# Patient Record
Sex: MALE
[De-identification: names, ages, dates, MRNs, and addresses within clinical notes are randomized per-mention and may not be internally consistent; named-entity substitution may affect disease eponyms.]

---

## 2020-02-21 ENCOUNTER — HOSPITAL ENCOUNTER (INPATIENT)
Dept: HOSPITAL 88 - FSED | Age: 40
LOS: 6 days | Discharge: HOME HEALTH | DRG: 871 | End: 2020-02-27
Attending: INTERNAL MEDICINE | Admitting: INTERNAL MEDICINE
Payer: COMMERCIAL

## 2020-02-21 VITALS — WEIGHT: 237 LBS | HEIGHT: 75 IN | BODY MASS INDEX: 29.47 KG/M2

## 2020-02-21 VITALS — SYSTOLIC BLOOD PRESSURE: 120 MMHG | DIASTOLIC BLOOD PRESSURE: 59 MMHG

## 2020-02-21 DIAGNOSIS — B95.62: ICD-10-CM

## 2020-02-21 DIAGNOSIS — G82.20: ICD-10-CM

## 2020-02-21 DIAGNOSIS — Z79.4: ICD-10-CM

## 2020-02-21 DIAGNOSIS — L89.324: ICD-10-CM

## 2020-02-21 DIAGNOSIS — N31.9: ICD-10-CM

## 2020-02-21 DIAGNOSIS — L89.224: ICD-10-CM

## 2020-02-21 DIAGNOSIS — R53.81: ICD-10-CM

## 2020-02-21 DIAGNOSIS — D50.9: ICD-10-CM

## 2020-02-21 DIAGNOSIS — M86.69: ICD-10-CM

## 2020-02-21 DIAGNOSIS — E11.40: ICD-10-CM

## 2020-02-21 DIAGNOSIS — E11.69: ICD-10-CM

## 2020-02-21 DIAGNOSIS — Z74.01: ICD-10-CM

## 2020-02-21 DIAGNOSIS — L89.214: ICD-10-CM

## 2020-02-21 DIAGNOSIS — Z93.3: ICD-10-CM

## 2020-02-21 DIAGNOSIS — A41.9: Primary | ICD-10-CM

## 2020-02-21 DIAGNOSIS — E11.65: ICD-10-CM

## 2020-02-21 DIAGNOSIS — L89.154: ICD-10-CM

## 2020-02-21 DIAGNOSIS — L03.317: ICD-10-CM

## 2020-02-21 DIAGNOSIS — M86.19: ICD-10-CM

## 2020-02-21 DIAGNOSIS — E87.2: ICD-10-CM

## 2020-02-21 DIAGNOSIS — L89.314: ICD-10-CM

## 2020-02-21 DIAGNOSIS — L89.523: ICD-10-CM

## 2020-02-21 DIAGNOSIS — E43: ICD-10-CM

## 2020-02-21 LAB
ANISOCYTOSIS BLD QL SMEAR: SLIGHT
BASOPHILS # BLD AUTO: 0 10*3/UL (ref 0–0.1)
BASOPHILS NFR BLD AUTO: 0.3 % (ref 0–1)
DEPRECATED NEUTROPHILS # BLD AUTO: 8.4 10*3/UL (ref 2.1–6.9)
EOSINOPHIL # BLD AUTO: 0.2 10*3/UL (ref 0–0.4)
EOSINOPHIL NFR BLD AUTO: 1.8 % (ref 0–6)
ERYTHROCYTE [DISTWIDTH] IN CORD BLOOD: 18.5 % (ref 11.7–14.4)
HCT VFR BLD AUTO: 23.6 % (ref 38.2–49.6)
HGB BLD-MCNC: 6.5 G/DL (ref 14–18)
HYPOCHROMIA BLD QL SMEAR: (no result)
LYMPHOCYTES # BLD: 3.2 10*3/UL (ref 1–3.2)
LYMPHOCYTES NFR BLD AUTO: 24.8 % (ref 18–39.1)
MCH RBC QN AUTO: 21 PG (ref 28–32)
MCHC RBC AUTO-ENTMCNC: 27.5 G/DL (ref 31–35)
MCV RBC AUTO: 76.4 FL (ref 81–99)
MICROCYTES BLD QL SMEAR: SLIGHT
MONOCYTES # BLD AUTO: 1 10*3/UL (ref 0.2–0.8)
MONOCYTES NFR BLD AUTO: 7.5 % (ref 4.4–11.3)
NEUTS SEG NFR BLD AUTO: 64.3 % (ref 38.7–80)
PLAT MORPH BLD: (no result)
PLATELET # BLD AUTO: 1236 X10E3/UL (ref 140–360)
PLATELET # BLD EST: (no result) 10*3/UL
POLYCHROMASIA BLD QL SMEAR: (no result)
RBC # BLD AUTO: 3.09 X10E6/UL (ref 4.3–5.7)

## 2020-02-21 PROCEDURE — 84443 ASSAY THYROID STIM HORMONE: CPT

## 2020-02-21 PROCEDURE — 86140 C-REACTIVE PROTEIN: CPT

## 2020-02-21 PROCEDURE — 84100 ASSAY OF PHOSPHORUS: CPT

## 2020-02-21 PROCEDURE — 81003 URINALYSIS AUTO W/O SCOPE: CPT

## 2020-02-21 PROCEDURE — 96372 THER/PROPH/DIAG INJ SC/IM: CPT

## 2020-02-21 PROCEDURE — 82728 ASSAY OF FERRITIN: CPT

## 2020-02-21 PROCEDURE — 86900 BLOOD TYPING SEROLOGIC ABO: CPT

## 2020-02-21 PROCEDURE — 87205 SMEAR GRAM STAIN: CPT

## 2020-02-21 PROCEDURE — 83735 ASSAY OF MAGNESIUM: CPT

## 2020-02-21 PROCEDURE — 82948 REAGENT STRIP/BLOOD GLUCOSE: CPT

## 2020-02-21 PROCEDURE — 36415 COLL VENOUS BLD VENIPUNCTURE: CPT

## 2020-02-21 PROCEDURE — 87040 BLOOD CULTURE FOR BACTERIA: CPT

## 2020-02-21 PROCEDURE — 80202 ASSAY OF VANCOMYCIN: CPT

## 2020-02-21 PROCEDURE — 85025 COMPLETE CBC W/AUTO DIFF WBC: CPT

## 2020-02-21 PROCEDURE — 83605 ASSAY OF LACTIC ACID: CPT

## 2020-02-21 PROCEDURE — 82607 VITAMIN B-12: CPT

## 2020-02-21 PROCEDURE — 84134 ASSAY OF PREALBUMIN: CPT

## 2020-02-21 PROCEDURE — 86920 COMPATIBILITY TEST SPIN: CPT

## 2020-02-21 PROCEDURE — 87186 SC STD MICRODIL/AGAR DIL: CPT

## 2020-02-21 PROCEDURE — 72170 X-RAY EXAM OF PELVIS: CPT

## 2020-02-21 PROCEDURE — 85651 RBC SED RATE NONAUTOMATED: CPT

## 2020-02-21 PROCEDURE — 80053 COMPREHEN METABOLIC PANEL: CPT

## 2020-02-21 PROCEDURE — 82746 ASSAY OF FOLIC ACID SERUM: CPT

## 2020-02-21 PROCEDURE — 84466 ASSAY OF TRANSFERRIN: CPT

## 2020-02-21 PROCEDURE — 87086 URINE CULTURE/COLONY COUNT: CPT

## 2020-02-21 PROCEDURE — 73521 X-RAY EXAM HIPS BI 2 VIEWS: CPT

## 2020-02-21 PROCEDURE — 87071 CULTURE AEROBIC QUANT OTHER: CPT

## 2020-02-21 PROCEDURE — 83540 ASSAY OF IRON: CPT

## 2020-02-21 PROCEDURE — 80048 BASIC METABOLIC PNL TOTAL CA: CPT

## 2020-02-21 PROCEDURE — 93925 LOWER EXTREMITY STUDY: CPT

## 2020-02-21 PROCEDURE — 36569 INSJ PICC 5 YR+ W/O IMAGING: CPT

## 2020-02-21 PROCEDURE — 83036 HEMOGLOBIN GLYCOSYLATED A1C: CPT

## 2020-02-21 PROCEDURE — 86850 RBC ANTIBODY SCREEN: CPT

## 2020-02-21 RX ADMIN — SODIUM CHLORIDE PRN MG: 900 INJECTION INTRAVENOUS at 20:38

## 2020-02-21 RX ADMIN — HYDROCODONE BITARTRATE AND ACETAMINOPHEN PRN EA: 5; 325 TABLET ORAL at 23:50

## 2020-02-21 RX ADMIN — INSULIN LISPRO SCH UNIT: 100 INJECTION, SOLUTION INTRAVENOUS; SUBCUTANEOUS at 23:30

## 2020-02-21 NOTE — XMS REPORT
Patient Summary Document

                             Created on: 2020



BALJEET JESSICA

External Reference #: [id number]

: 1980

Sex: Male



Demographics







                          Address                   [address] [place]  [address]

 

                          Home Phone                [phone]

 

                          Preferred Language        Unknown

 

                          Marital Status            Unknown

 

                          Hindu Affiliation     Unknown

 

                          Race                      Unknown

 

                          Ethnic Group              Unknown





Author







                          Author                    [facility]

 

                          Address                   Unknown

 

                          Phone                     Unavailable







Care Team Providers







                    Care Team Member Name    Role                Phone

 

                    UNKNOWN,  REFFERING    PP                  Unavailable

 

                    HANNAH SANDHU    Unavailable         Unavailable

 

                    FRANCESCA ETIENNE        Unavailable         Unavailable

 

                    JUSTEN DESAI    Unavailable         Unavailable

 

                    CHRIS BOSCH        Unavailable         Unavailable

 

                    CHEYANNE TURNER    Unavailable         Unavailable







Problems

This patient has no known problems.



Allergies, Adverse Reactions, Alerts

This patient has no known allergies or adverse reactions.



Medications

This patient has no known medications.



Encounters







             Start Date/Time    End Date/Time    Encounter Type    Admission Type    Attending [facility]

                    Care Facility       Care Department     Encounter ID

 

        2019 00:00:00    2019 00:00:00    Outpatient                    [facility]     [id number]

 

        2019 03:38:39    2019 03:38:39    Emergency                    [facility]     [id number]

 

        2019 23:43:52    2019 23:43:52    Outpatient                    [facility]     MED     [id number]

 

        2017-09-15 12:36:00    2017-09-15 12:36:00    Emergency    E               [facility]    MED     [id number]









Results







           Test Description    Test Time    Test Comments    Text Results    Atomic Results    Result

 Comments

 

                POC Glucose     2019 11:24:41                      

 

   

 

                Glucose POC (test code=Glucose POC)    183 mg/dL                 If you consider your patient

 critically ill, the Roche-Accu Check Infrom II meter should not be used for 
Glucose determination. Draw a venous Glucose and send to the main Lab for 
analysis.





POC [id number] 07:12:47* 





                Test Item       Value           Reference Range    Comments

 

                Glucose POC (test code=Glucose POC)    259 mg/dL                 If you consider your patient

 critically ill, the Roche-Accu Check Infrom II meter should not be used for 
Glucose determination. Draw a venous Glucose and send to the main Lab for 
analysis.





POC [id number] 20:28:10* 





                Test Item       Value           Reference Range    Comments

 

                Glucose POC (test code=Glucose POC)    227 mg/dL                 Notify RN or MDIf you consider

 your patient critically ill, the Roche-Accu Check Infrom II meter should not be
used for Glucose determination. Draw a venous Glucose and send to the main Lab 
for analysis.





POC [id number] 16:21:11* 





                Test Item       Value           Reference Range    Comments

 

                Glucose POC (test code=Glucose POC)    202 mg/dL                 If you consider your patient

 critically ill, the Roche-Accu Check Infrom II meter should not be used for 
Glucose determination. Draw a venous Glucose and send to the main Lab for 
analysis.





Vancomycin Level [id number] 13:25:28* 





                Test Item       Value           Reference Range    Comments

 

                Vanco Tr (test code=Vanco Tr)    13.5 mcg/mL     10.0-20.0        





POC [id number] 12:13:43* 





                Test Item       Value           Reference Range    Comments

 

                Glucose POC (test code=Glucose POC)    217 mg/dL                 If you consider your patient

 critically ill, the Roche-Accu Check Infrom II meter should not be used for 
Glucose determination. Draw a venous Glucose and send to the main Lab for 
analysis.





POC [id number] 07:42:42* 





                Test Item       Value           Reference Range    Comments

 

                Glucose POC (test code=Glucose POC)    183 mg/dL                 If you consider your patient

 critically ill, the Roche-Accu Check Infrom II meter should not be used for 
Glucose determination. Draw a venous Glucose and send to the main Lab for 
analysis.





POC [id number] 20:55:39* 





                Test Item       Value           Reference Range    Comments

 

                Glucose POC (test code=Glucose POC)    158 mg/dL                 If you consider your patient

 critically ill, the Roche-Accu Check Infrom II meter should not be used for 
Glucose determination. Draw a venous Glucose and send to the main Lab for 
analysis.





POC [id number] 16:22:41* 





                Test Item       Value           Reference Range    Comments

 

                Glucose POC (test code=Glucose POC)    134 mg/dL                 Notify RN or MDIf you consider

 your patient critically ill, the Roche-Accu Check Infrom II meter should not be
used for Glucose determination. Draw a venous Glucose and send to the main Lab 
for analysis.





POC [id number] 11:01:35* 





                Test Item       Value           Reference Range    Comments

 

                Glucose POC (test code=Glucose POC)    297 mg/dL                 Notify RN or MDIf you consider

 your patient critically ill, the Roche-Accu Check Infrom II meter should not be
used for Glucose determination. Draw a venous Glucose and send to the main Lab 
for analysis.





ABORh [id number] 08:17:25* 





                Test Item       Value           Reference Range    Comments

 

                Methodology (test code=Methodology)    Test-Tube(TT)                     

 

                Anti-A (test code=Anti-A)    0                                

 

                Anti-B (test code=Anti-B)    0                                

 

                Anti-AB (test code=Anti-AB)    NT                               

 

                Anti-D (test code=Anti-D)    4+                               

 

                ABORh Retype (test code=ABORh Retype)    O POS                            





POC [id number] 07:20:40* 





                Test Item       Value           Reference Range    Comments

 

                Glucose POC (test code=Glucose POC)    185 mg/dL                 Notify RN or MDIf you consider

 your patient critically ill, the Roche-Accu Check Infrom II meter should not be
used for Glucose determination. Draw a venous Glucose and send to the main Lab 
for analysis.





3C ABSC [id number] 06:21:17* 





                Test Item       Value           Reference Range    Comments

 

                SC1 IS (test code=SC1 IS)    NT                               

 

                SC2 IS (test code=SC2 IS)    NT                               

 

                SC3 IS (test code=SC3 IS)    NT                               

 

                SC1 37 (test code=SC1 37)    0                                

 

                SC2 37 (test code=SC2 37)    0                                

 

                SC3 37 (test code=SC3 37)    0                                

 

                SC1 AHG (test code=SC1 AHG)    0                                

 

                SC2 AHG (test code=SC2 AHG)    0                                

 

                SC3 AHG (test code=SC3 AHG)    0                                

 

                SC1 CC (test code=SC1 CC)    2+                               

 

                SC2 CC (test code=SC2 CC)    2+                               

 

                SC3 CC (test code=SC3 CC)    2+                               

 

                Antibody Screen (3C) (test code=Antibody Screen (3C))    Negative ABSC                     





[id number] 05:54:23* 





                Test Item       Value           Reference Range    Comments

 

                Previous History (test code=Previous History)    No Prev History                     

 

                BBID (test code=BBID)    [id number]                         

 

                Methodology (test code=Methodology)    Test-Tube(TT)                     

 

                Anti-A (test code=Anti-A)    0                                

 

                Anti-B (test code=Anti-B)    0                                

 

                Anti-D (test code=Anti-D)    4+                               

 

                DCon (test code=DCon)    NT                               

 

                A1 (test code=A1)    4+                               

 

                B cells (test code=B cells)    4+                               

 

                ABORh (test code=ABORh)    O POS                            





Automated [id number] 05:41:48* 





                Test Item       Value           Reference Range    Comments

 

                Neutro Auto (test code=Neutro Auto)    50.8 %          36.0-70.0        

 

                Lymph Auto (test code=Lymph Auto)    35.3 %          12.0-44.0        

 

                Mono Auto (test code=Mono Auto)    9.3 %           0.0-11.0         

 

                Eos, Auto (test code=Eos, Auto)    3.4 %           0.0-7.0          

 

                Basophil Auto (test code=Basophil Auto)    0.3 %           0.0-2.0          

 

                Neutro Absolute (test code=Neutro Absolute)    4.9 x10         1.6-7.4          

 

                Lymph Absolute (test code=Lymph Absolute)    3.41 x10        .50-4.60         

 

                Mono Absolute (test code=Mono Absolute)    .90 x10         .00-1.20         

 

                Eos Absolute (test code=Eos Absolute)    0.33 x10        0.00-0.74        

 

                Baso Absolute (test code=Baso Absolute)    0.03 x10        0.00-0.21        





IG [id number] 05:41:48* 





                Test Item       Value           Reference Range    Comments

 

                IG (test code=IG)    0.9 %           0.0-5.0          

 

                IG Abs (test code=IG Abs)    0 x10                            





Complete Blood Count with [id number] 05:41:47* 





                Test Item       Value           Reference Range    Comments

 

                WBC (test code=WBC)    9.6 x10         4.4-10.5         

 

                RBC (test code=RBC)    3.62 x10        4.10-5.70        

 

                Hgb (test code=Hgb)    8.5 g/dL        13.4-17.4        

 

                Hct (test code=Hct)    28.9 %          38.7-52.0        

 

                MCV (test code=MCV)    79.80 fL        80..00     

 

                MCHC (test code=MCHC)    29.40 g/dL      32.00-37.50      

 

                RDW CV (test code=RDW CV)    16.0 %          11.5-14.5        

 

                MCH (test code=MCH)    23.5 pg         27.0-32.5        

 

                Platelets (test code=Platelets)    634.0 x10       140.0-440.0      

 

                MPV (test code=MPV)    8.3 fL                           

 

                Slide Review (test code=Slide Review)    Auto            Auto            Result created by GL_SJM_SLIDE_REV_AUTO

 [email or web address]

 

                nRBC (test code=nRBC)    0                                

 

                NRBC Abs (test code=NRBC Abs)    0.00 x10                         

 

                Pos Morph XN (test code=Pos Morph XN)    A                                

 

                IPF (test code=IPF)    0 %                              





Basic Metabolic [id number] 05:25:44* 





                Test Item       Value           Reference Range    Comments

 

                Sodium Level (test code=Sodium Level)    136.0 mmol/L    135.0-145.0      

 

                Potassium Level (test code=Potassium Level)    4.4 mmol/L      3.5-5.1          

 

                Chloride Level (test code=Chloride Level)    95 mmol/L                  

 

                CO2 (test code=CO2)    30 mmol/L       22-29            

 

                Anion Gap (test code=Anion Gap)    11 mmol/L       7-16             

 

                BUN (test code=BUN)    14.50 mg/dL     6.00-20.00       

 

                Creatinine Level (test code=Creatinine Level)    0.60 mg/dL      0.70-1.20        

 

                BUN/Creat Ratio (test code=BUN/Creat Ratio)    24                               

 

                Glucose Level (test code=Glucose Level)    179 mg/dL                  

 

                Calcium Level (test code=Calcium Level)    8.9 mg/dL       8.3-10.5         





Basic Metabolic [id number] 05:25:44* 





                Test Item       Value           Reference Range    Comments

 

                Sodium Level (test code=Sodium Level)    136.0 mmol/L    135.0-145.0      

 

                Potassium Level (test code=Potassium Level)    4.4 mmol/L      3.5-5.1          

 

                Chloride Level (test code=Chloride Level)    95 mmol/L                  

 

                CO2 (test code=CO2)    30 mmol/L       22-29            

 

                Anion Gap (test code=Anion Gap)    11 mmol/L       7-16             

 

                BUN (test code=BUN)    14.50 mg/dL     6.00-20.00       

 

                Creatinine Level (test code=Creatinine Level)    0.60 mg/dL      0.70-1.20        

 

                BUN/Creat Ratio (test code=BUN/Creat Ratio)    24                               

 

                Glucose Level (test code=Glucose Level)    179 mg/dL                  

 

                Calcium Level (test code=Calcium Level)    8.9 mg/dL       8.3-10.5         

 

                eGFR AA (test code=eGFR AA)    >60 mL/min/1.73 m2                    eGFR (estimated Glomerular Filtration

 Rate) is an estimated value, calculated from the patient's serum creatinine 
using the MDRD equation. It is NOT the patient's actual GFR. The eGFR provides a
more clinically useful measure of kidney disease than serum creatinine 
alone.***This calculation takes sex and race into account, if the information is
provided. If the race is not provided, and the patient is -American, 
multiply by 1.212. If sex is not provided, and the patient is female, multiply 
by 0.742. Results for patients <18 years of age have not been validated by the 
MDRD study and should be interpreted with caution. eGFR Result 
Interpretation:eGFR > or=60 is in the Normal RangeeGFR < 60 may mean kidney 
diseaseeGFR < 15 may mean kidney failure*** Ranges recommended by the National 
Kidney Foundation, http://nkdep.nih.gov





Basic Metabolic [id number] 05:25:44* 





                Test Item       Value           Reference Range    Comments

 

                Sodium Level (test code=Sodium Level)    136.0 mmol/L    135.0-145.0      

 

                Potassium Level (test code=Potassium Level)    4.4 mmol/L      3.5-5.1          

 

                Chloride Level (test code=Chloride Level)    95 mmol/L                  

 

                CO2 (test code=CO2)    30 mmol/L       22-29            

 

                Anion Gap (test code=Anion Gap)    11 mmol/L       7-16             

 

                BUN (test code=BUN)    14.50 mg/dL     6.00-20.00       

 

                Creatinine Level (test code=Creatinine Level)    0.60 mg/dL      0.70-1.20        

 

                BUN/Creat Ratio (test code=BUN/Creat Ratio)    24                               

 

                Glucose Level (test code=Glucose Level)    179 mg/dL                  

 

                Calcium Level (test code=Calcium Level)    8.9 mg/dL       8.3-10.5         

 

                eGFR AA (test code=eGFR AA)    >60 mL/min/1.73 m2                    eGFR (estimated Glomerular Filtration

 Rate) is an estimated value, calculated from the patient's serum creatinine 
using the MDRD equation. It is NOT the patient's actual GFR. The eGFR provides a
more clinically useful measure of kidney disease than serum creatinine 
alone.***This calculation takes sex and race into account, if the information is
provided. If the race is not provided, and the patient is -American, 
multiply by 1.212. If sex is not provided, and the patient is female, multiply 
by 0.742. Results for patients <18 years of age have not been validated by the 
MDRD study and should be interpreted with caution. eGFR Result 
Interpretation:eGFR > or=60 is in the Normal RangeeGFR < 60 may mean kidney 
diseaseeGFR < 15 may mean kidney failure*** Ranges recommended by the National 
Kidney Foundation, http://nkdep.nih.gov

 

                eGFR Non-AA (test code=eGFR Non-AA)    >60.00 mL/min/1.73 m2                    eGFR (estimated Glomerular

 Filtration Rate) is an estimated value, calculated from the patient's serum 
creatinine using the MDRD equation. It is NOT the patient's actual GFR. The eGFR
provides a more clinically useful measure of kidney disease than serum 
creatinine alone.***This calculation takes sex and race into account, if the 
information is provided. If the race is not provided, and the patient is 
-American, multiply by 1.212. If sex is not provided, and the patient is 
female, multiply by 0.742. Results for patients <18 years of age have not been 
validated by the MDRD study and should be interpreted with caution. eGFR Result 
Interpretation:eGFR > or=60 is in the Normal RangeeGFR < 60 may mean kidney 
diseaseeGFR < 15 may mean kidney failure*** Ranges recommended by the National 
Kidney Foundation, http://nkdep.nih.gov





POC [id number] 20:32:07* 





                Test Item       Value           Reference Range    Comments

 

                Glucose POC (test code=Glucose POC)    155 mg/dL                 If you consider your patient

 critically ill, the Roche-Accu Check Infrom II meter should not be used for 
Glucose determination. Draw a venous Glucose and send to the main Lab for 
analysis.





POC [id number] 16:56:40* 





                Test Item       Value           Reference Range    Comments

 

                Glucose POC (test code=Glucose POC)    161 mg/dL                 If you consider your patient

 critically ill, the Roche-Accu Check Infrom II meter should not be used for 
Glucose determination. Draw a venous Glucose and send to the main Lab for 
analysis.





POC [id number] 11:55:05* 





                Test Item       Value           Reference Range    Comments

 

                Glucose POC (test code=Glucose POC)    291 mg/dL                 If you consider your patient

 critically ill, the Roche-Accu Check Infrom II meter should not be used for 
Glucose determination. Draw a venous Glucose and send to the main Lab for 
analysis.





POC [id number] 07:58:10* 





                Test Item       Value           Reference Range    Comments

 

                Glucose POC (test code=Glucose POC)    227 mg/dL                 If you consider your patient

 critically ill, the Roche-Accu Check Infrom II meter should not be used for 
Glucose determination. Draw a venous Glucose and send to the main Lab for 
analysis.





Vancomycin Level [id number] 21:57:56* 





                Test Item       Value           Reference Range    Comments

 

                Vanco Tr (test code=Vanco Tr)    11.6 mcg/mL     10.0-20.0        





POC [id number] 20:04:38* 





                Test Item       Value           Reference Range    Comments

 

                Glucose POC (test code=Glucose POC)    123 mg/dL                 If you consider your patient

 critically ill, the Roche-Accu Check Infrom II meter should not be used for 
Glucose determination. Draw a venous Glucose and send to the main Lab for 
analysis.





POC [id number] 16:27:10* 





                Test Item       Value           Reference Range    Comments

 

                Glucose POC (test code=Glucose POC)    164 mg/dL                 If you consider your patient

 critically ill, the Roche-Accu Check Infrom II meter should not be used for 
Glucose determination. Draw a venous Glucose and send to the main Lab for 
analysis.





POC [id number] 11:56:10* 





                Test Item       Value           Reference Range    Comments

 

                Glucose POC (test code=Glucose POC)    161 mg/dL                 If you consider your patient

 critically ill, the Roche-Accu Check Infrom II meter should not be used for 
Glucose determination. Draw a venous Glucose and send to the main Lab for 
analysis.





Blood [id number] 09:01:35No growth at 5 days.POC [id number] 
07:39:33* 





                Test Item       Value           Reference Range    Comments

 

                Glucose POC (test code=Glucose POC)    195 mg/dL                 If you consider your patient

 critically ill, the Roche-Accu Check Infrom II meter should not be used for 
Glucose determination. Draw a venous Glucose and send to the main Lab for 
analysis.





Basic Metabolic [id number] 06:17:36* 





                Test Item       Value           Reference Range    Comments

 

                Sodium Level (test code=Sodium Level)    138.0 mmol/L    135.0-145.0      

 

                Potassium Level (test code=Potassium Level)    4.0 mmol/L      3.5-5.1          

 

                Chloride Level (test code=Chloride Level)    100 mmol/L                 

 

                CO2 (test code=CO2)    27 mmol/L       22-29            

 

                Anion Gap (test code=Anion Gap)    11 mmol/L       7-16             

 

                BUN (test code=BUN)    9.00 mg/dL      6.00-20.00       

 

                Creatinine Level (test code=Creatinine Level)    0.60 mg/dL      0.70-1.20        

 

                BUN/Creat Ratio (test code=BUN/Creat Ratio)    15                               

 

                Glucose Level (test code=Glucose Level)    189 mg/dL                  

 

                Calcium Level (test code=Calcium Level)    8.5 mg/dL       8.3-10.5         





Basic Metabolic [id number] 06:17:36* 





                Test Item       Value           Reference Range    Comments

 

                Sodium Level (test code=Sodium Level)    138.0 mmol/L    135.0-145.0      

 

                Potassium Level (test code=Potassium Level)    4.0 mmol/L      3.5-5.1          

 

                Chloride Level (test code=Chloride Level)    100 mmol/L                 

 

                CO2 (test code=CO2)    27 mmol/L       22-29            

 

                Anion Gap (test code=Anion Gap)    11 mmol/L       7-16             

 

                BUN (test code=BUN)    9.00 mg/dL      6.00-20.00       

 

                Creatinine Level (test code=Creatinine Level)    0.60 mg/dL      0.70-1.20        

 

                BUN/Creat Ratio (test code=BUN/Creat Ratio)    15                               

 

                Glucose Level (test code=Glucose Level)    189 mg/dL                  

 

                Calcium Level (test code=Calcium Level)    8.5 mg/dL       8.3-10.5         

 

                eGFR AA (test code=eGFR AA)    >60 mL/min/1.73 m2                    eGFR (estimated Glomerular Filtration

 Rate) is an estimated value, calculated from the patient's serum creatinine 
using the MDRD equation. It is NOT the patient's actual GFR. The eGFR provides a
more clinically useful measure of kidney disease than serum creatinine 
alone.***This calculation takes sex and race into account, if the information is
provided. If the race is not provided, and the patient is -American, 
multiply by 1.212. If sex is not provided, and the patient is female, multiply 
by 0.742. Results for patients <18 years of age have not been validated by the 
MDRD study and should be interpreted with caution. eGFR Result 
Interpretation:eGFR > or=60 is in the Normal RangeeGFR < 60 may mean kidney 
diseaseeGFR < 15 may mean kidney failure*** Ranges recommended by the National 
Kidney Foundation, http://nkdep.nih.gov





Basic Metabolic [id number] 06:17:36* 





                Test Item       Value           Reference Range    Comments

 

                Sodium Level (test code=Sodium Level)    138.0 mmol/L    135.0-145.0      

 

                Potassium Level (test code=Potassium Level)    4.0 mmol/L      3.5-5.1          

 

                Chloride Level (test code=Chloride Level)    100 mmol/L                 

 

                CO2 (test code=CO2)    27 mmol/L       22-29            

 

                Anion Gap (test code=Anion Gap)    11 mmol/L       7-16             

 

                BUN (test code=BUN)    9.00 mg/dL      6.00-20.00       

 

                Creatinine Level (test code=Creatinine Level)    0.60 mg/dL      0.70-1.20        

 

                BUN/Creat Ratio (test code=BUN/Creat Ratio)    15                               

 

                Glucose Level (test code=Glucose Level)    189 mg/dL                  

 

                Calcium Level (test code=Calcium Level)    8.5 mg/dL       8.3-10.5         

 

                eGFR AA (test code=eGFR AA)    >60 mL/min/1.73 m2                    eGFR (estimated Glomerular Filtration

 Rate) is an estimated value, calculated from the patient's serum creatinine 
using the MDRD equation. It is NOT the patient's actual GFR. The eGFR provides a
more clinically useful measure of kidney disease than serum creatinine 
alone.***This calculation takes sex and race into account, if the information is
provided. If the race is not provided, and the patient is -American, 
multiply by 1.212. If sex is not provided, and the patient is female, multiply 
by 0.742. Results for patients <18 years of age have not been validated by the 
MDRD study and should be interpreted with caution. eGFR Result 
Interpretation:eGFR > or=60 is in the Normal RangeeGFR < 60 may mean kidney 
diseaseeGFR < 15 may mean kidney failure*** Ranges recommended by the National 
Kidney Foundation, http://nkdep.nih.gov

 

                eGFR Non-AA (test code=eGFR Non-AA)    >60.00 mL/min/1.73 m2                    eGFR (estimated Glomerular

 Filtration Rate) is an estimated value, calculated from the patient's serum 
creatinine using the MDRD equation. It is NOT the patient's actual GFR. The eGFR
provides a more clinically useful measure of kidney disease than serum 
creatinine alone.***This calculation takes sex and race into account, if the 
information is provided. If the race is not provided, and the patient is 
-American, multiply by 1.212. If sex is not provided, and the patient is 
female, multiply by 0.742. Results for patients <18 years of age have not been 
validated by the MDRD study and should be interpreted with caution. eGFR Result 
Interpretation:eGFR > or=60 is in the Normal RangeeGFR < 60 may mean kidney 
diseaseeGFR < 15 may mean kidney failure*** Ranges recommended by the National 
Kidney Foundation, http://nkdep.nih.gov





Complete Blood Count with [id number] 05:15:48* 





                Test Item       Value           Reference Range    Comments

 

                WBC (test code=WBC)    10.2 x10        4.4-10.5         

 

                RBC (test code=RBC)    3.15 x10        4.10-5.70        

 

                Hgb (test code=Hgb)    7.5 g/dL        13.4-17.4        

 

                Hct (test code=Hct)    25.8 %          38.7-52.0        

 

                MCV (test code=MCV)    81.90 fL        80..00     

 

                MCHC (test code=MCHC)    29.10 g/dL      32.00-37.50      

 

                RDW CV (test code=RDW CV)    16.4 %          11.5-14.5        

 

                MCH (test code=MCH)    23.8 pg         27.0-32.5        

 

                Platelets (test code=Platelets)    595.0 x10       140.0-440.0      

 

                MPV (test code=MPV)    8.7 fL                           

 

                Slide Review (test code=Slide Review)    Auto            Auto            Result created by [email or web address]_SLIDE_REV_[email or web address]

 [email or web address]

 

                nRBC (test code=nRBC)    0                                

 

                NRBC Abs (test code=NRBC Abs)    0.00 x10                         

 

                Pos Morph XN (test code=Pos Morph XN)    A                                

 

                IPF (test code=IPF)    0 %                              





Automated [id number] 05:15:48* 





                Test Item       Value           Reference Range    Comments

 

                Neutro Auto (test code=Neutro Auto)    46.3 %          36.0-70.0        

 

                Lymph Auto (test code=Lymph Auto)    41.3 %          12.0-44.0        

 

                Mono Auto (test code=Mono Auto)    8.3 %           0.0-11.0         

 

                Eos, Auto (test code=Eos, Auto)    3.2 %           0.0-7.0          

 

                Basophil Auto (test code=Basophil Auto)    0.3 %           0.0-2.0          

 

                Neutro Absolute (test code=Neutro Absolute)    4.7 x10         1.6-7.4          

 

                Lymph Absolute (test code=Lymph Absolute)    4.21 x10        .50-4.60         

 

                Mono Absolute (test code=Mono Absolute)    .85 x10         .00-1.20         

 

                Eos Absolute (test code=Eos Absolute)    0.33 x10        0.00-0.74        

 

                Baso Absolute (test code=Baso Absolute)    0.03 x10        0.00-0.21        





IG [id number] 05:15:48* 





                Test Item       Value           Reference Range    Comments

 

                IG (test code=IG)    0.6 %           0.0-5.0          

 

                IG Abs (test code=IG Abs)    0 x10                            





Vancomycin Level [id number] 23:32:37* 





                Test Item       Value           Reference Range    Comments

 

                Vanco Tr (test code=Vanco Tr)    2.8 mcg/mL      10.0-20.0        





POC [id number] 20:57:44* 





                Test Item       Value           Reference Range    Comments

 

                Glucose POC (test code=Glucose POC)    309 mg/dL                 If you consider your patient

 critically ill, the Roche-Accu Check Infrom II meter should not be used for 
Glucose determination. Draw a venous Glucose and send to the main Lab for 
analysis.





POC [id number] 16:21:03* 





                Test Item       Value           Reference Range    Comments

 

                Glucose POC (test code=Glucose POC)    199 mg/dL                 Notify RN or MDIf you consider

 your patient critically ill, the Roche-Accu Check Infrom II meter should not be
used for Glucose determination. Draw a venous Glucose and send to the main Lab 
for analysis.





POC [id number] 11:51:37* 





                Test Item       Value           Reference Range    Comments

 

                Glucose POC (test code=Glucose POC)    234 mg/dL                 Notify RN or MDIf you consider

 your patient critically ill, the Roche-Accu Check Infrom II meter should not be
used for Glucose determination. Draw a venous Glucose and send to the main Lab 
for analysis.





Urine [id number] 10:38:47 C Urine Added by GL_SJM_UA_CUL_IND>=100,000 
cfu/ml YeastPOC [id number] 07:40:34* 





                Test Item       Value           Reference Range    Comments

 

                Glucose POC (test code=Glucose POC)    228 mg/dL                 Notify RN or MDIf you consider

 your patient critically ill, the Roche-Accu Check Infrom II meter should not be
used for Glucose determination. Draw a venous Glucose and send to the main Lab 
for analysis.





POC [id number] 19:55:40* 





                Test Item       Value           Reference Range    Comments

 

                Glucose POC (test code=Glucose POC)    217 mg/dL                 If you consider your patient

 critically ill, the Roche-Accu Check Infrom II meter should not be used for 
Glucose determination. Draw a venous Glucose and send to the main Lab for 
analysis.





POC [id number] 16:06:29* 





                Test Item       Value           Reference Range    Comments

 

                Glucose POC (test code=Glucose POC)    228 mg/dL                 Notify RN or MDIf you consider

 your patient critically ill, the Roche-Accu Check Infrom II meter should not be
used for Glucose determination. Draw a venous Glucose and send to the main Lab 
for analysis.





POC [id number] 11:15:06* 





                Test Item       Value           Reference Range    Comments

 

                Glucose POC (test code=Glucose POC)    251 mg/dL                 Notify RN or MDIf you consider

 your patient critically ill, the Roche-Accu Check Infrom II meter should not be
used for Glucose determination. Draw a venous Glucose and send to the main Lab 
for analysis.





Urinalysis [id number] 06:51:37* 





                Test Item       Value           Reference Range    Comments

 

                UA WBC (test code=UA WBC)    20-29           0-5              

 

                UA RBC (test code=UA RBC)    6-10            0-5              

 

                UA Bacteria (test code=UA Bacteria)    Few                              

 

                UA Squam Epithelial (test code=UA Squam Epithelial)    11-19                            

 

                UA Yeast (test code=UA Yeast)    Many                             

 

                UA Ca Ox Crystal (test code=UA Ca Ox Crystal)    Few                              





Basic Metabolic [id number] 06:42:47* 





                Test Item       Value           Reference Range    Comments

 

                Sodium Level (test code=Sodium Level)    144.0 mmol/L    135.0-145.0      

 

                Potassium Level (test code=Potassium Level)    3.8 mmol/L      3.5-5.1          

 

                Chloride Level (test code=Chloride Level)    106 mmol/L                 

 

                CO2 (test code=CO2)    27 mmol/L       22-29            

 

                Anion Gap (test code=Anion Gap)    11 mmol/L       7-16             

 

                BUN (test code=BUN)    8.10 mg/dL      6.00-20.00       

 

                Creatinine Level (test code=Creatinine Level)    0.80 mg/dL      0.70-1.20        

 

                BUN/Creat Ratio (test code=BUN/Creat Ratio)    10                               

 

                Glucose Level (test code=Glucose Level)    315 mg/dL                  

 

                Calcium Level (test code=Calcium Level)    8.6 mg/dL       8.3-10.5         





Basic Metabolic [id number] 06:42:47* 





                Test Item       Value           Reference Range    Comments

 

                Sodium Level (test code=Sodium Level)    144.0 mmol/L    135.0-145.0      

 

                Potassium Level (test code=Potassium Level)    3.8 mmol/L      3.5-5.1          

 

                Chloride Level (test code=Chloride Level)    106 mmol/L                 

 

                CO2 (test code=CO2)    27 mmol/L       22-29            

 

                Anion Gap (test code=Anion Gap)    11 mmol/L       7-16             

 

                BUN (test code=BUN)    8.10 mg/dL      6.00-20.00       

 

                Creatinine Level (test code=Creatinine Level)    0.80 mg/dL      0.70-1.20        

 

                BUN/Creat Ratio (test code=BUN/Creat Ratio)    10                               

 

                Glucose Level (test code=Glucose Level)    315 mg/dL                  

 

                Calcium Level (test code=Calcium Level)    8.6 mg/dL       8.3-10.5         

 

                eGFR AA (test code=eGFR AA)    >60 mL/min/1.73 m2                    eGFR (estimated Glomerular Filtration

 Rate) is an estimated value, calculated from the patient's serum creatinine 
using the MDRD equation. It is NOT the patient's actual GFR. The eGFR provides a
more clinically useful measure of kidney disease than serum creatinine 
alone.***This calculation takes sex and race into account, if the information is
provided. If the race is not provided, and the patient is -American, 
multiply by 1.212. If sex is not provided, and the patient is female, multiply 
by 0.742. Results for patients <18 years of age have not been validated by the 
MDRD study and should be interpreted with caution. eGFR Result 
Interpretation:eGFR > or=60 is in the Normal RangeeGFR < 60 may mean kidney 
diseaseeGFR < 15 may mean kidney failure*** Ranges recommended by the National 
Kidney Foundation, http://nkdep.nih.gov





Basic Metabolic [id number] 06:42:47* 





                Test Item       Value           Reference Range    Comments

 

                Sodium Level (test code=Sodium Level)    144.0 mmol/L    135.0-145.0      

 

                Potassium Level (test code=Potassium Level)    3.8 mmol/L      3.5-5.1          

 

                Chloride Level (test code=Chloride Level)    106 mmol/L                 

 

                CO2 (test code=CO2)    27 mmol/L       22-29            

 

                Anion Gap (test code=Anion Gap)    11 mmol/L       7-16             

 

                BUN (test code=BUN)    8.10 mg/dL      6.00-20.00       

 

                Creatinine Level (test code=Creatinine Level)    0.80 mg/dL      0.70-1.20        

 

                BUN/Creat Ratio (test code=BUN/Creat Ratio)    10                               

 

                Glucose Level (test code=Glucose Level)    315 mg/dL                  

 

                Calcium Level (test code=Calcium Level)    8.6 mg/dL       8.3-10.5         

 

                eGFR AA (test code=eGFR AA)    >60 mL/min/1.73 m2                    eGFR (estimated Glomerular Filtration

 Rate) is an estimated value, calculated from the patient's serum creatinine 
using the MDRD equation. It is NOT the patient's actual GFR. The eGFR provides a
more clinically useful measure of kidney disease than serum creatinine 
alone.***This calculation takes sex and race into account, if the information is
provided. If the race is not provided, and the patient is -American, 
multiply by 1.212. If sex is not provided, and the patient is female, multiply 
by 0.742. Results for patients <18 years of age have not been validated by the 
MDRD study and should be interpreted with caution. eGFR Result 
Interpretation:eGFR > or=60 is in the Normal RangeeGFR < 60 may mean kidney 
diseaseeGFR < 15 may mean kidney failure*** Ranges recommended by the National 
Kidney Foundation, http://nkdep.nih.gov

 

                eGFR Non-AA (test code=eGFR Non-AA)    >60.00 mL/min/1.73 m2                    eGFR (estimated Glomerular

 Filtration Rate) is an estimated value, calculated from the patient's serum 
creatinine using the MDRD equation. It is NOT the patient's actual GFR. The eGFR
provides a more clinically useful measure of kidney disease than serum 
creatinine alone.***This calculation takes sex and race into account, if the 
information is provided. If the race is not provided, and the patient is 
-American, multiply by 1.212. If sex is not provided, and the patient is 
female, multiply by 0.742. Results for patients <18 years of age have not been 
validated by the MDRD study and should be interpreted with caution. eGFR Result 
Interpretation:eGFR > or=60 is in the Normal RangeeGFR < 60 may mean kidney 
diseaseeGFR < 15 may mean kidney failure*** Ranges recommended by the National 
Kidney Foundation, http://nkdep.nih.gov





Urinalysis with Culture, if [id number] 06:35:06* 





                Test Item       Value           Reference Range    Comments

 

                UA Color (test code=UA Color)    YELLO           Yellow           

 

                UA Appear (test code=UA Appear)    CLDY            Clear            

 

                UA pH (test code=UA pH)    6                                

 

                UA Spec Grav (test code=UA Spec Grav)    1.023           1.001-1.035      

 

                UA Glucose (test code=UA Glucose)    300 mg/dL       Negative         

 

                UA Bili (test code=UA Bili)    NEG             Negative         

 

                UA Ketones (test code=UA Ketones)    5 mg/dL         Negative         

 

                UA Blood (test code=UA Blood)    50 cells/mcL    Negative         

 

                UA Protein (test code=UA Protein)    75 mg/dL        Negative         

 

                UA Urobilinogen (test code=UA Urobilinogen)    4 mg/dL         >0.2             

 

                UA Nitrite (test code=UA Nitrite)    NEG             Negative         

 

                UA Leuk Est (test code=UA Leuk Est)    500 cells/mcL    Negative         

 

                UA Micro Ind? (test code=UA Micro Ind?)    Indicated       Not Indicated    Result created

 by rule GL_SJM_UA_MICRO_IND





Complete Blood Count without [id number] 06:23:13* 





                Test Item       Value           Reference Range    Comments

 

                WBC (test code=WBC)    11.5 x10        4.4-10.5         

 

                RBC (test code=RBC)    3.46 x10        4.10-5.70        

 

                Hgb (test code=Hgb)    8.3 g/dL        13.4-17.4        

 

                Hct (test code=Hct)    28.7 %          38.7-52.0        

 

                MCV (test code=MCV)    82.90 fL        80..00     

 

                MCH (test code=MCH)    24.0 pg         27.0-32.5        

 

                MCHC (test code=MCHC)    28.90 g/dL      32.00-37.50      

 

                RDW CV (test code=RDW CV)    16.9 %          11.5-14.5        

 

                Platelets (test code=Platelets)    712.0 x10       140.0-440.0      

 

                MPV (test code=MPV)    8.3 fL                           

 

                nRBC (test code=nRBC)    0                                

 

                NRBC Abs (test code=NRBC Abs)    0.00 x10                         

 

                IPF (test code=IPF)    0 %                              





POC [id number] 20:26:35* 





                Test Item       Value           Reference Range    Comments

 

                Glucose POC (test code=Glucose POC)    217 mg/dL                 If you consider your patient

 critically ill, the Roche-Accu Check Infrom II meter should not be used for 
Glucose determination. Draw a venous Glucose and send to the main Lab for 
analysis.





XR Chest 1 View CVAD Insertion [id number] 19:34:01Patient:   BALJEET JESSICA                                 MRN:    [id number] Date/Time2019
19:29 CDTReason for ExampiccReportChest one view AP 2019 7:33 PMCLINICAL 
INDICATION: PICC placementCOMPARISON: 2019 at 1849LOCATION: 
[id number]:Tip of a left PICC now projects over the cavoatrial junction. 
There is a trace right pleural effusion. The lungs are otherwise well aerated. 
Cardiomediastinal contours are within normal limits. The central pulmonary vascu
lature is not engorged.***** Final *****Dictated by:    Seipel, MD, Timothy JDic
tated DT/TM: 2019 7:33 pmSigned by:  Seipel, MD, Timothy JSigned (Electron
ic Signature):  2019 7:34 pmXR Chest 1 View [id number] 19:22:55
Patient:   BALJEET JESSICA                                 MRN:    [id number]
am Date/Time2019 19:02 CDTReason for ExamLine placementReportChest one view
AP 2019 7:22 PMCLINICAL INDICATION: Line placementCOMPARISON: None availab
leLOCATION: [id number]:The tip of a left PICC projects over the right atrium 
and could be retracted 4 to 5 cm. The lungs are well aerated. Cardiomediastinal 
contours are within normal limits. The central pulmonary vasculature is not engo
rged.***** Final *****Dictated by:    Seipel, MD, Timothy JDictated DT/TM:  7:22 pmSigned by:  Seipel, MD, Timothy JSigned (Electronic Signature):   7:22 pmBlood [id number] 18:03:00* 





                Test Item       Value           Reference Range    Comments

 

                ORGANISM (test code=ORGANISM)    Methicillin-Resistant Staphylococcus aureus                     



 

                Chloramphenicol (test code=Chlor)                                     

 

                Ciprofloxacin (test code=Cipro)                                     

 

                Clindamycin (test code=Clinda)                                     

 

                Erythromycin (test code=Eryth)                                     

 

                Gentamicin (test code=Gent)                                     

 

                Levofloxacin (test code=Levo)                                     

 

                Linezolid (test code=Linez)                                     

 

                Oxacillin (test code=Ox)                                     

 

                Rifampin (test code=Rif)                                     

 

                Tetracycline (test code=Tetra)                                     

 

                Trimethoprim/Sulfa (test code=SXT)                                     

 

                Vancomycin (test code=Vanc)                                     

 

                          Final Report (test code=Final Report)    Methicillin-Resistant Staphylococcus aureus

 Contact isolation recommended Results called to and read back by: Nicanor Hendricks RN 19 10:23:39/PXS                               

 

                          Anaerobic Gram Stain Report (test code=Anaerobic Gram Stain Report)    Evidence of

 growth Gram Positive Cocci in Clusters Results called to and read back by: LORETO Ellis  19 10:31:45 YA                               





Blood [id number] 18:03:00No growth at 5 days.POC [id number] 15:54:30* 





                Test Item       Value           Reference Range    Comments

 

                pH Art (test code=pH Art)    7.45            7.35-7.45        

 

                pH Temp Andrei Art (test code=pH Temp Andrei Art)    7.45                             

 

                pCO2 Art (test code=pCO2 Art)    40 mmHg         35-45            

 

                pCO2 Temp Andrei Art (test code=pCO2 Temp Andrei Art)    40 mmHg                          

 

                pO2 Art (test code=pO2 Art)    78 mmHg                    

 

                pO2 Temp Andrei Art (test code=pO2 Temp Andrei Art)    78 mmHg                          

 

                ctHb Art (test code=ctHb Art)    8.7 g/dL        12.2-17.4        

 

                O2 Sat Art (test code=O2 Sat Art)    96.5 %          80.0-100.0       

 

                FO2Hb Art (test code=FO2Hb Art)    94.0 %          0.0-100.0        

 

                FCOHb Art (test code=FCOHb Art)    2.5 %           0.0-20.0         

 

                FMetHb Art (test code=FMetHb Art)    0.1 %           0.0-20.0         

 

                HCO3 Art (test code=HCO3 Art)    27.6 mmol/L     22.0-26.0       L

 

                Hct Art (test code=Hct Art)    27 %            34-52            

 

                Na Art (test code=Na Art)    143 mmol/L      135-145          

 

                K Art (test code=K Art)    3.4 mmol/L      3.5-4.5          

 

                iCa Art (test code=iCa Art)    1.18 mmol/L     1.00-1.50        

 

                Cl Art (test code=Cl Art)    106 mmol/L                 

 

                Glu Art (test code=Glu Art)    294 mg/dL                  

 

                Base Excess Arterial (test code=Base Excess Arterial)    3.4                              

 

                FiO2 Art (test code=FiO2 Art)    21 %                             

 

                Lactate Art (test code=Lactate Art)    0.8 mmol/L      0.5-2.2          

 

                Draw Site (test code=Draw Site)    Radial. L                        





POC [id number] 15:41:58* 





                Test Item       Value           Reference Range    Comments

 

                Glucose POC (test code=Glucose POC)    287 mg/dL                 If you consider your patient

 critically ill, the Roche-Accu Check Infrom II meter should not be used for 
Glucose determination. Draw a venous Glucose and send to the main Lab for 
analysis.





Lactic Acid, Plasma (Venous)2019 15:37:44* 





                Test Item       Value           Reference Range    Comments

 

                          Lactic Acid, Plasma (Venous) (test code=Lactic Acid, Plasma (Venous))    1.0 mmol/L

                          0.5-1.9                    





Urine Drug [id number] 22:56:06* 





                Test Item       Value           Reference Range    Comments

 

                Amphetamine Screen Ur (test code=Amphetamine Screen Ur)    Negative        Negative         

 

                Barbiturate Screen Ur (test code=Barbiturate Screen Ur)    Negative        Negative         

 

                Benzodiazepines Ur (test code=Benzodiazepines Ur)    Negative        Negative         

 

                Cocaine Screen Ur (test code=Cocaine Screen Ur)    POSITIVE        Negative         

 

                U Methadone Scr (test code=U Methadone Scr)    Negative        Negative         

 

                Opiate Screen Ur (test code=Opiate Screen Ur)    Negative        Negative         

 

                U PCP Scrn (test code=U PCP Scrn)    Negative        Negative         

 

                Cannabinoid Screen Ur (test code=Cannabinoid Screen Ur)    POSITIVE        Negative         

 

                U TCA (test code=U TCA)    POSITIVE        Negative        The results of all drug screen tests

 are only preliminary. Clinical consideration and professional judgment should 
be applied to any drug of abuse test result, particularly when preliminary 
positive results are obtained. Please order a separate confirmatory test if 
desired.





Comprehensive Metabolic [id number] 21:46:03* 





                Test Item       Value           Reference Range    Comments

 

                Sodium Level (test code=Sodium Level)    135.0 mmol/L    135.0-145.0      

 

                Potassium Level (test code=Potassium Level)    3.7 mmol/L      3.5-5.1          

 

                Chloride Level (test code=Chloride Level)    96 mmol/L                  

 

                CO2 (test code=CO2)    20 mmol/L       22-29            

 

                Anion Gap (test code=Anion Gap)    19 mmol/L       7-16             

 

                BUN (test code=BUN)    13.00 mg/dL     6.00-20.00       

 

                Creatinine Level (test code=Creatinine Level)    0.80 mg/dL      0.70-1.20        

 

                BUN/Creat Ratio (test code=BUN/Creat Ratio)    16                               

 

                Glucose Level (test code=Glucose Level)    252 mg/dL                  

 

                Calcium Level (test code=Calcium Level)    9.4 mg/dL       8.3-10.5         

 

                Alk Phos (test code=Alk Phos)    215 U/L                    

 

                Bilirubin Total (test code=Bilirubin Total)    0.4 mg/dL       0.1-0.9          

 

                Albumin Level (test code=Albumin Level)    3.5 g/dL        3.5-5.2          

 

                Protein Total (test code=Protein Total)    8.2 g/dL        6.4-8.3          

 

                ALT (test code=ALT)    15 U/L          1-41             

 

                AST (test code=AST)    21 U/L          1-40             

 

                Globulin (test code=Globulin)    4.7 g/dL        2.9-3.1          

 

                A/G Ratio (test code=A/G Ratio)    0.7 ratio                        





Comprehensive Metabolic [id number] 21:46:03* 





                Test Item       Value           Reference Range    Comments

 

                Sodium Level (test code=Sodium Level)    135.0 mmol/L    135.0-145.0      

 

                Potassium Level (test code=Potassium Level)    3.7 mmol/L      3.5-5.1          

 

                Chloride Level (test code=Chloride Level)    96 mmol/L                  

 

                CO2 (test code=CO2)    20 mmol/L       22-29            

 

                Anion Gap (test code=Anion Gap)    19 mmol/L       7-16             

 

                BUN (test code=BUN)    13.00 mg/dL     6.00-20.00       

 

                Creatinine Level (test code=Creatinine Level)    0.80 mg/dL      0.70-1.20        

 

                BUN/Creat Ratio (test code=BUN/Creat Ratio)    16                               

 

                Glucose Level (test code=Glucose Level)    252 mg/dL                  

 

                Calcium Level (test code=Calcium Level)    9.4 mg/dL       8.3-10.5         

 

                Alk Phos (test code=Alk Phos)    215 U/L                    

 

                Bilirubin Total (test code=Bilirubin Total)    0.4 mg/dL       0.1-0.9          

 

                Albumin Level (test code=Albumin Level)    3.5 g/dL        3.5-5.2          

 

                Protein Total (test code=Protein Total)    8.2 g/dL        6.4-8.3          

 

                ALT (test code=ALT)    15 U/L          1-41             

 

                AST (test code=AST)    21 U/L          1-40             

 

                Globulin (test code=Globulin)    4.7 g/dL        2.9-3.1          

 

                A/G Ratio (test code=A/G Ratio)    0.7 ratio                        

 

                eGFR AA (test code=eGFR AA)    >60 mL/min/1.73 m2                    eGFR (estimated Glomerular Filtration

 Rate) is an estimated value, calculated from the patient's serum creatinine 
using the MDRD equation. It is NOT the patient's actual GFR. The eGFR provides a
more clinically useful measure of kidney disease than serum creatinine 
alone.***This calculation takes sex and race into account, if the information is
provided. If the race is not provided, and the patient is -American, 
multiply by 1.212. If sex is not provided, and the patient is female, multiply 
by 0.742. Results for patients <18 years of age have not been validated by the 
MDRD study and should be interpreted with caution. eGFR Result 
Interpretation:eGFR > or=60 is in the Normal RangeeGFR < 60 may mean kidney 
diseaseeGFR < 15 may mean kidney failure*** Ranges recommended by the National 
Kidney Foundation, http://nkdep.nih.gov





Alcohol [id number] 21:46:03* 





                Test Item       Value           Reference Range    Comments

 

                Ethanol Level (test code=Ethanol Level)    <0.00 g/dL      0.00-0.01       Intoxicated 0.080

 g/dL or more

 

                Ethanol Inst (test code=Ethanol Inst)    <0                               





Comprehensive Metabolic [id number] 21:46:03* 





                Test Item       Value           Reference Range    Comments

 

                Sodium Level (test code=Sodium Level)    135.0 mmol/L    135.0-145.0      

 

                Potassium Level (test code=Potassium Level)    3.7 mmol/L      3.5-5.1          

 

                Chloride Level (test code=Chloride Level)    96 mmol/L                  

 

                CO2 (test code=CO2)    20 mmol/L       22-29            

 

                Anion Gap (test code=Anion Gap)    19 mmol/L       7-16             

 

                BUN (test code=BUN)    13.00 mg/dL     6.00-20.00       

 

                Creatinine Level (test code=Creatinine Level)    0.80 mg/dL      0.70-1.20        

 

                BUN/Creat Ratio (test code=BUN/Creat Ratio)    16                               

 

                Glucose Level (test code=Glucose Level)    252 mg/dL                  

 

                Calcium Level (test code=Calcium Level)    9.4 mg/dL       8.3-10.5         

 

                Alk Phos (test code=Alk Phos)    215 U/L                    

 

                Bilirubin Total (test code=Bilirubin Total)    0.4 mg/dL       0.1-0.9          

 

                Albumin Level (test code=Albumin Level)    3.5 g/dL        3.5-5.2          

 

                Protein Total (test code=Protein Total)    8.2 g/dL        6.4-8.3          

 

                ALT (test code=ALT)    15 U/L          1-41             

 

                AST (test code=AST)    21 U/L          1-40             

 

                Globulin (test code=Globulin)    4.7 g/dL        2.9-3.1          

 

                A/G Ratio (test code=A/G Ratio)    0.7 ratio                        

 

                eGFR AA (test code=eGFR AA)    >60 mL/min/1.73 m2                    eGFR (estimated Glomerular Filtration

 Rate) is an estimated value, calculated from the patient's serum creatinine 
using the MDRD equation. It is NOT the patient's actual GFR. The eGFR provides a
more clinically useful measure of kidney disease than serum creatinine 
alone.***This calculation takes sex and race into account, if the information is
provided. If the race is not provided, and the patient is -American, 
multiply by 1.212. If sex is not provided, and the patient is female, multiply 
by 0.742. Results for patients <18 years of age have not been validated by the 
MDRD study and should be interpreted with caution. eGFR Result 
Interpretation:eGFR > or=60 is in the Normal RangeeGFR < 60 may mean kidney 
diseaseeGFR < 15 may mean kidney failure*** Ranges recommended by the National 
Kidney Foundation, http://nkdep.nih.gov

 

                eGFR Non-AA (test code=eGFR Non-AA)    >60.00 mL/min/1.73 m2                    eGFR (estimated Glomerular

 Filtration Rate) is an estimated value, calculated from the patient's serum 
creatinine using the MDRD equation. It is NOT the patient's actual GFR. The eGFR
provides a more clinically useful measure of kidney disease than serum 
creatinine alone.***This calculation takes sex and race into account, if the 
information is provided. If the race is not provided, and the patient is 
-American, multiply by 1.212. If sex is not provided, and the patient is 
female, multiply by 0.742. Results for patients <18 years of age have not been 
validated by the MDRD study and should be interpreted with caution. eGFR Result 
Interpretation:eGFR > or=60 is in the Normal RangeeGFR < 60 may mean kidney 
diseaseeGFR < 15 may mean kidney failure*** Ranges recommended by the National 
Kidney Foundation, http://nkdep.nih.gov





IG [id number] 21:19:52* 





                Test Item       Value           Reference Range    Comments

 

                IG (test code=IG)    0.6 %           0.0-5.0          

 

                IG Abs (test code=IG Abs)    0 x10                            





Complete Blood Count with [id number] 21:19:51* 





                Test Item       Value           Reference Range    Comments

 

                WBC (test code=WBC)    16.0 x10        4.4-10.5         

 

                RBC (test code=RBC)    3.86 x10        4.10-5.70        

 

                Hgb (test code=Hgb)    9.4 g/dL        13.4-17.4        

 

                Hct (test code=Hct)    31.2 %          38.7-52.0        

 

                MCV (test code=MCV)    80.80 fL        80..00     

 

                MCHC (test code=MCHC)    30.10 g/dL      32.00-37.50      

 

                RDW CV (test code=RDW CV)    16.7 %          11.5-14.5        

 

                MCH (test code=MCH)    24.4 pg         27.0-32.5        

 

                Platelets (test code=Platelets)    772.0 x10       140.0-440.0      

 

                MPV (test code=MPV)    8.3 fL                           

 

                Slide Review (test code=Slide Review)    Auto            Auto            Result created by [email or web address]



 

                nRBC (test code=nRBC)    0                                

 

                NRBC Abs (test code=NRBC Abs)    0.00 x10                         

 

                IPF (test code=IPF)    0 %                              





Automated [id number] 21:19:51* 





                Test Item       Value           Reference Range    Comments

 

                Neutro Auto (test code=Neutro Auto)    82.6 %          36.0-70.0        

 

                Lymph Auto (test code=Lymph Auto)    8.7 %           12.0-44.0        

 

                Mono Auto (test code=Mono Auto)    7.7 %           0.0-11.0         

 

                Eos, Auto (test code=Eos, Auto)    0.1 %           0.0-7.0          

 

                Basophil Auto (test code=Basophil Auto)    0.3 %           0.0-2.0          

 

                Neutro Absolute (test code=Neutro Absolute)    13.2 x10        1.6-7.4          

 

                Lymph Absolute (test code=Lymph Absolute)    1.39 x10        .50-4.60         

 

                Mono Absolute (test code=Mono Absolute)    1.23 x10        .00-1.20         

 

                Eos Absolute (test code=Eos Absolute)    0.02 x10        0.00-0.74        

 

                Baso Absolute (test code=Baso Absolute)    0.05 x10        0.00-0.21        





POC [id number] 11:31:08* 





                Test Item       Value           Reference Range    Comments

 

                Glucose POC (test code=Glucose POC)    167 mg/dL                 If you consider your patient

 critically ill, the Roche-Accu Check Infrom II meter should not be used for 
Glucose determination. Draw a venous Glucose and send to the main Lab for 
analysis.





POC [id number] 07:36:11* 





                Test Item       Value           Reference Range    Comments

 

                Glucose POC (test code=Glucose POC)    221 mg/dL                 Notify RN or MDIf you consider

 your patient critically ill, the Roche-Accu Check Infrom II meter should not be
used for Glucose determination. Draw a venous Glucose and send to the main Lab 
for analysis.





POC [id number] 20:26:07* 





                Test Item       Value           Reference Range    Comments

 

                Glucose POC (test code=Glucose POC)    176 mg/dL                 Notify RN or MDIf you consider

 your patient critically ill, the Roche-Accu Check Infrom II meter should not be
used for Glucose determination. Draw a venous Glucose and send to the main Lab 
for analysis.





POC [id number] 16:51:07* 





                Test Item       Value           Reference Range    Comments

 

                Glucose POC (test code=Glucose POC)    167 mg/dL                 If you consider your patient

 critically ill, the Roche-Accu Check Infrom II meter should not be used for 
Glucose determination. Draw a venous Glucose and send to the main Lab for 
analysis.





POC [id number] 11:13:15* 





                Test Item       Value           Reference Range    Comments

 

                Glucose POC (test code=Glucose POC)    224 mg/dL                 Notify RN or MDIf you consider

 your patient critically ill, the Roche-Accu Check Infrom II meter should not be
used for Glucose determination. Draw a venous Glucose and send to the main Lab 
for analysis.





POC [id number] 07:31:38* 





                Test Item       Value           Reference Range    Comments

 

                Glucose POC (test code=Glucose POC)    263 mg/dL                 Notify RN or MDIf you consider

 your patient critically ill, the Roche-Accu Check Infrom II meter should not be
used for Glucose determination. Draw a venous Glucose and send to the main Lab 
for analysis.





Basic Metabolic [id number] 06:51:27* 





                Test Item       Value           Reference Range    Comments

 

                Sodium Level (test code=Sodium Level)    138.0 mmol/L    135.0-145.0      

 

                Potassium Level (test code=Potassium Level)    4.7 mmol/L      3.5-5.1          

 

                Chloride Level (test code=Chloride Level)    100 mmol/L                 

 

                CO2 (test code=CO2)    29 mmol/L       22-29            

 

                Anion Gap (test code=Anion Gap)    9 mmol/L        7-16             

 

                BUN (test code=BUN)    16.20 mg/dL     6.00-20.00       

 

                Creatinine Level (test code=Creatinine Level)    0.70 mg/dL      0.70-1.20        

 

                BUN/Creat Ratio (test code=BUN/Creat Ratio)    23                               

 

                Glucose Level (test code=Glucose Level)    263 mg/dL                  

 

                Calcium Level (test code=Calcium Level)    8.8 mg/dL       8.3-10.5         





Basic Metabolic [id number] 06:51:27* 





                Test Item       Value           Reference Range    Comments

 

                Sodium Level (test code=Sodium Level)    138.0 mmol/L    135.0-145.0      

 

                Potassium Level (test code=Potassium Level)    4.7 mmol/L      3.5-5.1          

 

                Chloride Level (test code=Chloride Level)    100 mmol/L                 

 

                CO2 (test code=CO2)    29 mmol/L       22-29            

 

                Anion Gap (test code=Anion Gap)    9 mmol/L        7-16             

 

                BUN (test code=BUN)    16.20 mg/dL     6.00-20.00       

 

                Creatinine Level (test code=Creatinine Level)    0.70 mg/dL      0.70-1.20        

 

                BUN/Creat Ratio (test code=BUN/Creat Ratio)    23                               

 

                Glucose Level (test code=Glucose Level)    263 mg/dL                  

 

                Calcium Level (test code=Calcium Level)    8.8 mg/dL       8.3-10.5         

 

                eGFR AA (test code=eGFR AA)    >60 mL/min/1.73 m2                    eGFR (estimated Glomerular Filtration

 Rate) is an estimated value, calculated from the patient's serum creatinine 
using the MDRD equation. It is NOT the patient's actual GFR. The eGFR provides a
more clinically useful measure of kidney disease than serum creatinine 
alone.***This calculation takes sex and race into account, if the information is
provided. If the race is not provided, and the patient is -American, 
multiply by 1.212. If sex is not provided, and the patient is female, multiply 
by 0.742. Results for patients <18 years of age have not been validated by the 
MDRD study and should be interpreted with caution. eGFR Result 
Interpretation:eGFR > or=60 is in the Normal RangeeGFR < 60 may mean kidney 
diseaseeGFR < 15 may mean kidney failure*** Ranges recommended by the National 
Kidney Foundation, http://nkdep.nih.gov





Basic Metabolic [id number] 06:51:27* 





                Test Item       Value           Reference Range    Comments

 

                Sodium Level (test code=Sodium Level)    138.0 mmol/L    135.0-145.0      

 

                Potassium Level (test code=Potassium Level)    4.7 mmol/L      3.5-5.1          

 

                Chloride Level (test code=Chloride Level)    100 mmol/L                 

 

                CO2 (test code=CO2)    29 mmol/L       22-29            

 

                Anion Gap (test code=Anion Gap)    9 mmol/L        7-16             

 

                BUN (test code=BUN)    16.20 mg/dL     6.00-20.00       

 

                Creatinine Level (test code=Creatinine Level)    0.70 mg/dL      0.70-1.20        

 

                BUN/Creat Ratio (test code=BUN/Creat Ratio)    23                               

 

                Glucose Level (test code=Glucose Level)    263 mg/dL                  

 

                Calcium Level (test code=Calcium Level)    8.8 mg/dL       8.3-10.5         

 

                eGFR AA (test code=eGFR AA)    >60 mL/min/1.73 m2                    eGFR (estimated Glomerular Filtration

 Rate) is an estimated value, calculated from the patient's serum creatinine 
using the MDRD equation. It is NOT the patient's actual GFR. The eGFR provides a
more clinically useful measure of kidney disease than serum creatinine 
alone.***This calculation takes sex and race into account, if the information is
provided. If the race is not provided, and the patient is -American, 
multiply by 1.212. If sex is not provided, and the patient is female, multiply 
by 0.742. Results for patients <18 years of age have not been validated by the 
MDRD study and should be interpreted with caution. eGFR Result 
Interpretation:eGFR > or=60 is in the Normal RangeeGFR < 60 may mean kidney 
diseaseeGFR < 15 may mean kidney failure*** Ranges recommended by the National 
Kidney Foundation, http://nkdep.nih.gov

 

                eGFR Non-AA (test code=eGFR Non-AA)    >60.00 mL/min/1.73 m2                    eGFR (estimated Glomerular

 Filtration Rate) is an estimated value, calculated from the patient's serum 
creatinine using the MDRD equation. It is NOT the patient's actual GFR. The eGFR
provides a more clinically useful measure of kidney disease than serum 
creatinine alone.***This calculation takes sex and race into account, if the 
information is provided. If the race is not provided, and the patient is 
-American, multiply by 1.212. If sex is not provided, and the patient is 
female, multiply by 0.742. Results for patients <18 years of age have not been 
validated by the MDRD study and should be interpreted with caution. eGFR Result 
Interpretation:eGFR > or=60 is in the Normal RangeeGFR < 60 may mean kidney 
diseaseeGFR < 15 may mean kidney failure*** Ranges recommended by the National 
Kidney Foundation, http://nkdep.nih.gov





Automated [id number] 06:46:59* 





                Test Item       Value           Reference Range    Comments

 

                Neutro Auto (test code=Neutro Auto)    53.5 %          36.0-70.0        

 

                Lymph Auto (test code=Lymph Auto)    34.2 %          12.0-44.0        

 

                Mono Auto (test code=Mono Auto)    7.9 %           0.0-11.0         

 

                Eos, Auto (test code=Eos, Auto)    3.1 %           0.0-7.0          

 

                Basophil Auto (test code=Basophil Auto)    0.4 %           0.0-2.0          

 

                Neutro Absolute (test code=Neutro Absolute)    6.1 x10         1.6-7.4          

 

                Lymph Absolute (test code=Lymph Absolute)    3.92 x10        .50-4.60         

 

                Mono Absolute (test code=Mono Absolute)    .91 x10         .00-1.20         

 

                Eos Absolute (test code=Eos Absolute)    0.35 x10        0.00-0.74        

 

                Baso Absolute (test code=Baso Absolute)    0.05 x10        0.00-0.21        





IG [id number] 06:46:59* 





                Test Item       Value           Reference Range    Comments

 

                IG (test code=IG)    0.9 %           0.0-5.0          

 

                IG Abs (test code=IG Abs)    0 x10                            





Complete Blood Count with [id number] 06:46:58* 





                Test Item       Value           Reference Range    Comments

 

                WBC (test code=WBC)    11.5 x10        4.4-10.5         

 

                RBC (test code=RBC)    3.58 x10        4.10-5.70        

 

                Hgb (test code=Hgb)    8.9 g/dL        13.4-17.4        

 

                Hct (test code=Hct)    29.5 %          38.7-52.0        

 

                MCV (test code=MCV)    82.40 fL        80..00     

 

                MCHC (test code=MCHC)    30.20 g/dL      32.00-37.50      

 

                RDW CV (test code=RDW CV)    16.0 %          11.5-14.5        

 

                MCH (test code=MCH)    24.9 pg         27.0-32.5        

 

                Platelets (test code=Platelets)    777.0 x10       140.0-440.0      

 

                MPV (test code=MPV)    8.3 fL                           

 

                Slide Review (test code=Slide Review)    Auto            Auto            Result created by [email or web address]



 

                nRBC (test code=nRBC)    0                                

 

                NRBC Abs (test code=NRBC Abs)    0.00 x10                         

 

                IPF (test code=IPF)    0 %                              





Prothrombin Time and [id number] 06:46:58* 





                Test Item       Value           Reference Range    Comments

 

                Prothrombin Time (test code=Prothrombin Time)    12.5 seconds    9.8-13.4         

 

                INR (test code=INR)    1.1 ratio       0.6-1.2          





POC [id number] 20:30:45* 





                Test Item       Value           Reference Range    Comments

 

                Glucose POC (test code=Glucose POC)    170 mg/dL                 If you consider your patient

 critically ill, the Roche-Accu Check Infrom II meter should not be used for 
Glucose determination. Draw a venous Glucose and send to the main Lab for 
analysis.





POC [id number] 16:43:12* 





                Test Item       Value           Reference Range    Comments

 

                Glucose POC (test code=Glucose POC)    165 mg/dL                 If you consider your patient

 critically ill, the Roche-Accu Check Infrom II meter should not be used for 
Glucose determination. Draw a venous Glucose and send to the main Lab for 
analysis.





Blood [id number] 12:02:02No growth at 5 days.POC [id number] 
11:15:34* 





                Test Item       Value           Reference Range    Comments

 

                Glucose POC (test code=Glucose POC)    302 mg/dL                 Notify RN or MDIf you consider

 your patient critically ill, the Roche-Accu Check Infrom II meter should not be
used for Glucose determination. Draw a venous Glucose and send to the main Lab 
for analysis.





POC [id number] 07:49:14* 





                Test Item       Value           Reference Range    Comments

 

                Glucose POC (test code=Glucose POC)    272 mg/dL                 If you consider your patient

 critically ill, the Roche-Accu Check Infrom II meter should not be used for 
Glucose determination. Draw a venous Glucose and send to the main Lab for 
analysis.





POC [id number] 20:59:08* 





                Test Item       Value           Reference Range    Comments

 

                Glucose POC (test code=Glucose POC)    306 mg/dL                 Notify RN or MDIf you consider

 your patient critically ill, the Roche-Accu Check Infrom II meter should not be
used for Glucose determination. Draw a venous Glucose and send to the main Lab 
for analysis.





POC [id number] 16:28:39* 





                Test Item       Value           Reference Range    Comments

 

                Glucose POC (test code=Glucose POC)    167 mg/dL                 Notify RN or MDIf you consider

 your patient critically ill, the Roche-Accu Check Infrom II meter should not be
used for Glucose determination. Draw a venous Glucose and send to the main Lab 
for analysis.





POC [id number] 11:51:31* 





                Test Item       Value           Reference Range    Comments

 

                Glucose POC (test code=Glucose POC)    275 mg/dL                 Notify RN or MDIf you consider

 your patient critically ill, the Roche-Accu Check Infrom II meter should not be
used for Glucose determination. Draw a venous Glucose and send to the main Lab 
for analysis.





POC [id number] 07:48:38* 





                Test Item       Value           Reference Range    Comments

 

                Glucose POC (test code=Glucose POC)    187 mg/dL                 Notify RN or MDIf you consider

 your patient critically ill, the Roche-Accu Check Infrom II meter should not be
used for Glucose determination. Draw a venous Glucose and send to the main Lab 
for analysis.





Basic Metabolic [id number] 05:25:11* 





                Test Item       Value           Reference Range    Comments

 

                Sodium Level (test code=Sodium Level)    138.0 mmol/L    135.0-145.0      

 

                Potassium Level (test code=Potassium Level)    4.2 mmol/L      3.5-5.1          

 

                Chloride Level (test code=Chloride Level)    102 mmol/L                 

 

                CO2 (test code=CO2)    27 mmol/L       22-29            

 

                Anion Gap (test code=Anion Gap)    9 mmol/L        7-16             

 

                BUN (test code=BUN)    7.80 mg/dL      6.00-20.00       

 

                Creatinine Level (test code=Creatinine Level)    0.50 mg/dL      0.70-1.20        

 

                BUN/Creat Ratio (test code=BUN/Creat Ratio)    16                               

 

                Glucose Level (test code=Glucose Level)    172 mg/dL                  

 

                Calcium Level (test code=Calcium Level)    8.5 mg/dL       8.3-10.5         





Basic Metabolic [id number] 05:25:11* 





                Test Item       Value           Reference Range    Comments

 

                Sodium Level (test code=Sodium Level)    138.0 mmol/L    135.0-145.0      

 

                Potassium Level (test code=Potassium Level)    4.2 mmol/L      3.5-5.1          

 

                Chloride Level (test code=Chloride Level)    102 mmol/L                 

 

                CO2 (test code=CO2)    27 mmol/L       22-29            

 

                Anion Gap (test code=Anion Gap)    9 mmol/L        7-16             

 

                BUN (test code=BUN)    7.80 mg/dL      6.00-20.00       

 

                Creatinine Level (test code=Creatinine Level)    0.50 mg/dL      0.70-1.20        

 

                BUN/Creat Ratio (test code=BUN/Creat Ratio)    16                               

 

                Glucose Level (test code=Glucose Level)    172 mg/dL                  

 

                Calcium Level (test code=Calcium Level)    8.5 mg/dL       8.3-10.5         

 

                eGFR AA (test code=eGFR AA)    >60 mL/min/1.73 m2                    eGFR (estimated Glomerular Filtration

 Rate) is an estimated value, calculated from the patient's serum creatinine 
using the MDRD equation. It is NOT the patient's actual GFR. The eGFR provides a
more clinically useful measure of kidney disease than serum creatinine 
alone.***This calculation takes sex and race into account, if the information is
provided. If the race is not provided, and the patient is -American, 
multiply by 1.212. If sex is not provided, and the patient is female, multiply 
by 0.742. Results for patients <18 years of age have not been validated by the 
MDRD study and should be interpreted with caution. eGFR Result 
Interpretation:eGFR > or=60 is in the Normal RangeeGFR < 60 may mean kidney 
diseaseeGFR < 15 may mean kidney failure*** Ranges recommended by the National 
Kidney Foundation, http://nkdep.nih.gov





Basic Metabolic [id number] 05:25:11* 





                Test Item       Value           Reference Range    Comments

 

                Sodium Level (test code=Sodium Level)    138.0 mmol/L    135.0-145.0      

 

                Potassium Level (test code=Potassium Level)    4.2 mmol/L      3.5-5.1          

 

                Chloride Level (test code=Chloride Level)    102 mmol/L                 

 

                CO2 (test code=CO2)    27 mmol/L       22-29            

 

                Anion Gap (test code=Anion Gap)    9 mmol/L        7-16             

 

                BUN (test code=BUN)    7.80 mg/dL      6.00-20.00       

 

                Creatinine Level (test code=Creatinine Level)    0.50 mg/dL      0.70-1.20        

 

                BUN/Creat Ratio (test code=BUN/Creat Ratio)    16                               

 

                Glucose Level (test code=Glucose Level)    172 mg/dL                  

 

                Calcium Level (test code=Calcium Level)    8.5 mg/dL       8.3-10.5         

 

                eGFR AA (test code=eGFR AA)    >60 mL/min/1.73 m2                    eGFR (estimated Glomerular Filtration

 Rate) is an estimated value, calculated from the patient's serum creatinine 
using the MDRD equation. It is NOT the patient's actual GFR. The eGFR provides a
more clinically useful measure of kidney disease than serum creatinine 
alone.***This calculation takes sex and race into account, if the information is
provided. If the race is not provided, and the patient is -American, 
multiply by 1.212. If sex is not provided, and the patient is female, multiply 
by 0.742. Results for patients <18 years of age have not been validated by the 
MDRD study and should be interpreted with caution. eGFR Result 
Interpretation:eGFR > or=60 is in the Normal RangeeGFR < 60 may mean kidney 
diseaseeGFR < 15 may mean kidney failure*** Ranges recommended by the National 
Kidney Foundation, http://nkdep.nih.gov

 

                eGFR Non-AA (test code=eGFR Non-AA)    >60.00 mL/min/1.73 m2                    eGFR (estimated Glomerular

 Filtration Rate) is an estimated value, calculated from the patient's serum 
creatinine using the MDRD equation. It is NOT the patient's actual GFR. The eGFR
provides a more clinically useful measure of kidney disease than serum 
creatinine alone.***This calculation takes sex and race into account, if the 
information is provided. If the race is not provided, and the patient is 
-American, multiply by 1.212. If sex is not provided, and the patient is 
female, multiply by 0.742. Results for patients <18 years of age have not been 
validated by the MDRD study and should be interpreted with caution. eGFR Result 
Interpretation:eGFR > or=60 is in the Normal RangeeGFR < 60 may mean kidney 
diseaseeGFR < 15 may mean kidney failure*** Ranges recommended by the National 
Kidney Foundation, http://nkdep.nih.gov





Urinalysis with Culture, if [id number] 05:12:29* 





                Test Item       Value           Reference Range    Comments

 

                UA Color (test code=UA Color)    YELLO           Yellow           

 

                UA Appear (test code=UA Appear)    CLEAR           Clear            

 

                UA pH (test code=UA pH)    5                                

 

                UA Spec Grav (test code=UA Spec Grav)    1.009           1.001-1.035      

 

                UA Glucose (test code=UA Glucose)    NEG             Negative         

 

                UA Bili (test code=UA Bili)    NEG             Negative         

 

                UA Ketones (test code=UA Ketones)    NEG             Negative         

 

                UA Blood (test code=UA Blood)    NEG             Negative         

 

                UA Protein (test code=UA Protein)    NEG             Negative         

 

                UA Urobilinogen (test code=UA Urobilinogen)    0.2 mg/dL                        

 

                UA Nitrite (test code=UA Nitrite)    NEG             Negative         

 

                UA Leuk Est (test code=UA Leuk Est)    NEG             Negative         

 

                UA Micro Ind? (test code=UA Micro Ind?)    Not Indicated    Not Indicated    Result created

 by rule GL_SJM_UA_MICRO_IND





Complete Blood Count with [id number] 05:06:41* 





                Test Item       Value           Reference Range    Comments

 

                WBC (test code=WBC)    11.4 x10        4.4-10.5         

 

                RBC (test code=RBC)    3.25 x10        4.10-5.70        

 

                Hgb (test code=Hgb)    8.1 g/dL        13.4-17.4        

 

                MCV (test code=MCV)    82.50 fL        80..00     

 

                Hct (test code=Hct)    26.8 %          38.7-52.0        

 

                MCHC (test code=MCHC)    30.20 g/dL      32.00-37.50      

 

                RDW CV (test code=RDW CV)    15.8 %          11.5-14.5        

 

                MCH (test code=MCH)    24.9 pg         27.0-32.5        

 

                Platelets (test code=Platelets)    709.0 x10       140.0-440.0      

 

                MPV (test code=MPV)    8.2 fL                           

 

                Slide Review (test code=Slide Review)    Auto            Auto            Result created by GL_SJM_SLIDE_REV_AUTO



 

                nRBC (test code=nRBC)    0                                

 

                NRBC Abs (test code=NRBC Abs)    0.00 x10                         

 

                IPF (test code=IPF)    0 %                              





Automated [id number] 05:06:41* 





                Test Item       Value           Reference Range    Comments

 

                Neutro Auto (test code=Neutro Auto)    52.8 %          36.0-70.0        

 

                Lymph Auto (test code=Lymph Auto)    34.4 %          12.0-44.0        

 

                Mono Auto (test code=Mono Auto)    9.2 %           0.0-11.0         

 

                Eos, Auto (test code=Eos, Auto)    2.5 %           0.0-7.0          

 

                Basophil Auto (test code=Basophil Auto)    0.4 %           0.0-2.0          

 

                Neutro Absolute (test code=Neutro Absolute)    6.0 x10         1.6-7.4          

 

                Lymph Absolute (test code=Lymph Absolute)    3.92 x10        .50-4.60         

 

                Mono Absolute (test code=Mono Absolute)    1.05 x10        .00-1.20         

 

                Eos Absolute (test code=Eos Absolute)    0.28 x10        0.00-0.74        

 

                Baso Absolute (test code=Baso Absolute)    0.04 x10        0.00-0.21        





IG [id number] 05:06:41* 





                Test Item       Value           Reference Range    Comments

 

                IG (test code=IG)    0.7 %           0.0-5.0          

 

                IG Abs (test code=IG Abs)    0 x10                            





POC [id number] 21:04:42* 





                Test Item       Value           Reference Range    Comments

 

                Glucose POC (test code=Glucose POC)    211 mg/dL                 If you consider your patient

 critically ill, the Roche-Accu Check Infrom II meter should not be used for 
Glucose determination. Draw a venous Glucose and send to the main Lab for 
analysis.





POC [id number] 16:59:08* 





                Test Item       Value           Reference Range    Comments

 

                Glucose POC (test code=Glucose POC)    231 mg/dL                 Notify RN or MDIf you consider

 your patient critically ill, the Roche-Accu Check Infrom II meter should not be
used for Glucose determination. Draw a venous Glucose and send to the main Lab 
for analysis.





POC [id number] 11:36:36* 





                Test Item       Value           Reference Range    Comments

 

                Glucose POC (test code=Glucose POC)    360 mg/dL                 Notify RN or MDIf you consider

 your patient critically ill, the Roche-Accu Check Infrom II meter should not be
used for Glucose determination. Draw a venous Glucose and send to the main Lab 
for analysis.





POC [id number] 07:40:35* 





                Test Item       Value           Reference Range    Comments

 

                Glucose POC (test code=Glucose POC)    241 mg/dL                 Notify RN or MDIf you consider

 your patient critically ill, the Roche-Accu Check Infrom II meter should not be
used for Glucose determination. Draw a venous Glucose and send to the main Lab 
for analysis.





POC [id number] 20:25:31* 





                Test Item       Value           Reference Range    Comments

 

                Glucose POC (test code=Glucose POC)    306 mg/dL                 If you consider your patient

 critically ill, the Roche-Accu Check Infrom II meter should not be used for 
Glucose determination. Draw a venous Glucose and send to the main Lab for 
analysis.





POC [id number] 17:11:02* 





                Test Item       Value           Reference Range    Comments

 

                Glucose POC (test code=Glucose POC)    191 mg/dL                 Notify RN or MDIf you consider

 your patient critically ill, the Roche-Accu Check Infrom II meter should not be
used for Glucose determination. Draw a venous Glucose and send to the main Lab 
for analysis.





XR Chest 1 View [id number] 16:29:35Patient:   BALJEET JESSICA II        
                     MRN:    [id number] Date/Time2019 16:22 CDTReason 
for ExamLine placementReportCHEST 1 VIEWCLINICAL INFORMATION:  Line 
placementCOMPARISON: 2019FINDINGS:The tip of the left arm PICC is 
located at the superior cavoatrial junction.  The lungs are well-expanded.  
There is stable blunting of the right lateral costophrenic angle.  The heart 
size is normal.  The bones are unchanged.IMPRESSION:The tip of the left arm PICC
projects over the superior cavoatrial junction.LOCATION: R16***** Final 
*****Dictated by:    MD Gant Adam FDictated DT/TM: 2019 4:28 pmSigned
by:  MD Gant Adam FSigned (Electronic Signature):  2019 4:29 pmPOC 
[id number] 14:07:01* 





                Test Item       Value           Reference Range    Comments

 

                Glucose POC (test code=Glucose POC)    276 mg/dL                 If you consider your patient

 critically ill, the Roche-Accu Check Infrom II meter should not be used for 
Glucose determination. Draw a venous Glucose and send to the main Lab for 
analysis.





POC [id number] 11:12:33* 





                Test Item       Value           Reference Range    Comments

 

                Glucose POC (test code=Glucose POC)    292 mg/dL                 If you consider your patient

 critically ill, the Roche-Accu Check Infrom II meter should not be used for 
Glucose determination. Draw a venous Glucose and send to the main Lab for 
analysis.





Urine [id number] 09:34:39* 





                Test Item       Value           Reference Range    Comments

 

                ORGANISM (test code=ORGANISM)    Escherichia coli                     

 

                Amikacin (test code=Amik)                                     

 

                Ampicillin (test code=Amp)                                     

 

                Ampicillin/Sulbactam (test code=Amp/Sul)                                     

 

                Cefazolin (test code=Cefaz)                                     

 

                Cefepime (test code=Cefep)                                     

 

                Cefotaxime (test code=Cefo)                                     

 

                Ceftazidime (test code=Ceftaz)                                     

 

                Ceftriaxone (test code=Ceftri)                                     

 

                Cefuroxime (test code=Cefur)                                     

 

                Ciprofloxacin (test code=Cipro)                                     

 

                Gentamicin (test code=Gent)                                     

 

                Imipenem (test code=Imi)                                     

 

                Levofloxacin (test code=Levo)                                     

 

                Meropenem (test code=Navi)                                     

 

                Nitrofurantoin (test code=Nitro)                                     

 

                Piperacillin/Tazobactam (test code=Pip/Derek)                                     

 

                Tobramycin (test code=Tobra)                                     

 

                Trimethoprim/Sulfa (test code=SXT)                                     

 

                ORGANISM (test code=ORGANISM2)    Escherichia coli                     

 

                Amikacin (test code=Amik)                                     

 

                Ampicillin (test code=Amp)                                     

 

                Ampicillin/Sulbactam (test code=Amp/Sul)                                     

 

                Cefazolin (test code=Cefaz)                                     

 

                Cefepime (test code=Cefep)                                     

 

                Cefotaxime (test code=Cefo)                                     

 

                Ceftazidime (test code=Ceftaz)                                     

 

                Ceftriaxone (test code=Ceftri)                                     

 

                Cefuroxime (test code=Cefur)                                     

 

                Ciprofloxacin (test code=Cipro)                                     

 

                Gentamicin (test code=Gent)                                     

 

                Imipenem (test code=Imi)                                     

 

                Levofloxacin (test code=Levo)                                     

 

                Meropenem (test code=Navi)                                     

 

                Nitrofurantoin (test code=Nitro)                                     

 

                Piperacillin/Tazobactam (test code=Pip/Derek)                                     

 

                Tobramycin (test code=Tobra)                                     

 

                Trimethoprim/Sulfa (test code=SXT)                                     

 

                          Final Report (test code=Final Report)    >=100,000 cfu/ml Escherichia coli >=100,000

 cfu/ml Escherichia coli #2                               





 C Urine Added by GL_[email or web address]_UA_CUL_INDHemoglobin [id number] 07:40:43* 





                Test Item       Value           Reference Range    Comments

 

                Hemoglobin A1c (test code=Hemoglobin A1c)    10.5 %          4.8-5.9         Non Diabetic 4.8-5.9%Diabetic

 <7.0%





POC [id number] 07:23:37* 





                Test Item       Value           Reference Range    Comments

 

                Glucose POC (test code=Glucose POC)    308 mg/dL                 If you consider your patient

 critically ill, the Roche-Accu Check Infrom II meter should not be used for 
Glucose determination. Draw a venous Glucose and send to the main Lab for 
analysis.





3C ABSC [id number] 05:58:53* 





                Test Item       Value           Reference Range    Comments

 

                SC1 IS (test code=SC1 IS)    NT                               

 

                SC2 IS (test code=SC2 IS)    NT                               

 

                SC3 IS (test code=SC3 IS)    NT                               

 

                SC1 37 (test code=SC1 37)    0                                

 

                SC2 37 (test code=SC2 37)    0                                

 

                SC3 37 (test code=SC3 37)    0                                

 

                SC1 AHG (test code=SC1 AHG)    0                                

 

                SC2 AHG (test code=SC2 AHG)    0                                

 

                SC3 AHG (test code=SC3 AHG)    0                                

 

                SC1 CC (test code=SC1 CC)    2+                               

 

                SC2 CC (test code=SC2 CC)    2+                               

 

                SC3 CC (test code=SC3 CC)    2+                               

 

                Antibody Screen (3C) (test code=Antibody Screen (3C))    Negative ABSC                     





[id number] 05:58:52* 





                Test Item       Value           Reference Range    Comments

 

                Previous History (test code=Previous History)    Yes Prev History                     

 

                BBID (test code=BBID)    [id number]                         

 

                Methodology (test code=Methodology)    Test-Tube(TT)                     

 

                Anti-A (test code=Anti-A)    0                                

 

                Anti-B (test code=Anti-B)    0                                

 

                Anti-D (test code=Anti-D)    4+                               

 

                DCon (test code=DCon)    NT                               

 

                A1 (test code=A1)    4+                               

 

                B cells (test code=B cells)    4+                               

 

                ABORh (test code=ABORh)    O POS                            





Protein [id number] 05:39:06* 





                Test Item       Value           Reference Range    Comments

 

                Protein Total (test code=Protein Total)    7.0 g/dL        6.4-8.3          





Albumin [id number] 05:39:06* 





                Test Item       Value           Reference Range    Comments

 

                Albumin Level (test code=Albumin Level)    3.0 g/dL        3.5-5.2          





[id number] 05:39:06* 





                Test Item       Value           Reference Range    Comments

 

                Prealbumin (test code=Prealbumin)    10 mg/dL        20-40            





Basic Metabolic [id number] 05:39:05* 





                Test Item       Value           Reference Range    Comments

 

                Sodium Level (test code=Sodium Level)    135.0 mmol/L    135.0-145.0      

 

                Potassium Level (test code=Potassium Level)    4.5 mmol/L      3.5-5.1          

 

                Chloride Level (test code=Chloride Level)    98 mmol/L                  

 

                CO2 (test code=CO2)    25 mmol/L       22-29            

 

                Anion Gap (test code=Anion Gap)    12 mmol/L       7-16             

 

                BUN (test code=BUN)    9.20 mg/dL      6.00-20.00       

 

                Creatinine Level (test code=Creatinine Level)    0.60 mg/dL      0.70-1.20        

 

                BUN/Creat Ratio (test code=BUN/Creat Ratio)    15                               

 

                Glucose Level (test code=Glucose Level)    299 mg/dL                  

 

                Calcium Level (test code=Calcium Level)    8.5 mg/dL       8.3-10.5         





Basic Metabolic [id number] 05:39:05* 





                Test Item       Value           Reference Range    Comments

 

                Sodium Level (test code=Sodium Level)    135.0 mmol/L    135.0-145.0      

 

                Potassium Level (test code=Potassium Level)    4.5 mmol/L      3.5-5.1          

 

                Chloride Level (test code=Chloride Level)    98 mmol/L                  

 

                CO2 (test code=CO2)    25 mmol/L       22-29            

 

                Anion Gap (test code=Anion Gap)    12 mmol/L       7-16             

 

                BUN (test code=BUN)    9.20 mg/dL      6.00-20.00       

 

                Creatinine Level (test code=Creatinine Level)    0.60 mg/dL      0.70-1.20        

 

                BUN/Creat Ratio (test code=BUN/Creat Ratio)    15                               

 

                Glucose Level (test code=Glucose Level)    299 mg/dL                  

 

                Calcium Level (test code=Calcium Level)    8.5 mg/dL       8.3-10.5         

 

                eGFR AA (test code=eGFR AA)    >60 mL/min/1.73 m2                    eGFR (estimated Glomerular Filtration

 Rate) is an estimated value, calculated from the patient's serum creatinine 
using the MDRD equation. It is NOT the patient's actual GFR. The eGFR provides a
more clinically useful measure of kidney disease than serum creatinine 
alone.***This calculation takes sex and race into account, if the information is
provided. If the race is not provided, and the patient is -American, 
multiply by 1.212. If sex is not provided, and the patient is female, multiply 
by 0.742. Results for patients <18 years of age have not been validated by the 
MDRD study and should be interpreted with caution. eGFR Result 
Interpretation:eGFR > or=60 is in the Normal RangeeGFR < 60 may mean kidney 
diseaseeGFR < 15 may mean kidney failure*** Ranges recommended by the National 
Kidney Foundation, http://nkdep.nih.gov





Basic Metabolic [id number] 05:39:05* 





                Test Item       Value           Reference Range    Comments

 

                Sodium Level (test code=Sodium Level)    135.0 mmol/L    135.0-145.0      

 

                Potassium Level (test code=Potassium Level)    4.5 mmol/L      3.5-5.1          

 

                Chloride Level (test code=Chloride Level)    98 mmol/L                  

 

                CO2 (test code=CO2)    25 mmol/L       22-29            

 

                Anion Gap (test code=Anion Gap)    12 mmol/L       7-16             

 

                BUN (test code=BUN)    9.20 mg/dL      6.00-20.00       

 

                Creatinine Level (test code=Creatinine Level)    0.60 mg/dL      0.70-1.20        

 

                BUN/Creat Ratio (test code=BUN/Creat Ratio)    15                               

 

                Glucose Level (test code=Glucose Level)    299 mg/dL                  

 

                Calcium Level (test code=Calcium Level)    8.5 mg/dL       8.3-10.5         

 

                eGFR AA (test code=eGFR AA)    >60 mL/min/1.73 m2                    eGFR (estimated Glomerular Filtration

 Rate) is an estimated value, calculated from the patient's serum creatinine 
using the MDRD equation. It is NOT the patient's actual GFR. The eGFR provides a
more clinically useful measure of kidney disease than serum creatinine 
alone.***This calculation takes sex and race into account, if the information is
provided. If the race is not provided, and the patient is -American, 
multiply by 1.212. If sex is not provided, and the patient is female, multiply 
by 0.742. Results for patients <18 years of age have not been validated by the 
MDRD study and should be interpreted with caution. eGFR Result 
Interpretation:eGFR > or=60 is in the Normal RangeeGFR < 60 may mean kidney 
diseaseeGFR < 15 may mean kidney failure*** Ranges recommended by the National 
Kidney Foundation, http://nkdep.nih.gov

 

                eGFR Non-AA (test code=eGFR Non-AA)    >60.00 mL/min/1.73 m2                    eGFR (estimated Glomerular

 Filtration Rate) is an estimated value, calculated from the patient's serum 
creatinine using the MDRD equation. It is NOT the patient's actual GFR. The eGFR
provides a more clinically useful measure of kidney disease than serum 
creatinine alone.***This calculation takes sex and race into account, if the 
information is provided. If the race is not provided, and the patient is 
-American, multiply by 1.212. If sex is not provided, and the patient is 
female, multiply by 0.742. Results for patients <18 years of age have not been 
validated by the MDRD study and should be interpreted with caution. eGFR Result 
Interpretation:eGFR > or=60 is in the Normal RangeeGFR < 60 may mean kidney 
diseaseeGFR < 15 may mean kidney failure*** Ranges recommended by the National 
Kidney Foundation, http://nkdep.nih.gov





Complete Blood Count without [id number] 05:07:31* 





                Test Item       Value           Reference Range    Comments

 

                WBC (test code=WBC)    15.1 x10        4.4-10.5         

 

                RBC (test code=RBC)    3.63 x10        4.10-5.70        

 

                Hgb (test code=Hgb)    9.1 g/dL        13.4-17.4        

 

                Hct (test code=Hct)    30.0 %          38.7-52.0        

 

                MCV (test code=MCV)    82.60 fL        80..00     

 

                MCH (test code=MCH)    25.1 pg         27.0-32.5        

 

                MCHC (test code=MCHC)    30.30 g/dL      32.00-37.50      

 

                RDW CV (test code=RDW CV)    16.2 %          11.5-14.5        

 

                Platelets (test code=Platelets)    685.0 x10       140.0-440.0      

 

                MPV (test code=MPV)    9.2 fL                           

 

                nRBC (test code=nRBC)    0                                

 

                NRBC Abs (test code=NRBC Abs)    0.00 x10                         

 

                IPF (test code=IPF)    0 %                              





Drugs of Abuse Urine [id number] 01:09:36* 





                Test Item       Value           Reference Range    Comments

 

                Amphetamine Screen Ur (test code=Amphetamine Screen Ur)    Negative        Negative        For

 diagnostic purposes only. Positive results should always be assessed in 
conjunction with a patient's medical history.

 

                Barbiturate Screen Ur (test code=Barbiturate Screen Ur)    Negative        Negative         

 

                Benzodiazepines Ur (test code=Benzodiazepines Ur)    Negative        Negative         

 

                Cocaine Screen Ur (test code=Cocaine Screen Ur)    Negative        Negative         

 

                U Methadone (test code=U Methadone)    Negative        Negative         

 

                Opiate Screen Ur (test code=Opiate Screen Ur)    POSITIVE        Negative         

 

                U PCP Scrn (test code=U PCP Scrn)    Negative        Negative         

 

                U Propoxyphene (test code=U Propoxyphene)    Negative        Negative         

 

                Cannabinoid Screen Ur (test code=Cannabinoid Screen Ur)    POSITIVE        Negative         





POC [id number] 21:19:06* 





                Test Item       Value           Reference Range    Comments

 

                Glucose POC (test code=Glucose POC)    376 mg/dL                 If you consider your patient

 critically ill, the Roche-Accu Check Infrom II meter should not be used for 
Glucose determination. Draw a venous Glucose and send to the main Lab for 
analysis.





Red Blood Cells [id number] 19:22:25* 





                Test Item       Value           Reference Range    Comments

 

                # of Units (test code=# of Units)    2                                

 

                RBC Trn Reason (test code=RBC Trn Reason)    Surgery                          

 

                RBC Product Ready (test code=RBC Product Ready)    RBC Ready                        





3C ABSC [id number] 19:14:26* 





                Test Item       Value           Reference Range    Comments

 

                SC1 IS (test code=SC1 IS)    NT                               

 

                SC2 IS (test code=SC2 IS)    NT                               

 

                SC3 IS (test code=SC3 IS)    NT                               

 

                SC1 37 (test code=SC1 37)    0                                

 

                SC2 37 (test code=SC2 37)    0                                

 

                SC3 37 (test code=SC3 37)    0                                

 

                SC1 AHG (test code=SC1 AHG)    0                                

 

                SC2 AHG (test code=SC2 AHG)    0                                

 

                SC3 AHG (test code=SC3 AHG)    0                                

 

                SC1 CC (test code=SC1 CC)    2+                               

 

                SC2 CC (test code=SC2 CC)    2+                               

 

                SC3 CC (test code=SC3 CC)    2+                               

 

                Antibody Screen (3C) (test code=Antibody Screen (3C))    Negative ABSC                     





Hx ABO/[id number] 18:37:56O [id number] 18:36:35* 





                Test Item       Value           Reference Range    Comments

 

                Previous History (test code=Previous History)    Yes Prev History                     

 

                BBID (test code=BBID)    [id number]                         

 

                Methodology (test code=Methodology)    Test-Tube(TT)                     

 

                Anti-A (test code=Anti-A)    0                                

 

                Anti-B (test code=Anti-B)    0                                

 

                Anti-D (test code=Anti-D)    4+                               

 

                DCon (test code=DCon)    NT                               

 

                A1 (test code=A1)    4+                               

 

                B cells (test code=B cells)    4+                               

 

                ABORh (test code=ABORh)    O POS                            





POC [id number] 16:22:41* 





                Test Item       Value           Reference Range    Comments

 

                Glucose POC (test code=Glucose POC)    327 mg/dL                 Notify RN or MDIf you consider

 your patient critically ill, the Roche-Accu Check Infrom II meter should not be
used for Glucose determination. Draw a venous Glucose and send to the main Lab 
for analysis.





POC [id number] 11:16:36* 





                Test Item       Value           Reference Range    Comments

 

                Glucose POC (test code=Glucose POC)    351 mg/dL                 Notify RN or MDIf you consider

 your patient critically ill, the Roche-Accu Check Infrom II meter should not be
used for Glucose determination. Draw a venous Glucose and send to the main Lab 
for analysis.





Erythrocyte Sedimentation Rate [id number] 08:15:41* 





                Test Item       Value           Reference Range    Comments

 

                ESR STAT (test code=ESR STAT)    129 mm/hr       0-9              





POC [id number] 07:43:40* 





                Test Item       Value           Reference Range    Comments

 

                Glucose POC (test code=Glucose POC)    338 mg/dL                 Notify RN or MDIf you consider

 your patient critically ill, the Roche-Accu Check Infrom II meter should not be
used for Glucose determination. Draw a venous Glucose and send to the main Lab 
for analysis.





Complete Blood Count without [id number] 07:10:35* 





                Test Item       Value           Reference Range    Comments

 

                WBC (test code=WBC)    12.8 x10        4.4-10.5         

 

                RBC (test code=RBC)    3.55 x10        4.10-5.70        

 

                Hgb (test code=Hgb)    8.9 g/dL        13.4-17.4        

 

                Hct (test code=Hct)    29.1 %          38.7-52.0        

 

                MCV (test code=MCV)    82.00 fL        80..00     

 

                MCH (test code=MCH)    25.1 pg         27.0-32.5        

 

                MCHC (test code=MCHC)    30.60 g/dL      32.00-37.50      

 

                RDW CV (test code=RDW CV)    16.1 %          11.5-14.5        

 

                Platelets (test code=Platelets)    690.0 x10       140.0-440.0      

 

                MPV (test code=MPV)    9.0 fL                           

 

                nRBC (test code=nRBC)    0                                

 

                NRBC Abs (test code=NRBC Abs)    0.00 x10                         

 

                IPF (test code=IPF)    0 %                              





Basic Metabolic [id number] 07:07:06* 





                Test Item       Value           Reference Range    Comments

 

                Sodium Level (test code=Sodium Level)    134.0 mmol/L    135.0-145.0      

 

                Potassium Level (test code=Potassium Level)    4.8 mmol/L      3.5-5.1          

 

                Chloride Level (test code=Chloride Level)    99 mmol/L                  

 

                CO2 (test code=CO2)    22 mmol/L       22-29            

 

                Anion Gap (test code=Anion Gap)    13 mmol/L       7-16             

 

                BUN (test code=BUN)    12.30 mg/dL     6.00-20.00       

 

                Creatinine Level (test code=Creatinine Level)    0.70 mg/dL      0.70-1.20        

 

                BUN/Creat Ratio (test code=BUN/Creat Ratio)    18                               

 

                Glucose Level (test code=Glucose Level)    329 mg/dL                  

 

                Calcium Level (test code=Calcium Level)    8.7 mg/dL       8.3-10.5         





C Reactive [id number] 07:07:06* 





                Test Item       Value           Reference Range    Comments

 

                CRP (test code=CRP)    187.3 mg/L      0.0-5.0          





Basic Metabolic [id number] 07:07:06* 





                Test Item       Value           Reference Range    Comments

 

                Sodium Level (test code=Sodium Level)    134.0 mmol/L    135.0-145.0      

 

                Potassium Level (test code=Potassium Level)    4.8 mmol/L      3.5-5.1          

 

                Chloride Level (test code=Chloride Level)    99 mmol/L                  

 

                CO2 (test code=CO2)    22 mmol/L       22-29            

 

                Anion Gap (test code=Anion Gap)    13 mmol/L       7-16             

 

                BUN (test code=BUN)    12.30 mg/dL     6.00-20.00       

 

                Creatinine Level (test code=Creatinine Level)    0.70 mg/dL      0.70-1.20        

 

                BUN/Creat Ratio (test code=BUN/Creat Ratio)    18                               

 

                Glucose Level (test code=Glucose Level)    329 mg/dL                  

 

                Calcium Level (test code=Calcium Level)    8.7 mg/dL       8.3-10.5         

 

                eGFR AA (test code=eGFR AA)    >60 mL/min/1.73 m2                    eGFR (estimated Glomerular Filtration

 Rate) is an estimated value, calculated from the patient's serum creatinine 
using the MDRD equation. It is NOT the patient's actual GFR. The eGFR provides a
more clinically useful measure of kidney disease than serum creatinine 
alone.***This calculation takes sex and race into account, if the information is
provided. If the race is not provided, and the patient is -American, 
multiply by 1.212. If sex is not provided, and the patient is female, multiply 
by 0.742. Results for patients <18 years of age have not been validated by the 
MDRD study and should be interpreted with caution. eGFR Result 
Interpretation:eGFR > or=60 is in the Normal RangeeGFR < 60 may mean kidney 
diseaseeGFR < 15 may mean kidney failure*** Ranges recommended by the National 
Kidney Foundation, http://nkdep.nih.gov





Basic Metabolic [id number] 07:07:06* 





                Test Item       Value           Reference Range    Comments

 

                Sodium Level (test code=Sodium Level)    134.0 mmol/L    135.0-145.0      

 

                Potassium Level (test code=Potassium Level)    4.8 mmol/L      3.5-5.1          

 

                Chloride Level (test code=Chloride Level)    99 mmol/L                  

 

                CO2 (test code=CO2)    22 mmol/L       22-29            

 

                Anion Gap (test code=Anion Gap)    13 mmol/L       7-16             

 

                BUN (test code=BUN)    12.30 mg/dL     6.00-20.00       

 

                Creatinine Level (test code=Creatinine Level)    0.70 mg/dL      0.70-1.20        

 

                BUN/Creat Ratio (test code=BUN/Creat Ratio)    18                               

 

                Glucose Level (test code=Glucose Level)    329 mg/dL                  

 

                Calcium Level (test code=Calcium Level)    8.7 mg/dL       8.3-10.5         

 

                eGFR AA (test code=eGFR AA)    >60 mL/min/1.73 m2                    eGFR (estimated Glomerular Filtration

 Rate) is an estimated value, calculated from the patient's serum creatinine 
using the MDRD equation. It is NOT the patient's actual GFR. The eGFR provides a
more clinically useful measure of kidney disease than serum creatinine 
alone.***This calculation takes sex and race into account, if the information is
provided. If the race is not provided, and the patient is -American, 
multiply by 1.212. If sex is not provided, and the patient is female, multiply 
by 0.742. Results for patients <18 years of age have not been validated by the 
MDRD study and should be interpreted with caution. eGFR Result 
Interpretation:eGFR > or=60 is in the Normal RangeeGFR < 60 may mean kidney 
diseaseeGFR < 15 may mean kidney failure*** Ranges recommended by the National 
Kidney Foundation, http://nkdep.nih.gov

 

                eGFR Non-AA (test code=eGFR Non-AA)    >60.00 mL/min/1.73 m2                    eGFR (estimated Glomerular

 Filtration Rate) is an estimated value, calculated from the patient's serum 
creatinine using the MDRD equation. It is NOT the patient's actual GFR. The eGFR
provides a more clinically useful measure of kidney disease than serum 
creatinine alone.***This calculation takes sex and race into account, if the 
information is provided. If the race is not provided, and the patient is 
-American, multiply by 1.212. If sex is not provided, and the patient is 
female, multiply by 0.742. Results for patients <18 years of age have not been 
validated by the MDRD study and should be interpreted with caution. eGFR Result 
Interpretation:eGFR > or=60 is in the Normal RangeeGFR < 60 may mean kidney 
diseaseeGFR < 15 may mean kidney failure*** Ranges recommended by the National 
Kidney Foundation, http://nkdep.nih.gov





POC [id number] 21:54:36* 





                Test Item       Value           Reference Range    Comments

 

                Glucose POC (test code=Glucose POC)    281 mg/dL                 Notify RN or MDIf you consider

 your patient critically ill, the Roche-Accu Check Infrom II meter should not be
used for Glucose determination. Draw a venous Glucose and send to the main Lab 
for analysis.





POC [id number] 16:45:59* 





                Test Item       Value           Reference Range    Comments

 

                Glucose POC (test code=Glucose POC)    246 mg/dL                 Notify RN or MDIf you consider

 your patient critically ill, the Roche-Accu Check Infrom II meter should not be
used for Glucose determination. Draw a venous Glucose and send to the main Lab 
for analysis.





Blood [id number] 15:01:47No growth at 5 days.Urinalysis Microscopic
2019 12:52:27* 





                Test Item       Value           Reference Range    Comments

 

                UA WBC (test code=UA WBC)    20-29           0-5              

 

                UA RBC (test code=UA RBC)    0-5             0-5              

 

                UA Bacteria (test code=UA Bacteria)    Profuse                          

 

                UA Squam Epithelial (test code=UA Squam Epithelial)    0-5                              





Urinalysis with Culture, if [id number] 12:37:15* 





                Test Item       Value           Reference Range    Comments

 

                UA Color (test code=UA Color)    YELLO           Yellow           

 

                UA Appear (test code=UA Appear)    CLEAR           Clear            

 

                UA pH (test code=UA pH)    6.5                              

 

                UA Spec Grav (test code=UA Spec Grav)    1.032           1.001-1.035      

 

                UA Glucose (test code=UA Glucose)    1000 mg/dL      Negative         

 

                UA Bili (test code=UA Bili)    NEG             Negative         

 

                UA Ketones (test code=UA Ketones)    5 mg/dL         Negative         

 

                UA Blood (test code=UA Blood)    25 cells/mcL    Negative         

 

                UA Protein (test code=UA Protein)    NEG             Negative         

 

                UA Urobilinogen (test code=UA Urobilinogen)    1 mg/dL         >0.2             

 

                UA Nitrite (test code=UA Nitrite)    POS             Negative         

 

                UA Leuk Est (test code=UA Leuk Est)    NEG             Negative         

 

                UA Micro Ind? (test code=UA Micro Ind?)    Indicated       Not Indicated    Result created

 by rule [id number]_IND





Comprehensive Metabolic [id number] 11:57:22* 





                Test Item       Value           Reference Range    Comments

 

                Sodium Level (test code=Sodium Level)    135.0 mmol/L    135.0-145.0      

 

                Potassium Level (test code=Potassium Level)    4.5 mmol/L      3.5-5.1          

 

                Chloride Level (test code=Chloride Level)    96 mmol/L                  

 

                CO2 (test code=CO2)    28 mmol/L       22-29            

 

                Anion Gap (test code=Anion Gap)    11 mmol/L       7-16             

 

                BUN (test code=BUN)    8.70 mg/dL      6.00-20.00       

 

                Creatinine Level (test code=Creatinine Level)    0.60 mg/dL      0.70-1.20        

 

                BUN/Creat Ratio (test code=BUN/Creat Ratio)    14                               

 

                Glucose Level (test code=Glucose Level)    478 mg/dL                 Critical results called

 to JOSE HAINES RN at 2019 11:55:00 CDT by .  Read back and verified? 
YES

 

                Calcium Level (test code=Calcium Level)    8.7 mg/dL       8.3-10.5         

 

                Alk Phos (test code=Alk Phos)    331 U/L                    

 

                Bilirubin Total (test code=Bilirubin Total)    0.3 mg/dL       0.1-0.9          

 

                Albumin Level (test code=Albumin Level)    3.1 g/dL        3.5-5.2          

 

                Protein Total (test code=Protein Total)    7.6 g/dL        6.4-8.3          

 

                ALT (test code=ALT)    20 U/L          1-41             

 

                AST (test code=AST)    12 U/L          1-40             

 

                Globulin (test code=Globulin)    4.5 g/dL        2.9-3.1          

 

                A/G Ratio (test code=A/G Ratio)    0.7 ratio                        

 

                eGFR AA (test code=eGFR AA)    >60 mL/min/1.73 m2                    eGFR (estimated Glomerular Filtration

 Rate) is an estimated value, calculated from the patient's serum creatinine 
using the MDRD equation. It is NOT the patient's actual GFR. The eGFR provides a
more clinically useful measure of kidney disease than serum creatinine 
alone.***This calculation takes sex and race into account, if the information is
provided. If the race is not provided, and the patient is -American, 
multiply by 1.212. If sex is not provided, and the patient is female, multiply 
by 0.742. Results for patients <18 years of age have not been validated by the 
MDRD study and should be interpreted with caution. eGFR Result 
Interpretation:eGFR > or=60 is in the Normal RangeeGFR < 60 may mean kidney 
diseaseeGFR < 15 may mean kidney failure*** Ranges recommended by the National 
Kidney Foundation, http://nkdep.nih.gov





Comprehensive Metabolic [id number] 11:57:22* 





                Test Item       Value           Reference Range    Comments

 

                Sodium Level (test code=Sodium Level)    135.0 mmol/L    135.0-145.0      

 

                Potassium Level (test code=Potassium Level)    4.5 mmol/L      3.5-5.1          

 

                Chloride Level (test code=Chloride Level)    96 mmol/L                  

 

                CO2 (test code=CO2)    28 mmol/L       22-29            

 

                Anion Gap (test code=Anion Gap)    11 mmol/L       7-16             

 

                BUN (test code=BUN)    8.70 mg/dL      6.00-20.00       

 

                Creatinine Level (test code=Creatinine Level)    0.60 mg/dL      0.70-1.20        

 

                BUN/Creat Ratio (test code=BUN/Creat Ratio)    14                               

 

                Glucose Level (test code=Glucose Level)    478 mg/dL                 Critical results called

 to JOSE HAINES RN at 2019 11:55:00 CDT by .  Read back and verified? 
YES

 

                Calcium Level (test code=Calcium Level)    8.7 mg/dL       8.3-10.5         

 

                Alk Phos (test code=Alk Phos)    331 U/L                    

 

                Bilirubin Total (test code=Bilirubin Total)    0.3 mg/dL       0.1-0.9          

 

                Albumin Level (test code=Albumin Level)    3.1 g/dL        3.5-5.2          

 

                Protein Total (test code=Protein Total)    7.6 g/dL        6.4-8.3          

 

                ALT (test code=ALT)    20 U/L          1-41             

 

                AST (test code=AST)    12 U/L          1-40             

 

                Globulin (test code=Globulin)    4.5 g/dL        2.9-3.1          

 

                A/G Ratio (test code=A/G Ratio)    0.7 ratio                        

 

                eGFR AA (test code=eGFR AA)    >60 mL/min/1.73 m2                    eGFR (estimated Glomerular Filtration

 Rate) is an estimated value, calculated from the patient's serum creatinine 
using the MDRD equation. It is NOT the patient's actual GFR. The eGFR provides a
more clinically useful measure of kidney disease than serum creatinine 
alone.***This calculation takes sex and race into account, if the information is
provided. If the race is not provided, and the patient is -American, 
multiply by 1.212. If sex is not provided, and the patient is female, multiply 
by 0.742. Results for patients <18 years of age have not been validated by the 
MDRD study and should be interpreted with caution. eGFR Result 
Interpretation:eGFR > or=60 is in the Normal RangeeGFR < 60 may mean kidney 
diseaseeGFR < 15 may mean kidney failure*** Ranges recommended by the National 
Kidney Foundation, http://nkdep.nih.gov





Comprehensive Metabolic [id number] 11:57:22* 





                Test Item       Value           Reference Range    Comments

 

                Sodium Level (test code=Sodium Level)    135.0 mmol/L    135.0-145.0      

 

                Potassium Level (test code=Potassium Level)    4.5 mmol/L      3.5-5.1          

 

                Chloride Level (test code=Chloride Level)    96 mmol/L                  

 

                CO2 (test code=CO2)    28 mmol/L       22-29            

 

                Anion Gap (test code=Anion Gap)    11 mmol/L       7-16             

 

                BUN (test code=BUN)    8.70 mg/dL      6.00-20.00       

 

                Creatinine Level (test code=Creatinine Level)    0.60 mg/dL      0.70-1.20        

 

                BUN/Creat Ratio (test code=BUN/Creat Ratio)    14                               

 

                Glucose Level (test code=Glucose Level)    478 mg/dL                 Critical results called

 to JOSE HAINES RN at 2019 11:55:00 CDT by .  Read back and verified? 
YES

 

                Calcium Level (test code=Calcium Level)    8.7 mg/dL       8.3-10.5         

 

                Alk Phos (test code=Alk Phos)    331 U/L                    

 

                Bilirubin Total (test code=Bilirubin Total)    0.3 mg/dL       0.1-0.9          

 

                Albumin Level (test code=Albumin Level)    3.1 g/dL        3.5-5.2          

 

                Protein Total (test code=Protein Total)    7.6 g/dL        6.4-8.3          

 

                ALT (test code=ALT)    20 U/L          1-41             

 

                AST (test code=AST)    12 U/L          1-40             

 

                Globulin (test code=Globulin)    4.5 g/dL        2.9-3.1          

 

                A/G Ratio (test code=A/G Ratio)    0.7 ratio                        

 

                eGFR AA (test code=eGFR AA)    >60 mL/min/1.73 m2                    eGFR (estimated Glomerular Filtration

 Rate) is an estimated value, calculated from the patient's serum creatinine 
using the MDRD equation. It is NOT the patient's actual GFR. The eGFR provides a
more clinically useful measure of kidney disease than serum creatinine 
alone.***This calculation takes sex and race into account, if the information is
provided. If the race is not provided, and the patient is -American, 
multiply by 1.212. If sex is not provided, and the patient is female, multiply 
by 0.742. Results for patients <18 years of age have not been validated by the 
MDRD study and should be interpreted with caution. eGFR Result 
Interpretation:eGFR > or=60 is in the Normal RangeeGFR < 60 may mean kidney 
diseaseeGFR < 15 may mean kidney failure*** Ranges recommended by the National 
Kidney Foundation, http://nkdep.nih.gov

 

                eGFR Non-AA (test code=eGFR Non-AA)    >60.00 mL/min/1.73 m2                    eGFR (estimated Glomerular

 Filtration Rate) is an estimated value, calculated from the patient's serum 
creatinine using the MDRD equation. It is NOT the patient's actual GFR. The eGFR
provides a more clinically useful measure of kidney disease than serum 
creatinine alone.***This calculation takes sex and race into account, if the 
information is provided. If the race is not provided, and the patient is 
-American, multiply by 1.212. If sex is not provided, and the patient is 
female, multiply by 0.742. Results for patients <18 years of age have not been 
validated by the MDRD study and should be interpreted with caution. eGFR Result 
Interpretation:eGFR > or=60 is in the Normal RangeeGFR < 60 may mean kidney 
diseaseeGFR < 15 may mean kidney failure*** Ranges recommended by the National 
Kidney Foundation, http://nkdep.nih.gov





Erythrocyte Sedimentation Rate [id number] 11:41:50* 





                Test Item       Value           Reference Range    Comments

 

                ESR STAT (test code=ESR STAT)    113 mm/hr       0-9              





Lactic Acid, Plasma (Venous)2019 11:32:31* 





                Test Item       Value           Reference Range    Comments

 

                          Lactic Acid, Plasma (Venous) (test code=Lactic Acid, Plasma (Venous))    1.6 mmol/L

                          0.5-1.9                    





Complete Blood Count with [id number] 11:22:31* 





                Test Item       Value           Reference Range    Comments

 

                WBC (test code=WBC)    14.1 x10        4.4-10.5         

 

                RBC (test code=RBC)    3.64 x10        4.10-5.70        

 

                Hgb (test code=Hgb)    9.1 g/dL        13.4-17.4        

 

                MCV (test code=MCV)    81.90 fL        80..00     

 

                Hct (test code=Hct)    29.8 %          38.7-52.0        

 

                MCHC (test code=MCHC)    30.50 g/dL      32.00-37.50      

 

                MCH (test code=MCH)    25.0 pg         27.0-32.5        

 

                RDW CV (test code=RDW CV)    15.9 %          11.5-14.5        

 

                Platelets (test code=Platelets)    575.0 x10       140.0-440.0      

 

                MPV (test code=MPV)    9.1 fL                           

 

                Slide Review (test code=Slide Review)    Auto            Auto            Result created by [email or web address]



 

                nRBC (test code=nRBC)    0                                

 

                NRBC Abs (test code=NRBC Abs)    0.00 x10                         

 

                IPF (test code=IPF)    0 %                              





Automated [id number] 11:22:31* 





                Test Item       Value           Reference Range    Comments

 

                Neutro Auto (test code=Neutro Auto)    70.9 %          36.0-70.0        

 

                Lymph Auto (test code=Lymph Auto)    20.0 %          12.0-44.0        

 

                Mono Auto (test code=Mono Auto)    7.1 %           0.0-11.0         

 

                Eos, Auto (test code=Eos, Auto)    1.1 %           0.0-7.0          

 

                Basophil Auto (test code=Basophil Auto)    0.3 %           0.0-2.0          

 

                Neutro Absolute (test code=Neutro Absolute)    10.0 x10        1.6-7.4          

 

                Lymph Absolute (test code=Lymph Absolute)    2.83 x10        .50-4.60         

 

                Mono Absolute (test code=Mono Absolute)    1.00 x10        .00-1.20         

 

                Eos Absolute (test code=Eos Absolute)    0.15 x10        0.00-0.74        

 

                Baso Absolute (test code=Baso Absolute)    0.04 x10        0.00-0.21        





IG [id number] 11:22:31* 





                Test Item       Value           Reference Range    Comments

 

                IG (test code=IG)    0.6 %           0.0-5.0          

 

                IG Abs (test code=IG Abs)    0 x10                            





Wound Culture w/ Gram Stain2019-06-10 10:17:52* 





                Test Item       Value           Reference Range    Comments

 

                ORGANISM (test code=ORGANISM)    Methicillin-Resistant Staphylococcus aureus                     



 

                Chloramphenicol (test code=Chlor)                                     

 

                Ciprofloxacin (test code=Cipro)                                     

 

                Clindamycin (test code=Clinda)                                     

 

                Erythromycin (test code=Eryth)                                     

 

                Gentamicin (test code=Gent)                                     

 

                Levofloxacin (test code=Levo)                                     

 

                Linezolid (test code=Linez)                                     

 

                Oxacillin (test code=Ox)                                     

 

                Rifampin (test code=Rif)                                     

 

                Tetracycline (test code=Tetra)                                     

 

                Trimethoprim/Sulfa (test code=SXT)                                     

 

                Vancomycin (test code=Vanc)                                     

 

                          Amended Final Report (test code=Amended Final Report)    Few Methicillin-Resistant

 Staphylococcus aureus Contact isolation recommended Moderate Diphtheroids No 
Anaerobes Isolated at 3 Days                               

 

                          Gram Stain Report (test code=Gram Stain Report)    Rare White Blood Cells No organisms

 seen.                                               





Wound Culture w/ Gram [id number] 09:27:59* 





                Test Item       Value           Reference Range    Comments

 

                ORGANISM (test code=ORGANISM)    Methicillin-Resistant Staphylococcus aureus                     



 

                Chloramphenicol (test code=Chlor)                                     

 

                Ciprofloxacin (test code=Cipro)                                     

 

                Clindamycin (test code=Clinda)                                     

 

                Erythromycin (test code=Eryth)                                     

 

                Gentamicin (test code=Gent)                                     

 

                Levofloxacin (test code=Levo)                                     

 

                Linezolid (test code=Linez)                                     

 

                Oxacillin (test code=Ox)                                     

 

                Rifampin (test code=Rif)                                     

 

                Tetracycline (test code=Tetra)                                     

 

                Trimethoprim/Sulfa (test code=SXT)                                     

 

                Vancomycin (test code=Vanc)                                     

 

                          Final Report (test code=Final Report)    Few Methicillin-Resistant Staphylococcus 

aureus Moderate Diphtheroids No Anaerobes Isolated at 3 Days                               

 

                          Gram Stain Report (test code=Gram Stain Report)    Rare White Blood Cells No organisms

 seen.                                               





Blood [id number] 00:02:07No growth at 5 days.Blood [id number] 
00:01:26No growth at 5 days.CT Spine Thoracic w/o [id number] 23:05:50
Patient:   BALJEET JANG                                  MRN:    [id number]
am Date/Time2019 22:53 CDTReason for ExamBack painReportCT THORACIC SPINE WI
THOUT CONTRASTLocation: B87Oepcprov history painTechnique:Helical axial CT scan 
was performed.  Coronal and sagittal reconstructions of the entire thoracic spin
e were obtained. Exam was performed on an up-to-date helical CT scanner.  Images
were viewed in both bone and soft tissue windows. Evaluation of the intracanali
cular soft tissues is limited due to the lack of intrathecal contrast.This exam 
was performed according to our departmental dose -optimization program, which in
cludes automatic exposure control, adjustment of MA and/or KV according to patie
nt size, and or use of iterative reconstruction technique.DLP:   2090    mGy*cmF
INDINGS:There is no fracture, dislocation, listhesis, focal lytic or blastic les
ion. No evidence of central spinal canal or foraminal stenosis.The paravertebral
soft tissues are normal without evidence of paravertebral hematoma or mass.There
is hypertrophic facet arthropathy at the mid spine more prominent on the right 
at T9-T10.IMPRESSION:No acute abnormality. Mild DJD.***** Final *****Dictated b
y:    MD Holly Maria VDictated DT/TM: 2019 11:04 pmSigned by:  MD Holly Maria VSigned (Electronic Signature):  2019 11:05 pmCT Spine Lumbar 
w/o [id number] 23:03:48Patient:   BALJEET JANG                       
          MRN:    [id number] Date/Time2019 22:53 CDTReason for ExamBack 
painReportCT LUMBAR SPINE WITHOUT CONTRASTLocation: N76Acwpvsuf history 
painTechnique:Helical axial CT scan was performed.  Coronal and sagittal 
reconstructions of the entire lumbar spine were obtained. Exam was performed on 
an up-to-date helical CT scanner.  Images were viewed in both bone and soft 
tissue windows. Evaluation of the intracanalicular soft tissues is limited due 
to the lack of intrathecal contrast.This exam was performed according to our 
departmental dose -optimization program, which includes automatic exposure 
control, adjustment of MA and/or KV according to patient size, and or use of 
iterative reconstruction technique.DLP:    2090   mGy*cmFINDINGS:There is no 
fracture, dislocation, listhesis, focal lytic or blastic lesion. No evidence of 
central spinal canal or foraminal stenosis.The paravertebral soft tissues are 
normal without evidence of paravertebral hematoma or mass.Mild hypertrophic 
facet arthropathy is present throughout the spine.IMPRESSION:1. No acute lumbar 
spine abnormality. Mild facet arthritis.2. DJD and heterotopic calcifications 
noted at the hips and pelvis more on the left. Clinical correlation krishna
mmended.***** Final *****Dictated by:    MD Holly Maria VDictated DT/TM: 2019 11:00 pmSigned by:  MD Holly Maria VSigned (Electronic Signature):   11:03 pmCT Spine Cervical w/o [id number] 22:44:01Patient:   
BALJEET JANG                                  MRN:    [id number] 
Date/Time2019 22:40 CDTReason for ExamBack painReportCT CERVICAL SPINELoc
ation: [id number] HISTORY: painTechnique:Helical axial CT scan of the cervical 
spine was performed.  Coronal and sagittal images were reconstructed. Exam was p
erformed on an up-to-date helical CT scanner.   Images were viewed in both soft 
tissue and bone windows.This exam was performed according to our departmental do
se -optimization program, which includes automatic exposure control, adjustment 
of mA and/or kV according to patient size, and/ or use of iterative reconstructi
on technique.DLP:     568 mGy*cmFINDINGS: There is no evidence of fracture, disl
ocation, subluxation.  Alignment is normal.  Soft tissues are normal.  There are
no paraspinal hematomas.There are no degenerative changes demonstrated.IMPRESSI
ON:Normal exam.***** Final *****Dictated by:    MD Holly Maria VDictated DT/T
M: 2019 10:42 pmSigned by:  MD Holly Maria VSigned (Electronic Signatur
e):  2019 10:44 pmCT Pelvis w/o [id number] 18:22:47Patient:   
BALJEET JESSICA II                              MRN:    [id number] 
Date/Time2019 17:46 CDTReason for ExamSepsisReportCT SCAN OF THE PELVIS 
WITHOUT CONTRASTLocation: [id number] HISTORY: Sepsis. Pressure ulcersTECHNIQUE:
 Helical CT of the pelvis without IV contrast without complication. Exam perfor
med without IV contrast has limited sensitivity for detection of mass or inflamm
ation. Exam was performed on an up-to-date helical CT scanner.   Coronal and sag
ittal images were reconstructed.This exam was performed according to our [facility]
ental dose- optimization program, which includes automatic exposure control, adj
ustment of MA and/or KV according to patient size, and or use of iterative recon
struction technique.DLP:   965    mGy*cmComparison study CT left hip and femur J
an  and CT pelvis 2018FINDINGS:On the left, deep large 4 cm pre
ssure ulcers again noted lateral to the left femur greater trochanter and caudal
to the left ischium. There is little interval change in the extensive heterotop
ic calcification lateral to the left greater and caudal to the left ischial tube
rosity. Soft tissue thickening of the lower aspect of the left greater gluteus a
nd upper hamstring muscles is unchanged. Several small bubbles of subcutaneous e
mphysema are seen within the ulcer best seen on axial images 98. On the right, e
xtensive heterotopic calcification measuring approximately 10 x 5 x 4.7 cm is ag
ain noted just caudal to the right ischial tuberosity. Scarring from prior press
ure ulcers in the soft tissues is present adjacent to the right initial tuberosi
ty and lateral to the right hip.This degree of periostitis and periosteal reacti
on from pressure ulcers has high correlation to osteomyelitis.Left lower quadran
t abdominal colostomy noted. Bladder, seminal vesicles, prostate bladder unremar
kable. Bilateral enlarged groin lymph nodes again noted.IMPRESSION:1. Overall li
ttle change in the deep pressure ulcers lateral to left femur greater trochanter
and adjacent to the left ischial tuberosity, with extensive periosteal reaction 
and heterotopic bone formation.2. The findings have high correlation to osteomy
elitis. Recommend correlation to clinical history and biopsy results.***** Final
*****Dictated by:    MD Holly Maria VDictated DT/TM: 2019 6:03 pmSigned
by:  MD Holly Maria VSigned (Electronic Signature):  2019 6:22 pmXR 
Chest 1 View CVAD Insertion [id number] 16:02:33Patient:   BALJEET JESSICA II                              MRN:    [id number] Date/Time2019 
15:30 CSTReason for ExamLine placementReportEXAM: XR Chest 1 View CVAD Insertion
Follow-upHISTORY: Line placementLocation code R 16COMPARISON: Chest radiograph 
from 2018FINDINGS:Right arm PICC tip is in the distal SVC.Pleural 
thickening/small effusion at right lung base persists. Left lung is clear. 
Cardiomediastinal silhouette is normal.Osseous structures are unremarkabl
e.IMPRESSION:Right arm PICC tip in distal SVC.***** Final *****Dictated by:    MANOLO Tate MD, Rama FDictated DT/TM: 2019 4:01 pmSigned by:  Jacquelin Connors MD, Rama FSigned (Electronic Signature):  2019 4:02 pmCT 
Femur w/o Contrast [id number] 23:52:32Patient:   BALJEET JESSICA II        
                     MRN:    [id number] Date/Time2019 23:38 CSTReason 
for ExamOsteomyelitisReportEXAM: CT LEFT LOWER EXTREMITY WITHOUT 
CONTRASTINDICATION: OsteomyelitisCOMPARISON: None availableTECHNIQUE: Routine 
axial CT images of the left femur without intravenous contrast. Coronal and 
sagittal reformatted images were submitted for review.IV contrast: None     DLP:
1897.3  mGy-cmFINDINGS:There is a soft tissue overlying the left greater 
trochanter measuring approximately 3 x 4.4 cm in diameter and 4.2 cm in depth. 
There is soft tissue thickening and edema involving the adjacent soft tissues. 
Significant heterotopic ossification is noted which is new compared to the prior
examination. There is also periosteal reaction involving the proximal left 
femur/greater trochanter.There is a second soft tissue ulceration overlying the 
left ischial tuberosity measuring approximately 7.9 x 1.5 cm for depth of 5.4 
cm. There is significant soft tissue thickening and intramuscular edema. There 
is heterotopic ossification and periosteal reaction surrounding the left ischium
with osseous erosion consistent with osteomyelitis.There are multiple enlarged 
lymph nodes within the left groin with the largest measuring 2.4 x 1.9 cm which 
are likely reactive. There is an colostomy noted in the left lower quadrant ot
herwise the visualized small and large bowel is unremarkable. The urinary bladde
r is unremarkable.IMPRESSION:1.   Osseous erosion involving the ACL tuberosity c
onsistent with osteomyelitis. Significant periosteal reaction involving the left
issue him as well as the left proximal femur/greater trochanter.2.   Large soft 
tissue ulcerations overlying the left greater trochanter and left issue and as 
detailed above.3.   Large left inguinal lymph node which is likely reactive.LOCA
TION: Y07Psal CT exam was performed according to our departmental dose optimizat
ion program, which includes automated exposure control, adjustment of the mA and
/or kV according to the patient size and/or use of iterative reconstructive tech
nique.***** Final *****Dictated by:    MD Catalan Melanie  CDictated DT/TM:  11:44 pmSigned by:  MD Catalan Melanie  CSigned (Electronic Signature):  
2019 11:52 pmBLOOD [id number] 19:01:00* 





                Test Item       Value           Reference Range    Comments

 

                CULTURE (BEAKER) (test code=1095)    No growth in 5 days                     





BLOOD [id number] 19:01:00* 





                Test Item       Value           Reference Range    Comments

 

                CULTURE (BEAKER) (test code=1095)    No growth in 5 days                     





BLOOD [id number] 07:01:00* 





                Test Item       Value           Reference Range    Comments

 

                CULTURE (BEAKER) (test code=1095)    No growth in 5 days                     





BLOOD [id number] 07:01:00* 





                Test Item       Value           Reference Range    Comments

 

                CULTURE (BEAKER) (test code=1095)    No growth in 5 days                     





POCT-GLUCOSE [id number] 11:48:00* 





                Test Item       Value           Reference Range    Comments

 

                POC-GLUCOSE METER (BEAKER) (test code=1538)    151 mg/dL                 TESTED AT [facility] 

[address] [facility] [address]





POCT-GLUCOSE [id number] 08:10:00* 





                Test Item       Value           Reference Range    Comments

 

                POC-GLUCOSE METER (BEAKER) (test code=1538)    178 mg/dL                 TESTED AT [facility] 

[address] [facility] [address]





[id number] 06:47:00* 





                Test Item       Value           Reference Range    Comments

 

                PHOSPHORUS (BEAKER) (test code=604)    4.6 mg/dL       2.3-4.7          





[id number] 06:47:00* 





                Test Item       Value           Reference Range    Comments

 

                MAGNESIUM (BEAKER) (test code=627)    2.1 mg/dL       1.6-2.6          





BASIC METABOLIC [id number] 06:47:00* 





                Test Item       Value           Reference Range    Comments

 

                SODIUM (BEAKER) (test code=381)    139 meq/L       136-145          

 

                POTASSIUM (BEAKER) (test code=379)    4.0 meq/L       3.5-5.1          

 

                CHLORIDE (BEAKER) (test code=382)    104 meq/L                  

 

                CO2 (BEAKER) (test code=355)    27 meq/L        22-29            

 

                BLOOD UREA NITROGEN (BEAKER) (test code=354)    15 mg/dL        7-21             

 

                CREATININE (BEAKER) (test code=358)    0.88 mg/dL      0.57-1.25        

 

                GLUCOSE RANDOM (BEAKER) (test code=652)    192 mg/dL                  

 

                CALCIUM (BEAKER) (test code=697)    9.4 mg/dL       8.4-10.2         

 

                EGFR (BEAKER) (test code=1092)    98 mL/min/1.73 sq m                    ESTIMATED GFR IS NOT AS 

ACCURATE AS CREATININE CLEARANCE IN PREDICTING GLOMERULAR FILTRATION RATE. 
ESTIMATED GFR IS NOT APPLICABLE FOR DIALYSIS PATIENTS.





HEPATIC FUNCTION [id number] 06:47:00* 





                Test Item       Value           Reference Range    Comments

 

                TOTAL PROTEIN (BEAKER) (test code=770)    8.0 gm/dL       6.0-8.3          

 

                ALBUMIN (BEAKER) (test code=1145)    3.5 g/dL        3.5-5.0          

 

                BILIRUBIN TOTAL (BEAKER) (test code=377)    0.3 mg/dL       0.2-1.2          

 

                BILIRUBIN DIRECT (BEAKER) (test code=706)    0.1 mg/dL       0.1-0.5          

 

                ALKALINE PHOSPHATASE (BEAKER) (test code=346)    176 U/L                    

 

                AST (SGOT) (BEAKER) (test code=353)    22 U/L          5-34             

 

                ALT (SGPT) (BEAKER) (test code=347)    39 U/L          6-55             





CBC W/PLT COUNT & AUTO [id number] 06:09:00* 





                Test Item       Value           Reference Range    Comments

 

                WHITE BLOOD CELL COUNT (BEAKER) (test code=775)    8.7 K/ L        3.5-10.5         

 

                RED BLOOD CELL COUNT (BEAKER) (test code=761)    4.06 M/ L       4.63-6.08        

 

                HEMOGLOBIN (BEAKER) (test code=410)    10.8 GM/DL      13.7-17.5        

 

                HEMATOCRIT (BEAKER) (test code=411)    34.5 %          40.1-51.0        

 

                MEAN CORPUSCULAR VOLUME (BEAKER) (test code=753)    85.0 fL         79.0-92.2        

 

                MEAN CORPUSCULAR HEMOGLOBIN (BEAKER) (test code=751)    26.6 pg         25.7-32.2        

 

                    MEAN CORPUSCULAR HEMOGLOBIN CONC (BEAKER) (test code=752)    31.3 GM/DL          32.3-36.5

                                         

 

                RED CELL DISTRIBUTION WIDTH (BEAKER) (test code=412)    15.1 %          11.6-14.4        

 

                PLATELET COUNT (BEAKER) (test code=756)    490 K/CU MM     150-450          

 

                MEAN PLATELET VOLUME (BEAKER) (test code=754)    8.9 fL          9.4-12.4         

 

                NUCLEATED RED BLOOD CELLS (BEAKER) (test code=413)    0 /100 WBC      0-0              

 

                NEUTROPHILS RELATIVE PERCENT (BEAKER) (test code=429)    35 %                             

 

                LYMPHOCYTES RELATIVE PERCENT (BEAKER) (test code=430)    52 %                             

 

                MONOCYTES RELATIVE PERCENT (BEAKER) (test code=431)    9 %                              

 

                EOSINOPHILS RELATIVE PERCENT (BEAKER) (test code=432)    4 %                              

 

                BASOPHILS RELATIVE PERCENT (BEAKER) (test code=437)    1 %                              

 

                NEUTROPHILS ABSOLUTE COUNT (BEAKER) (test code=670)    3.03 K/ L       1.78-5.38        

 

                LYMPHOCYTES ABSOLUTE COUNT (BEAKER) (test code=414)    4.49 K/ L       1.32-3.57        

 

                MONOCYTES ABSOLUTE COUNT (BEAKER) (test code=415)    0.75 K/ L       0.30-0.82        

 

                EOSINOPHILS ABSOLUTE COUNT (BEAKER) (test code=416)    0.30 K/ L       0.04-0.54        

 

                BASOPHILS ABSOLUTE COUNT (BEAKER) (test code=417)    0.07 K/ L       0.01-0.08        

 

                IMMATURE GRANULOCYTES-RELATIVE PERCENT (BEAKER) (test code=2801)    0 %             0-1              





POCT-GLUCOSE [id number] 21:21:00* 





                Test Item       Value           Reference Range    Comments

 

                POC-GLUCOSE METER (BEAKER) (test code=1538)    139 mg/dL                 TESTED AT [facility] 

[address] [facility] [address]





POCT-GLUCOSE [id number] 17:49:00* 





                Test Item       Value           Reference Range    Comments

 

                POC-GLUCOSE METER (BEAKER) (test code=1538)    167 mg/dL                 TESTED AT [address] [address]





POCT-GLUCOSE [id number] 11:34:00* 





                Test Item       Value           Reference Range    Comments

 

                POC-GLUCOSE METER (BEAKER) (test code=1538)    88 mg/dL                  TESTED AT [address] [address]





POCT-GLUCOSE [id number] 08:20:00* 





                Test Item       Value           Reference Range    Comments

 

                POC-GLUCOSE METER (BEAKER) (test code=1538)    149 mg/dL                 TESTED AT [place] [id number] [facility] [address]





WOUND CULTURE + GRAM [id number] 07:29:00* 





                Test Item       Value           Reference Range    Comments

 

                CULTURE (BEAKER) (test code=1095)                                    4+ Same organism has been isolated from

 cultures(s) of the same body site and collection date. Repeat identification 
and susceptibility testing performed only after consultation with the clinical 
microbiology laboratory.Refer to previous culture ofMethicillin resistant 
Staphylococcus aureus

 

                GRAM STAIN RESULT (BEAKER) (test code=1123)    4+ WBCs                          

 

                          GRAM STAIN RESULT (BEAKER) (test code=112311)    4+ gram positive cocci in clusters

                                                     





WOUND CULTURE + GRAM [id number] 07:26:00* 





                Test Item       Value           Reference Range    Comments

 

                    CULTURE (BEAKER) (test code=1095)    METHICILLIN RESISTANT STAPHYLOCOCCUS AUREUS    

                                        4+ Methicillin resistant Staphylococcus aureus

 

                Clindamycin (test code=10)                                     

 

                Erythromycin (test code=4)                                     

 

                Linezolid (test code=40)                                     

 

                Nitrofurantoin (test code=23)                                     

 

                Oxacillin (test code=14)                                     

 

                Rifampin (test code=43)                                     

 

                Tetracycline (test code=2)                                     

 

                Trimethoprim + Sulfamethoxazole (test code=47)                                     

 

                Vancomycin (test code=13)                                     

 

                GRAM STAIN RESULT (BEAKER) (test code=1123)    <1+ WBCs                         

 

                GRAM STAIN RESULT (BEAKER) (test code=112318)    No organisms seen                     





CBC W/PLT COUNT & AUTO [id number] 05:47:00* 





                Test Item       Value           Reference Range    Comments

 

                WHITE BLOOD CELL COUNT (BEAKER) (test code=775)    8.3 K/ L        3.5-10.5         

 

                RED BLOOD CELL COUNT (BEAKER) (test code=761)    4.10 M/ L       4.63-6.08        

 

                HEMOGLOBIN (BEAKER) (test code=410)    10.9 GM/DL      13.7-17.5        

 

                HEMATOCRIT (BEAKER) (test code=411)    35.2 %          40.1-51.0        

 

                MEAN CORPUSCULAR VOLUME (BEAKER) (test code=753)    85.9 fL         79.0-92.2        

 

                MEAN CORPUSCULAR HEMOGLOBIN (BEAKER) (test code=751)    26.6 pg         25.7-32.2        

 

                    MEAN CORPUSCULAR HEMOGLOBIN CONC (BEAKER) (test code=752)    31.0 GM/DL          32.3-36.5

                                         

 

                RED CELL DISTRIBUTION WIDTH (BEAKER) (test code=412)    15.0 %          11.6-14.4        

 

                PLATELET COUNT (BEAKER) (test code=756)    447 K/CU MM     150-450          

 

                MEAN PLATELET VOLUME (BEAKER) (test code=754)    9.0 fL          9.4-12.4         

 

                NUCLEATED RED BLOOD CELLS (BEAKER) (test code=413)    0 /100 WBC      0-0              

 

                NEUTROPHILS RELATIVE PERCENT (BEAKER) (test code=429)    39 %                             

 

                LYMPHOCYTES RELATIVE PERCENT (BEAKER) (test code=430)    47 %                             

 

                MONOCYTES RELATIVE PERCENT (BEAKER) (test code=431)    8 %                              

 

                EOSINOPHILS RELATIVE PERCENT (BEAKER) (test code=432)    6 %                              

 

                BASOPHILS RELATIVE PERCENT (BEAKER) (test code=437)    1 %                              

 

                NEUTROPHILS ABSOLUTE COUNT (BEAKER) (test code=670)    3.24 K/ L       1.78-5.38        

 

                LYMPHOCYTES ABSOLUTE COUNT (BEAKER) (test code=414)    3.84 K/ L       1.32-3.57        

 

                MONOCYTES ABSOLUTE COUNT (BEAKER) (test code=415)    0.63 K/ L       0.30-0.82        

 

                EOSINOPHILS ABSOLUTE COUNT (BEAKER) (test code=416)    0.48 K/ L       0.04-0.54        

 

                BASOPHILS ABSOLUTE COUNT (BEAKER) (test code=417)    0.04 K/ L       0.01-0.08        

 

                IMMATURE GRANULOCYTES-RELATIVE PERCENT (BEAKER) (test code=2801)    0 %             0-1              





[id number] 05:45:00* 





                Test Item       Value           Reference Range    Comments

 

                PHOSPHORUS (BEAKER) (test code=604)    4.9 mg/dL       2.3-4.7          





[id number] 05:45:00* 





                Test Item       Value           Reference Range    Comments

 

                MAGNESIUM (BEAKER) (test code=627)    2.1 mg/dL       1.6-2.6          





BASIC METABOLIC [id number] 05:45:00* 





                Test Item       Value           Reference Range    Comments

 

                SODIUM (BEAKER) (test code=381)    141 meq/L       136-145          

 

                POTASSIUM (BEAKER) (test code=379)    3.8 meq/L       3.5-5.1          

 

                CHLORIDE (BEAKER) (test code=382)    104 meq/L                  

 

                CO2 (BEAKER) (test code=355)    29 meq/L        22-29            

 

                BLOOD UREA NITROGEN (BEAKER) (test code=354)    11 mg/dL        7-21             

 

                CREATININE (BEAKER) (test code=358)    0.66 mg/dL      0.57-1.25        

 

                GLUCOSE RANDOM (BEAKER) (test code=652)    120 mg/dL                  

 

                CALCIUM (BEAKER) (test code=697)    9.6 mg/dL       8.4-10.2         

 

                EGFR (BEAKER) (test code=1092)    137 mL/min/1.73 sq m                    ESTIMATED GFR IS NOT AS

 ACCURATE AS CREATININE CLEARANCE IN PREDICTING GLOMERULAR FILTRATION RATE. 
ESTIMATED GFR IS NOT APPLICABLE FOR DIALYSIS PATIENTS.





HEPATIC FUNCTION [id number] 05:45:00* 





                Test Item       Value           Reference Range    Comments

 

                TOTAL PROTEIN (BEAKER) (test code=770)    8.2 gm/dL       6.0-8.3          

 

                ALBUMIN (BEAKER) (test code=1145)    3.6 g/dL        3.5-5.0          

 

                BILIRUBIN TOTAL (BEAKER) (test code=377)    0.4 mg/dL       0.2-1.2          

 

                BILIRUBIN DIRECT (BEAKER) (test code=706)    0.2 mg/dL       0.1-0.5          

 

                ALKALINE PHOSPHATASE (BEAKER) (test code=346)    187 U/L                    

 

                AST (SGOT) (BEAKER) (test code=353)    30 U/L          5-34             

 

                ALT (SGPT) (BEAKER) (test code=347)    49 U/L          6-55             





POCT-GLUCOSE [id number] 21:59:00* 





                Test Item       Value           Reference Range    Comments

 

                POC-GLUCOSE METER (BEAKER) (test code=1538)    120 mg/dL                 TESTED AT [facility] 

[address] [facility] [address]





POCT-GLUCOSE [id number] 17:59:00* 





                Test Item       Value           Reference Range    Comments

 

                POC-GLUCOSE METER (BEAKER) (test code=1538)    97 mg/dL                  TESTED AT [facility] [address]

 [facility] [address]





VANCOMYCIN LEVEL, [id number] 16:57:00* 





                Test Item       Value           Reference Range    Comments

 

                VANCOMYCIN TROUGH (BEAKER) (test code=522)    7.8 ug/mL       10.0-20.0        





POCT-GLUCOSE [id number] 13:33:00* 





                Test Item       Value           Reference Range    Comments

 

                POC-GLUCOSE METER (BEAKER) (test code=1538)    95 mg/dL                  TESTED AT [place] [id number]

 [facility] [address]





[id number] 13:19:00* 





                Test Item       Value           Reference Range    Comments

 

                PHOSPHORUS (BEAKER) (test code=604)    4.3 mg/dL       2.3-4.7          





[id number] 13:19:00* 





                Test Item       Value           Reference Range    Comments

 

                MAGNESIUM (BEAKER) (test code=627)    2.1 mg/dL       1.6-2.6          





BASIC METABOLIC [id number] 13:19:00* 





                Test Item       Value           Reference Range    Comments

 

                SODIUM (BEAKER) (test code=381)    138 meq/L       136-145          

 

                POTASSIUM (BEAKER) (test code=379)    4.0 meq/L       3.5-5.1          

 

                CHLORIDE (BEAKER) (test code=382)    103 meq/L                  

 

                CO2 (BEAKER) (test code=355)    27 meq/L        22-29            

 

                BLOOD UREA NITROGEN (BEAKER) (test code=354)    10 mg/dL        7-21             

 

                CREATININE (BEAKER) (test code=358)    0.63 mg/dL      0.57-1.25        

 

                GLUCOSE RANDOM (BEAKER) (test code=652)    93 mg/dL                   

 

                CALCIUM (BEAKER) (test code=697)    9.0 mg/dL       8.4-10.2         

 

                EGFR (BEAKER) (test code=1092)    144 mL/min/1.73 sq m                    ESTIMATED GFR IS NOT AS

 ACCURATE AS CREATININE CLEARANCE IN PREDICTING GLOMERULAR FILTRATION RATE. 
ESTIMATED GFR IS NOT APPLICABLE FOR DIALYSIS PATIENTS.





HEPATIC FUNCTION [id number] 13:19:00* 





                Test Item       Value           Reference Range    Comments

 

                TOTAL PROTEIN (BEAKER) (test code=770)    8.0 gm/dL       6.0-8.3          

 

                ALBUMIN (BEAKER) (test code=1145)    3.5 g/dL        3.5-5.0          

 

                BILIRUBIN TOTAL (BEAKER) (test code=377)    0.3 mg/dL       0.2-1.2          

 

                BILIRUBIN DIRECT (BEAKER) (test code=706)    0.2 mg/dL       0.1-0.5          

 

                ALKALINE PHOSPHATASE (BEAKER) (test code=346)    192 U/L                    

 

                AST (SGOT) (BEAKER) (test code=353)    36 U/L          5-34             

 

                ALT (SGPT) (BEAKER) (test code=347)    50 U/L          6-55             





CBC W/PLT COUNT & AUTO [id number] 13:07:00* 





                Test Item       Value           Reference Range    Comments

 

                WHITE BLOOD CELL COUNT (BEAKER) (test code=775)    7.8 K/ L        3.5-10.5         

 

                RED BLOOD CELL COUNT (BEAKER) (test code=761)    3.73 M/ L       4.63-6.08        

 

                HEMOGLOBIN (BEAKER) (test code=410)    10.1 GM/DL      13.7-17.5        

 

                HEMATOCRIT (BEAKER) (test code=411)    32.1 %          40.1-51.0        

 

                MEAN CORPUSCULAR VOLUME (BEAKER) (test code=753)    86.1 fL         79.0-92.2        

 

                MEAN CORPUSCULAR HEMOGLOBIN (BEAKER) (test code=751)    27.1 pg         25.7-32.2        

 

                    MEAN CORPUSCULAR HEMOGLOBIN CONC (BEAKER) (test code=752)    31.5 GM/DL          32.3-36.5

                                         

 

                RED CELL DISTRIBUTION WIDTH (BEAKER) (test code=412)    15.4 %          11.6-14.4        

 

                PLATELET COUNT (BEAKER) (test code=756)    412 K/CU MM     150-450          

 

                MEAN PLATELET VOLUME (BEAKER) (test code=754)    9.0 fL          9.4-12.4         

 

                NUCLEATED RED BLOOD CELLS (BEAKER) (test code=413)    0 /100 WBC      0-0              

 

                NEUTROPHILS RELATIVE PERCENT (BEAKER) (test code=429)    37 %                             

 

                LYMPHOCYTES RELATIVE PERCENT (BEAKER) (test code=430)    47 %                             

 

                MONOCYTES RELATIVE PERCENT (BEAKER) (test code=431)    9 %                              

 

                EOSINOPHILS RELATIVE PERCENT (BEAKER) (test code=432)    7 %                              

 

                BASOPHILS RELATIVE PERCENT (BEAKER) (test code=437)    1 %                              

 

                NEUTROPHILS ABSOLUTE COUNT (BEAKER) (test code=670)    2.86 K/ L       1.78-5.38        

 

                LYMPHOCYTES ABSOLUTE COUNT (BEAKER) (test code=414)    3.64 K/ L       1.32-3.57        

 

                MONOCYTES ABSOLUTE COUNT (BEAKER) (test code=415)    0.69 K/ L       0.30-0.82        

 

                EOSINOPHILS ABSOLUTE COUNT (BEAKER) (test code=416)    0.52 K/ L       0.04-0.54        

 

                BASOPHILS ABSOLUTE COUNT (BEAKER) (test code=417)    0.04 K/ L       0.01-0.08        

 

                IMMATURE GRANULOCYTES-RELATIVE PERCENT (BEAKER) (test code=2801)    0 %             0-1              





POCT-GLUCOSE [id number] 11:31:00* 





                Test Item       Value           Reference Range    Comments

 

                POC-GLUCOSE METER (BEAKER) (test code=1538)    162 mg/dL                 TESTED AT [place] [id number] [facility] [address]





POCT-GLUCOSE [id number] 08:18:00* 





                Test Item       Value           Reference Range    Comments

 

                POC-GLUCOSE METER (BEAKER) (test code=1538)    219 mg/dL                 TESTED AT [address] [address]





POCT-GLUCOSE [id number] 21:40:00* 





                Test Item       Value           Reference Range    Comments

 

                POC-GLUCOSE METER (BEAKER) (test code=1538)    130 mg/dL                 TESTED AT [address] [address]





POCT-GLUCOSE [id number] 20:11:00* 





                Test Item       Value           Reference Range    Comments

 

                POC-GLUCOSE METER (BEAKER) (test code=1538)    156 mg/dL                 TESTED AT [address] [address]





HEMOGLOBIN [id number] 19:32:00* 





                Test Item       Value           Reference Range    Comments

 

                HEMOGLOBIN A1C (BEAKER) (test code=368)    9.3 %           4.3-6.1          





VANCOMYCIN LEVEL, [id number] 17:43:00* 





                Test Item       Value           Reference Range    Comments

 

                VANCOMYCIN TROUGH (BEAKER) (test code=522)    9.2 ug/mL       10.0-20.0        





POCT-GLUCOSE [id number] 13:32:00* 





                Test Item       Value           Reference Range    Comments

 

                POC-GLUCOSE METER (BEAKER) (test code=1538)    213 mg/dL                 TESTED AT [place] [address]





POCT-GLUCOSE [id number] 11:53:00* 





                Test Item       Value           Reference Range    Comments

 

                POC-GLUCOSE METER (BEAKER) (test code=1538)    283 mg/dL                 TESTED AT [place] [id number]





T4, [id number] 08:37:00* 





                Test Item       Value           Reference Range    Comments

 

                FREE T4 (BEAKER) (test code=655)    0.92 ng/dL      0.70-1.48        





TSH/FREE T4 IF [id number] 07:35:00* 





                Test Item       Value           Reference Range    Comments

 

                THYROID STIMULATING HORMONE (BEAKER) (test code=772)    8.07 uIU/mL     0.35-4.94        





C-REACTIVE [id number] 07:11:00* 





                Test Item       Value           Reference Range    Comments

 

                C-REACTIVE PROTEIN (BEAKER) (test code=676)    17.40 mg/dL     0.00-0.50        





POCT-GLUCOSE [id number] 06:59:00* 





                Test Item       Value           Reference Range    Comments

 

                POC-GLUCOSE METER (BEAKER) (test code=1538)    236 mg/dL                 TESTED AT [address] [address]





POCT-LACTIC ACID, [id number] 06:37:00* 





                Test Item       Value           Reference Range    Comments

 

                POC-LACTIC ACID, VENOUS (BEAKER) (test code=2805)    0.7 mmol/L      0.9-1.7         TESTED AT

 [address] [address]





[id number] 05:12:00* 





                Test Item       Value           Reference Range    Comments

 

                PROCALCITONIN (BEAKER) (test code=3036)    0.07 ng/mL      <0.05            





SEPSIS RISK (ng/mL)Low:          0.05-0.50Intermediate: 0.51-2.00High:         >
=2.01POCT-LACTIC ACID, [id number] 04:16:00* 





                Test Item       Value           Reference Range    Comments

 

                POC-LACTIC ACID, VENOUS (BEAKER) (test code=2805)    0.8 mmol/L      0.9-1.7         TESTED AT

 [address] [address]





POCT-GLUCOSE [id number] 04:14:00* 





                Test Item       Value           Reference Range    Comments

 

                POC-GLUCOSE METER (BEAKER) (test code=1538)    280 mg/dL                 TESTED AT [address] [address]





BASIC METABOLIC [id number] 01:38:00* 





                Test Item       Value           Reference Range    Comments

 

                SODIUM (BEAKER) (test code=381)    135 meq/L       136-145          

 

                POTASSIUM (BEAKER) (test code=379)    3.7 meq/L       3.5-5.1          

 

                CHLORIDE (BEAKER) (test code=382)    103 meq/L                  

 

                CO2 (BEAKER) (test code=355)    22 meq/L        22-29            

 

                BLOOD UREA NITROGEN (BEAKER) (test code=354)    12 mg/dL        7-21             

 

                CREATININE (BEAKER) (test code=358)    0.82 mg/dL      0.57-1.25        

 

                GLUCOSE RANDOM (BEAKER) (test code=652)    341 mg/dL                  

 

                CALCIUM (BEAKER) (test code=697)    9.0 mg/dL       8.4-10.2         

 

                EGFR (BEAKER) (test code=1092)    106 mL/min/1.73 sq m                    ESTIMATED GFR IS NOT AS

 ACCURATE AS CREATININE CLEARANCE IN PREDICTING GLOMERULAR FILTRATION RATE. 
ESTIMATED GFR IS NOT APPLICABLE FOR DIALYSIS PATIENTS.





CBC W/PLT COUNT & AUTO [id number] 01:17:00* 





                Test Item       Value           Reference Range    Comments

 

                WHITE BLOOD CELL COUNT (BEAKER) (test code=775)    13.8 K/ L       3.5-10.5         

 

                RED BLOOD CELL COUNT (BEAKER) (test code=761)    3.85 M/ L       4.63-6.08        

 

                HEMOGLOBIN (BEAKER) (test code=410)    10.3 GM/DL      13.7-17.5        

 

                HEMATOCRIT (BEAKER) (test code=411)    33.0 %          40.1-51.0        

 

                MEAN CORPUSCULAR VOLUME (BEAKER) (test code=753)    85.7 fL         79.0-92.2        

 

                MEAN CORPUSCULAR HEMOGLOBIN (BEAKER) (test code=751)    26.8 pg         25.7-32.2        

 

                    MEAN CORPUSCULAR HEMOGLOBIN CONC (BEAKER) (test code=752)    31.2 GM/DL          32.3-36.5

                                         

 

                RED CELL DISTRIBUTION WIDTH (BEAKER) (test code=412)    15.8 %          11.6-14.4        

 

                PLATELET COUNT (BEAKER) (test code=756)    379 K/CU MM     150-450          

 

                MEAN PLATELET VOLUME (BEAKER) (test code=754)    9.3 fL          9.4-12.4         

 

                NUCLEATED RED BLOOD CELLS (BEAKER) (test code=413)    0 /100 WBC      0-0              

 

                NEUTROPHILS RELATIVE PERCENT (BEAKER) (test code=429)    56 %                             

 

                LYMPHOCYTES RELATIVE PERCENT (BEAKER) (test code=430)    30 %                             

 

                MONOCYTES RELATIVE PERCENT (BEAKER) (test code=431)    10 %                             

 

                EOSINOPHILS RELATIVE PERCENT (BEAKER) (test code=432)    4 %                              

 

                BASOPHILS RELATIVE PERCENT (BEAKER) (test code=437)    0 %                              

 

                NEUTROPHILS ABSOLUTE COUNT (BEAKER) (test code=670)    7.74 K/ L       1.78-5.38        

 

                LYMPHOCYTES ABSOLUTE COUNT (BEAKER) (test code=414)    4.13 K/ L       1.32-3.57        

 

                MONOCYTES ABSOLUTE COUNT (BEAKER) (test code=415)    1.31 K/ L       0.30-0.82        

 

                EOSINOPHILS ABSOLUTE COUNT (BEAKER) (test code=416)    0.49 K/ L       0.04-0.54        

 

                BASOPHILS ABSOLUTE COUNT (BEAKER) (test code=417)    0.04 K/ L       0.01-0.08        

 

                IMMATURE GRANULOCYTES-RELATIVE PERCENT (BEAKER) (test code=2801)    0 %             0-1              





RAD, CHEST, 1 VIEW, NON [id number] 16:35:00Reason for exam:->check picc 
placementShould this be performed at the bedside?->YesFINAL REPORT PATIENT ID:  
[id number] EXAM: Frontal chest radiograph, 2 images HISTORY PROVIDED: Check PICC 
placement COMPARISON: 2018 at 1214 IMPRESSION:Interval placement of a 
right upper extremity PICC line with the tip projecting over the proximal SVC. 
There is blunting of the right costophrenic angle with right basilar opacities 
compatible with a small pleural effusion and atelectasis or consolidation the 
right lung base without significant change since the previous examination. The 
left lung is clear. No discernible pneumothorax. Cardiomediastinal contours are 
stable. No acute osseous abnormality. Signed: Vishnu Cisneros 
Verified Date/Time:  2018 16:35:17 Reading Location: [facility] Reading 
Room      Electronically signed by: VISHNU CISNEROS on 2018 04:35 PM 
POCT-GLUCOSE [id number] 12:42:00* 





                Test Item       Value           Reference Range    Comments

 

                POC-GLUCOSE METER (BEAKER) (test code=1538)    101 mg/dL                 TESTED AT [facility] 

[address] [facility] [address]





RAD, CHEST, 1 VIEW, NON [id number] 12:30:00Reason for exam:->VASCULAR ACCESS
PROBLEMShould this be performed at the bedside?->YesFINAL REPORT PATIENT ID:   
[id number] INDICATION: VASCULAR ACCESS PROBLEM COMPARISON:2018 
TECHNIQUE: Chest radiograph, single view, portable technique. FINDINGS / 
IMPRESSION: Previously demonstrated right PICC line is removed. There is a right
subpulmonic pleural effusion, versus elevated right hemidiaphragm. No pulmonary 
edema, consolidation, or pneumothorax is demonstrated. Cardiac and mediastinal 
contours are normal. Osseous structures unremarkable. Signed: Paul Avilez Verified Date/Time:  2018 12:30:28 Reading Location: [facility] Radiology Reading Room Electronically signed by: PAUL AVILEZ M.D. on 2018 12:30 PM FUNGUS CULTURE +  [id number] 08:53:00* 





                Test Item       Value           Reference Range    Comments

 

                CULTURE (BEAKER) (test code=1095)    No fungus isolated in 28 days                     

 

                FUNGUS SMEAR (BEAKER) (test code=1406)    No fungi seen                     





POCT-GLUCOSE [id number] 12:39:00* 





                Test Item       Value           Reference Range    Comments

 

                POC-GLUCOSE METER (BEAKER) (test code=1538)    94 mg/dL                  TESTED AT [address] [address]





CBC (HEMOGRAM ONLY)2018 11:41:00* 





                Test Item       Value           Reference Range    Comments

 

                WHITE BLOOD CELL COUNT (BEAKER) (test code=775)    11.1 K/ L       3.5-10.5         

 

                RED BLOOD CELL COUNT (BEAKER) (test code=761)    3.24 M/ L       4.63-6.08        

 

                HEMOGLOBIN (BEAKER) (test code=410)    8.8 GM/DL       13.7-17.5        

 

                HEMATOCRIT (BEAKER) (test code=411)    28.9 %          40.1-51.0        

 

                MEAN CORPUSCULAR VOLUME (BEAKER) (test code=753)    89.2 fL         79.0-92.2        

 

                MEAN CORPUSCULAR HEMOGLOBIN (BEAKER) (test code=751)    27.2 pg         25.7-32.2        

 

                    MEAN CORPUSCULAR HEMOGLOBIN CONC (BEAKER) (test code=752)    30.4 GM/DL          32.3-36.5

                                         

 

                RED CELL DISTRIBUTION WIDTH (BEAKER) (test code=412)    15.6 %          11.6-14.4        

 

                PLATELET COUNT (BEAKER) (test code=756)    657 K/CU MM     150-450          

 

                MEAN PLATELET VOLUME (BEAKER) (test code=754)    8.1 fL          9.4-12.4         

 

                NUCLEATED RED BLOOD CELLS (BEAKER) (test code=413)    0 /100 WBC      0-0              





POCT-GLUCOSE [id number] 09:17:00* 





                Test Item       Value           Reference Range    Comments

 

                POC-GLUCOSE METER (BEAKER) (test code=1538)    139 mg/dL                 TESTED AT [address] [address]





POCT-GLUCOSE [id number] 21:23:00* 





                Test Item       Value           Reference Range    Comments

 

                POC-GLUCOSE METER (BEAKER) (test code=1538)    144 mg/dL                 TESTED AT [address] [address]





POCT-GLUCOSE [id number] 17:35:00* 





                Test Item       Value           Reference Range    Comments

 

                POC-GLUCOSE METER (BEAKER) (test code=1538)    254 mg/dL                 TESTED AT [address] [address]





POCT-GLUCOSE [id number] 11:41:00* 





                Test Item       Value           Reference Range    Comments

 

                POC-GLUCOSE METER (BEAKER) (test code=1538)    190 mg/dL                 TESTED AT [address] [address]





POCT-GLUCOSE [id number] 07:43:00* 





                Test Item       Value           Reference Range    Comments

 

                POC-GLUCOSE METER (BEAKER) (test code=1538)    228 mg/dL                 TESTED AT [address] [address]





POCT-GLUCOSE [id number] 21:02:00* 





                Test Item       Value           Reference Range    Comments

 

                POC-GLUCOSE METER (BEAKER) (test code=1538)    182 mg/dL                 TESTED AT [address] [address]





POCT-GLUCOSE [id number] 17:20:00* 





                Test Item       Value           Reference Range    Comments

 

                POC-GLUCOSE METER (BEAKER) (test code=1538)    174 mg/dL                 TESTED AT [address] [address]





POCT-GLUCOSE [id number] 12:04:00* 





                Test Item       Value           Reference Range    Comments

 

                POC-GLUCOSE METER (BEAKER) (test code=1538)    233 mg/dL                 TESTED AT [address] [address]





POCT-GLUCOSE [id number] 08:45:00* 





                Test Item       Value           Reference Range    Comments

 

                POC-GLUCOSE METER (BEAKER) (test code=1538)    194 mg/dL                 TESTED AT [address] [address]





POCT-GLUCOSE [id number] 21:00:00* 





                Test Item       Value           Reference Range    Comments

 

                POC-GLUCOSE METER (BEAKER) (test code=1538)    315 mg/dL                 TESTED AT [address] [address]





POCT-GLUCOSE [id number] 17:17:00* 





                Test Item       Value           Reference Range    Comments

 

                POC-GLUCOSE METER (BEAKER) (test code=1538)    191 mg/dL                 TESTED AT [place] [address] [facility] [address]





POCT-GLUCOSE [id number] 11:56:00* 





                Test Item       Value           Reference Range    Comments

 

                POC-GLUCOSE METER (BEAKER) (test code=1538)    138 mg/dL                 TESTED AT [facility] 

[address] [facility] [address]





POCT-GLUCOSE [id number] 08:34:00* 





                Test Item       Value           Reference Range    Comments

 

                POC-GLUCOSE METER (BEAKER) (test code=1538)    235 mg/dL                 TESTED AT [address] [address]





BASIC METABOLIC [id number] 05:10:00* 





                Test Item       Value           Reference Range    Comments

 

                SODIUM (BEAKER) (test code=381)    138 meq/L       136-145          

 

                POTASSIUM (BEAKER) (test code=379)    4.2 meq/L       3.5-5.1          

 

                CHLORIDE (BEAKER) (test code=382)    103 meq/L                  

 

                CO2 (BEAKER) (test code=355)    27 meq/L        22-29            

 

                BLOOD UREA NITROGEN (BEAKER) (test code=354)    17 mg/dL        7-21             

 

                CREATININE (BEAKER) (test code=358)    0.75 mg/dL      0.57-1.25        

 

                GLUCOSE RANDOM (BEAKER) (test code=652)    188 mg/dL                  

 

                CALCIUM (BEAKER) (test code=697)    9.1 mg/dL       8.4-10.2         

 

                EGFR (BEAKER) (test code=1092)    118 mL/min/1.73 sq m                    ESTIMATED GFR IS NOT AS

 ACCURATE AS CREATININE CLEARANCE IN PREDICTING GLOMERULAR FILTRATION RATE. 
ESTIMATED GFR IS NOT APPLICABLE FOR DIALYSIS PATIENTS.





CBC (HEMOGRAM ONLY)2018 04:53:00* 





                Test Item       Value           Reference Range    Comments

 

                WHITE BLOOD CELL COUNT (BEAKER) (test code=775)    14.1 K/ L       3.5-10.5         

 

                RED BLOOD CELL COUNT (BEAKER) (test code=761)    3.39 M/ L       4.63-6.08        

 

                HEMOGLOBIN (BEAKER) (test code=410)    8.8 GM/DL       13.7-17.5        

 

                HEMATOCRIT (BEAKER) (test code=411)    30.6 %          40.1-51.0        

 

                MEAN CORPUSCULAR VOLUME (BEAKER) (test code=753)    90.3 fL         79.0-92.2        

 

                MEAN CORPUSCULAR HEMOGLOBIN (BEAKER) (test code=751)    26.0 pg         25.7-32.2        

 

                    MEAN CORPUSCULAR HEMOGLOBIN CONC (BEAKER) (test code=752)    28.8 GM/DL          32.3-36.5

                                         

 

                RED CELL DISTRIBUTION WIDTH (BEAKER) (test code=412)    15.7 %          11.6-14.4        

 

                PLATELET COUNT (BEAKER) (test code=756)    848 K/CU MM     150-450          

 

                MEAN PLATELET VOLUME (BEAKER) (test code=754)    8.0 fL          9.4-12.4         

 

                NUCLEATED RED BLOOD CELLS (BEAKER) (test code=413)    0 /100 WBC      0-0              





POCT-GLUCOSE [id number] 20:53:00* 





                Test Item       Value           Reference Range    Comments

 

                POC-GLUCOSE METER (BEAKER) (test code=1538)    249 mg/dL                 TESTED AT [address] [address]





POCT-GLUCOSE [id number] 19:02:00* 





                Test Item       Value           Reference Range    Comments

 

                POC-GLUCOSE METER (BEAKER) (test code=1538)    295 mg/dL                 TESTED AT [address] [address]





POCT-GLUCOSE [id number] 14:25:00* 





                Test Item       Value           Reference Range    Comments

 

                POC-GLUCOSE METER (BEAKER) (test code=1538)    192 mg/dL                 TESTED AT [address] [address]





POCT-GLUCOSE [id number] 14:25:00* 





                Test Item       Value           Reference Range    Comments

 

                POC-GLUCOSE METER (BEAKER) (test code=1538)    214 mg/dL                 TESTED AT [address] [address]





POCT-GLUCOSE [id number] 21:50:00* 





                Test Item       Value           Reference Range    Comments

 

                POC-GLUCOSE METER (BEAKER) (test code=1538)    188 mg/dL                 TESTED AT [address] [address]





POCT-GLUCOSE [id number] 18:25:00* 





                Test Item       Value           Reference Range    Comments

 

                POC-GLUCOSE METER (BEAKER) (test code=1538)    205 mg/dL                 TESTED AT [address] [address]





POCT-GLUCOSE [id number] 12:28:00* 





                Test Item       Value           Reference Range    Comments

 

                POC-GLUCOSE METER (BEAKER) (test code=1538)    259 mg/dL                 TESTED AT [address] [address]





POCT-GLUCOSE [id number] 07:50:00* 





                Test Item       Value           Reference Range    Comments

 

                POC-GLUCOSE METER (BEAKER) (test code=1538)    220 mg/dL                 TESTED AT [address] [address]





POCT-GLUCOSE [id number] 22:03:00* 





                Test Item       Value           Reference Range    Comments

 

                POC-GLUCOSE METER (BEAKER) (test code=1538)    208 mg/dL                 TESTED AT [address] [address]





POCT-GLUCOSE [id number] 17:25:00* 





                Test Item       Value           Reference Range    Comments

 

                POC-GLUCOSE METER (BEAKER) (test code=1538)    124 mg/dL                 TESTED AT [address] [address]





POCT-GLUCOSE [id number] 11:52:00* 





                Test Item       Value           Reference Range    Comments

 

                POC-GLUCOSE METER (BEAKER) (test code=1538)    143 mg/dL                 TESTED AT [address] [address]





POCT-GLUCOSE [id number] 07:52:00* 





                Test Item       Value           Reference Range    Comments

 

                POC-GLUCOSE METER (BEAKER) (test code=1538)    209 mg/dL                 TESTED AT [address] [address]





POCT-GLUCOSE [id number] 20:58:00* 





                Test Item       Value           Reference Range    Comments

 

                POC-GLUCOSE METER (BEAKER) (test code=1538)    183 mg/dL                 TESTED AT [address] [address]





POCT-GLUCOSE [id number] 17:25:00* 





                Test Item       Value           Reference Range    Comments

 

                POC-GLUCOSE METER (BEAKER) (test code=1538)    173 mg/dL                 TESTED AT [address] [address]





POCT-GLUCOSE [id number] 12:29:00* 





                Test Item       Value           Reference Range    Comments

 

                POC-GLUCOSE METER (BEAKER) (test code=1538)    285 mg/dL                 TESTED AT [facility] 

[address] [facility] [address]





POCT-GLUCOSE [id number] 07:53:00* 





                Test Item       Value           Reference Range    Comments

 

                POC-GLUCOSE METER (BEAKER) (test code=1538)    224 mg/dL                 TESTED AT [facility] 

[address] [facility] [address]





CBC W/PLT COUNT & AUTO [id number] 07:11:00* 





                Test Item       Value           Reference Range    Comments

 

                WHITE BLOOD CELL COUNT (BEAKER) (test code=775)    12.2 K/ L       3.5-10.5         

 

                RED BLOOD CELL COUNT (BEAKER) (test code=761)    2.91 M/ L       4.63-6.08        

 

                HEMOGLOBIN (BEAKER) (test code=410)    7.8 GM/DL       13.7-17.5        

 

                HEMATOCRIT (BEAKER) (test code=411)    26.8 %          40.1-51.0        

 

                MEAN CORPUSCULAR VOLUME (BEAKER) (test code=753)    92.1 fL         79.0-92.2        

 

                MEAN CORPUSCULAR HEMOGLOBIN (BEAKER) (test code=751)    26.8 pg         25.7-32.2        

 

                    MEAN CORPUSCULAR HEMOGLOBIN CONC (BEAKER) (test code=752)    29.1 GM/DL          32.3-36.5

                                         

 

                RED CELL DISTRIBUTION WIDTH (BEAKER) (test code=412)    15.4 %          11.6-14.4        

 

                PLATELET COUNT (BEAKER) (test code=756)    700 K/CU MM     150-450          

 

                MEAN PLATELET VOLUME (BEAKER) (test code=754)    9.1 fL          9.4-12.4         

 

                NUCLEATED RED BLOOD CELLS (BEAKER) (test code=413)    0 /100 WBC      0-0              

 

                NEUTROPHILS RELATIVE PERCENT (BEAKER) (test code=429)    56 %                             

 

                LYMPHOCYTES RELATIVE PERCENT (BEAKER) (test code=430)    34 %                             

 

                MONOCYTES RELATIVE PERCENT (BEAKER) (test code=431)    7 %                              

 

                EOSINOPHILS RELATIVE PERCENT (BEAKER) (test code=432)    3 %                              

 

                BASOPHILS RELATIVE PERCENT (BEAKER) (test code=437)    0 %                              

 

                NEUTROPHILS ABSOLUTE COUNT (BEAKER) (test code=670)    6.78 K/ L       1.78-5.38        

 

                LYMPHOCYTES ABSOLUTE COUNT (BEAKER) (test code=414)    4.09 K/ L       1.32-3.57        

 

                MONOCYTES ABSOLUTE COUNT (BEAKER) (test code=415)    0.79 K/ L       0.30-0.82        

 

                EOSINOPHILS ABSOLUTE COUNT (BEAKER) (test code=416)    0.36 K/ L       0.04-0.54        

 

                BASOPHILS ABSOLUTE COUNT (BEAKER) (test code=417)    0.03 K/ L       0.01-0.08        

 

                IMMATURE GRANULOCYTES-RELATIVE PERCENT (BEAKER) (test code=2801)    1 %             0-1              





[id number] 07:10:00* 





                Test Item       Value           Reference Range    Comments

 

                PREALBUMIN (BEAKER) (test code=586)    13 mg/dL        14-45            





[id number] 06:58:00* 





                Test Item       Value           Reference Range    Comments

 

                MAGNESIUM (BEAKER) (test code=627)    2.0 mg/dL       1.6-2.6          





BASIC METABOLIC [id number] 06:58:00* 





                Test Item       Value           Reference Range    Comments

 

                SODIUM (BEAKER) (test code=381)    137 meq/L       136-145          

 

                POTASSIUM (BEAKER) (test code=379)    4.1 meq/L       3.5-5.1          

 

                CHLORIDE (BEAKER) (test code=382)    105 meq/L                  

 

                CO2 (BEAKER) (test code=355)    27 meq/L        22-29            

 

                BLOOD UREA NITROGEN (BEAKER) (test code=354)    16 mg/dL        7-21             

 

                CREATININE (BEAKER) (test code=358)    0.70 mg/dL      0.57-1.25        

 

                GLUCOSE RANDOM (BEAKER) (test code=652)    162 mg/dL                  

 

                CALCIUM (BEAKER) (test code=697)    8.7 mg/dL       8.4-10.2         

 

                EGFR (BEAKER) (test code=1092)    128 mL/min/1.73 sq m                    ESTIMATED GFR IS NOT AS

 ACCURATE AS CREATININE CLEARANCE IN PREDICTING GLOMERULAR FILTRATION RATE. 
ESTIMATED GFR IS NOT APPLICABLE FOR DIALYSIS PATIENTS.





[id number] 06:58:00* 





                Test Item       Value           Reference Range    Comments

 

                ALBUMIN (BEAKER) (test code=1145)    2.6 g/dL        3.5-5.0          





POCT-GLUCOSE [id number] 21:31:00* 





                Test Item       Value           Reference Range    Comments

 

                POC-GLUCOSE METER (BEAKER) (test code=1538)    167 mg/dL                 TESTED AT [place] [address] [facility] [address]





POCT-GLUCOSE [id number] 18:19:00* 





                Test Item       Value           Reference Range    Comments

 

                POC-GLUCOSE METER (BEAKER) (test code=1538)    210 mg/dL                 TESTED AT [address] [address]





POCT-GLUCOSE [id number] 12:24:00* 





                Test Item       Value           Reference Range    Comments

 

                POC-GLUCOSE METER (BEAKER) (test code=1538)    124 mg/dL                 TESTED AT [address] [address]





POCT-GLUCOSE [id number] 08:23:00* 





                Test Item       Value           Reference Range    Comments

 

                POC-GLUCOSE METER (BEAKER) (test code=1538)    179 mg/dL                 TESTED AT [address] [address]





[id number] 04:50:00* 





                Test Item       Value           Reference Range    Comments

 

                MAGNESIUM (BEAKER) (test code=627)    2.0 mg/dL       1.6-2.6          





BASIC METABOLIC [id number] 04:50:00* 





                Test Item       Value           Reference Range    Comments

 

                SODIUM (BEAKER) (test code=381)    134 meq/L       136-145          

 

                POTASSIUM (BEAKER) (test code=379)    3.9 meq/L       3.5-5.1          

 

                CHLORIDE (BEAKER) (test code=382)    104 meq/L                  

 

                CO2 (BEAKER) (test code=355)    22 meq/L        22-29            

 

                BLOOD UREA NITROGEN (BEAKER) (test code=354)    10 mg/dL        7-21             

 

                CREATININE (BEAKER) (test code=358)    0.66 mg/dL      0.57-1.25        

 

                GLUCOSE RANDOM (BEAKER) (test code=652)    230 mg/dL                  

 

                CALCIUM (BEAKER) (test code=697)    8.0 mg/dL       8.4-10.2         

 

                EGFR (BEAKER) (test code=1092)    137 mL/min/1.73 sq m                    ESTIMATED GFR IS NOT AS

 ACCURATE AS CREATININE CLEARANCE IN PREDICTING GLOMERULAR FILTRATION RATE. 
ESTIMATED GFR IS NOT APPLICABLE FOR DIALYSIS PATIENTS.





CBC W/PLT COUNT & AUTO [id number] 04:38:00* 





                Test Item       Value           Reference Range    Comments

 

                WHITE BLOOD CELL COUNT (BEAKER) (test code=775)    13.3 K/ L       3.5-10.5         

 

                RED BLOOD CELL COUNT (BEAKER) (test code=761)    2.69 M/ L       4.63-6.08        

 

                HEMOGLOBIN (BEAKER) (test code=410)    7.3 GM/DL       13.7-17.5        

 

                HEMATOCRIT (BEAKER) (test code=411)    24.9 %          40.1-51.0        

 

                MEAN CORPUSCULAR VOLUME (BEAKER) (test code=753)    92.6 fL         79.0-92.2        

 

                MEAN CORPUSCULAR HEMOGLOBIN (BEAKER) (test code=751)    27.1 pg         25.7-32.2        

 

                    MEAN CORPUSCULAR HEMOGLOBIN CONC (BEAKER) (test code=752)    29.3 GM/DL          32.3-36.5

                                         

 

                RED CELL DISTRIBUTION WIDTH (BEAKER) (test code=412)    15.3 %          11.6-14.4        

 

                PLATELET COUNT (BEAKER) (test code=756)    735 K/CU MM     150-450          

 

                MEAN PLATELET VOLUME (BEAKER) (test code=754)    8.1 fL          9.4-12.4         

 

                NUCLEATED RED BLOOD CELLS (BEAKER) (test code=413)    0 /100 WBC      0-0              

 

                NEUTROPHILS RELATIVE PERCENT (BEAKER) (test code=429)    57 %                             

 

                LYMPHOCYTES RELATIVE PERCENT (BEAKER) (test code=430)    32 %                             

 

                MONOCYTES RELATIVE PERCENT (BEAKER) (test code=431)    8 %                              

 

                EOSINOPHILS RELATIVE PERCENT (BEAKER) (test code=432)    2 %                              

 

                BASOPHILS RELATIVE PERCENT (BEAKER) (test code=437)    0 %                              

 

                NEUTROPHILS ABSOLUTE COUNT (BEAKER) (test code=670)    7.56 K/ L       1.78-5.38        

 

                LYMPHOCYTES ABSOLUTE COUNT (BEAKER) (test code=414)    4.19 K/ L       1.32-3.57        

 

                MONOCYTES ABSOLUTE COUNT (BEAKER) (test code=415)    1.02 K/ L       0.30-0.82        

 

                EOSINOPHILS ABSOLUTE COUNT (BEAKER) (test code=416)    0.28 K/ L       0.04-0.54        

 

                BASOPHILS ABSOLUTE COUNT (BEAKER) (test code=417)    0.05 K/ L       0.01-0.08        

 

                IMMATURE GRANULOCYTES-RELATIVE PERCENT (BEAKER) (test code=2801)    2 %             0-1              





ANAEROBIC [id number] 04:16:00* 





                Test Item       Value           Reference Range    Comments

 

                CULTURE (BEAKER) (test code=1095)    No anaerobes isolated                     





VANCOMYCIN LEVEL, [id number] 03:30:00* 





                Test Item       Value           Reference Range    Comments

 

                VANCOMYCIN TROUGH (BEAKER) (test code=522)    11.6 ug/mL      10.0-20.0        





POCT-GLUCOSE [id number] 22:51:00* 





                Test Item       Value           Reference Range    Comments

 

                POC-GLUCOSE METER (BEAKER) (test code=1538)    312 mg/dL                 Notified RN MD/TESTED

 AT [address] [address]





POCT-GLUCOSE [id number] 17:22:00* 





                Test Item       Value           Reference Range    Comments

 

                POC-GLUCOSE METER (BEAKER) (test code=1538)    112 mg/dL                 TESTED AT [address] [address]





POCT-GLUCOSE [id number] 12:43:00* 





                Test Item       Value           Reference Range    Comments

 

                POC-GLUCOSE METER (BEAKER) (test code=1538)    113 mg/dL                 TESTED AT [address] [address]





POCT-GLUCOSE [id number] 08:20:00* 





                Test Item       Value           Reference Range    Comments

 

                POC-GLUCOSE METER (BEAKER) (test code=1538)    122 mg/dL                 TESTED AT [address] [address]





BASIC METABOLIC [id number] 06:37:00* 





                Test Item       Value           Reference Range    Comments

 

                SODIUM (BEAKER) (test code=381)    138 meq/L       136-145          

 

                POTASSIUM (BEAKER) (test code=379)    3.7 meq/L       3.5-5.1          

 

                CHLORIDE (BEAKER) (test code=382)    105 meq/L                  

 

                CO2 (BEAKER) (test code=355)    27 meq/L        22-29            

 

                BLOOD UREA NITROGEN (BEAKER) (test code=354)    7 mg/dL         7-21             

 

                CREATININE (BEAKER) (test code=358)    0.51 mg/dL      0.57-1.25        

 

                GLUCOSE RANDOM (BEAKER) (test code=652)    95 mg/dL                   

 

                CALCIUM (BEAKER) (test code=697)    7.9 mg/dL       8.4-10.2         

 

                EGFR (BEAKER) (test code=1092)    184 mL/min/1.73 sq m                    ESTIMATED GFR IS NOT AS

 ACCURATE AS CREATININE CLEARANCE IN PREDICTING GLOMERULAR FILTRATION RATE. 
ESTIMATED GFR IS NOT APPLICABLE FOR DIALYSIS PATIENTS.





[id number] 06:32:00* 





                Test Item       Value           Reference Range    Comments

 

                MAGNESIUM (BEAKER) (test code=627)    1.9 mg/dL       1.6-2.6          





CBC W/PLT COUNT & AUTO [id number] 06:03:00* 





                Test Item       Value           Reference Range    Comments

 

                WHITE BLOOD CELL COUNT (BEAKER) (test code=775)    15.4 K/ L       3.5-10.5         

 

                RED BLOOD CELL COUNT (BEAKER) (test code=761)    2.68 M/ L       4.63-6.08        

 

                HEMOGLOBIN (BEAKER) (test code=410)    7.1 GM/DL       13.7-17.5        

 

                HEMATOCRIT (BEAKER) (test code=411)    24.3 %          40.1-51.0        

 

                MEAN CORPUSCULAR VOLUME (BEAKER) (test code=753)    90.7 fL         79.0-92.2        

 

                MEAN CORPUSCULAR HEMOGLOBIN (BEAKER) (test code=751)    26.5 pg         25.7-32.2        

 

                    MEAN CORPUSCULAR HEMOGLOBIN CONC (BEAKER) (test code=752)    29.2 GM/DL          32.3-36.5

                                         

 

                RED CELL DISTRIBUTION WIDTH (BEAKER) (test code=412)    14.8 %          11.6-14.4        

 

                PLATELET COUNT (BEAKER) (test code=756)    738 K/CU MM     150-450          

 

                MEAN PLATELET VOLUME (BEAKER) (test code=754)    8.3 fL          9.4-12.4         

 

                NUCLEATED RED BLOOD CELLS (BEAKER) (test code=413)    0 /100 WBC      0-0              

 

                NEUTROPHILS RELATIVE PERCENT (BEAKER) (test code=429)    63 %                             

 

                LYMPHOCYTES RELATIVE PERCENT (BEAKER) (test code=430)    26 %                             

 

                MONOCYTES RELATIVE PERCENT (BEAKER) (test code=431)    8 %                              

 

                EOSINOPHILS RELATIVE PERCENT (BEAKER) (test code=432)    2 %                              

 

                BASOPHILS RELATIVE PERCENT (BEAKER) (test code=437)    0 %                              

 

                NEUTROPHILS ABSOLUTE COUNT (BEAKER) (test code=670)    9.70 K/ L       1.78-5.38        

 

                LYMPHOCYTES ABSOLUTE COUNT (BEAKER) (test code=414)    3.98 K/ L       1.32-3.57        

 

                MONOCYTES ABSOLUTE COUNT (BEAKER) (test code=415)    1.17 K/ L       0.30-0.82        

 

                EOSINOPHILS ABSOLUTE COUNT (BEAKER) (test code=416)    0.26 K/ L       0.04-0.54        

 

                BASOPHILS ABSOLUTE COUNT (BEAKER) (test code=417)    0.05 K/ L       0.01-0.08        

 

                IMMATURE GRANULOCYTES-RELATIVE PERCENT (BEAKER) (test code=2801)    2 %             0-1              





POCT-GLUCOSE [id number] 02:08:00* 





                Test Item       Value           Reference Range    Comments

 

                POC-GLUCOSE METER (BEAKER) (test code=1538)    193 mg/dL                 TESTED AT [place] [id number]





POCT-GLUCOSE [id number] 23:26:00* 





                Test Item       Value           Reference Range    Comments

 

                POC-GLUCOSE METER (BEAKER) (test code=1538)    450 mg/dL                 Notified RN MD/TESTED

 AT [place] [address]





POCT-GLUCOSE [id number] 16:58:00* 





                Test Item       Value           Reference Range    Comments

 

                POC-GLUCOSE METER (BEAKER) (test code=1538)    258 mg/dL                 TESTED AT [place] [id number]





POCT-GLUCOSE [id number] 15:07:00* 





                Test Item       Value           Reference Range    Comments

 

                POC-GLUCOSE METER (BEAKER) (test code=1538)    184 mg/dL                 TESTED AT [address] [address]





SURGICALLY OBTAINED CULTURE + GRAM [id number] 12:14:00* 





                Test Item       Value           Reference Range    Comments

 

                CULTURE (BEAKER) (test code=1095)    See comment                      

 

                GRAM STAIN RESULT (BEAKER) (test code=1123)    4+ WBCs                          

 

                GRAM STAIN RESULT (BEAKER) (test code=11239)    No organisms seen                     





1+ Skin floraPOCT-GLUCOSE [id number] 08:16:00* 





                Test Item       Value           Reference Range    Comments

 

                POC-GLUCOSE METER (BEAKER) (test code=1538)    201 mg/dL                 TESTED AT [facility] 

[address] [facility] [place] [address]





[id number] 05:37:00* 





                Test Item       Value           Reference Range    Comments

 

                MAGNESIUM (BEAKER) (test code=627)    2.0 mg/dL       1.6-2.6          





BASIC METABOLIC [id number] 05:37:00* 





                Test Item       Value           Reference Range    Comments

 

                SODIUM (BEAKER) (test code=381)    137 meq/L       136-145          

 

                POTASSIUM (BEAKER) (test code=379)    4.3 meq/L       3.5-5.1          

 

                CHLORIDE (BEAKER) (test code=382)    102 meq/L                  

 

                CO2 (BEAKER) (test code=355)    28 meq/L        22-29            

 

                BLOOD UREA NITROGEN (BEAKER) (test code=354)    12 mg/dL        7-21             

 

                CREATININE (BEAKER) (test code=358)    0.63 mg/dL      0.57-1.25        

 

                GLUCOSE RANDOM (BEAKER) (test code=652)    158 mg/dL                  

 

                CALCIUM (BEAKER) (test code=697)    8.5 mg/dL       8.4-10.2         

 

                EGFR (BEAKER) (test code=1092)    144 mL/min/1.73 sq m                    ESTIMATED GFR IS NOT AS

 ACCURATE AS CREATININE CLEARANCE IN PREDICTING GLOMERULAR FILTRATION RATE. 
ESTIMATED GFR IS NOT APPLICABLE FOR DIALYSIS PATIENTS.





CBC W/PLT COUNT & AUTO [id number] 05:06:00* 





                Test Item       Value           Reference Range    Comments

 

                WHITE BLOOD CELL COUNT (BEAKER) (test code=775)    16.6 K/ L       3.5-10.5         

 

                RED BLOOD CELL COUNT (BEAKER) (test code=761)    3.05 M/ L       4.63-6.08        

 

                HEMOGLOBIN (BEAKER) (test code=410)    8.0 GM/DL       13.7-17.5        

 

                HEMATOCRIT (BEAKER) (test code=411)    27.6 %          40.1-51.0        

 

                MEAN CORPUSCULAR VOLUME (BEAKER) (test code=753)    90.5 fL         79.0-92.2        

 

                MEAN CORPUSCULAR HEMOGLOBIN (BEAKER) (test code=751)    26.2 pg         25.7-32.2        

 

                    MEAN CORPUSCULAR HEMOGLOBIN CONC (BEAKER) (test code=752)    29.0 GM/DL          32.3-36.5

                                         

 

                RED CELL DISTRIBUTION WIDTH (BEAKER) (test code=412)    14.6 %          11.6-14.4        

 

                PLATELET COUNT (BEAKER) (test code=756)    831 K/CU MM     150-450          

 

                MEAN PLATELET VOLUME (BEAKER) (test code=754)    8.1 fL          9.4-12.4         

 

                NUCLEATED RED BLOOD CELLS (BEAKER) (test code=413)    0 /100 WBC      0-0              

 

                NEUTROPHILS RELATIVE PERCENT (BEAKER) (test code=429)    61 %                             

 

                LYMPHOCYTES RELATIVE PERCENT (BEAKER) (test code=430)    28 %                             

 

                MONOCYTES RELATIVE PERCENT (BEAKER) (test code=431)    7 %                              

 

                EOSINOPHILS RELATIVE PERCENT (BEAKER) (test code=432)    2 %                              

 

                BASOPHILS RELATIVE PERCENT (BEAKER) (test code=437)    0 %                              

 

                NEUTROPHILS ABSOLUTE COUNT (BEAKER) (test code=670)    10.04 K/ L      1.78-5.38        

 

                LYMPHOCYTES ABSOLUTE COUNT (BEAKER) (test code=414)    4.60 K/ L       1.32-3.57        

 

                MONOCYTES ABSOLUTE COUNT (BEAKER) (test code=415)    1.14 K/ L       0.30-0.82        

 

                EOSINOPHILS ABSOLUTE COUNT (BEAKER) (test code=416)    0.31 K/ L       0.04-0.54        

 

                BASOPHILS ABSOLUTE COUNT (BEAKER) (test code=417)    0.05 K/ L       0.01-0.08        

 

                IMMATURE GRANULOCYTES-RELATIVE PERCENT (BEAKER) (test code=2801)    3 %             0-1              





POCT-GLUCOSE METER2018-11-15 20:56:00* 





                Test Item       Value           Reference Range    Comments

 

                POC-GLUCOSE METER (BEAKER) (test code=1538)    324 mg/dL                 TESTED AT [facility] 

[address] [facility] [address]





POCT-GLUCOSE METER2018-11-15 17:32:00* 





                Test Item       Value           Reference Range    Comments

 

                POC-GLUCOSE METER (BEAKER) (test code=1538)    129 mg/dL                 TESTED AT [facility] 

[address] [facility] [address]





BLOOD CULTURE2018-11-15 13:20:00* 





                Test Item       Value           Reference Range    Comments

 

                CULTURE (BEAKER) (test code=1095)    No growth in 5 days                     





BLOOD CULTURE2018-11-15 13:04:00* 





                Test Item       Value           Reference Range    Comments

 

                CULTURE (BEAKER) (test code=1095)    No growth in 5 days                     





POCT-GLUCOSE METER2018-11-15 11:46:00* 





                Test Item       Value           Reference Range    Comments

 

                POC-GLUCOSE METER (BEAKER) (test code=1538)    201 mg/dL                 TESTED AT [facility] 

[address] [facility] [address]





POCT-GLUCOSE METER2018-11-15 08:10:00* 





                Test Item       Value           Reference Range    Comments

 

                POC-GLUCOSE METER (BEAKER) (test code=1538)    234 mg/dL                 TESTED AT [facility] 

[address] [facility] [address]





CBC W/PLT COUNT & AUTO DIFFERENTIAL2018-11-15 06:47:00* 





                Test Item       Value           Reference Range    Comments

 

                WHITE BLOOD CELL COUNT (BEAKER) (test code=775)    12.0 K/ L       3.5-10.5         

 

                RED BLOOD CELL COUNT (BEAKER) (test code=761)    2.85 M/ L       4.63-6.08        

 

                HEMOGLOBIN (BEAKER) (test code=410)    7.7 GM/DL       13.7-17.5        

 

                HEMATOCRIT (BEAKER) (test code=411)    26.2 %          40.1-51.0        

 

                MEAN CORPUSCULAR VOLUME (BEAKER) (test code=753)    91.9 fL         79.0-92.2        

 

                MEAN CORPUSCULAR HEMOGLOBIN (BEAKER) (test code=751)    27.0 pg         25.7-32.2        

 

                    MEAN CORPUSCULAR HEMOGLOBIN CONC (BEAKER) (test code=752)    29.4 GM/DL          32.3-36.5

                                         

 

                RED CELL DISTRIBUTION WIDTH (BEAKER) (test code=412)    14.6 %          11.6-14.4        

 

                PLATELET COUNT (BEAKER) (test code=756)    716 K/CU MM     150-450          

 

                MEAN PLATELET VOLUME (BEAKER) (test code=754)    8.6 fL          9.4-12.4         

 

                NUCLEATED RED BLOOD CELLS (BEAKER) (test code=413)    0 /100 WBC      0-0              

 

                NEUTROPHILS RELATIVE PERCENT (BEAKER) (test code=429)    54 %                             

 

                LYMPHOCYTES RELATIVE PERCENT (BEAKER) (test code=430)    32 %                             

 

                MONOCYTES RELATIVE PERCENT (BEAKER) (test code=431)    9 %                              

 

                EOSINOPHILS RELATIVE PERCENT (BEAKER) (test code=432)    2 %                              

 

                BASOPHILS RELATIVE PERCENT (BEAKER) (test code=437)    0 %                              

 

                NEUTROPHILS ABSOLUTE COUNT (BEAKER) (test code=670)    6.46 K/ L       1.78-5.38        

 

                LYMPHOCYTES ABSOLUTE COUNT (BEAKER) (test code=414)    3.84 K/ L       1.32-3.57        

 

                MONOCYTES ABSOLUTE COUNT (BEAKER) (test code=415)    1.05 K/ L       0.30-0.82        

 

                EOSINOPHILS ABSOLUTE COUNT (BEAKER) (test code=416)    0.29 K/ L       0.04-0.54        

 

                BASOPHILS ABSOLUTE COUNT (BEAKER) (test code=417)    0.05 K/ L       0.01-0.08        

 

                IMMATURE GRANULOCYTES-RELATIVE PERCENT (BEAKER) (test code=2801)    2 %             0-1              





MAGNESIUM2018-11-15 06:42:00* 





                Test Item       Value           Reference Range    Comments

 

                MAGNESIUM (BEAKER) (test code=627)    1.9 mg/dL       1.6-2.6          





BASIC METABOLIC PANEL2018-11-15 06:42:00* 





                Test Item       Value           Reference Range    Comments

 

                SODIUM (BEAKER) (test code=381)    137 meq/L       136-145          

 

                POTASSIUM (BEAKER) (test code=379)    4.4 meq/L       3.5-5.1          

 

                CHLORIDE (BEAKER) (test code=382)    104 meq/L                  

 

                CO2 (BEAKER) (test code=355)    27 meq/L        22-29            

 

                BLOOD UREA NITROGEN (BEAKER) (test code=354)    11 mg/dL        7-21             

 

                CREATININE (BEAKER) (test code=358)    0.72 mg/dL      0.57-1.25        

 

                GLUCOSE RANDOM (BEAKER) (test code=652)    290 mg/dL                  

 

                CALCIUM (BEAKER) (test code=697)    8.2 mg/dL       8.4-10.2         

 

                EGFR (BEAKER) (test code=1092)    124 mL/min/1.73 sq m                    ESTIMATED GFR IS NOT AS

 ACCURATE AS CREATININE CLEARANCE IN PREDICTING GLOMERULAR FILTRATION RATE. 
ESTIMATED GFR IS NOT APPLICABLE FOR DIALYSIS PATIENTS.





POCT-GLUCOSE [id number] 21:59:00* 





                Test Item       Value           Reference Range    Comments

 

                POC-GLUCOSE METER (BEAKER) (test code=1538)    325 mg/dL                 TESTED AT [facility] 

[address] [facility] [address]





POCT-GLUCOSE [id number] 17:49:00* 





                Test Item       Value           Reference Range    Comments

 

                POC-GLUCOSE METER (BEAKER) (test code=1538)    97 mg/dL                  TESTED AT [address] [address]





VANCOMYCIN LEVEL, [id number] 16:10:00* 





                Test Item       Value           Reference Range    Comments

 

                VANCOMYCIN TROUGH (BEAKER) (test code=522)    6.1 ug/mL       10.0-20.0        





POCT-GLUCOSE [id number] 11:33:00* 





                Test Item       Value           Reference Range    Comments

 

                POC-GLUCOSE METER (BEAKER) (test code=1538)    165 mg/dL                 TESTED AT [address] [address]





CBC W/PLT COUNT & AUTO [id number] 10:17:00* 





                Test Item       Value           Reference Range    Comments

 

                WHITE BLOOD CELL COUNT (BEAKER) (test code=775)    14.8 K/ L       3.5-10.5         

 

                RED BLOOD CELL COUNT (BEAKER) (test code=761)    2.84 M/ L       4.63-6.08        

 

                HEMOGLOBIN (BEAKER) (test code=410)    7.5 GM/DL       13.7-17.5        

 

                HEMATOCRIT (BEAKER) (test code=411)    25.2 %          40.1-51.0        

 

                MEAN CORPUSCULAR VOLUME (BEAKER) (test code=753)    88.7 fL         79.0-92.2        

 

                MEAN CORPUSCULAR HEMOGLOBIN (BEAKER) (test code=751)    26.4 pg         25.7-32.2        

 

                    MEAN CORPUSCULAR HEMOGLOBIN CONC (BEAKER) (test code=752)    29.8 GM/DL          32.3-36.5

                                         

 

                RED CELL DISTRIBUTION WIDTH (BEAKER) (test code=412)    14.1 %          11.6-14.4        

 

                PLATELET COUNT (BEAKER) (test code=756)    768 K/CU MM     150-450          

 

                MEAN PLATELET VOLUME (BEAKER) (test code=754)    8.2 fL          9.4-12.4         

 

                NUCLEATED RED BLOOD CELLS (BEAKER) (test code=413)    0 /100 WBC      0-0              





(CELLAVISION MANUAL DIFF)2018 10:17:00* 





                Test Item       Value           Reference Range    Comments

 

                NEUTROPHILS - REL (CELLAVISION)(BEAKER) (test code=2816)    48 %                             

 

                LYMPHOCYTES - REL (CELLAVISION)(BEAKER) (test code=2817)    32 %                             

 

                MONOCYTES - REL (CELLAVISION)(BEAKER) (test code=2818)    3 %                              

 

                MYELOCYTES - REL (CELLAVISION)(BEAKER) (test code=2822)    1 %             0-0              

 

                BANDS - REL (CELLAVISION)(BEAKER) (test code=2826)    16 %            0-10             

 

                NEUTROPHILS - ABS (CELLAVISION)(BEAKER) (test code=2830)    7.10 K/ul       1.78-5.38       

 

 

                LYMPHOCYTES - ABS (CELLAVISION)(BEAKER) (test code=2831)    4.74 K/ul       1.32-3.57       

 

 

                MONOCYTES - ABS (CELLAVISION)(BEAKER) (test code=2832)    0.44 K/uL       0.30-0.82        

 

                MYELOCYTES-ABS (CELLAVISION)(BEAKER) (test code=2837)    0.15 K/uL       0.00-0.00        

 

                BANDS - ABS (CELLAVISION)(BEAKER) (test code=2840)    2.37 K/uL       0.00-0.80        

 

                TOTAL COUNTED (BEAKER) (test code=1351)    100                              

 

                MANUAL NRBC  CELLS (BEAKER) (test code=1353)    1 /100 WBC      0-0              

 

                WBC MORPHOLOGY (BEAKER) (test code=487)    Normal                           

 

                PLT MORPHOLOGY (BEAKER) (test code=486)    Normal                           

 

                POLYCHROMATOPHILLIC RBCS(BEAKER) (test code=478)    1+ few                           

 

                ANISOCYTOSIS (BEAKER) (test code=961)    1+ few                           

 

                ARTIFACT (CELLAVISION)(BEAKER) (test code=3432)    Present                          

 

                PLATELET CONCENTRATION (CELLAVISION)(BEAKER) (test code=3438)    Increased                        





Received comment: User comments: Slide comments: POCT-GLUCOSE [id number] 
08:08:00* 





                Test Item       Value           Reference Range    Comments

 

                POC-GLUCOSE METER (BEAKER) (test code=1538)    129 mg/dL                 TESTED AT [facility] 

[address] [facility] [address]





[id number] 04:00:00* 





                Test Item       Value           Reference Range    Comments

 

                MAGNESIUM (BEAKER) (test code=627)    2.0 mg/dL       1.6-2.6          





BASIC METABOLIC [id number] 04:00:00* 





                Test Item       Value           Reference Range    Comments

 

                SODIUM (BEAKER) (test code=381)    138 meq/L       136-145          

 

                POTASSIUM (BEAKER) (test code=379)    4.1 meq/L       3.5-5.1          

 

                CHLORIDE (BEAKER) (test code=382)    102 meq/L                  

 

                CO2 (BEAKER) (test code=355)    33 meq/L        22-29            

 

                BLOOD UREA NITROGEN (BEAKER) (test code=354)    7 mg/dL         7-21             

 

                CREATININE (BEAKER) (test code=358)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

 

                POC-GLUCOSE METER (BEAKER) (test code=1538)    305 mg/dL                 TESTED AT [address] [address]





RETICULOCYTE COUNT2018-11-10 06:12:00* 





                Test Item       Value           Reference Range    Comments

 

                RETICULOCYTE COUNT PCT (BEAKER) (test code=575)    1.6 %           0.5-1.8          





FERRITIN2018-11-10 05:56:00* 





                Test Item       Value           Reference Range    Comments

 

                FERRITIN (BEAKER) (test code=361)    226 ng/mL       5-275            





POCT-GLUCOSE METER2018-11-10 04:08:00* 





                Test Item       Value           Reference Range    Comments

 

                POC-GLUCOSE METER (BEAKER) (test code=1538)    381 mg/dL                 TESTED AT [address] [address]





URINALYSIS W/ MICROSCOPIC2018-11-10 03:00:00* 





                Test Item       Value           Reference Range    Comments

 

                COLOR (BEAKER) (test code=470)    Yellow                           

 

                CLARITY (BEAKER) (test code=469)    Clear                            

 

                SPECIFIC GRAVITY UA (BEAKER) (test code=468)    1.033           1.001-1.035      

 

                PH UA (BEAKER) (test code=467)    6.0             5.0-8.0          

 

                PROTEIN UA (BEAKER) (test code=464)    Negative        Negative         

 

                GLUCOSE UA (BEAKER) (test code=365)    >1000 mg/dL     Negative         

 

                KETONES UA (BEAKER) (test code=371)    Negative        Negative         

 

                BILIRUBIN UA (BEAKER) (test code=462)    Negative        Negative         

 

                BLOOD UA (BEAKER) (test code=461)    Small           Negative         

 

                NITRITE UA (BEAKER) (test code=465)    Negative        Negative         

 

                LEUKOCYTE ESTERASE UA (BEAKER) (test code=466)    Negative        Negative         

 

                UROBILINOGEN UA (BEAKER) (test code=463)    2.0 mg/dL       0.2-1.0          

 

                RBC UA (BEAKER) (test code=519)    3 /HPF                           

 

                WBC UA (BEAKER) (test code=520)    1 /HPF                           

 

                SQUAMOUS EPITHELIAL (BEAKER) (test code=516)    < /HPF                           

 

                AMORPHOUS CRYSTALS (BEAKER) (test code=1584)    Rare                             

 

                SOURCE(BEAKER) (test code=2795)    Urine, Straight Catheter                     





URINALYSIS W/ REFLEX URINE CULTURE2018-11-10 02:29:00* 





                Test Item       Value           Reference Range    Comments

 

                COLOR (BEAKER) (test code=470)    Yellow                           

 

                CLARITY (BEAKER) (test code=469)    Clear                            

 

                SPECIFIC GRAVITY UA (BEAKER) (test code=468)    1.035           1.001-1.035      

 

                PH UA (BEAKER) (test code=467)    6.0             5.0-8.0          

 

                PROTEIN UA (BEAKER) (test code=464)    10 mg/dL        Negative         

 

                GLUCOSE UA (BEAKER) (test code=365)    >1000 mg/dL     Negative         

 

                KETONES UA (BEAKER) (test code=371)    Negative        Negative         

 

                BILIRUBIN UA (BEAKER) (test code=462)    Negative        Negative         

 

                BLOOD UA (BEAKER) (test code=461)    Small           Negative         

 

                NITRITE UA (BEAKER) (test code=465)    Negative        Negative         

 

                LEUKOCYTE ESTERASE UA (BEAKER) (test code=466)    Negative        Negative         

 

                UROBILINOGEN UA (BEAKER) (test code=463)    3.0 mg/dL       0.2-1.0          

 

                RBC UA (BEAKER) (test code=519)    8 /HPF                           

 

                WBC UA (BEAKER) (test code=520)    2 /HPF                           

 

                SQUAMOUS EPITHELIAL (BEAKER) (test code=516)    2 /HPF                           

 

                SOURCE(BEAKER) (test code=2795)                                     





KETONE, [id number] 02:26:00* 





                Test Item       Value           Reference Range    Comments

 

                KETONES, BLOOD (BEAKER) (test code=1103)    0.0 mmol/L      <0.4             





BLOOD GAS, VENOUS2018-11-10 01:16:00* 





                Test Item       Value           Reference Range    Comments

 

                PH VENOUS (BEAKER) (test code=701)    7.51            7.32-7.42        

 

                PCO2 VENOUS (BEAKER) (test code=755)    37 mmHg         41-51            

 

                PO2 VENOUS (BEAKER) (test code=702)    69 mmHg         25-40            

 

                O2 SATURATION VENOUS (BEAKER) (test code=703)    95.4 %          40.0-70.0        

 

                HCO3 VENOUS (BEAKER) (test code=705)    29 mmol/L       21-29            

 

                BASE EXCESS VENOUS (BEAKER) (test code=704)    5.2 mmol/L      -2.0-3.0         

 

                PATIENT TEMPERATURE (BEAKER) (test code=1818)    37.0 C                           

 

                FIO2 (BEAKER) (test code=1819)    21.0 %                           





(CELLAVISION MANUAL DIFF)2018-11-10 00:27:00* 





                Test Item       Value           Reference Range    Comments

 

                NEUTROPHILS - REL (CELLAVISION)(BEAKER) (test code=2816)    58 %                             

 

                LYMPHOCYTES - REL (CELLAVISION)(BEAKER) (test code=2817)    23 %                             

 

                MONOCYTES - REL (CELLAVISION)(BEAKER) (test code=2818)    9 %                              

 

                BANDS - REL (CELLAVISION)(BEAKER) (test code=2826)    10 %            0-10             

 

                NEUTROPHILS - ABS (CELLAVISION)(BEAKER) (test code=2830)    5.34 K/ul       1.78-5.38       

 

 

                LYMPHOCYTES - ABS (CELLAVISION)(BEAKER) (test code=2831)    2.12 K/ul       1.32-3.57       

 

 

                MONOCYTES - ABS (CELLAVISION)(BEAKER) (test code=2832)    0.83 K/uL       0.30-0.82        

 

                BANDS - ABS (CELLAVISION)(BEAKER) (test code=2840)    0.92 K/uL       0.00-0.80        

 

                TOTAL COUNTED (BEAKER) (test code=1351)    100                              

 

                SMUDGE CELLS (BEAKER) (test code=1371)    Present                          

 

                GIANT PLATELETS (BEAKER) (test code=313)    Present                          

 

                OVALOCYTES (BEAKER) (test code=477)    1+ few                           

 

                TEAR DROP CELLS (BEAKER) (test code=481)    1+ few                           

 

                PLATELET CONCENTRATION (CELLAVISION)(BEAKER) (test code=3438)    Increased                        





Received comment: User comments: Slide comments: BASIC METABOLIC PANEL2018-11-10
00:27:00* 





                Test Item       Value           Reference Range    Comments

 

                SODIUM (BEAKER) (test code=381)    129 meq/L       136-145          

 

                POTASSIUM (BEAKER) (test code=379)    4.2 meq/L       3.5-5.1          

 

                CHLORIDE (BEAKER) (test code=382)    93 meq/L                   

 

                CO2 (BEAKER) (test code=355)    27 meq/L        22-29            

 

                BLOOD UREA NITROGEN (BEAKER) (test code=354)    13 mg/dL        7-21             

 

                CREATININE (BEAKER) (test code=358)    1.11 mg/dL      0.57-1.25        

 

                GLUCOSE RANDOM (BEAKER) (test code=652)    578 mg/dL                  

 

                CALCIUM (BEAKER) (test code=697)    8.6 mg/dL       8.4-10.2         

 

                EGFR (BEAKER) (test code=1092)    75 mL/min/1.73 sq m                    ESTIMATED GFR IS NOT AS 

ACCURATE AS CREATININE CLEARANCE IN PREDICTING GLOMERULAR FILTRATION RATE. 
ESTIMATED GFR IS NOT APPLICABLE FOR DIALYSIS PATIENTS.





CBC W/PLT COUNT & AUTO DIFFERENTIAL2018-11-10 00:26:00* 





                Test Item       Value           Reference Range    Comments

 

                WHITE BLOOD CELL COUNT (BEAKER) (test code=775)    9.2 K/ L        3.5-10.5         

 

                RED BLOOD CELL COUNT (BEAKER) (test code=761)    3.13 M/ L       4.63-6.08        

 

                HEMOGLOBIN (BEAKER) (test code=410)    8.3 GM/DL       13.7-17.5        

 

                HEMATOCRIT (BEAKER) (test code=411)    27.4 %          40.1-51.0        

 

                MEAN CORPUSCULAR VOLUME (BEAKER) (test code=753)    87.5 fL         79.0-92.2        

 

                MEAN CORPUSCULAR HEMOGLOBIN (BEAKER) (test code=751)    26.5 pg         25.7-32.2        

 

                    MEAN CORPUSCULAR HEMOGLOBIN CONC (BEAKER) (test code=752)    30.3 GM/DL          32.3-36.5

                                         

 

                RED CELL DISTRIBUTION WIDTH (BEAKER) (test code=412)    13.8 %          11.6-14.4        

 

                PLATELET COUNT (BEAKER) (test code=756)    637 K/CU MM     150-450          

 

                MEAN PLATELET VOLUME (BEAKER) (test code=754)    8.8 fL          9.4-12.4         

 

                NUCLEATED RED BLOOD CELLS (BEAKER) (test code=413)    0 /100 WBC      0-0              





LACTIC ACID, VENOUS, WHOLE BLOOD2018-11-10 00:07:00* 





                Test Item       Value           Reference Range    Comments

 

                LACTATE BLOOD VENOUS (2) (VALERIE) (test code=2872)    3.0 mmol/L      0.5-2.2          





POCT-GLUCOSE [id number] 21:29:00* 





                Test Item       Value           Reference Range    Comments

 

                POC-GLUCOSE METER (VALERIE) (test code=1538)    496 mg/dL                 TESTED AT [facility] 

[address] [facility] [address]





AFB Culture and [id number] 13:01:00Specimen/Source: Bone/left hipCollected:
04/15/2018 08:45 Status: Final      Last Updated: 2018 13:01          
AFB-Stain-Fluorochrome (Final) (Final)    18 No acid fast bacill seen on 
direct smear   Culture Result (Final) (Final)    18 No growth of AFB at six
(6) weeks  AFB Culture and [id number] 13:01:00Specimen/Source: Hip/lft 
superficialCollected: 04/15/2018 08:30 Status: Final      Last Updated: 
2018 13:01          AFB-Stain-Fluorochrome (Final) (Final)    18 No 
acid fast bacill seen on direct smear   Culture Result (Final) (Final)    
18 No growth of AFB at six (6) weeks  AFB Culture and [id number] 
13:00:00Specimen/Source: Bone/left footCollected: 04/15/2018 09:20 Status: Final
     Last Updated: 2018 13:00          AFB-Stain-Fluorochrome (Final) 
(Final)    18 No acid fast bacill seen on direct smear   Culture Result 
(Final) (Final)    18 No growth of AFB at six (6) weeks  AFB Culture and 
[id number] 13:00:00Specimen/Source: Hip/left deepCollected: 04/15/2018 
08:45 Status: Final      Last Updated: 2018 13:00          
AFB-Stain-Fluorochrome (Final) (Final)    18 No acid fast bacill seen on 
direct smear   Culture Result (Final) (Final)    18 No growth of AFB at six
(6) weeks  AFB Culture and [id number] 12:59:00Specimen/Source: 
Foot/LeftCollected: 04/15/2018 09:20 Status: Final      Last Updated: 2018
12:59          AFB-Stain-Fluorochrome (Final) (Final)    18 No acid fast 
bacill seen on direct smear   Culture Result (Final) (Final)    18 No 
growth of AFB at six (6) weeks  AFB Culture and [id number] 12:58:00
Specimen/Source: Wound/RT. ISCHIUMCollected: 2018 12:50 Status: Final     
Last Updated: 2018 12:58          AFB-Stain-Fluorochrome (Final) (Final)  
 18 No acid fast bacill seen on direct smear   Culture Result (Final) (Fi
nal)    18 No growth of AFB at six (6) weeks  Fungus Culture with Stain
2018 10:01:00Specimen/Source: Wound/RT. ISCHIUMCollected: 2018 12:50
Status: Final      Last Updated: 2018 10:01          Fungal Smear Result 
(Final) (Final)    No yeast or hyphae seen   Culture Result (Final) (Final)    
18  No fungus isolated at 6 weeks  Fungus Culture with [id number] 
11:00:00Specimen/Source: Hip/left deepCollected: 04/15/2018 08:45 Status: Final 
    Last Updated: 2018 11:00          Fungal Smear Result (Final) (Final) 
  4/15/18 No yeast or hyphae seen   Culture Result (Final) (Final)    18 No
fungus isolated at 6 weeks  Fungus Culture with [id number] 11:00:00
Specimen/Source: Bone/left hipCollected: 04/15/2018 08:45 Status: Final      Las
t Updated: 2018 11:00          Fungal Smear Result (Final) (Final)    4/15
/18 No yeast or hyphae seen   Culture Result (Final) (Final)    18 No fungu
s isolated at 6 weeks  Fungus Culture with [id number] 10:59:00
Specimen/Source: Foot/LeftCollected: 04/15/2018 09:20 Status: Final      Last Up
dated: 2018 10:59          Fungal Smear Result (Final) (Final)    4/15/18 
No yeast or hyphae seen   Culture Result (Final) (Final)    18 No fungus is
olated at 6 weeks  Fungus Culture with [id number] 10:59:00Specimen/Source: 
Hip/lft superficialCollected: 04/15/2018 08:30 Status: Final      Last Updated: 
2018 10:59          Fungal Smear Result (Final) (Final)    4/15/18 No 
yeast or hyphae seen   Culture Result (Final) (Final)    18 No fungus 
isolated at 6 weeks  Fungus Culture with [id number] 10:58:00
Specimen/Source: Bone/left footCollected: 04/15/2018 09:20 Status: Final      La
st Updated: 2018 10:58          Fungal Smear Result (Final) (Final)     No yeast or hyphae seen   Culture Result (Final) (Final)    18 No helder
us isolated at 6 weeks  US Upper Ext Venous Duplex [id number] 12:50:17
Patient:   BALJEET JESSICA II                              MRN:    [id number]
am Date/Time2018 12:40 CDTReason for Examr/o thrombus;Pain (please specify)R
eportLEFT UPPER EXTREMITY VENOUS DOPPLER:HISTORY: Upper left arm and shoulder pa
in.Grayscale, color-flow and Doppler spectral waveform analysis of the venous sy
stem of the left upper extremity was performed.FINDINGS:The left internal jugula
r vein, subclavian vein and axillary vein are patent and compressible without ev
idence of thrombus.The basilic and cephalic veins are patent and compressible wi
thout thrombus.The visualized portions of the brachial veins are patent and comp
ressible without thrombus.  A left arm PICC is in place.The ulnar and radial vei
ns are patent and compressible without evidence of thrombus.IMPRESSION:No eviden
ce of upper extremity DVT.LOCATION: [facility]16***** Final *****Dictated by:    MD Gant Adam FDictated DT/TM: 2018 12:48 pmSigned by:  MD Stalin, Jose FSelizabeth
bethany (Electronic Signature):  2018 12:50 pmPOC [id number] 08:01:17* 





                Test Item       Value           Reference Range    Comments

 

                Glucose POC (test code=Glucose POC)    154 mg/dL                 If you consider your patient

 critically ill, the Roche-Accu Check Infrom II meter should not be used for 
Glucose determination. Draw a venous Glucose and send to the main Lab for 
analysis.





POC [id number] 19:43:16* 





                Test Item       Value           Reference Range    Comments

 

                Glucose POC (test code=Glucose POC)    237 mg/dL                 If you consider your patient

 critically ill, the Roche-Accu Check Infrom II meter should not be used for 
Glucose determination. Draw a venous Glucose and send to the main Lab for 
analysis.





POC [id number] 16:14:13* 





                Test Item       Value           Reference Range    Comments

 

                Glucose POC (test code=Glucose POC)    146 mg/dL                 Notify RN or MDIf you consider

 your patient critically ill, the Roche-Accu Check Infrom II meter should not be
used for Glucose determination. Draw a venous Glucose and send to the main Lab 
for analysis.





POC [id number] 11:56:48* 





                Test Item       Value           Reference Range    Comments

 

                Glucose POC (test code=Glucose POC)    167 mg/dL                 Notify RN or MDIf you consider

 your patient critically ill, the Roche-Accu Check Infrom II meter should not be
used for Glucose determination. Draw a venous Glucose and send to the main Lab 
for analysis.





POC [id number] 07:34:44* 





                Test Item       Value           Reference Range    Comments

 

                Glucose POC (test code=Glucose POC)    186 mg/dL                 Notify RN or MDIf you consider

 your patient critically ill, the Roche-Accu Check Infrom II meter should not be
used for Glucose determination. Draw a venous Glucose and send to the main Lab 
for analysis.





POC [id number] 21:30:39* 





                Test Item       Value           Reference Range    Comments

 

                Glucose POC (test code=Glucose POC)    206 mg/dL                 If you consider your patient

 critically ill, the Roche-Accu Check Infrom II meter should not be used for 
Glucose determination. Draw a venous Glucose and send to the main Lab for 
analysis.





POC [id number] 16:21:39* 





                Test Item       Value           Reference Range    Comments

 

                Glucose POC (test code=Glucose POC)    178 mg/dL                 If you consider your patient

 critically ill, the Roche-Accu Check Infrom II meter should not be used for 
Glucose determination. Draw a venous Glucose and send to the main Lab for 
analysis.





POC [id number] 11:42:11* 





                Test Item       Value           Reference Range    Comments

 

                Glucose POC (test code=Glucose POC)    70 mg/dL                  If you consider your patient

 critically ill, the Roche-Accu Check Infrom II meter should not be used for 
Glucose determination. Draw a venous Glucose and send to the main Lab for 
analysis.





POC Glucose, [id number] 20:25:00* 





                Test Item       Value           Reference Range    Comments

 

                POC Glucose (test code=POCGLUC)    140 mg/dL                 If you consider your patient

 critically ill, the Roche Accu-Chek InformII metershould not be used for 
Glucose determinations.Draw a venous Glucose and send to the Main Lab for 
Analysis.





POC Glucose, [id number] 16:51:00* 





                Test Item       Value           Reference Range    Comments

 

                POC Glucose (test code=POCGLUC)    133 mg/dL                 If you consider your patient

 critically ill, the Roche Accu-Chek InformII metershould not be used for 
Glucose determinations.Draw a venous Glucose and send to the Main Lab for 
Analysis.





POC Glucose, [id number] 11:56:00* 





                Test Item       Value           Reference Range    Comments

 

                POC Glucose (test code=POCGLUC)    180 mg/dL                 If you consider your patient

 critically ill, the Roche Accu-Chek InformII metershould not be used for 
Glucose determinations.Draw a venous Glucose and send to the Main Lab for 
Analysis.





POC Glucose, [id number] 08:28:00* 





                Test Item       Value           Reference Range    Comments

 

                POC Glucose (test code=POCGLUC)    138 mg/dL                 If you consider your patient

 critically ill, the Roche Accu-Chek InformII metershould not be used for 
Glucose determinations.Draw a venous Glucose and send to the Main Lab for 
Analysis.





POC Glucose, [id number] 20:29:00* 





                Test Item       Value           Reference Range    Comments

 

                POC Glucose (test code=POCGLUC)    259 mg/dL                 If you consider your patient

 critically ill, the Roche Accu-Chek InformII metershould not be used for 
Glucose determinations.Draw a venous Glucose and send to the Main Lab for 
Analysis.





POC Glucose, [id number] 17:00:00* 





                Test Item       Value           Reference Range    Comments

 

                POC Glucose (test code=POCGLUC)    103 mg/dL                 If you consider your patient

 critically ill, the Roche Accu-Chek InformII metershould not be used for 
Glucose determinations.Draw a venous Glucose and send to the Main Lab for 
Analysis.





POC Glucose, [id number] 19:50:00* 





                Test Item       Value           Reference Range    Comments

 

                POC Glucose (test code=POCGLUC)    225 mg/dL                 Notify RN or MDIf you consider

 your patient critically ill, the Roche Accu-Chek InformII metershould not be 
used for Glucose determinations.Draw a venous Glucose and send to the Main Lab 
for Analysis.





POC Glucose, [id number] 16:12:00* 





                Test Item       Value           Reference Range    Comments

 

                POC Glucose (test code=POCGLUC)    267 mg/dL                 If you consider your patient

 critically ill, the Roche Accu-Chek InformII metershould not be used for 
Glucose determinations.Draw a venous Glucose and send to the Main Lab for 
Analysis.





POC Glucose, [id number] 11:11:00* 





                Test Item       Value           Reference Range    Comments

 

                POC Glucose (test code=POCGLUC)    118 mg/dL                 If you consider your patient

 critically ill, the Roche Accu-Chek InformII metershould not be used for 
Glucose determinations.Draw a venous Glucose and send to the Main Lab for 
Analysis.





POC Glucose, [id number] 07:39:00* 





                Test Item       Value           Reference Range    Comments

 

                POC Glucose (test code=POCGLUC)    157 mg/dL                 If you consider your patient

 critically ill, the Roche Accu-Chek InformII metershould not be used for 
Glucose determinations.Draw a venous Glucose and send to the Main Lab for 
Analysis.





RBC, Crossmatch  01:56:00* 





                Test Item       Value           Reference Range    Comments

 

                Product 1 Code (test code=PRODCODE1)                                

 

                Unit 1 ID (test code=UNITID1)    [id number]                     

 

                Unit 1 ABO (test code=UNITABO1)    O                                

 

                Unit 1 Rh (test code=UNITRH1)    POS                              

 

                Unit 1 Interp (test code=UNITINTERP1)    Compatible                       

 

                Unit 1 Status (test code=UNITSTAT1)    RE                               

 

                Product 2 Code (test code=PRODCODE2)                                

 

                Unit 2 ID (test code=UNITID2)    [id number]                     

 

                Unit 2 ABO (test code=UNITABO2)    O                                

 

                Unit 2 Rh (test code=UNITRH2)    POS                              

 

                Unit 2 Interp (test code=UNITINTERP2)    Compatible                       

 

                Unit 2 Status (test code=UNITSTAT2)    RE                               





POC Glucose, [id number] 20:15:00* 





                Test Item       Value           Reference Range    Comments

 

                POC Glucose (test code=POCGLUC)    166 mg/dL                 If you consider your patient

 critically ill, the Roche Accu-Chek InformII metershould not be used for 
Glucose determinations.Draw a venous Glucose and send to the Main Lab for 
Analysis.





POC Glucose, [id number] 16:09:00* 





                Test Item       Value           Reference Range    Comments

 

                POC Glucose (test code=POCGLUC)    127 mg/dL                 If you consider your patient

 critically ill, the Roche Accu-Chek InformII metershould not be used for 
Glucose determinations.Draw a venous Glucose and send to the Main Lab for 
Analysis.





POC Glucose, [id number] 12:48:00* 





                Test Item       Value           Reference Range    Comments

 

                POC Glucose (test code=POCGLUC)    151 mg/dL                 If you consider your patient

 critically ill, the Roche Accu-Chek InformII metershould not be used for 
Glucose determinations.Draw a venous Glucose and send to the Main Lab for 
Analysis.





POC Glucose, [id number] 07:43:00* 





                Test Item       Value           Reference Range    Comments

 

                POC Glucose (test code=POCGLUC)    148 mg/dL                 Notify RN or MDIf you consider

 your patient critically ill, the Roche Accu-Chek InformII metershould not be 
used for Glucose determinations.Draw a venous Glucose and send to the Main Lab 
for Analysis.





RBC, Crossmatch  06:00:00* 





                Test Item       Value           Reference Range    Comments

 

                Product 1 Code (test code=PRODCODE1)                                

 

                Unit 1 ID (test code=UNITID1)    [id number]                     

 

                Unit 1 ABO (test code=UNITABO1)    O                                

 

                Unit 1 Rh (test code=UNITRH1)    POS                              

 

                Unit 1 Interp (test code=UNITINTERP1)    Compatible                       

 

                Unit 1 Status (test code=UNITSTAT1)    PT                               

 

                Product 2 Code (test code=PRODCODE2)                                

 

                Unit 2 ID (test code=UNITID2)    [id number]                     

 

                Unit 2 ABO (test code=UNITABO2)    O                                

 

                Unit 2 Rh (test code=UNITRH2)    POS                              

 

                Unit 2 Interp (test code=UNITINTERP2)    Compatible                       

 

                Unit 2 Status (test code=UNITSTAT2)    PT                               





POC Glucose, [id number] 20:44:00* 





                Test Item       Value           Reference Range    Comments

 

                POC Glucose (test code=POCGLUC)    261 mg/dL                 If you consider your patient

 critically ill, the Roche Accu-Chek InformII metershould not be used for 
Glucose determinations.Draw a venous Glucose and send to the Main Lab for 
Analysis.





POC Glucose, [id number] 16:08:00* 





                Test Item       Value           Reference Range    Comments

 

                POC Glucose (test code=POCGLUC)    140 mg/dL                 Notify RN or MDIf you consider

 your patient critically ill, the Roche Accu-Chek InformII metershould not be 
used for Glucose determinations.Draw a venous Glucose and send to the Main Lab 
for Analysis.





[id number] 12:03:00* 





                Test Item       Value           Reference Range    Comments

 

                Osmolality [Serum] (test code=CMOSMOS)    286 mOsm/Kg     270-295          





POC Glucose, [id number] 11:29:00* 





                Test Item       Value           Reference Range    Comments

 

                POC Glucose (test code=POCGLUC)    190 mg/dL                 Notify RN or MDIf you consider

 your patient critically ill, the Roche Accu-Chek InformII metershould not be 
used for Glucose determinations.Draw a venous Glucose and send to the Main Lab 
for Analysis.





CBC with [id number] 09:44:00* 





                Test Item       Value           Reference Range    Comments

 

                WBC (test code=WBC)    12.4 K/cumm     4.4-10.5         

 

                RBC (test code=RBC)    3.73 M/cumm     4.10-5.70        

 

                Hemoglobin (test code=HGB)    9.8 gm/dL       13.4-17.4       READ BACK LAB VALUESVERIFIED BY

 REPEAT TESTINGrechecked & called to roddy,rn @ [id number], post txn/mdj

 

                Hematocrit (test code=HCT)    33.0 %          38.7-52.0        

 

                MCV (test code=MCV)    88.5 fL                    

 

                MCH (test code=MCH)    26.1 pg         27.0-32.5        

 

                MCHC (test code=MCHC)    29.5 g/dL       32.0-37.5        

 

                RDW (test code=RDW)    16.6 %          11.5-14.5        

 

                Platelet Count (test code=PLTCT)    729 K/cumm      140-440          

 

                MPV (test code=MPV)    9.6 fL                           

 

                Diff Method (test code=DIFFM)    Auto                             

 

                Neutrophil (test code=NEUT)    52.1 %          36-70            

 

                Lymphocyte (test code=LYMPH)    39.5 %          12-44            

 

                Monocyte (test code=MONO)    5.9 %           0-11             

 

                Eosinophil (test code=EOS)    2.1 %           0-7              

 

                Basophil (test code=BASO)    0.5 %           0-2              

 

                Neutro Abs (test code=ANEUT)    6.5 K/cumm      1.6-7.4          

 

                Lymph Abs (test code=ALYMPH)    4.9 K/cumm      0.5-4.6          

 

                Mono Abs (test code=AMONO)    0.7 K/cumm      0.0-1.2          

 

                Eos Abs (test code=AEOS)    0.26 K/cumm     0.00-0.74        

 

                Baso Abs (test code=ABASO)    0.1 K/cumm      0.00-0.21        

 

                Hypochromic (test code=HYPO)    Slight                           





[id number] 07:53:00* 





                Test Item       Value           Reference Range    Comments

 

                Phosphorus (test code=PO4)    5.0 mg/dL       2.70-4.50        





Magnesium, [id number] 07:53:00* 





                Test Item       Value           Reference Range    Comments

 

                Magnesium (test code=MG)    2.1 mg/dL       1.7-2.5          





Basic Metabolic [id number] 07:53:00* 





                Test Item       Value           Reference Range    Comments

 

                Sodium (test code=NA)    140 mmol/L      135-145          

 

                Potassium (test code=K)    4.6 mmol/L      3.5-5.1          

 

                Chloride (test code=CL)    99 mmol/L                  

 

                Carbon Dioxide (test code=CO2)    29 mmol/L       22-29            

 

                Glucose (test code=GLU)    102 mg/dL                  

 

                Blood Urea Nitrogen (test code=BUN)    10 mg/dL        6-20             

 

                Creatinine (test code=CREAT)    0.6 mg/dL       0.7-1.2          

 

                Calcium (test code=CA)    8.5 mg/dL       8.3-10.5         

 

                BUN/Creatinine Ratio (test code=BCRATIO)    16.7                             

 

                Anion Gap (test code=AGAP)    12 mmol/L       7-16             

 

                Estimated GFR (test code=GFR)    >60 mL/min/1.73m2                    eGFR (estimated Glomerular 

Filtration Rate) is an estimated value,calculated from the patient's serum 
creatinine using the MDRD equation.It is NOT the patient's actual GFR. The eGFR 
provides a more clinicallyuseful measure of kidney disease than serum creatinine
 alone.***This calculation takes sex and race into account, if the informationis
 provided. If the race is not provided, and the patient isAfrican-American, 
multiply by 1.212. If sex is not provided, and thepatient is female, multiply by
 0.742. Results for patients <18 years ofage have not been validated by the MDRD
 study and should be interpretedwith caution.eGFR Result Interpretation:eGFR > 
or=60 is in the Normal RangeeGFR < 60 may mean kidney diseaseeGFR < 15 may mean 
kidney failure***Ranges recommended by the National Kidney Foundat
ion,http://nkdep.nih.gov





POC Glucose, [id number] 07:01:00* 





                Test Item       Value           Reference Range    Comments

 

                POC Glucose (test code=POCGLUC)    109 mg/dL                 Notify RN or MDIf you consider

 your patient critically ill, the Roche Accu-Chek InformII metershould not be 
used for Glucose determinations.Draw a venous Glucose and send to the Main Lab 
for Analysis.





POC Glucose, [id number] 20:23:00* 





                Test Item       Value           Reference Range    Comments

 

                POC Glucose (test code=POCGLUC)    234 mg/dL                 Notify RN or MDIf you consider

 your patient critically ill, the Roche Accu-Chek InformII metershould not be 
used for Glucose determinations.Draw a venous Glucose and send to the Main Lab 
for Analysis.





POC Glucose, [id number] 16:47:00* 





                Test Item       Value           Reference Range    Comments

 

                POC Glucose (test code=POCGLUC)    154 mg/dL                 If you consider your patient

 critically ill, the Roche Accu-Chek InformII metershould not be used for 
Glucose determinations.Draw a venous Glucose and send to the Main Lab for 
Analysis.





POC Glucose, [id number] 12:13:00* 





                Test Item       Value           Reference Range    Comments

 

                POC Glucose (test code=POCGLUC)    266 mg/dL                 Notify RN or MDIf you consider

 your patient critically ill, the Roche Accu-Chek InformII metershould not be 
used for Glucose determinations.Draw a venous Glucose and send to the Main Lab 
for Analysis.





Comprehensive Metabolic [id number] 11:21:00* 





                Test Item       Value           Reference Range    Comments

 

                Sodium (test code=NA)    154 mmol/L      135-145          

 

                Potassium (test code=K)    3.1 mmol/L      3.5-5.1          

 

                Chloride (test code=CL)    112 mmol/L                 

 

                Carbon Dioxide (test code=CO2)    21 mmol/L       22-29            

 

                Glucose (test code=GLU)    149 mg/dL                  

 

                Blood Urea Nitrogen (test code=BUN)    11 mg/dL        6-20             

 

                Creatinine (test code=CREAT)    0.6 mg/dL       0.7-1.2          

 

                Calcium (test code=CA)    5.3 mg/dL       8.3-10.5         

 

                Prot Total (test code=TP)    4.7 g/dL        6.4-8.3          

 

                Albumin (test code=ALB)    1.8 g/dL        3.5-5.2          

 

                A/G Ratio (test code=AGRATIO)    0.6 Ratio                        

 

                Globulin (test code=GLOB)    2.9             2.9-3.1          

 

                Bili Total (test code=TBIL)    0.2 mg/dL       0.1-0.9          

 

                Alk Phos (test code=APHOS)    121 U/L                    

 

                AST (test code=AST)    15 U/L          1-40             

 

                ALT (test code=ALT)    20 U/L          1-41             

 

                BUN/Creatinine Ratio (test code=BCRATIO)    18.3                             

 

                Anion Gap (test code=AGAP)    21 mmol/L       7-16             

 

                Estimated GFR (test code=GFR)    >60 mL/min/1.73m2                    eGFR (estimated Glomerular 

Filtration Rate) is an estimated value,calculated from the patient's serum 
creatinine using the MDRD equation.It is NOT the patient's actual GFR. The eGFR 
provides a more clinicallyuseful measure of kidney disease than serum creatinine
 alone.***This calculation takes sex and race into account, if the informationis
 provided. If the race is not provided, and the patient isAfrican-American, 
multiply by 1.212. If sex is not provided, and thepatient is female, multiply by
 0.742. Results for patients <18 years ofage have not been validated by the MDRD
 study and should be interpretedwith caution.eGFR Result Interpretation:eGFR > 
or=60 is in the Normal RangeeGFR < 60 may mean kidney diseaseeGFR < 15 may mean 
kidney failure***Ranges recommended by the National Kidney Foundat
ion,http://nkdep.nih.gov





POC Glucose, [id number] 08:23:00* 





                Test Item       Value           Reference Range    Comments

 

                POC Glucose (test code=POCGLUC)    161 mg/dL                 Notify RN or MDIf you consider

 your patient critically ill, the Roche Accu-Chek InformII metershould not be 
used for Glucose determinations.Draw a venous Glucose and send to the Main Lab 
for Analysis.





CBC with [id number] 08:17:00* 





                Test Item       Value           Reference Range    Comments

 

                WBC (test code=WBC)    11.7 K/cumm     4.4-10.5         

 

                RBC (test code=RBC)    2.89 M/cumm     4.10-5.70        

 

                Hemoglobin (test code=HGB)    7.6 gm/dL       13.4-17.4       READ BACK LAB VALUESVERIFIED BY

 REPEAT TESTINGrechecked & called to shawn pereyra @ 0865, no info given/gaby

 

                Hematocrit (test code=HCT)    25.4 %          38.7-52.0        

 

                MCV (test code=MCV)    87.9 fL                    

 

                MCH (test code=MCH)    26.4 pg         27.0-32.5        

 

                MCHC (test code=MCHC)    30.0 g/dL       32.0-37.5        

 

                RDW (test code=RDW)    16.6 %          11.5-14.5        

 

                Platelet Count (test code=PLTCT)    766 K/cumm      140-440          

 

                MPV (test code=MPV)    8.9 fL                           

 

                Diff Method (test code=DIFFM)    Auto                             

 

                Neutrophil (test code=NEUT)    57.2 %          36-70            

 

                Lymphocyte (test code=LYMPH)    35.2 %          12-44            

 

                Monocyte (test code=MONO)    5.5 %           0-11             

 

                Eosinophil (test code=EOS)    1.7 %           0-7              

 

                Basophil (test code=BASO)    0.4 %           0-2              

 

                Neutro Abs (test code=ANEUT)    6.7 K/cumm      1.6-7.4          

 

                Lymph Abs (test code=ALYMPH)    4.1 K/cumm      0.5-4.6          

 

                Mono Abs (test code=AMONO)    0.7 K/cumm      0.0-1.2          

 

                Eos Abs (test code=AEOS)    0.20 K/cumm     0.00-0.74        

 

                Baso Abs (test code=ABASO)    0.0 K/cumm      0.00-0.21        

 

                Hypochromic (test code=HYPO)    Slight                           





POC Glucose, [id number] 23:42:00* 





                Test Item       Value           Reference Range    Comments

 

                POC Glucose (test code=POCGLUC)    331 mg/dL                 Notify RN or MDIf you consider

 your patient critically ill, the Roche Accu-Chek InformII metershould not be 
used for Glucose determinations.Draw a venous Glucose and send to the Main Lab 
for Analysis.





POC Glucose, [id number] 16:22:00* 





                Test Item       Value           Reference Range    Comments

 

                POC Glucose (test code=POCGLUC)    201 mg/dL                 If you consider your patient

 critically ill, the Roche Accu-Chek InformII metershould not be used for 
Glucose determinations.Draw a venous Glucose and send to the Main Lab for 
Analysis.





POC Glucose, [id number] 15:13:00* 





                Test Item       Value           Reference Range    Comments

 

                POC Glucose (test code=POCGLUC)    196 mg/dL                 If you consider your patient

 critically ill, the Roche Accu-Chek InformII metershould not be used for 
Glucose determinations.Draw a venous Glucose and send to the Main Lab for 
Analysis.





Antibody Screen - [id number] 14:13:00* 





                Test Item       Value           Reference Range    Comments

 

                Antibody Screen (test code=ABSCR)    Negative                         





Blood Type and [id number] 13:06:00* 





                Test Item       Value           Reference Range    Comments

 

                ABO type (test code=ABO)    O                                

 

                Rh Type (test code=RH)    Positive                         





POC Glucose, [id number] 20:35:00* 





                Test Item       Value           Reference Range    Comments

 

                POC Glucose (test code=POCGLUC)    198 mg/dL                 If you consider your patient

 critically ill, the Roche Accu-Chek InformII metershould not be used for 
Glucose determinations.Draw a venous Glucose and send to the Main Lab for 
Analysis.





POC Glucose, [id number] 17:03:00* 





                Test Item       Value           Reference Range    Comments

 

                POC Glucose (test code=POCGLUC)    170 mg/dL                 If you consider your patient

 critically ill, the Roche Accu-Chek InformII metershould not be used for 
Glucose determinations.Draw a venous Glucose and send to the Main Lab for 
Analysis.





POC Glucose, [id number] 11:36:00* 





                Test Item       Value           Reference Range    Comments

 

                POC Glucose (test code=POCGLUC)    269 mg/dL                 If you consider your patient

 critically ill, the Roche Accu-Chek InformII metershould not be used for 
Glucose determinations.Draw a venous Glucose and send to the Main Lab for 
Analysis.





Comprehensive Metabolic [id number] 09:15:00* 





                Test Item       Value           Reference Range    Comments

 

                Sodium (test code=NA)    136 mmol/L      135-145          

 

                Potassium (test code=K)    4.7 mmol/L      3.5-5.1          

 

                Chloride (test code=CL)    95 mmol/L                  

 

                Carbon Dioxide (test code=CO2)    26 mmol/L       22-29            

 

                Glucose (test code=GLU)    137 mg/dL                  

 

                Blood Urea Nitrogen (test code=BUN)    16 mg/dL        6-20             

 

                Creatinine (test code=CREAT)    0.7 mg/dL       0.7-1.2          

 

                Calcium (test code=CA)    9.0 mg/dL       8.3-10.5         

 

                Prot Total (test code=TP)    8.2 g/dL        6.4-8.3          

 

                Albumin (test code=ALB)    3.3 g/dL        3.5-5.2          

 

                A/G Ratio (test code=AGRATIO)    0.7 Ratio                        

 

                Globulin (test code=GLOB)    4.9             2.9-3.1          

 

                Bili Total (test code=TBIL)    0.3 mg/dL       0.1-0.9          

 

                Alk Phos (test code=APHOS)    251 U/L                    

 

                AST (test code=AST)    35 U/L          1-40             

 

                ALT (test code=ALT)    45 U/L          1-41             

 

                BUN/Creatinine Ratio (test code=BCRATIO)    22.9                             

 

                Anion Gap (test code=AGAP)    15 mmol/L       7-16             

 

                Estimated GFR (test code=GFR)    >60 mL/min/1.73m2                    eGFR (estimated Glomerular 

Filtration Rate) is an estimated value,calculated from the patient's serum 
creatinine using the MDRD equation.It is NOT the patient's actual GFR. The eGFR 
provides a more clinicallyuseful measure of kidney disease than serum creatinine
 alone.***This calculation takes sex and race into account, if the informationis
 provided. If the race is not provided, and the patient isAfrican-American, 
multiply by 1.212. If sex is not provided, and thepatient is female, multiply by
 0.742. Results for patients <18 years ofage have not been validated by the MDRD
 study and should be interpretedwith caution.eGFR Result Interpretation:eGFR > 
or=60 is in the Normal RangeeGFR < 60 may mean kidney diseaseeGFR < 15 may mean 
kidney failure***Ranges recommended by the National Kidney Foundat
ion,http://nkdep.nih.gov





POC Glucose, [id number] 07:57:00* 





                Test Item       Value           Reference Range    Comments

 

                POC Glucose (test code=POCGLUC)    140 mg/dL                 If you consider your patient

 critically ill, the Roche Accu-Chek InformII metershould not be used for 
Glucose determinations.Draw a venous Glucose and send to the Main Lab for 
Analysis.





CBC with [id number] 07:51:00* 





                Test Item       Value           Reference Range    Comments

 

                WBC (test code=WBC)    14.3 K/cumm     4.4-10.5         

 

                RBC (test code=RBC)    3.80 M/cumm     4.10-5.70        

 

                Hemoglobin (test code=HGB)    10.2 gm/dL      13.4-17.4        

 

                Hematocrit (test code=HCT)    32.7 %          38.7-52.0        

 

                MCV (test code=MCV)    85.9 fL                    

 

                MCH (test code=MCH)    26.7 pg         27.0-32.5        

 

                MCHC (test code=MCHC)    31.1 g/dL       32.0-37.5        

 

                RDW (test code=RDW)    16.3 %          11.5-14.5        

 

                Platelet Count (test code=PLTCT)    916 K/cumm      140-440          

 

                MPV (test code=MPV)    6.3 fL                           

 

                Diff Method (test code=DIFFM)    Auto                             

 

                Neutrophil (test code=NEUT)    56.3 %          36-70            

 

                Lymphocyte (test code=LYMPH)    35.6 %          12-44            

 

                Monocyte (test code=MONO)    5.5 %           0-11             

 

                Eosinophil (test code=EOS)    2.4 %           0-7              

 

                Basophil (test code=BASO)    0.4 %           0-2              

 

                Neutro Abs (test code=ANEUT)    8.1 K/cumm      1.6-7.4          

 

                Lymph Abs (test code=ALYMPH)    5.1 K/cumm      0.5-4.6          

 

                Mono Abs (test code=AMONO)    0.8 K/cumm      0.0-1.2          

 

                Eos Abs (test code=AEOS)    0.34 K/cumm     0.00-0.74        

 

                Baso Abs (test code=ABASO)    0.1 K/cumm      0.00-0.21        

 

                Hypochromic (test code=HYPO)    Slight                           





POC Glucose, [id number] 20:52:00* 





                Test Item       Value           Reference Range    Comments

 

                POC Glucose (test code=POCGLUC)    225 mg/dL                 If you consider your patient

 critically ill, the Roche Accu-Chek InformII metershould not be used for 
Glucose determinations.Draw a venous Glucose and send to the Main Lab for 
Analysis.





POC Glucose, [id number] 16:50:00* 





                Test Item       Value           Reference Range    Comments

 

                POC Glucose (test code=POCGLUC)    216 mg/dL                 If you consider your patient

 critically ill, the Roche Accu-Chek InformII metershould not be used for 
Glucose determinations.Draw a venous Glucose and send to the Main Lab for 
Analysis.





POC Glucose, [id number] 12:46:00* 





                Test Item       Value           Reference Range    Comments

 

                POC Glucose (test code=POCGLUC)    145 mg/dL                 If you consider your patient

 critically ill, the Roche Accu-Chek InformII metershould not be used for 
Glucose determinations.Draw a venous Glucose and send to the Main Lab for 
Analysis.





POC Glucose, [id number] 08:05:00* 





                Test Item       Value           Reference Range    Comments

 

                POC Glucose (test code=POCGLUC)    154 mg/dL                 If you consider your patient

 critically ill, the Roche Accu-Chek InformII metershould not be used for 
Glucose determinations.Draw a venous Glucose and send to the Main Lab for 
Analysis.





POC Glucose, [id number] 19:52:00* 





                Test Item       Value           Reference Range    Comments

 

                POC Glucose (test code=POCGLUC)    254 mg/dL                 Notify RN or MDIf you consider

 your patient critically ill, the Roche Accu-Chek InformII metershould not be 
used for Glucose determinations.Draw a venous Glucose and send to the Main Lab 
for Analysis.





POC Glucose, [id number] 17:27:00* 





                Test Item       Value           Reference Range    Comments

 

                POC Glucose (test code=POCGLUC)    208 mg/dL                 If you consider your patient

 critically ill, the Roche Accu-Chek InformII metershould not be used for 
Glucose determinations.Draw a venous Glucose and send to the Main Lab for 
Analysis.





POC Glucose, [id number] 12:36:00* 





                Test Item       Value           Reference Range    Comments

 

                POC Glucose (test code=POCGLUC)    166 mg/dL                 If you consider your patient

 critically ill, the Roche Accu-Chek InformII metershould not be used for 
Glucose determinations.Draw a venous Glucose and send to the Main Lab for 
Analysis.





Culture, Blood [id number] 09:10:00Specimen: BloodCollected: 04/15/2018 
23:50 Status: Final      Last Updated: 2018 09:10          Culture Result 
(Final) (Final)    No Growth After 5 Days  Culture, Blood [id number] 
09:10:00Specimen: BloodCollected: 04/15/2018 23:50 Status: Final      Last 
Updated: 2018 09:10          Culture Result (Final) (Final)    No Growth 
After 5 Days  POC Glucose, [id number] 08:13:00* 





                Test Item       Value           Reference Range    Comments

 

                POC Glucose (test code=POCGLUC)    173 mg/dL                 If you consider your patient

 critically ill, the Roche Accu-Chek InformII metershould not be used for 
Glucose determinations.Draw a venous Glucose and send to the Main Lab for 
Analysis.





POC Glucose, [id number] 20:27:00* 





                Test Item       Value           Reference Range    Comments

 

                POC Glucose (test code=POCGLUC)    223 mg/dL                 Notify RN or MDIf you consider

 your patient critically ill, the Roche Accu-Chek InformII metershould not be 
used for Glucose determinations.Draw a venous Glucose and send to the Main Lab 
for Analysis.





POC Glucose, [id number] 15:46:00* 





                Test Item       Value           Reference Range    Comments

 

                POC Glucose (test code=POCGLUC)    205 mg/dL                 If you consider your patient

 critically ill, the Roche Accu-Chek InformII metershould not be used for 
Glucose determinations.Draw a venous Glucose and send to the Main Lab for 
Analysis.





Culture, Wound [id number] 13:42:00Specimen/Source: Wound/RT. 
ISCHIUMCollected: 2018 12:50 Status: Final      Last Updated: 2018 
13:41          Culture Result (Final) (Final)    18  Smear from broth Gram 
positive cocci in chains    18 Anaerobic culture:No anaerobes isolated at 3
 days   Isolate (Final) (Final)    18 From broth    Group D Enterococcus   
                        Isolate                          Group D Enterococcus   
                       -----------------------  FREEDOM (mcg/ml)    Ampicillin (AM) 
      <=2     Susceptible    Gentamicin Syn (HLG)  >500    Resistant    
Linezolid (LNZ)       2       Susceptible    Penicillin (P)        8       
Susceptible    Streptomycin Syn (HLS)>1000   Resistant    Vancomycin (VA)       
2       Susceptible  Culture, Blood [id number] 13:34:00Specimen: 
BloodCollected: 2018 14:25 Status: Final      Last Updated: 2018 
13:34       (1) ER Bed 15     Culture Result (Final) (Final)    18 Evidence
 of growth Gram positive coccobacilli  (anaerobicbottle)    18 Called to ARTURO Starks at 2:42pm YA    Read Back Lab Value    18 Anaerobic Diphtheroids 
 Culture, Wound [id number] 11:54:00Specimen/Source: Bone/left 
footCollected: 04/15/2018 09:20 Status: Final      Last Updated: 2018 
11:54          Gram Stain (Final) (Final)    4/15/18 No organisms seen, Few 
WBC's   Culture Result (Final) (Final)    18 Growth too young to evaluate 
at 24 hours-reincubated    18 Few Diphtheroids    18 Anaerobic 
culture:No anaerobes isolated at 3 days   Isolate (Final) (Final)    18 
Rare    Klebsiella pneumoniae    18 Confirmed ESBL       Amikacin  
          <=16   Susceptible      Ampicillin          >16    Resistant      
Ampicillin/Sulb     >16/8  Resistant      Cefazolin           >16    Resistant  
    Cefepime            >16    Resistant      Cefotaxime          >32    
Resistant      Ceftazidime         8      Resistant      Ceftriaxone         >32
    Resistant      Cefuroxime          >16    Resistant      Ciprofloxacin      
 2      Intermediate      Gentamicin          <=4    Susceptible      Imipenem  
          <=1    Susceptible      Levofloxacin        <=2    Susceptible      
Meropenem           <=1    Susceptible      Piperacillin/Tazo   <=16   
Susceptible      Tobramycin          <=4    Susceptible      Trimethoprim/Sulfa 
 >2/38  Resistant   Isolate (Final) (Final)     Rare    Beta Hemolytic 
Streptococcus    Streptococcus agalactiae   Isolate (Final) (Final)    18 
Rare    Enterobacter cloacae      Amikacin            <=16   Susceptible      
Ampicillin          >16    Resistant      Ampicillin/Sulb     >16/8  Resistant  
    Cefazolin           >16    Resistant      Cefepime            <=4    
Susceptible      Cefotaxime          <=2    Susceptible      Ceftazidime        
 <=1    Susceptible      Ceftriaxone         <=1    Susceptible      Cefuroxime 
         >16    Resistant      Ciprofloxacin       <=1    Susceptible      
Gentamicin          <=4    Susceptible      Imipenem            <=1    
Susceptible      Levofloxacin        <=2    Susceptible      Meropenem          
 <=1    Susceptible      Piperacillin/Tazo   <=16   Susceptible      Tobramycin 
         <=4    Susceptible      Trimethoprim/Sulfa  >2/38  Resistant  Culture, 
[id number] 09:41:00Specimen: UrineCollected: 2018 05:30 Status: Final
      Last Updated: 2018 09:41          Culture Result (Final) (Final)    
18 <10,000 CFU/mL Yeast  POC Glucose, [id number] 07:53:00* 





                Test Item       Value           Reference Range    Comments

 

                POC Glucose (test code=POCGLUC)    190 mg/dL                 If you consider your patient

 critically ill, the Roche Accu-Chek InformII metershould not be used for 
Glucose determinations.Draw a venous Glucose and send to the Main Lab for 
Analysis.





POC Glucose, [id number] 20:37:00* 





                Test Item       Value           Reference Range    Comments

 

                POC Glucose (test code=POCGLUC)    213 mg/dL                 Notify RN or MDIf you consider

 your patient critically ill, the Roche Accu-Chek InformII metershould not be 
used for Glucose determinations.Draw a venous Glucose and send to the Main Lab 
for Analysis.





POC Glucose, [id number] 17:19:00* 





                Test Item       Value           Reference Range    Comments

 

                POC Glucose (test code=POCGLUC)    144 mg/dL                 If you consider your patient

 critically ill, the Roche Accu-Chek InformII metershould not be used for 
Glucose determinations.Draw a venous Glucose and send to the Main Lab for 
Analysis.





POC Glucose, [id number] 12:29:00* 





                Test Item       Value           Reference Range    Comments

 

                POC Glucose (test code=POCGLUC)    136 mg/dL                 If you consider your patient

 critically ill, the Roche Accu-Chek InformII metershould not be used for 
Glucose determinations.Draw a venous Glucose and send to the Main Lab for 
Analysis.





POC Glucose, [id number] 08:30:00* 





                Test Item       Value           Reference Range    Comments

 

                POC Glucose (test code=POCGLUC)    174 mg/dL                 If you consider your patient

 critically ill, the Roche Accu-Chek InformII metershould not be used for 
Glucose determinations.Draw a venous Glucose and send to the Main Lab for 
Analysis.





Culture, Wound [id number] 06:55:00Specimen/Source: Foot/LeftCollected: 
04/15/2018 09:20 Status: Final      Last Updated: 2018 06:55          Gram
 Stain (Final) (Final)    4/15/18 No organsims seen, No WBC's seen   Culture 
Result (Final) (Final)    18 Growth too young to evaluate at 24 hours-
reincubated    18 Moderate Diphtheroids    18 Moderate Alpha 
streptococcus (not Group D or Enterococcus)    18 Anaerobic culture:No 
anaerobes isolated at 3 days   Isolate (Final) (Final)    18 Rare    
Pseudomonas aeruginosa      Amikacin           <=16  Susceptible      Cefepime  
         <=4   Susceptible      Ceftazidime        4     Susceptible      
Ciprofloxacin      <=1   Susceptible      Gentamicin         <=4   Susceptible  
    Imipenem           <=1   Susceptible      Levofloxacin       <=2   
Susceptible      Meropenem          <=1   Susceptible      Piperacillin/Tazo  <
=16  Susceptible      Tobramycin         <=4   Susceptible   Isolate (Final) 
(Final)    18 Rare    Enterobacter cloacae      Amikacin            <=16   
Susceptible      Ampicillin          >16    Resistant      Ampicillin/Sulb     >
16/8  Resistant      Cefazolin           >16    Resistant      Cefepime         
   <=4    Susceptible      Cefotaxime          <=2    Susceptible      
Ceftazidime         <=1    Susceptible      Ceftriaxone         <=1    
Susceptible      Cefuroxime          16     Resistant      Ciprofloxacin       <
=1    Susceptible      Gentamicin          <=4    Susceptible      Imipenem     
       <=1    Susceptible      Levofloxacin        <=2    Susceptible      
Meropenem           <=1    Susceptible      Piperacillin/Tazo   <=16   
Susceptible      Tobramycin          <=4    Susceptible      Trimethoprim/Sulfa 
 >2/38  Resistant  Culture, Wound [id number] 06:53:00Specimen: 
HipCollected: 2018 21:50 Status: Final      Last Updated: 2018 06:53
          Gram Stain (Final) (Final)    4/15/18 Many WBC'S  , Few Gram Positive 
Cocci In Pairs  , Few    Diphtheroids   Culture Result (Final) (Final)    
18 Moderate Alpha streptococcus (not Group D or Enterococcus)    18 
Anaerobic culture:No anaerobes isolated at 3 days   Isolate (Final) (Final)    
18 Few    Beta Hemolytic Streptococcus    Streptococcus agalactiae  
Culture, Wound [id number] 06:51:00Specimen/Source: Bone/left 
hipCollected: 04/15/2018 08:45 Status: Final      Last Updated: 2018 06:51
          Gram Stain (Final) (Final)    4/15/18 Rare  WBC'S  , Few Gram Positive
 Cocci In Pairs   Culture Result (Final) (Final)    18 Moderate Alpha 
streptococcus (not Group D or Enterococcus)    18 Anaerobic culture:No 
anaerobes isolated at 3 days   Isolate (Final) (Final)    18 Moderate    
Beta Hemolytic Streptococcus    Streptococcus agalactiae  Culture, Wound 
[id number] 06:47:00Specimen/Source: Hip/left deepCollected: 04/15/2018 
08:45 Status: Final      Last Updated: 2018 06:47          Gram Stain 
(Final) (Final)    4/15/18 Many WBC'S  , Moderate Gram Positive Cocci In Pairs  
 Culture Result (Final) (Final)    18 Moderate Alpha streptococcus (not 
Group D or Enterococcus)    18 Anaerobic culture:No anaerobes isolated at 3
 days   Isolate (Final) (Final)    18 Few    Beta Hemolytic Streptococcus  
  Streptococcus agalactiae  Culture, Wound [id number] 06:47:00
Specimen/Source: Hip/lft superficialCollected: 04/15/2018 08:30 Status: Final   
   Last Updated: 2018 06:47          Gram Stain (Final) (Final)    4/15/18
 Few WBC'S  , Rare Gram Positive Cocci In Pairs   Culture Result (Final) (Final)
    18 Moderate Alpha streptococcus (not Group D or Enterococcus)    
8 Anaerobic culture:No anaerobes isolated at 3 days   Isolate (Final) (Final)   
 18 Few    Beta Hemolytic Streptococcus    Streptococcus agalactiae  CBC 
with [id number] 06:09:00* 





                Test Item       Value           Reference Range    Comments

 

                WBC (test code=WBC)    13.3 K/cumm     4.4-10.5         

 

                RBC (test code=RBC)    3.25 M/cumm     4.10-5.70        

 

                Hemoglobin (test code=HGB)    8.4 gm/dL       13.4-17.4       READ BACK LAB VALUESVERIFIED BY

 REPEAT TESTINGcalled to SOILA Severino RN at 0605 2018. transfusion. DD

 

                Hematocrit (test code=HCT)    28.4 %          38.7-52.0        

 

                MCV (test code=MCV)    87.4 fL                    

 

                MCH (test code=MCH)    25.9 pg         27.0-32.5        

 

                MCHC (test code=MCHC)    29.7 g/dL       32.0-37.5        

 

                RDW (test code=RDW)    15.9 %          11.5-14.5        

 

                Platelet Count (test code=PLTCT)    948 K/cumm      140-440          

 

                MPV (test code=MPV)    9.3 fL                           

 

                Diff Method (test code=DIFFM)    Auto                             

 

                Neutrophil (test code=NEUT)    54.1 %          36-70            

 

                Lymphocyte (test code=LYMPH)    35.4 %          12-44            

 

                Monocyte (test code=MONO)    5.0 %           0-11             

 

                Eosinophil (test code=EOS)    5.1 %           0-7              

 

                Basophil (test code=BASO)    0.4 %           0-2              

 

                Neutro Abs (test code=ANEUT)    7.2 K/cumm      1.6-7.4          

 

                Lymph Abs (test code=ALYMPH)    4.7 K/cumm      0.5-4.6          

 

                Mono Abs (test code=AMONO)    0.7 K/cumm      0.0-1.2          

 

                Eos Abs (test code=AEOS)    0.67 K/cumm     0.00-0.74        

 

                Baso Abs (test code=ABASO)    0.1 K/cumm      0.00-0.21        

 

                Hypochromic (test code=HYPO)    Slight                           





[id number] 06:08:00* 





                Test Item       Value           Reference Range    Comments

 

                Phosphorus (test code=PO4)    4.7 mg/dL       2.70-4.50        





Magnesium, [id number] 06:08:00* 





                Test Item       Value           Reference Range    Comments

 

                Magnesium (test code=MG)    2.3 mg/dL       1.7-2.5          





Basic Metabolic [id number] 06:08:00* 





                Test Item       Value           Reference Range    Comments

 

                Sodium (test code=NA)    137 mmol/L      135-145          

 

                Potassium (test code=K)    4.7 mmol/L      3.5-5.1          

 

                Chloride (test code=CL)    99 mmol/L                  

 

                Carbon Dioxide (test code=CO2)    28 mmol/L       22-29            

 

                Glucose (test code=GLU)    162 mg/dL                  

 

                Blood Urea Nitrogen (test code=BUN)    12 mg/dL        6-20             

 

                Creatinine (test code=CREAT)    0.6 mg/dL       0.7-1.2          

 

                Calcium (test code=CA)    8.1 mg/dL       8.3-10.5         

 

                BUN/Creatinine Ratio (test code=BCRATIO)    20.0                             

 

                Anion Gap (test code=AGAP)    10 mmol/L       7-16             

 

                Estimated GFR (test code=GFR)    >60 mL/min/1.73m2                    eGFR (estimated Glomerular 

Filtration Rate) is an estimated value,calculated from the patient's serum 
creatinine using the MDRD equation.It is NOT the patient's actual GFR. The eGFR 
provides a more clinicallyuseful measure of kidney disease than serum creatinine
 alone.***This calculation takes sex and race into account, if the informationis
 provided. If the race is not provided, and the patient isAfrican-American, 
multiply by 1.212. If sex is not provided, and thepatient is female, multiply by
 0.742. Results for patients <18 years ofage have not been validated by the MDRD
 study and should be interpretedwith caution.eGFR Result Interpretation:eGFR > 
or=60 is in the Normal RangeeGFR < 60 may mean kidney diseaseeGFR < 15 may mean 
kidney failure***Ranges recommended by the National Kidney Foundat
ion,http://nkdep.nih.gov





POC Glucose, [id number] 21:59:00* 





                Test Item       Value           Reference Range    Comments

 

                POC Glucose (test code=POCGLUC)    292 mg/dL                 If you consider your patient

 critically ill, the Roche Accu-Chek InformII metershould not be used for 
Glucose determinations.Draw a venous Glucose and send to the Main Lab for 
Analysis.





Culture, Blood [id number] 20:17:00Specimen: BloodCollected: 2018 
14:25 Status: Final      Last Updated: 2018 20:16       (1) ER Bed 15     
Culture Result (Final) (Final)    No Growth After 5 Days  POC Glucose, Blood
2018 17:30:00* 





                Test Item       Value           Reference Range    Comments

 

                POC Glucose (test code=POCGLUC)    256 mg/dL                 If you consider your patient

 critically ill, the Roche Accu-Chek InformII metershould not be used for 
Glucose determinations.Draw a venous Glucose and send to the Main Lab for 
Analysis.





Culture, Wound [id number] 13:37:00Specimen: WoundCollected: 
2018 17:30 Status: Final      Last Updated: 2018 13:37       (1) ER 
Bed 15     Gram Stain (Final) (Final)    18 Few WBC'S  , Few Gram Positive 
Cocci In Pairs   Culture Result (Final) (Final)    4/15/18 Many Streptococcus 
agalactiae(Group B)    18 Moderate Alpha streptococcus (not Group D or 
Enterococcus)    18 Few Peptostreptococcus    18 Moderate Anaerobic 
Gram variable coccobacilli    18 Multiple organisms present, no further 
workup in progress  POC Glucose, [id number] 12:24:00* 





                Test Item       Value           Reference Range    Comments

 

                POC Glucose (test code=POCGLUC)    190 mg/dL                 If you consider your patient

 critically ill, the Roche Accu-Chek InformII metershould not be used for 
Glucose determinations.Draw a venous Glucose and send to the Main Lab for 
Analysis.





POC Glucose, [id number] 08:14:00* 





                Test Item       Value           Reference Range    Comments

 

                POC Glucose (test code=POCGLUC)    145 mg/dL                 If you consider your patient

 critically ill, the Roche Accu-Chek InformII metershould not be used for 
Glucose determinations.Draw a venous Glucose and send to the Main Lab for 
Analysis.





RBC, Crossmatch  06:00:00* 





                Test Item       Value           Reference Range    Comments

 

                Product 1 Code (test code=PRODCODE1)                                

 

                Unit 1 ID (test code=UNITID1)    [id number]                     

 

                Unit 1 ABO (test code=UNITABO1)    O                                

 

                Unit 1 Rh (test code=UNITRH1)    POS                              

 

                Unit 1 Interp (test code=UNITINTERP1)    Compatible                       

 

                Unit 1 Status (test code=UNITSTAT1)    PT                               

 

                Product 2 Code (test code=PRODCODE2)                                

 

                Unit 2 ID (test code=UNITID2)    [id number]                     

 

                Unit 2 ABO (test code=UNITABO2)    O                                

 

                Unit 2 Rh (test code=UNITRH2)    POS                              

 

                Unit 2 Interp (test code=UNITINTERP2)    Compatible                       

 

                Unit 2 Status (test code=UNITSTAT2)    PT                               





POC Glucose, [id number] 19:38:00* 





                Test Item       Value           Reference Range    Comments

 

                POC Glucose (test code=POCGLUC)    241 mg/dL                 If you consider your patient

 critically ill, the Roche Accu-Chek InformII metershould not be used for 
Glucose determinations.Draw a venous Glucose and send to the Main Lab for 
Analysis.





POC Glucose, [id number] 17:18:00* 





                Test Item       Value           Reference Range    Comments

 

                POC Glucose (test code=POCGLUC)    163 mg/dL                 If you consider your patient

 critically ill, the Roche Accu-Chek InformII metershould not be used for 
Glucose determinations.Draw a venous Glucose and send to the Main Lab for 
Analysis.





CBC with [id number] 11:05:00* 





                Test Item       Value           Reference Range    Comments

 

                WBC (test code=WBC)    14.0 K/cumm     4.4-10.5         

 

                RBC (test code=RBC)    1.48 M/cumm     4.10-5.70        

 

                Hemoglobin (test code=HGB)    3.6 gm/dL       13.4-17.4        

 

                Hematocrit (test code=HCT)    12.9 %          38.7-52.0        

 

                MCV (test code=MCV)    87.0 fL                    

 

                MCH (test code=MCH)    24.5 pg         27.0-32.5        

 

                MCHC (test code=MCHC)    28.2 g/dL       32.0-37.5        

 

                RDW (test code=RDW)    15.5 %          11.5-14.5        

 

                Platelet Count (test code=PLTCT)    1206 K/cumm     140-440          

 

                MPV (test code=MPV)    10.1 fL                          

 

                Diff Method (test code=DIFFM)    Auto                             

 

                Neutrophil (test code=NEUT)    58.0 %          36-70            

 

                Lymphocyte (test code=LYMPH)    32.9 %          12-44            

 

                Monocyte (test code=MONO)    5.3 %           0-11             

 

                Eosinophil (test code=EOS)    3.5 %           0-7              

 

                Basophil (test code=BASO)    0.3 %           0-2              

 

                Neutro Abs (test code=ANEUT)    8.1 K/cumm      1.6-7.4          

 

                Lymph Abs (test code=ALYMPH)    4.6 K/cumm      0.5-4.6          

 

                Mono Abs (test code=AMONO)    0.7 K/cumm      0.0-1.2          

 

                Eos Abs (test code=AEOS)    0.48 K/cumm     0.00-0.74        

 

                Baso Abs (test code=ABASO)    0.0 K/cumm      0.00-0.21        

 

                    RBC Morphology (test code=RBCMRPH)    Slight Anisocytosis ; Moderate Hypochromia    

                                         

 

                Hypochromic (test code=HYPO)    Moderate                         

 

                Platelet Est (test code=PLTEST)    Increased Platelet on Smear                     





Culture, [id number] 10:25:00Specimen: Urine, CC-MidstreamCollected: 
2018 15:25 Status: Final      Last Updated: 2018 10:25       (1) ER 
Bed 15     Culture Result (Final) (Final)    4/15/18 40,000 CFU/mL Yeast   
Isolate (Final) (Final)    18 30,000 CFU/mL    Klebsiella pneumoniae    
18 Multi Drug Resistant Organism - Contact IsolationRecommended    18 
Called to BETTYE Mckinney at 8:38am YA    Read Back Lab Value   +18 
Colistin=14mm No interpretation (by disc diffusion method)      Amikacin        
    <=16   Susceptible      Ampicillin          >16    Resistant      
Ampicillin/Sulb     >16/8  Resistant      Cefazolin           >16    Resistant  
    Cefepime            >16    Resistant      Cefotaxime          >32    
Resistant      Ceftazidime         16     Resistant      Ceftriaxone         >32
    Resistant      Cefuroxime          >16    Resistant      Ciprofloxacin      
 >2     Resistant      Gentamicin          >8     Resistant      Imipenem       
     <=1    Susceptible      Levofloxacin        <=2    Susceptible      
Meropenem           <=1    Susceptible      Nitrofurantoin      64     
Intermediate      Piperacillin/Tazo   <=16   Susceptible      Tobramycin        
  >8     Resistant      Trimethoprim/Sulfa  >2/38  Resistant Result added after 
release.   Isolate (Final) (Final)    18 30,000 CFU/mL    Klebsiella 
pneumoniae    18 Multi Drug Resistant Organism - Contact 
IsolationRecommended    18 Called to BETTYE Mckinney at 8:38am YA    Read Back 
Lab Value      Amikacin            <=16   Susceptible      Ampicillin          >
16    Resistant      Ampicillin/Sulb     >16/8  Resistant      Cefazolin        
   >16    Resistant      Cefepime            >16    Resistant      Cefotaxime   
       >32    Resistant      Ceftazidime         16     Resistant      
Ceftriaxone         >32    Resistant      Cefuroxime          >16    Resistant  
    Ciprofloxacin       >2     Resistant      Gentamicin          >8     
Resistant      Imipenem            <=1    Susceptible      Levofloxacin        <
=2    Susceptible      Meropenem           <=1    Susceptible      
Nitrofurantoin      64     Intermediate      Piperacillin/Tazo   <=16   
Susceptible      Tobramycin          >8     Resistant      Trimethoprim/Sulfa  >
2/38  Resistant  Basic Metabolic [id number] 09:09:00* 





                Test Item       Value           Reference Range    Comments

 

                Sodium (test code=NA)    139 mmol/L      135-145          

 

                Potassium (test code=K)    4.5 mmol/L      3.5-5.1          

 

                Chloride (test code=CL)    98 mmol/L                  

 

                Carbon Dioxide (test code=CO2)    30 mmol/L       22-29            

 

                Glucose (test code=GLU)    188 mg/dL                  

 

                Blood Urea Nitrogen (test code=BUN)    8 mg/dL         6-20             

 

                Creatinine (test code=CREAT)    0.6 mg/dL       0.7-1.2          

 

                Calcium (test code=CA)    8.1 mg/dL       8.3-10.5         

 

                BUN/Creatinine Ratio (test code=BCRATIO)    13.3                             

 

                Anion Gap (test code=AGAP)    11 mmol/L       7-16             

 

                Estimated GFR (test code=GFR)    >60 mL/min/1.73m2                    eGFR (estimated Glomerular 

Filtration Rate) is an estimated value,calculated from the patient's serum 
creatinine using the MDRD equation.It is NOT the patient's actual GFR. The eGFR 
provides a more clinicallyuseful measure of kidney disease than serum creatinine
 alone.***This calculation takes sex and race into account, if the informationis
 provided. If the race is not provided, and the patient isAfrican-American, 
multiply by 1.212. If sex is not provided, and thepatient is female, multiply by
 0.742. Results for patients <18 years ofage have not been validated by the MDRD
 study and should be interpretedwith caution.eGFR Result Interpretation:eGFR > 
or=60 is in the Normal RangeeGFR < 60 may mean kidney diseaseeGFR < 15 may mean 
kidney failure***Ranges recommended by the National Kidney Foundat
ion,http://nkdep.nih.gov





POC Glucose, [id number] 07:47:00* 





                Test Item       Value           Reference Range    Comments

 

                POC Glucose (test code=POCGLUC)    205 mg/dL                 Notify RN or MDIf you consider

 your patient critically ill, the Roche Accu-Chek InformII metershould not be 
used for Glucose determinations.Draw a venous Glucose and send to the Main Lab 
for Analysis.





[id number] 07:44:00* 





                Test Item       Value           Reference Range    Comments

 

                Hemoglobin (test code=HGB)    6.2 gm/dL       13.4-17.4        





Prothrombin [id number] 06:48:00* 





                Test Item       Value           Reference Range    Comments

 

                PT (test code=PT)    15.20 seconds    9.78-13.35       

 

                INR (test code=INR)    1.35 Ratio      0.6-1.2          





Partial Thromboplastin [id number] 06:48:00* 





                Test Item       Value           Reference Range    Comments

 

                aPTT (test code=PTT)    33.60 seconds    24.39-37.25      





US DUPLX LWR EXT ART/BPG, [id number] 22:44:29LOCATION: [id number]: 
38-year-old male with a left foot wound. Clinical concern isperipheral arterial 
vascular disease.COMMENT:Sonographic imaging of the arterial anatomy in both 
legs obtained.Grayscale, color-flow, and Doppler waveform imaging modalities 
utilized.Common femoral arteries, superficial femoral arteries, 
poplitealarteries, posterior tibial arteries, anterior tibial arteries, and inth
e case of the right lower extremity the dorsalis pedis artery wereimaged. Left d
orsalis pedis artery was obscured by a bandage.Triphasic wave form patterns with
 appropriate peak systolic flowvelocity is seen throughout the majority of the b
oth lower extremitiesextending from the common femoral arteries distally to the 
anteriortibial arteries. In the right leg dorsalis pedis artery exhibits lowvelo
city monophasic waveform pattern. Systolic flow velocity was 59 cm/sat the site.
Grayscale examination demonstrates no evidence of atheroscleroticchanges in the 
vascular anatomy.IMPRESSION:No suspicious arterial waveform abnormalities are se
en in this patient'slower extremities in this arterial Doppler ultrasound study.
 There is adampened waveform pattern of monophasic waveform in the right dorsali
spedis artery as outlined above.POC Glucose, [id number] 17:03:00* 





                Test Item       Value           Reference Range    Comments

 

                POC Glucose (test code=POCGLUC)    225 mg/dL                 Notify RN or MDIf you consider

 your patient critically ill, the Roche Accu-Chek InformII metershould not be 
used for Glucose determinations.Draw a venous Glucose and send to the Main Lab 
for Analysis.





XR CHEST 1 [id number] 12:50:15CHEST 1 VIEWCLINICAL INFORMATION:  
FEVERCOMPARISON: 2018FINDINGS:Patchy opacities in the right lung base 
are stable to the comparisonstudy.  There is a small right pleural effusion.  
The left lung isclear.  The heart size is normal.  A left arm PICC terminates in
 thesuperior vena cava.IMPRESSION:Small right pleural effusion with associated 
lower lobe consolidation.LOCATION: R16POC Glucose, [id number] 12:04:00* 





                Test Item       Value           Reference Range    Comments

 

                POC Glucose (test code=POCGLUC)    239 mg/dL                 Notify RN or MDIf you consider

 your patient critically ill, the Roche Accu-Chek InformII metershould not be 
used for Glucose determinations.Draw a venous Glucose and send to the Main Lab 
for Analysis.





POC Glucose, [id number] 07:33:00* 





                Test Item       Value           Reference Range    Comments

 

                POC Glucose (test code=POCGLUC)    236 mg/dL                 Notify RN or MDIf you consider

 your patient critically ill, the Roche Accu-Chek InformII metershould not be 
used for Glucose determinations.Draw a venous Glucose and send to the Main Lab 
for Analysis.





Basic Metabolic [id number] 06:59:00* 





                Test Item       Value           Reference Range    Comments

 

                Sodium (test code=NA)    136 mmol/L      135-145          

 

                Potassium (test code=K)    4.4 mmol/L      3.5-5.1          

 

                Chloride (test code=CL)    99 mmol/L                  

 

                Carbon Dioxide (test code=CO2)    28 mmol/L       22-29            

 

                Glucose (test code=GLU)    192 mg/dL                  

 

                Blood Urea Nitrogen (test code=BUN)    6 mg/dL         6-20             

 

                Creatinine (test code=CREAT)    0.5 mg/dL       0.7-1.2          

 

                Calcium (test code=CA)    7.4 mg/dL       8.3-10.5         

 

                BUN/Creatinine Ratio (test code=BCRATIO)    12.0                             

 

                Anion Gap (test code=AGAP)    9 mmol/L        7-16             

 

                Estimated GFR (test code=GFR)    >60 mL/min/1.73m2                    eGFR (estimated Glomerular 

Filtration Rate) is an estimated value,calculated from the patient's serum 
creatinine using the MDRD equation.It is NOT the patient's actual GFR. The eGFR 
provides a more clinicallyuseful measure of kidney disease than serum creatinine
 alone.***This calculation takes sex and race into account, if the informationis
 provided. If the race is not provided, and the patient isAfrican-American, 
multiply by 1.212. If sex is not provided, and thepatient is female, multiply by
 0.742. Results for patients <18 years ofage have not been validated by the MDRD
 study and should be interpretedwith caution.eGFR Result Interpretation:eGFR > 
or=60 is in the Normal RangeeGFR < 60 may mean kidney diseaseeGFR < 15 may mean 
kidney failure***Ranges recommended by the National Kidney Foundat
ion,http://nkdep.nih.gov





[id number] 06:58:00* 





                Test Item       Value           Reference Range    Comments

 

                Hemoglobin (test code=HGB)    7.2 gm/dL       13.4-17.4        





RBC, Crossmatch  06:00:00* 





                Test Item       Value           Reference Range    Comments

 

                Product 1 Code (test code=PRODCODE1)                                

 

                Unit 1 ID (test code=UNITID1)    [id number]                     

 

                Unit 1 ABO (test code=UNITABO1)    O                                

 

                Unit 1 Rh (test code=UNITRH1)    POS                              

 

                Unit 1 Interp (test code=UNITINTERP1)    Compatible                       

 

                Unit 1 Status (test code=UNITSTAT1)    PT                               





Urinalysis [id number] 01:58:00* 





                Test Item       Value           Reference Range    Comments

 

                Color (test code=COLOR)    Yellow          Yellow,Straw,Pl yellow     

 

                Clarity (test code=CLAR)    Clear           Clear            

 

                Specific Gravity (test code=SPGR)    1.013           1.001-1.035      

 

                pH (test code=PH)    7.0             5.0-9.0          

 

                Ketone (test code=KET)    Negative mg/dL    Negative         

 

                Glucose (test code=GLUCUR)    Negative mg/dL    Negative         

 

                Protein (test code=PROT)    Negative mg/dL    Negative         

 

                Bilirubin (test code=BILI)    Negative mg/dL    Negative         

 

                Occult Blood (test code=UDOB)    Negative        Negative         

 

                Urobilinogen (test code=UROB)    0.2 mg/dL       0.2-1.0          

 

                Nitrite (test code=NIT)    Negative        Negative         

 

                Leuk Esterase (test code=LEUK)    Small           Negative         

 

                Micros Exam (test code=MEXAM)    Indicated                        

 

                Epithelial Cells (test code=EPI)    None Seen /LPF    0-30             

 

                WBC, Urine (test code=UWBC)    0-1 /HPF        0-5              

 

                RBC, Urine (test code=URBC)    None Seen /HPF    0-5              

 

                Bacteria (test code=BACT)    None /HPF                        

 

                Yeast (test code=YEAST)    Few /HPF                         





POC Glucose, Blood2018-04-15 20:19:00* 





                Test Item       Value           Reference Range    Comments

 

                POC Glucose (test code=POCGLUC)    328 mg/dL                 Notify RN or MDIf you consider

 your patient critically ill, the Roche Accu-Chek InformII metershould not be 
used for Glucose determinations.Draw a venous Glucose and send to the Main Lab 
for Analysis.





Vancomycin, Trough2018-04-15 12:52:00* 





                Test Item       Value           Reference Range    Comments

 

                Vanco, Trou (test code=VANTR)    10.7 ug/mL      10.0-20.0        





POC Glucose, Blood2018-04-15 12:16:00* 





                Test Item       Value           Reference Range    Comments

 

                POC Glucose (test code=POCGLUC)    208 mg/dL                 Notify RN or MDIf you consider

 your patient critically ill, the Roche Accu-Chek InformII metershould not be 
used for Glucose determinations.Draw a venous Glucose and send to the Main Lab 
for Analysis.





Vitamin B122018-04-15 07:24:00* 





                Test Item       Value           Reference Range    Comments

 

                Vitamin B 12 (test code=VITB12)    727 pg/mL       211-946          





Ferritin, Serum2018-04-15 07:24:00* 





                Test Item       Value           Reference Range    Comments

 

                Ferritin (test code=FERR)    147 ng/mL                  





TIBC and Iron2018-04-15 07:09:00* 





                Test Item       Value           Reference Range    Comments

 

                Iron (test code=FE)    28 ug/dL                   

 

                UIBC (test code=UIBC)    162 ug/dL       112-346          

 

                TIBC (test code=TIBC)    190 ug/dL       171-504          

 

                % Saturation (test code=PSAT)    15 %            20-50            





Phosphorus2018-04-15 07:07:00* 





                Test Item       Value           Reference Range    Comments

 

                Phosphorus (test code=PO4)    4.5 mg/dL       2.70-4.50        





Magnesium, Serum2018-04-15 07:07:00* 





                Test Item       Value           Reference Range    Comments

 

                Magnesium (test code=MG)    2.1 mg/dL       1.7-2.5          





Basic Metabolic Panel2018-04-15 07:07:00* 





                Test Item       Value           Reference Range    Comments

 

                Sodium (test code=NA)    137 mmol/L      135-145          

 

                Potassium (test code=K)    4.4 mmol/L      3.5-5.1          

 

                Chloride (test code=CL)    96 mmol/L                  

 

                Carbon Dioxide (test code=CO2)    29 mmol/L       22-29            

 

                Glucose (test code=GLU)    197 mg/dL                  

 

                Blood Urea Nitrogen (test code=BUN)    10 mg/dL        6-20             

 

                Creatinine (test code=CREAT)    0.6 mg/dL       0.7-1.2          

 

                Calcium (test code=CA)    8.1 mg/dL       8.3-10.5         

 

                BUN/Creatinine Ratio (test code=BCRATIO)    16.7                             

 

                Anion Gap (test code=AGAP)    12 mmol/L       7-16             

 

                Estimated GFR (test code=GFR)    >60 mL/min/1.73m2                    eGFR (estimated Glomerular 

Filtration Rate) is an estimated value,calculated from the patient's serum 
creatinine using the MDRD equation.It is NOT the patient's actual GFR. The eGFR 
provides a more clinicallyuseful measure of kidney disease than serum creatinine
 alone.***This calculation takes sex and race into account, if the informationis
 provided. If the race is not provided, and the patient isAfrican-American, 
multiply by 1.212. If sex is not provided, and thepatient is female, multiply by
 0.742. Results for patients <18 years ofage have not been validated by the MDRD
 study and should be interpretedwith caution.eGFR Result Interpretation:eGFR > 
or=60 is in the Normal RangeeGFR < 60 may mean kidney diseaseeGFR < 15 may mean 
kidney failure***Ranges recommended by the National Kidney Foundat
ion,http://nkdep.nih.gov





CBC with Differential2018-04-15 06:42:00* 





                Test Item       Value           Reference Range    Comments

 

                WBC (test code=WBC)    10.0 K/cumm     4.4-10.5         

 

                RBC (test code=RBC)    2.92 M/cumm     4.10-5.70        

 

                Hemoglobin (test code=HGB)    7.2 gm/dL       13.4-17.4        

 

                Hematocrit (test code=HCT)    25.2 %          38.7-52.0        

 

                MCV (test code=MCV)    86.4 fL                    

 

                MCH (test code=MCH)    24.6 pg         27.0-32.5        

 

                MCHC (test code=MCHC)    28.5 g/dL       32.0-37.5        

 

                RDW (test code=RDW)    14.6 %          11.5-14.5        

 

                Platelet Count (test code=PLTCT)    953 K/cumm      140-440          

 

                MPV (test code=MPV)    9.7 fL                           

 

                Diff Method (test code=DIFFM)    Auto                             

 

                Neutrophil (test code=NEUT)    51.7 %          36-70            

 

                Lymphocyte (test code=LYMPH)    38.6 %          12-44            

 

                Monocyte (test code=MONO)    7.1 %           0-11             

 

                Eosinophil (test code=EOS)    2.1 %           0-7              

 

                Basophil (test code=BASO)    0.5 %           0-2              

 

                Neutro Abs (test code=ANEUT)    5.2 K/cumm      1.6-7.4          

 

                Lymph Abs (test code=ALYMPH)    3.9 K/cumm      0.5-4.6          

 

                Mono Abs (test code=AMONO)    0.7 K/cumm      0.0-1.2          

 

                Eos Abs (test code=AEOS)    0.21 K/cumm     0.00-0.74        

 

                Baso Abs (test code=ABASO)    0.1 K/cumm      0.00-0.21        

 

                Hypochromic (test code=HYPO)    Slight                           





POC Glucose, [id number] 20:59:00* 





                Test Item       Value           Reference Range    Comments

 

                POC Glucose (test code=POCGLUC)    267 mg/dL                 Notify RN or MDIf you consider

 your patient critically ill, the Roche Accu-Chek InformII metershould not be 
used for Glucose determinations.Draw a venous Glucose and send to the Main Lab 
for Analysis.





Antibody Screen - [id number] 18:56:00* 





                Test Item       Value           Reference Range    Comments

 

                Antibody Screen (test code=ABSCR)    Negative                         





Blood Type and [id number] 18:55:00* 





                Test Item       Value           Reference Range    Comments

 

                ABO type (test code=ABO)    O                                

 

                Rh Type (test code=RH)    Positive                         





XR CHEST 1 [id number] 17:18:53LOCATION: [id number]: 38-year-old male, status
 post PICC line placement.COMMENT:A frontal chest radiograph was obtained at 
4:43 p.m., and compared to astudy of 2018.The left arm PICC line tip 
is in the superior vena cava.Peripheral scarring seen laterally in the right 
lung base is unchanged.The chest otherwise is unremarkable.IMPRESSION:This 
patient's left arm PICC line tip is in the superior vena cava.POC Glucose, Blood
2018 16:03:00* 





                Test Item       Value           Reference Range    Comments

 

                POC Glucose (test code=POCGLUC)    238 mg/dL                 If you consider your patient

 critically ill, the Roche Accu-Chek InformII metershould not be used for 
Glucose determinations.Draw a venous Glucose and send to the Main Lab for 
Analysis.





CT PELVIS LTD W/O [id number] 13:58:03LOCATION: [id number]:  38-year-old 
male presents with a left hip abscess.COMMENT:Axial CT imaging of this patient's
 pelvis was obtained from the iliaccrests to the pelvic floor without IV 
contrast. Coronal and sagittalsoft tissue reconstructions were included. 
Radiographs of the left hip obtained yesterday are available forcomparison.One 
or more of the following dose reduction techniques were used:Automated exposure 
control, adjustment of the mA and/or kV according thepatient size, and/or 
utilization of iterative reconstruction technique.DLP: 1017.9 mGy-cmCONTRAST: 
NoneFINDINGS:A large collection of gas is seen in the patient's left hip. The 
gascomes in close proximity to the greater trochanter proximal left femur.The 
images demonstrate findings suggestive of cortical discontinuityinvolving the 
greater trochanter raising concern for osteomyelitis inthis area.No fluid or gas
 is seen in the left hip joint space.No acute injury is seen elsewhere in the 
bony pelvis. Findings ofprobable myositis of significance is seen along the 
inferior aspect ofthe right hemipelvis with a collection of gas seen nearby the 
inferiorpubic ramus. No destructive lesion is seen specific to the inferiorpubic
 ramus clinical correlation is suggested regarding possibility ofosteomyelitis i
n this location as well.The pelvic viscera exhibits no acute findings. Ostomy is
 seen in theleft lower quadrant. Urinary bladder is decompressed by Lang cathet
er.Correlation with a MRI examination of the pelvis is suggested.IMPRESSION:In t
he patient's left hip there is a cortical discontinuity seeninvolving the greate
r trochanter of the proximal femur is concern forosteomyelitis. Gas is seen in t
he soft tissues, in close proximity tothis area.A sacral decubitus ulceration is
 seen in the medial aspect of the rightbuttock coming in proximity to the right 
inferior pubic ramus. 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 
suggestive of myositisossificans. Osteoarthritis is not excluded in this area. BLAIR perez comments for follow-up imaging suggestions.POC Glucose, Blood
2018 11:45:00* 





                Test Item       Value           Reference Range    Comments

 

                POC Glucose (test code=POCGLUC)    349 mg/dL                 Notify RN or MDIf you consider

 your patient critically ill, the Roche Accu-Chek InformII metershould not be 
used for Glucose determinations.Draw a venous Glucose and send to the Main Lab 
for Analysis.





Glycosylated [id number] 08:17:00* 





                Test Item       Value           Reference Range    Comments

 

                HBA1c (test code=HBA1C)    11.2 %          4.8-5.9          





POC Glucose, [id number] 07:54:00* 





                Test Item       Value           Reference Range    Comments

 

                POC Glucose (test code=POCGLUC)    326 mg/dL                 Notify RN or MDIf you consider

 your patient critically ill, the Roche Accu-Chek InformII metershould not be 
used for Glucose determinations.Draw a venous Glucose and send to the Main Lab 
for Analysis.





CBC with [id number] 07:28:00* 





                Test Item       Value           Reference Range    Comments

 

                WBC (test code=WBC)    10.6 K/cumm     4.4-10.5         

 

                RBC (test code=RBC)    2.72 M/cumm     4.10-5.70        

 

                Hemoglobin (test code=HGB)    6.8 gm/dL       13.4-17.4        

 

                Hematocrit (test code=HCT)    23.4 %          38.7-52.0        

 

                MCV (test code=MCV)    86.1 fL                    

 

                MCH (test code=MCH)    25.1 pg         27.0-32.5        

 

                MCHC (test code=MCHC)    29.1 g/dL       32.0-37.5        

 

                RDW (test code=RDW)    14.6 %          11.5-14.5        

 

                Platelet Count (test code=PLTCT)    1089 K/cumm     140-440          

 

                MPV (test code=MPV)    9.8 fL                           

 

                Diff Method (test code=DIFFM)    Manual                           

 

                Neutrophil (test code=NEUT)    44.0 %          36-70            

 

                Bands (test code=BAND)    7.0 %           0-6              

 

                Lymphocyte (test code=LYMPH)    38.0 %          12-44            

 

                Monocyte (test code=MONO)    7.0 %           0-11             

 

                Eosinophil (test code=EOS)    2.0 %           0-7              

 

                Basophil (test code=BASO)    2.0 %           0-2              

 

                Neutro Abs (test code=ANEUT)    5.4 K/cumm      1.6-7.4          

 

                Lymph Abs (test code=ALYMPH)    4.0 K/cumm      0.5-4.6          

 

                Mono Abs (test code=AMONO)    0.7 K/cumm      0.0-1.2          

 

                Eos Abs (test code=AEOS)    0.21 K/cumm     0.00-0.74        

 

                Baso Abs (test code=ABASO)    0.2 K/cumm      0.00-0.21        

 

                RBC Morphology (test code=RBCMRPH)    Moderate Hypochromia                     

 

                Platelet Est (test code=PLTEST)    Increased Platelet on Smear                     





Thyroid Stimulating Hormone (TSH)2018 06:26:00* 





                Test Item       Value           Reference Range    Comments

 

                TSH (test code=TSH)    5.31 mIU/mL     0.270-4.200      





Lipid [id number] 06:21:00* 





                Test Item       Value           Reference Range    Comments

 

                Cholesterol (test code=CHOL)    103 mg/dL       0-200            

 

                Triglycerides (test code=TRIG)    199 mg/dL       9-200            

 

                HDL (test code=HDL)    14 mg/dL        40-60            

 

                Chol/HDL (test code=CHOLPHDL)    7.4 Ratio       0.0-5.0          

 

                LDL, Calculated (test code=LDLC)    49              0-130           (NOTE)RISK OF HEART DISEASEPublished

 by American Heart AssociationAnalyte                 Optimal            
Boderline            Increased RiskCHOL                  <200                 
200-239                   >240TRIG                    <150                 150-
199                   >200HDL Male:           >60                               
                  <40HDL Female:       &gt;60                                   
                <50LDL                     <100                130-159          
          >160LDL                      NEAR OPTIMAL -129

 

                VLDL (test code=VLDL)    40 mg/dL        5-40             

 

                LDL/HDL (test code=LDLPHDL)    4                                





[id number] 06:21:00* 





                Test Item       Value           Reference Range    Comments

 

                Phosphorus (test code=PO4)    4.0 mg/dL       2.70-4.50        





Magnesium, [id number] 06:21:00* 





                Test Item       Value           Reference Range    Comments

 

                Magnesium (test code=MG)    2.0 mg/dL       1.7-2.5          





Basic Metabolic [id number] 06:21:00* 





                Test Item       Value           Reference Range    Comments

 

                Sodium (test code=NA)    136 mmol/L      135-145          

 

                Potassium (test code=K)    4.4 mmol/L      3.5-5.1          

 

                Chloride (test code=CL)    99 mmol/L                  

 

                Carbon Dioxide (test code=CO2)    25 mmol/L       22-29            

 

                Glucose (test code=GLU)    277 mg/dL                  

 

                Blood Urea Nitrogen (test code=BUN)    8 mg/dL         6-20             

 

                Creatinine (test code=CREAT)    0.6 mg/dL       0.7-1.2          

 

                Calcium (test code=CA)    7.8 mg/dL       8.3-10.5         

 

                BUN/Creatinine Ratio (test code=BCRATIO)    13.3                             

 

                Anion Gap (test code=AGAP)    12 mmol/L       7-16             

 

                Estimated GFR (test code=GFR)    >60 mL/min/1.73m2                    eGFR (estimated Glomerular 

Filtration Rate) is an estimated value,calculated from the patient's serum 
creatinine using the MDRD equation.It is NOT the patient's actual GFR. The eGFR 
provides a more clinicallyuseful measure of kidney disease than serum creatinine
 alone.***This calculation takes sex and race into account, if the informationis
 provided. If the race is not provided, and the patient isAfrican-American, 
multiply by 1.212. If sex is not provided, and thepatient is female, multiply by
 0.742. Results for patients <18 years ofage have not been validated by the MDRD
 study and should be interpretedwith caution.eGFR Result Interpretation:eGFR > 
or=60 is in the Normal RangeeGFR < 60 may mean kidney diseaseeGFR < 15 may mean 
kidney failure***Ranges recommended by the National Kidney Foundat
ion,http://nkdep.nih.gov





Lactic Acid [id number] 06:16:00* 





                Test Item       Value           Reference Range    Comments

 

                Lactic Acid, Bld (test code=LAC)    0.9 mmol/L      0.5-1.9          





Prothrombin [id number] 06:10:00* 





                Test Item       Value           Reference Range    Comments

 

                PT (test code=PT)    15.20 seconds    9.78-13.35       

 

                INR (test code=INR)    1.34 Ratio      0.6-1.2          





Partial Thromboplastin [id number] 06:10:00* 





                Test Item       Value           Reference Range    Comments

 

                aPTT (test code=PTT)    32.20 seconds    24.39-37.25      





POC Glucose, [id number] 20:27:00* 





                Test Item       Value           Reference Range    Comments

 

                POC Glucose (test code=POCGLUC)    329 mg/dL                 If you consider your patient

 critically ill, the Roche Accu-Chek InformII metershould not be used for 
Glucose determinations.Draw a venous Glucose and send to the Main Lab for 
Analysis.





Urinalysis [id number] 16:06:00* 





                Test Item       Value           Reference Range    Comments

 

                Color (test code=COLOR)    Yellow          Yellow,Straw,Pl yellow     

 

                Clarity (test code=CLAR)    Clear           Clear            

 

                Specific Gravity (test code=SPGR)    1.028           1.001-1.035      

 

                pH (test code=PH)    7.0             5.0-9.0          

 

                Ketone (test code=KET)    5 mg/dL         Negative         

 

                Glucose (test code=GLUCUR)    1000 mg/dL      Negative         

 

                Protein (test code=PROT)    75 mg/dL        Negative         

 

                Bilirubin (test code=BILI)    See IctoTest mg/dL    Negative         

 

                Occult Blood (test code=UDOB)    Small           Negative         

 

                Urobilinogen (test code=UROB)    8.0 mg/dL       0.2-1.0          

 

                Nitrite (test code=NIT)    Positive        Negative         

 

                Leuk Esterase (test code=LEUK)    Small           Negative         

 

                Ictotest (test code=ICTOTEST)    Confirmed Negative    Negative,Confirmed Negative    

 

 

                Micros Exam (test code=MEXAM)    Indicated                        

 

                Epithelial Cells (test code=EPI)    3-5 /LPF        0-30             

 

                WBC, Urine (test code=UWBC)    10-14 /HPF      0-5              

 

                RBC, Urine (test code=URBC)    0-2 /HPF        0-5              

 

                Bacteria (test code=BACT)    Many /HPF                        

 

                Yeast (test code=YEAST)    Few /HPF                        budding yeast presentFS





CBC with [id number] 15:56:00* 





                Test Item       Value           Reference Range    Comments

 

                WBC (test code=WBC)    12.4 K/cumm     4.4-10.5         

 

                RBC (test code=RBC)    3.22 M/cumm     4.10-5.70        

 

                Hemoglobin (test code=HGB)    8.2 gm/dL       13.4-17.4        

 

                Hematocrit (test code=HCT)    27.0 %          38.7-52.0        

 

                MCV (test code=MCV)    83.7 fL                    

 

                MCH (test code=MCH)    25.5 pg         27.0-32.5        

 

                MCHC (test code=MCHC)    30.5 g/dL       32.0-37.5        

 

                RDW (test code=RDW)    14.6 %          11.5-14.5        

 

                Platelet Count (test code=PLTCT)    1012 K/cumm     140-440          

 

                MPV (test code=MPV)    7.0 fL                           

 

                Diff Method (test code=DIFFM)    Manual                           

 

                Neutrophil (test code=NEUT)    41.0 %          36-70            

 

                Bands (test code=BAND)    12.0 %          0-6              

 

                Lymphocyte (test code=LYMPH)    30.0 %          12-44            

 

                Monocyte (test code=MONO)    16.0 %          0-11             

 

                Eosinophil (test code=EOS)    1.0 %           0-7              

 

                Neutro Abs (test code=ANEUT)    6.6 K/cumm      1.6-7.4          

 

                Lymph Abs (test code=ALYMPH)    3.7 K/cumm      0.5-4.6          

 

                Mono Abs (test code=AMONO)    2.0 K/cumm      0.0-1.2          

 

                Eos Abs (test code=AEOS)    0.12 K/cumm     0.00-0.74        

 

                    RBC Morphology (test code=RBCMRPH)    Slight Anisocytosis ; Slight Polychromasia    

                                         

 

                Platelet Est (test code=PLTEST)    Increased Platelet on Smear                     





Lactic Acid [id number] 15:12:00* 





                Test Item       Value           Reference Range    Comments

 

                Lactic Acid, Bld (test code=LAC)    2.6 mmol/L      0.5-1.9          





Comprehensive Metabolic [id number] 15:08:00* 





                Test Item       Value           Reference Range    Comments

 

                Sodium (test code=NA)    135 mmol/L      135-145          

 

                Potassium (test code=K)    5.0 mmol/L      3.5-5.1         SLIGHT HEMOLYSIS

 

                Chloride (test code=CL)    96 mmol/L                  

 

                Carbon Dioxide (test code=CO2)    24 mmol/L       22-29            

 

                Glucose (test code=GLU)    279 mg/dL                  

 

                Blood Urea Nitrogen (test code=BUN)    7 mg/dL         6-20             

 

                Creatinine (test code=CREAT)    0.7 mg/dL       0.7-1.2          

 

                Calcium (test code=CA)    8.2 mg/dL       8.3-10.5         

 

                Prot Total (test code=TP)    7.5 g/dL        6.4-8.3          

 

                Albumin (test code=ALB)    2.7 g/dL        3.5-5.2          

 

                A/G Ratio (test code=AGRATIO)    0.6 Ratio                        

 

                Globulin (test code=GLOB)    4.8             2.9-3.1          

 

                Bili Total (test code=TBIL)    0.3 mg/dL       0.1-0.9          

 

                Alk Phos (test code=APHOS)    266 U/L                    

 

                AST (test code=AST)    30 U/L          1-40             

 

                ALT (test code=ALT)    23 U/L          1-41             

 

                BUN/Creatinine Ratio (test code=BCRATIO)    10.0                             

 

                Anion Gap (test code=AGAP)    15 mmol/L       7-16             

 

                Estimated GFR (test code=GFR)    >60 mL/min/1.73m2                    eGFR (estimated Glomerular 

Filtration Rate) is an estimated value,calculated from the patient's serum 
creatinine using the MDRD equation.It is NOT the patient's actual GFR. The eGFR 
provides a more clinicallyuseful measure of kidney disease than serum creatinine
 alone.***This calculation takes sex and race into account, if the informationis
 provided. If the race is not provided, and the patient isAfrican-American, 
multiply by 1.212. If sex is not provided, and thepatient is female, multiply by
 0.742. Results for patients <18 years ofage have not been validated by the MDRD
 study and should be interpretedwith caution.eGFR Result Interpretation:eGFR > 
or=60 is in the Normal RangeeGFR < 60 may mean kidney diseaseeGFR < 15 may mean 
kidney failure***Ranges recommended by the National Kidney Foundat
ion,http://nkdep.nih.gov





XR CHEST 1 [id number] 14:29:19CHEST 1 VIEWCLINICAL INFORMATION:  
FeverCOMPARISON: 2016FINDINGS:Linear scarring and/or atelectasis is 
seen in the right lower lobe. There is elevation of the right hemidiaphragm.  
The left lung is clear. The heart size is normal.  The bones are 
intact.IMPRESSION:Atelectasis and/or scarring in the right lower lobe.  
Superimposedpneumonia cannot be excluded.LOCATION: R16XR HIP 2+V, UNI.-LEFT
2018 14:27:80D59AHFD: XR HIP 2+V, UNI.-LEFTHISTORY: Pain-
erythemaCOMPARISON: NoneFINDINGS:No acute fracture or dislocation. The joint 
spaces are preserved.Apparent sclerosis in the right inferior pubic ramus; 
however, thereappears to be overlying bowel loops. No soft tissue 
abnormality.IMPRESSION:Findings suggestive of right inguinal hernia. Possible 
sclerotic lesionin the right inferior pubic ramus; however, this may be 
secondary tosuperimposed bowel loops. If pain persists, consider further 
evaluationwith CT.POCT-GLUCOSE [id number] 00:42:00* 





                Test Item       Value           Reference Range    Comments

 

                POC-GLUCOSE METER (BEAKER) (test code=1538)    315 mg/dL                 TESTED AT [address] [address]





COMPREHENSIVE METABOLIC [id number] 21:43:00* 





                Test Item       Value           Reference Range    Comments

 

                TOTAL PROTEIN (BEAKER) (test code=770)    6.9 gm/dL       6.0-8.5          

 

                ALBUMIN (BEAKER) (test code=1145)    2.9 g/dL        3.5-5.0          

 

                ALKALINE PHOSPHATASE (BEAKER) (test code=346)    372 U/L                    

 

                BILIRUBIN TOTAL (BEAKER) (test code=377)    0.4 mg/dL       0.1-1.2          

 

                SODIUM (BEAKER) (test code=381)    135 meq/L       135-148          

 

                POTASSIUM (BEAKER) (test code=379)    3.9 meq/L       3.6-5.5          

 

                CHLORIDE (BEAKER) (test code=382)    95 meq/L                   

 

                CO2 (BEAKER) (test code=355)    29 meq/L        24-32            

 

                BLOOD UREA NITROGEN (BEAKER) (test code=354)    9 mg/dL         10-26            

 

                CREATININE (BEAKER) (test code=358)    0.64 mg/dL      0.50-1.20        

 

                GLUCOSE RANDOM (BEAKER) (test code=652)    434 mg/dL                  

 

                CALCIUM (BEAKER) (test code=697)    8.4 mg/dL       8.5-10.5         

 

                AST (SGOT) (BEAKER) (test code=353)    51 U/L          5-40             

 

                ALT (SGPT) (BEAKER) (test code=347)    47 U/L          5-50             

 

                EGFR (BEAKER) (test code=1092)     mL/min/1.73 sq m                    INSUFFICIENT CLINICAL DATA

 TO CALCULATE ESTIMATED GFR.





CBC W/PLT COUNT & AUTO [id number] 21:29:00* 





                Test Item       Value           Reference Range    Comments

 

                WHITE BLOOD CELL COUNT (BEAKER) (test code=775)    11.6 10e3/ L    4.0-10.0         

 

                RED BLOOD CELL COUNT (BEAKER) (test code=761)    3.40 10e6/ L    4.20-5.80        

 

                HEMOGLOBIN (BEAKER) (test code=410)    9.2 g/dL        13.0-16.8        

 

                HEMATOCRIT (BEAKER) (test code=411)    27.9 %          40.0-50.0        

 

                MEAN CORPUSCULAR VOLUME (BEAKER) (test code=753)    81.9 fL         82.0-98.0        

 

                MEAN CORPUSCULAR HEMOGLOBIN (BEAKER) (test code=751)    27.1 pg         27.0-33.0        

 

                    MEAN CORPUSCULAR HEMOGLOBIN CONC (BEAKER) (test code=752)    33.1 g/dL           32.0-36.0 

                                         

 

                RED CELL DISTRIBUTION WIDTH (BEAKER) (test code=412)    13.8 %          10.3-14.2        

 

                PLATELET COUNT (BEAKER) (test code=756)    894 10e3/ L     150-430          

 

                MEAN PLATELET VOLUME (BEAKER) (test code=754)    6.3 fL          6.5-10.5         

 

                NEUTROPHILS RELATIVE PERCENT (BEAKER) (test code=429)    51 %                             

 

                LYMPHOCYTES RELATIVE PERCENT (BEAKER) (test code=430)    34 %                             

 

                MONOCYTES RELATIVE PERCENT (BEAKER) (test code=431)    13 %                             

 

                EOSINOPHILS RELATIVE PERCENT (BEAKER) (test code=432)    2 %                              

 

                BASOPHILS RELATIVE PERCENT (BEAKER) (test code=437)    1 %                              

 

                NEUTROPHILS ABSOLUTE COUNT (BEAKER) (test code=670)    5.93 10e3/ L    1.80-8.00        

 

                LYMPHOCYTES ABSOLUTE COUNT (BEAKER) (test code=414)    3.94 10e3/ L    1.48-4.50        

 

                MONOCYTES ABSOLUTE COUNT (BEAKER) (test code=415)    1.46 10e3/ L    0.00-1.30        

 

                EOSINOPHILS ABSOLUTE COUNT (BEAKER) (test code=416)    0.17 10e3/ L    0.00-0.50        

 

                BASOPHILS ABSOLUTE COUNT (BEAKER) (test code=417)    0.10 10e3/ L    0.00-0.20        





KETONE, [id number] 21:23:00* 





                Test Item       Value           Reference Range    Comments

 

                KETONES, BLOOD (BEAKER) (test code=1103)    0.1 mmol/L      <0.4
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
## 2020-02-21 NOTE — DIAGNOSTIC IMAGING REPORT
PELVIS ONE IMAGE - BILATERAL HIP FOUR Images



HISTORY:  Pressure sores, evaluate for osteomyelitis



COMPARISON: CT pelvis performed at [facility] 5/22/2019

     

FINDINGS:

Bones:

Hips: Intact and normally positioned. Mild degenerative changes of the right

hip.



Pelvis: Intact. No diastases of the pubic symphysis or sacroiliac joints.

Cortical thickening and heterotopic ossifications of the inferior pubic

rami/ischial tuberosities, right greater than left. There are erosive changes

of the left issue tuberosity similar to previous exam. Cortical thickening of

the lateral aspect of the left greater trochanter.



Lower lumbar spine: Unremarkable.



Soft tissues:

Heterotopic ossifications of the soft tissues of the greater trochanter of the

right femur and the lesser trochanter of the left femur. Extensive heterotopic

ossifications in the soft tissues of the posterior left pelvis extending to the

level of the iliac crest and throughout the proximal left thigh. Deep soft

tissue ulceration of the lateral aspect of the left thigh/hip extending to the

bone is larger. Soft tissue ulceration at the posterior aspect of the left

proximal thigh is redemonstrated.





IMPRESSION: 

Large decubitus ulcer of the lateral aspect of the left thigh/hip and decubitus

ulcer of the proximal left thigh.



Extensive heterotopic ossifications of the left pelvis, ischial tuberosities,

and right greater trochanter suggestive of chronic osteomyelitis.



Signed by: Dr. Sada Holly MD on 2/21/2020 8:54 PM

## 2020-02-21 NOTE — NUR
Spoke with Mirian RUSSO about elevated BS 404mg/dl ordered low dose sliding scale. Informed 
about Pt history of self catheterization, ordered self cath prn. Ordered [place] 5/325 for 
pain.

## 2020-02-22 VITALS — DIASTOLIC BLOOD PRESSURE: 59 MMHG | SYSTOLIC BLOOD PRESSURE: 120 MMHG

## 2020-02-22 VITALS — DIASTOLIC BLOOD PRESSURE: 74 MMHG | SYSTOLIC BLOOD PRESSURE: 143 MMHG

## 2020-02-22 VITALS — DIASTOLIC BLOOD PRESSURE: 72 MMHG | SYSTOLIC BLOOD PRESSURE: 137 MMHG

## 2020-02-22 VITALS — SYSTOLIC BLOOD PRESSURE: 137 MMHG | DIASTOLIC BLOOD PRESSURE: 72 MMHG

## 2020-02-22 VITALS — DIASTOLIC BLOOD PRESSURE: 62 MMHG | SYSTOLIC BLOOD PRESSURE: 130 MMHG

## 2020-02-22 VITALS — SYSTOLIC BLOOD PRESSURE: 145 MMHG | DIASTOLIC BLOOD PRESSURE: 82 MMHG

## 2020-02-22 VITALS — DIASTOLIC BLOOD PRESSURE: 63 MMHG | SYSTOLIC BLOOD PRESSURE: 125 MMHG

## 2020-02-22 VITALS — SYSTOLIC BLOOD PRESSURE: 143 MMHG | DIASTOLIC BLOOD PRESSURE: 74 MMHG

## 2020-02-22 LAB
ALBUMIN SERPL-MCNC: 1.7 G/DL (ref 3.5–5)
ALBUMIN/GLOB SERPL: 0.3 {RATIO} (ref 0.8–2)
ALP SERPL-CCNC: 384 IU/L (ref 40–150)
ALT SERPL-CCNC: 17 IU/L (ref 0–55)
ANION GAP SERPL CALC-SCNC: 11 MMOL/L (ref 8–16)
BASOPHILS # BLD AUTO: 0 10*3/UL (ref 0–0.1)
BASOPHILS NFR BLD AUTO: 0.3 % (ref 0–1)
BUN SERPL-MCNC: 9 MG/DL (ref 7–26)
BUN/CREAT SERPL: 13 (ref 6–25)
CALCIUM SERPL-MCNC: 8.3 MG/DL (ref 8.4–10.2)
CHLORIDE SERPL-SCNC: 108 MMOL/L (ref 98–107)
CO2 SERPL-SCNC: 25 MMOL/L (ref 22–29)
DEPRECATED NEUTROPHILS # BLD AUTO: 6.3 10*3/UL (ref 2.1–6.9)
EGFRCR SERPLBLD CKD-EPI 2021: > 60 ML/MIN (ref 60–?)
EOSINOPHIL # BLD AUTO: 0.2 10*3/UL (ref 0–0.4)
EOSINOPHIL NFR BLD AUTO: 1.8 % (ref 0–6)
ERYTHROCYTE [DISTWIDTH] IN CORD BLOOD: 18.7 % (ref 11.7–14.4)
GLOBULIN PLAS-MCNC: 5.1 G/DL (ref 2.3–3.5)
GLUCOSE SERPLBLD-MCNC: 276 MG/DL (ref 74–118)
HCT VFR BLD AUTO: 21.6 % (ref 38.2–49.6)
HGB BLD-MCNC: 5.7 G/DL (ref 14–18)
LYMPHOCYTES # BLD: 3.6 10*3/UL (ref 1–3.2)
LYMPHOCYTES NFR BLD AUTO: 31.8 % (ref 18–39.1)
MCH RBC QN AUTO: 20.8 PG (ref 28–32)
MCHC RBC AUTO-ENTMCNC: 26.4 G/DL (ref 31–35)
MCV RBC AUTO: 78.8 FL (ref 81–99)
MONOCYTES # BLD AUTO: 1 10*3/UL (ref 0.2–0.8)
MONOCYTES NFR BLD AUTO: 9.1 % (ref 4.4–11.3)
NEUTS SEG NFR BLD AUTO: 55.5 % (ref 38.7–80)
PLATELET # BLD AUTO: 978 X10E3/UL (ref 140–360)
POTASSIUM SERPL-SCNC: 4 MMOL/L (ref 3.5–5.1)
RBC # BLD AUTO: 2.74 X10E6/UL (ref 4.3–5.7)
SODIUM SERPL-SCNC: 140 MMOL/L (ref 136–145)

## 2020-02-22 PROCEDURE — 05HY33Z INSERTION OF INFUSION DEVICE INTO UPPER VEIN, PERCUTANEOUS APPROACH: ICD-10-PCS | Performed by: INTERNAL MEDICINE

## 2020-02-22 PROCEDURE — 30243N1 TRANSFUSION OF NONAUTOLOGOUS RED BLOOD CELLS INTO CENTRAL VEIN, PERCUTANEOUS APPROACH: ICD-10-PCS | Performed by: INTERNAL MEDICINE

## 2020-02-22 RX ADMIN — INSULIN LISPRO SCH UNIT: 100 INJECTION, SOLUTION INTRAVENOUS; SUBCUTANEOUS at 12:02

## 2020-02-22 RX ADMIN — SODIUM CHLORIDE PRN MG: 900 INJECTION INTRAVENOUS at 06:10

## 2020-02-22 RX ADMIN — FAMOTIDINE SCH MG: 20 TABLET, FILM COATED ORAL at 17:22

## 2020-02-22 RX ADMIN — SODIUM CHLORIDE PRN MG: 900 INJECTION INTRAVENOUS at 18:51

## 2020-02-22 RX ADMIN — INSULIN LISPRO SCH UNIT: 100 INJECTION, SOLUTION INTRAVENOUS; SUBCUTANEOUS at 21:00

## 2020-02-22 RX ADMIN — Medication PRN MG: at 11:19

## 2020-02-22 RX ADMIN — FAMOTIDINE SCH MG: 20 TABLET, FILM COATED ORAL at 08:28

## 2020-02-22 RX ADMIN — TAZOBACTAM SODIUM AND PIPERACILLIN SODIUM SCH MLS/HR: 375; 3 INJECTION, SOLUTION INTRAVENOUS at 12:01

## 2020-02-22 RX ADMIN — HYDROCODONE BITARTRATE AND ACETAMINOPHEN PRN EA: 5; 325 TABLET ORAL at 18:51

## 2020-02-22 RX ADMIN — TAZOBACTAM SODIUM AND PIPERACILLIN SODIUM SCH MLS/HR: 375; 3 INJECTION, SOLUTION INTRAVENOUS at 05:50

## 2020-02-22 RX ADMIN — INSULIN LISPRO SCH UNIT: 100 INJECTION, SOLUTION INTRAVENOUS; SUBCUTANEOUS at 17:22

## 2020-02-22 RX ADMIN — HYDROCODONE BITARTRATE AND ACETAMINOPHEN PRN EA: 5; 325 TABLET ORAL at 05:50

## 2020-02-22 RX ADMIN — TAZOBACTAM SODIUM AND PIPERACILLIN SODIUM SCH MLS/HR: 375; 3 INJECTION, SOLUTION INTRAVENOUS at 23:00

## 2020-02-22 RX ADMIN — Medication PRN MG: at 19:01

## 2020-02-22 RX ADMIN — VANCOMYCIN HYDROCHLORIDE SCH MLS/HR: 1 INJECTION, SOLUTION INTRAVENOUS at 21:34

## 2020-02-22 RX ADMIN — ENOXAPARIN SODIUM SCH MG: 40 INJECTION SUBCUTANEOUS at 17:22

## 2020-02-22 RX ADMIN — INSULIN LISPRO SCH UNIT: 100 INJECTION, SOLUTION INTRAVENOUS; SUBCUTANEOUS at 08:38

## 2020-02-22 NOTE — NUR
PATIENT HAS FINISHED SECOND UNIT OF BLOOD, NO NEGATIVE REACTIONS NOTED. VITALS ARE STABLE, 
STARTING PATIENT ON ANTIBIOTIC, CONTINUING TO MONITOR.
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
## 2020-02-22 NOTE — DIAGNOSTIC IMAGING REPORT
EXAMINATION:  CHEST XRAY LINE PLACEMENT    



INDICATION:      

^VERIFY LINE PLACEMENT.

^[id number]

^1132



COMPARISON:  None

     

FINDINGS:  AP view   



TUBES and LINES:  Right-sided PICC line.



LUNGS/PLEURA:  Lungs are well inflated.  There is obscuration of the right

costophrenic angle likely due to atelectasis and effusion.



HEART AND MEDIASTINUM:  The cardiomediastinal silhouette is unremarkable.    



BONES AND SOFT TISSUES:  No acute osseous lesion.  Soft tissues are

unremarkable.



UPPER ABDOMEN: No free air under the diaphragm.    



IMPRESSION: 

Obscuration of the right costophrenic angle likely due to atelectasis and

effusion.





Signed by: Miguel Babb MD on 2/22/2020 12:27 PM
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
## 2020-02-23 VITALS — SYSTOLIC BLOOD PRESSURE: 132 MMHG | DIASTOLIC BLOOD PRESSURE: 68 MMHG

## 2020-02-23 VITALS — DIASTOLIC BLOOD PRESSURE: 68 MMHG | SYSTOLIC BLOOD PRESSURE: 118 MMHG

## 2020-02-23 VITALS — DIASTOLIC BLOOD PRESSURE: 67 MMHG | SYSTOLIC BLOOD PRESSURE: 143 MMHG

## 2020-02-23 VITALS — DIASTOLIC BLOOD PRESSURE: 85 MMHG | SYSTOLIC BLOOD PRESSURE: 155 MMHG

## 2020-02-23 VITALS — DIASTOLIC BLOOD PRESSURE: 58 MMHG | SYSTOLIC BLOOD PRESSURE: 105 MMHG

## 2020-02-23 VITALS — DIASTOLIC BLOOD PRESSURE: 67 MMHG | SYSTOLIC BLOOD PRESSURE: 135 MMHG

## 2020-02-23 LAB
ANION GAP SERPL CALC-SCNC: 12.2 MMOL/L (ref 8–16)
BASOPHILS # BLD AUTO: 0 10*3/UL (ref 0–0.1)
BASOPHILS NFR BLD AUTO: 0.2 % (ref 0–1)
BUN SERPL-MCNC: 10 MG/DL (ref 7–26)
BUN/CREAT SERPL: 15 (ref 6–25)
CALCIUM SERPL-MCNC: 8.4 MG/DL (ref 8.4–10.2)
CHLORIDE SERPL-SCNC: 101 MMOL/L (ref 98–107)
CO2 SERPL-SCNC: 28 MMOL/L (ref 22–29)
DEPRECATED NEUTROPHILS # BLD AUTO: 6.6 10*3/UL (ref 2.1–6.9)
EGFRCR SERPLBLD CKD-EPI 2021: > 60 ML/MIN (ref 60–?)
EOSINOPHIL # BLD AUTO: 0.2 10*3/UL (ref 0–0.4)
EOSINOPHIL NFR BLD AUTO: 1.7 % (ref 0–6)
ERYTHROCYTE [DISTWIDTH] IN CORD BLOOD: 18.4 % (ref 11.7–14.4)
FERRITIN SERPL-MCNC: 81.69 NG/ML (ref 21.81–274.66)
FOLATE SERPL-MCNC: 8.5 NG/ML (ref 7–15.4)
GLUCOSE SERPLBLD-MCNC: 258 MG/DL (ref 74–118)
HCT VFR BLD AUTO: 23.3 % (ref 38.2–49.6)
HGB BLD-MCNC: 6.6 G/DL (ref 14–18)
IRON SATN MFR SERPL: 9 % (ref 15–50)
IRON SERPL-MCNC: 24 UG/DL (ref 65–175)
LYMPHOCYTES # BLD: 4.1 10*3/UL (ref 1–3.2)
LYMPHOCYTES NFR BLD AUTO: 33.7 % (ref 18–39.1)
MAGNESIUM SERPL-MCNC: 1.9 MG/DL (ref 1.3–2.1)
MCH RBC QN AUTO: 22.1 PG (ref 28–32)
MCHC RBC AUTO-ENTMCNC: 28.3 G/DL (ref 31–35)
MCV RBC AUTO: 78.2 FL (ref 81–99)
MONOCYTES # BLD AUTO: 0.9 10*3/UL (ref 0.2–0.8)
MONOCYTES NFR BLD AUTO: 7.6 % (ref 4.4–11.3)
NEUTS SEG NFR BLD AUTO: 54.4 % (ref 38.7–80)
PLATELET # BLD AUTO: 1019 X10E3/UL (ref 140–360)
POTASSIUM SERPL-SCNC: 4.2 MMOL/L (ref 3.5–5.1)
RBC # BLD AUTO: 2.98 X10E6/UL (ref 4.3–5.7)
SODIUM SERPL-SCNC: 137 MMOL/L (ref 136–145)
TIBC SERPL-MCNC: 255 UG/DL (ref 261–478)
TRANSFERRIN SERPL-MCNC: 182 MG/DL (ref 174–364)
TSH SERPL DL<=0.005 MIU/L-ACNC: 5.02 UIU/ML (ref 0.35–4.94)
VIT B12 BLD-MCNC: 414 PG/ML (ref 213–816)

## 2020-02-23 RX ADMIN — FAMOTIDINE SCH MG: 20 TABLET, FILM COATED ORAL at 17:36

## 2020-02-23 RX ADMIN — SODIUM CHLORIDE PRN MG: 900 INJECTION INTRAVENOUS at 06:45

## 2020-02-23 RX ADMIN — HYDROCODONE BITARTRATE AND ACETAMINOPHEN PRN EA: 5; 325 TABLET ORAL at 12:37

## 2020-02-23 RX ADMIN — TAZOBACTAM SODIUM AND PIPERACILLIN SODIUM SCH MLS/HR: 375; 3 INJECTION, SOLUTION INTRAVENOUS at 12:25

## 2020-02-23 RX ADMIN — TAZOBACTAM SODIUM AND PIPERACILLIN SODIUM SCH MLS/HR: 375; 3 INJECTION, SOLUTION INTRAVENOUS at 04:54

## 2020-02-23 RX ADMIN — INSULIN LISPRO SCH UNIT: 100 INJECTION, SOLUTION INTRAVENOUS; SUBCUTANEOUS at 17:36

## 2020-02-23 RX ADMIN — INSULIN LISPRO SCH UNIT: 100 INJECTION, SOLUTION INTRAVENOUS; SUBCUTANEOUS at 12:28

## 2020-02-23 RX ADMIN — OXYBUTYNIN CHLORIDE SCH MG: 5 TABLET, FILM COATED, EXTENDED RELEASE ORAL at 23:11

## 2020-02-23 RX ADMIN — INSULIN LISPRO SCH UNIT: 100 INJECTION, SOLUTION INTRAVENOUS; SUBCUTANEOUS at 22:01

## 2020-02-23 RX ADMIN — ENOXAPARIN SODIUM SCH MG: 40 INJECTION SUBCUTANEOUS at 17:36

## 2020-02-23 RX ADMIN — HYDROCODONE BITARTRATE AND ACETAMINOPHEN PRN EA: 5; 325 TABLET ORAL at 21:30

## 2020-02-23 RX ADMIN — TAZOBACTAM SODIUM AND PIPERACILLIN SODIUM SCH MLS/HR: 375; 3 INJECTION, SOLUTION INTRAVENOUS at 04:55

## 2020-02-23 RX ADMIN — TAZOBACTAM SODIUM AND PIPERACILLIN SODIUM SCH MLS/HR: 375; 3 INJECTION, SOLUTION INTRAVENOUS at 17:36

## 2020-02-23 RX ADMIN — FAMOTIDINE SCH MG: 20 TABLET, FILM COATED ORAL at 08:58

## 2020-02-23 RX ADMIN — INSULIN LISPRO SCH UNIT: 100 INJECTION, SOLUTION INTRAVENOUS; SUBCUTANEOUS at 09:04

## 2020-02-23 RX ADMIN — VANCOMYCIN HYDROCHLORIDE SCH MLS/HR: 1 INJECTION, SOLUTION INTRAVENOUS at 21:54

## 2020-02-23 NOTE — NUR
Visit made by the Spiritual Care Department Pastoral Visitor, Jayden Soria. PV provided 
pastoral presence, hospitality, and supportive listening. 

Pastoral Visitor informed pt/family of the scope of Chaplaincy Services and availability.



BRITNEY KERR



Spiritual Care Department

O: [phone]

Pager: [phone] ([phone])
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
## 2020-02-23 NOTE — NUR
PATIENT'S DRESSING ON LEFT HIP HAS FALLEN OFF, WOUND WAS CLEANED AND REPACKED, COVERED WITH 
CLEAN AND DRY DRESSING.

## 2020-02-24 VITALS — DIASTOLIC BLOOD PRESSURE: 70 MMHG | SYSTOLIC BLOOD PRESSURE: 115 MMHG

## 2020-02-24 VITALS — DIASTOLIC BLOOD PRESSURE: 59 MMHG | SYSTOLIC BLOOD PRESSURE: 120 MMHG

## 2020-02-24 VITALS — DIASTOLIC BLOOD PRESSURE: 70 MMHG | SYSTOLIC BLOOD PRESSURE: 113 MMHG

## 2020-02-24 VITALS — DIASTOLIC BLOOD PRESSURE: 60 MMHG | SYSTOLIC BLOOD PRESSURE: 122 MMHG

## 2020-02-24 VITALS — SYSTOLIC BLOOD PRESSURE: 113 MMHG | DIASTOLIC BLOOD PRESSURE: 56 MMHG

## 2020-02-24 VITALS — SYSTOLIC BLOOD PRESSURE: 129 MMHG | DIASTOLIC BLOOD PRESSURE: 71 MMHG

## 2020-02-24 VITALS — SYSTOLIC BLOOD PRESSURE: 120 MMHG | DIASTOLIC BLOOD PRESSURE: 59 MMHG

## 2020-02-24 VITALS — SYSTOLIC BLOOD PRESSURE: 148 MMHG | DIASTOLIC BLOOD PRESSURE: 83 MMHG

## 2020-02-24 LAB
ANION GAP SERPL CALC-SCNC: 12.4 MMOL/L (ref 8–16)
BASOPHILS # BLD AUTO: 0 10*3/UL (ref 0–0.1)
BASOPHILS NFR BLD AUTO: 0.3 % (ref 0–1)
BUN SERPL-MCNC: 11 MG/DL (ref 7–26)
BUN/CREAT SERPL: 17 (ref 6–25)
CALCIUM SERPL-MCNC: 7.8 MG/DL (ref 8.4–10.2)
CHLORIDE SERPL-SCNC: 101 MMOL/L (ref 98–107)
CO2 SERPL-SCNC: 27 MMOL/L (ref 22–29)
DEPRECATED NEUTROPHILS # BLD AUTO: 5.3 10*3/UL (ref 2.1–6.9)
DEPRECATED PHOSPHATE SERPL-MCNC: 3.7 MG/DL (ref 2.3–4.7)
EGFRCR SERPLBLD CKD-EPI 2021: > 60 ML/MIN (ref 60–?)
EOSINOPHIL # BLD AUTO: 0.2 10*3/UL (ref 0–0.4)
EOSINOPHIL NFR BLD AUTO: 2.3 % (ref 0–6)
ERYTHROCYTE [DISTWIDTH] IN CORD BLOOD: 17.9 % (ref 11.7–14.4)
GLUCOSE SERPLBLD-MCNC: 280 MG/DL (ref 74–118)
HCT VFR BLD AUTO: 24.5 % (ref 38.2–49.6)
HGB BLD-MCNC: 7.2 G/DL (ref 14–18)
HYPOCHROMIA BLD QL SMEAR: SLIGHT
LYMPHOCYTES # BLD: 3.6 10*3/UL (ref 1–3.2)
LYMPHOCYTES NFR BLD AUTO: 35.5 % (ref 18–39.1)
MAGNESIUM SERPL-MCNC: 2 MG/DL (ref 1.3–2.1)
MCH RBC QN AUTO: 22.8 PG (ref 28–32)
MCHC RBC AUTO-ENTMCNC: 29.4 G/DL (ref 31–35)
MCV RBC AUTO: 77.5 FL (ref 81–99)
MONOCYTES # BLD AUTO: 0.9 10*3/UL (ref 0.2–0.8)
MONOCYTES NFR BLD AUTO: 8.6 % (ref 4.4–11.3)
NEUTS SEG NFR BLD AUTO: 51.6 % (ref 38.7–80)
PLATELET # BLD AUTO: 908 X10E3/UL (ref 140–360)
POTASSIUM SERPL-SCNC: 4.4 MMOL/L (ref 3.5–5.1)
RBC # BLD AUTO: 3.16 X10E6/UL (ref 4.3–5.7)
RBC MORPH BLD: (no result)
SODIUM SERPL-SCNC: 136 MMOL/L (ref 136–145)

## 2020-02-24 RX ADMIN — FAMOTIDINE SCH MG: 20 TABLET, FILM COATED ORAL at 16:59

## 2020-02-24 RX ADMIN — TAZOBACTAM SODIUM AND PIPERACILLIN SODIUM SCH MLS/HR: 375; 3 INJECTION, SOLUTION INTRAVENOUS at 00:55

## 2020-02-24 RX ADMIN — TAZOBACTAM SODIUM AND PIPERACILLIN SODIUM SCH MLS/HR: 375; 3 INJECTION, SOLUTION INTRAVENOUS at 23:27

## 2020-02-24 RX ADMIN — INSULIN LISPRO SCH UNIT: 100 INJECTION, SOLUTION INTRAVENOUS; SUBCUTANEOUS at 16:58

## 2020-02-24 RX ADMIN — HYDROCODONE BITARTRATE AND ACETAMINOPHEN PRN EA: 5; 325 TABLET ORAL at 22:42

## 2020-02-24 RX ADMIN — INSULIN GLARGINE SCH UNITS: 100 INJECTION, SOLUTION SUBCUTANEOUS at 20:15

## 2020-02-24 RX ADMIN — INSULIN LISPRO SCH UNIT: 100 INJECTION, SOLUTION INTRAVENOUS; SUBCUTANEOUS at 09:06

## 2020-02-24 RX ADMIN — TAZOBACTAM SODIUM AND PIPERACILLIN SODIUM SCH MLS/HR: 375; 3 INJECTION, SOLUTION INTRAVENOUS at 16:58

## 2020-02-24 RX ADMIN — TAZOBACTAM SODIUM AND PIPERACILLIN SODIUM SCH MLS/HR: 375; 3 INJECTION, SOLUTION INTRAVENOUS at 11:28

## 2020-02-24 RX ADMIN — SODIUM CHLORIDE PRN MG: 900 INJECTION INTRAVENOUS at 22:41

## 2020-02-24 RX ADMIN — ACETAMINOPHEN AND CODEINE PHOSPHATE PRN EA: 300; 30 TABLET ORAL at 11:28

## 2020-02-24 RX ADMIN — INSULIN LISPRO SCH UNIT: 100 INJECTION, SOLUTION INTRAVENOUS; SUBCUTANEOUS at 20:15

## 2020-02-24 RX ADMIN — HYDROCODONE BITARTRATE AND ACETAMINOPHEN PRN EA: 5; 325 TABLET ORAL at 10:20

## 2020-02-24 RX ADMIN — ENOXAPARIN SODIUM SCH MG: 40 INJECTION SUBCUTANEOUS at 16:57

## 2020-02-24 RX ADMIN — OXYBUTYNIN CHLORIDE SCH MG: 5 TABLET, FILM COATED, EXTENDED RELEASE ORAL at 08:27

## 2020-02-24 RX ADMIN — INSULIN LISPRO SCH UNIT: 100 INJECTION, SOLUTION INTRAVENOUS; SUBCUTANEOUS at 08:30

## 2020-02-24 RX ADMIN — SODIUM CHLORIDE PRN MG: 900 INJECTION INTRAVENOUS at 12:09

## 2020-02-24 RX ADMIN — HYDROCODONE BITARTRATE AND ACETAMINOPHEN PRN EA: 5; 325 TABLET ORAL at 23:27

## 2020-02-24 RX ADMIN — TAZOBACTAM SODIUM AND PIPERACILLIN SODIUM SCH MLS/HR: 375; 3 INJECTION, SOLUTION INTRAVENOUS at 06:06

## 2020-02-24 RX ADMIN — INSULIN LISPRO SCH UNIT: 100 INJECTION, SOLUTION INTRAVENOUS; SUBCUTANEOUS at 11:27

## 2020-02-24 RX ADMIN — VANCOMYCIN HYDROCHLORIDE SCH MLS/HR: 1 INJECTION, SOLUTION INTRAVENOUS at 20:01

## 2020-02-24 RX ADMIN — FAMOTIDINE SCH MG: 20 TABLET, FILM COATED ORAL at 08:27

## 2020-02-24 RX ADMIN — INSULIN GLARGINE SCH UNITS: 100 INJECTION, SOLUTION SUBCUTANEOUS at 09:04

## 2020-02-24 NOTE — NUR
Bedside report and rounds completed with oncoming nurse. Patient in bed resting, call light 
within reach. No issues or concerns noted.

## 2020-02-24 NOTE — NUR
Dressing changed to right/left hip, right/left buttocks, left ankle: areas cleaned with NS, 
wet to dry dakins, covered with 4x4, abd pads and paper tape. Tolerated well.

## 2020-02-24 NOTE — NUR
Spoke to pt regarding LTAC order. Informed pt that we would have to verify with insurance to 
see if he has LTAC benefits. He stated that his wife spoke with Daniela from [employer] and was 
told that they would approve. Pt states they talked about facilities in [place]. CM 
informed him that we should still have to submit and get approval. Pt verbalized 
understanding. 

CM informed pt about the two LTAC facilities in [place] - [facility] and [facility]. Pt 
states he was fine with either one. Choice letter signed and placed in chart. Copy to pt. 



Chelsie Hernandez with [facility] checked with intake and said Feliz BANEGAS does not have LTAC 
benefits. 

FS faxed to [facility] to check benefits at [phone]. [facility] get 
back with CM once they verify.

## 2020-02-24 NOTE — NUR
Bedside report and rounds completed with off going nurse. Patient in bed, call light within 
reach. No issues or concerns noted. Will continue to monitor closely.

## 2020-02-24 NOTE — NUR
Received phone call from [facility] with Feliz ([phone]). She states that pt 
has no LTAC, SNF, or inpatient rehab benefits. Does have home health benefits. CM informed 
her that pt will be needing IV abx and home health for wound care. She will look up who is 
in network and let CM know. 

Ghassan RUSSO was informed about pt's benefits.

## 2020-02-24 NOTE — NUR
Patient requesting to not be woke up for midnight and 4 am vital. Educated patient on 
importance of v/s and understand concern of not getting adequate rest in hospital. V/S taken 
for midnight and can take v/s at 0500 instead of 0400. Can do dressing changes, IV 
antibiotics, vs and lab work in am at 0500 to cluster care to allow for more time for rest. 
Patient agreed with POC. Informed patient that would still be doing rounding but would not 
wake patient up, patient agreed. Will continue to monitor.

## 2020-02-25 VITALS — DIASTOLIC BLOOD PRESSURE: 82 MMHG | SYSTOLIC BLOOD PRESSURE: 138 MMHG

## 2020-02-25 VITALS — SYSTOLIC BLOOD PRESSURE: 126 MMHG | DIASTOLIC BLOOD PRESSURE: 71 MMHG

## 2020-02-25 VITALS — DIASTOLIC BLOOD PRESSURE: 54 MMHG | SYSTOLIC BLOOD PRESSURE: 104 MMHG

## 2020-02-25 VITALS — DIASTOLIC BLOOD PRESSURE: 63 MMHG | SYSTOLIC BLOOD PRESSURE: 132 MMHG

## 2020-02-25 VITALS — SYSTOLIC BLOOD PRESSURE: 142 MMHG | DIASTOLIC BLOOD PRESSURE: 79 MMHG

## 2020-02-25 LAB
BASOPHILS # BLD AUTO: 0.1 10*3/UL (ref 0–0.1)
BASOPHILS NFR BLD AUTO: 0.4 % (ref 0–1)
DEPRECATED NEUTROPHILS # BLD AUTO: 7 10*3/UL (ref 2.1–6.9)
EOSINOPHIL # BLD AUTO: 0.3 10*3/UL (ref 0–0.4)
EOSINOPHIL NFR BLD AUTO: 2.5 % (ref 0–6)
ERYTHROCYTE [DISTWIDTH] IN CORD BLOOD: 18.7 % (ref 11.7–14.4)
FERRITIN SERPL-MCNC: 54.35 NG/ML (ref 21.81–274.66)
HCT VFR BLD AUTO: 27.9 % (ref 38.2–49.6)
HGB BLD-MCNC: 8 G/DL (ref 14–18)
HYPOCHROMIA BLD QL SMEAR: SLIGHT
IRON SATN MFR SERPL: 9 % (ref 15–50)
IRON SERPL-MCNC: 27 UG/DL (ref 65–175)
LYMPHOCYTES # BLD: 3.7 10*3/UL (ref 1–3.2)
LYMPHOCYTES NFR BLD AUTO: 30.4 % (ref 18–39.1)
MCH RBC QN AUTO: 22.7 PG (ref 28–32)
MCHC RBC AUTO-ENTMCNC: 28.7 G/DL (ref 31–35)
MCV RBC AUTO: 79.3 FL (ref 81–99)
MONOCYTES # BLD AUTO: 1 10*3/UL (ref 0.2–0.8)
MONOCYTES NFR BLD AUTO: 8.4 % (ref 4.4–11.3)
NEUTS SEG NFR BLD AUTO: 57.3 % (ref 38.7–80)
PLATELET # BLD AUTO: 924 X10E3/UL (ref 140–360)
RBC # BLD AUTO: 3.52 X10E6/UL (ref 4.3–5.7)
RBC MORPH BLD: (no result)
TIBC SERPL-MCNC: 311 UG/DL (ref 261–478)
TRANSFERRIN SERPL-MCNC: 222 MG/DL (ref 174–364)

## 2020-02-25 RX ADMIN — FAMOTIDINE SCH MG: 20 TABLET, FILM COATED ORAL at 06:52

## 2020-02-25 RX ADMIN — ENOXAPARIN SODIUM SCH MG: 40 INJECTION SUBCUTANEOUS at 16:57

## 2020-02-25 RX ADMIN — INSULIN LISPRO SCH UNIT: 100 INJECTION, SOLUTION INTRAVENOUS; SUBCUTANEOUS at 12:00

## 2020-02-25 RX ADMIN — INSULIN LISPRO SCH UNIT: 100 INJECTION, SOLUTION INTRAVENOUS; SUBCUTANEOUS at 16:57

## 2020-02-25 RX ADMIN — TAZOBACTAM SODIUM AND PIPERACILLIN SODIUM SCH MLS/HR: 375; 3 INJECTION, SOLUTION INTRAVENOUS at 18:15

## 2020-02-25 RX ADMIN — INSULIN GLARGINE SCH UNITS: 100 INJECTION, SOLUTION SUBCUTANEOUS at 09:00

## 2020-02-25 RX ADMIN — TAZOBACTAM SODIUM AND PIPERACILLIN SODIUM SCH MLS/HR: 375; 3 INJECTION, SOLUTION INTRAVENOUS at 23:05

## 2020-02-25 RX ADMIN — TAZOBACTAM SODIUM AND PIPERACILLIN SODIUM SCH MLS/HR: 375; 3 INJECTION, SOLUTION INTRAVENOUS at 06:17

## 2020-02-25 RX ADMIN — INSULIN LISPRO SCH UNIT: 100 INJECTION, SOLUTION INTRAVENOUS; SUBCUTANEOUS at 09:00

## 2020-02-25 RX ADMIN — SODIUM CHLORIDE SCH MLS/HR: 9 INJECTION, SOLUTION INTRAVENOUS at 13:37

## 2020-02-25 RX ADMIN — HYDROCODONE BITARTRATE AND ACETAMINOPHEN PRN EA: 5; 325 TABLET ORAL at 18:52

## 2020-02-25 RX ADMIN — INSULIN LISPRO SCH UNIT: 100 INJECTION, SOLUTION INTRAVENOUS; SUBCUTANEOUS at 21:00

## 2020-02-25 RX ADMIN — ACETAMINOPHEN AND CODEINE PHOSPHATE PRN EA: 300; 30 TABLET ORAL at 01:24

## 2020-02-25 RX ADMIN — LEVOTHYROXINE SODIUM SCH MCG: 50 TABLET ORAL at 06:17

## 2020-02-25 RX ADMIN — OXYBUTYNIN CHLORIDE SCH MG: 5 TABLET, FILM COATED, EXTENDED RELEASE ORAL at 09:00

## 2020-02-25 RX ADMIN — FAMOTIDINE SCH MG: 20 TABLET, FILM COATED ORAL at 16:55

## 2020-02-25 RX ADMIN — INSULIN LISPRO SCH UNIT: 100 INJECTION, SOLUTION INTRAVENOUS; SUBCUTANEOUS at 16:56

## 2020-02-25 RX ADMIN — INSULIN GLARGINE SCH UNITS: 100 INJECTION, SOLUTION SUBCUTANEOUS at 21:38

## 2020-02-25 RX ADMIN — TAZOBACTAM SODIUM AND PIPERACILLIN SODIUM SCH MLS/HR: 375; 3 INJECTION, SOLUTION INTRAVENOUS at 12:15

## 2020-02-25 RX ADMIN — VANCOMYCIN HYDROCHLORIDE SCH MLS/HR: 1 INJECTION, SOLUTION INTRAVENOUS at 21:27

## 2020-02-25 NOTE — NUR
Received callback from pt's wife Surekha. States she spoke with pt and he is agreeable to 
go to a facility. States he will go anywhere in [place], just not [facility], 
since he had a bad experience there previously. 



Kayley AIKEN was notified and is working on finding a facility that has a Medicaid pending bed 
available.

## 2020-02-25 NOTE — NUR
Received call from pt's wife Surekha Rose ([phone]). She is concerned because pt 
called her and told her he was getting discharged today with 2 weeks of oral abx. She stated 
that any oral abx that he's been on previously did not work for him. Pt has chronic 
osteomyelitis. 



Pt's wife states that she is his provider at home. Pt was going to [facility] wound clinic 
once a week, but that was stopped and he was supposed to be set up with home health for 
wound care but home health company did not come out. States she thinks it was [facility]. Wife also stated that a plastic surgeon was consulted, but all they did was take 
pictures and never got back with them. She also informed Cm that pt was at [facility] September to October of last year, on Medicaid pending. States if they have to go 
Medicaid pending again so that pt can receive IV abx, then they are willing to do that.  



TRE spoke to RAFAELA Ferrell and informed him of conversation with wife. Ghassan stated that Dr. Fonseca 
is not planning on doing any debridement at this time. He was planning to discharge pt home 
with oral abx, since that was what was recommended by ID, but he is fine with facility 
placement for IV abx if pt is agreeable. 

TRE also spoke to COLTEN Kirkpatrick regarding possible facility placement and abx recommendations. 
States to keep pt on current abx for now and will reassess tomorrow. 



TRE and Kayley AIKEN to pt's bedside to discuss choice for facility. Pt states that he will not 
go anywhere and that he wants wound debridement. Informed the pt that from conversation with 
NP/MD, Dr. Fonseca is not planning a debridement at this time. Pt states he will leave here 
and go to [facility] in the [facility] where he will get his debridement, but wants a 
signed note from Dr. Fonseca saying he will not do the debridement at this time.



TRE and Kayley AIKEN called pt's wife back to update her that pt is refusing placement at this 
time. She also spoke with RAFAELA Ferrell and KATHIE Walker regarding plan of care. Wife states she 
thinks pt needs to go to facility for IV abx, with follow up with wound care outpatient. 

She states she will talk to pt and call CM back.

## 2020-02-25 NOTE — NUR
Reported to Silver RUSSO that patient c/o having to straight cath more frequently, normally only 
has to do every 4 hrs but having to do every 2 hrs and having urine leakage even after 
straight cath. New order: insert villela.

## 2020-02-25 NOTE — NUR
SPOKE WITH PT AND WIFE AND ARE IN AGREEMENT FOR [facility], SIGNED CHOICE FILED 
IN CHART. PACKET FORWARDED TO FACILITY WITH [facility] AND RTF PUT ON PACKET. WILL UPDATE WHEN 
RECEIVE AUTH.

## 2020-02-25 NOTE — NUR
16 F villela placed, 10 cc in balloon, return of clear, yellow urine.  Area cleansed with 
soap/water and betadine per protocol and sterile technique maintained. Patient tolerated 
well.

## 2020-02-25 NOTE — NUR
Dressing changed to all wounds and tolerated well.

-------------------------------------------------------------------------------

Addendum: 02/25/20 at 0616 by Keshia Lea RN

-------------------------------------------------------------------------------

Per khadar

## 2020-02-26 VITALS — DIASTOLIC BLOOD PRESSURE: 61 MMHG | SYSTOLIC BLOOD PRESSURE: 128 MMHG

## 2020-02-26 VITALS — SYSTOLIC BLOOD PRESSURE: 115 MMHG | DIASTOLIC BLOOD PRESSURE: 53 MMHG

## 2020-02-26 VITALS — DIASTOLIC BLOOD PRESSURE: 92 MMHG | SYSTOLIC BLOOD PRESSURE: 150 MMHG

## 2020-02-26 VITALS — DIASTOLIC BLOOD PRESSURE: 53 MMHG | SYSTOLIC BLOOD PRESSURE: 115 MMHG

## 2020-02-26 VITALS — DIASTOLIC BLOOD PRESSURE: 69 MMHG | SYSTOLIC BLOOD PRESSURE: 135 MMHG

## 2020-02-26 VITALS — SYSTOLIC BLOOD PRESSURE: 128 MMHG | DIASTOLIC BLOOD PRESSURE: 59 MMHG

## 2020-02-26 VITALS — SYSTOLIC BLOOD PRESSURE: 135 MMHG | DIASTOLIC BLOOD PRESSURE: 69 MMHG

## 2020-02-26 VITALS — DIASTOLIC BLOOD PRESSURE: 60 MMHG | SYSTOLIC BLOOD PRESSURE: 142 MMHG

## 2020-02-26 RX ADMIN — INSULIN GLARGINE SCH UNITS: 100 INJECTION, SOLUTION SUBCUTANEOUS at 09:00

## 2020-02-26 RX ADMIN — ENOXAPARIN SODIUM SCH MG: 40 INJECTION SUBCUTANEOUS at 16:32

## 2020-02-26 RX ADMIN — INSULIN LISPRO SCH UNIT: 100 INJECTION, SOLUTION INTRAVENOUS; SUBCUTANEOUS at 07:30

## 2020-02-26 RX ADMIN — HYDROCODONE BITARTRATE AND ACETAMINOPHEN PRN EA: 5; 325 TABLET ORAL at 14:20

## 2020-02-26 RX ADMIN — LEVOTHYROXINE SODIUM SCH MCG: 50 TABLET ORAL at 06:00

## 2020-02-26 RX ADMIN — SODIUM CHLORIDE SCH MLS/HR: 9 INJECTION, SOLUTION INTRAVENOUS at 12:00

## 2020-02-26 RX ADMIN — INSULIN LISPRO SCH UNIT: 100 INJECTION, SOLUTION INTRAVENOUS; SUBCUTANEOUS at 11:30

## 2020-02-26 RX ADMIN — INSULIN GLARGINE SCH UNITS: 100 INJECTION, SOLUTION SUBCUTANEOUS at 21:00

## 2020-02-26 RX ADMIN — TAZOBACTAM SODIUM AND PIPERACILLIN SODIUM SCH MLS/HR: 375; 3 INJECTION, SOLUTION INTRAVENOUS at 06:00

## 2020-02-26 RX ADMIN — FAMOTIDINE SCH MG: 20 TABLET, FILM COATED ORAL at 16:30

## 2020-02-26 RX ADMIN — AMPICILLIN SODIUM AND SULBACTAM SODIUM SCH MLS/HR: 2; 1 INJECTION, POWDER, FOR SOLUTION INTRAMUSCULAR; INTRAVENOUS at 21:28

## 2020-02-26 RX ADMIN — VANCOMYCIN HYDROCHLORIDE SCH MLS/HR: 1 INJECTION, SOLUTION INTRAVENOUS at 22:29

## 2020-02-26 RX ADMIN — OXYBUTYNIN CHLORIDE SCH MG: 5 TABLET, FILM COATED, EXTENDED RELEASE ORAL at 09:00

## 2020-02-26 RX ADMIN — INSULIN LISPRO SCH UNIT: 100 INJECTION, SOLUTION INTRAVENOUS; SUBCUTANEOUS at 16:30

## 2020-02-26 RX ADMIN — AMPICILLIN SODIUM AND SULBACTAM SODIUM SCH MLS/HR: 2; 1 INJECTION, POWDER, FOR SOLUTION INTRAMUSCULAR; INTRAVENOUS at 14:00

## 2020-02-26 RX ADMIN — ACETAMINOPHEN AND CODEINE PHOSPHATE PRN EA: 300; 30 TABLET ORAL at 05:50

## 2020-02-26 RX ADMIN — HYDROCODONE BITARTRATE AND ACETAMINOPHEN PRN EA: 5; 325 TABLET ORAL at 00:50

## 2020-02-26 RX ADMIN — FAMOTIDINE SCH MG: 20 TABLET, FILM COATED ORAL at 06:00

## 2020-02-26 RX ADMIN — SODIUM HYPOCHLORITE SCH ML: 2.5 SOLUTION TOPICAL at 06:02

## 2020-02-26 RX ADMIN — INSULIN LISPRO SCH UNIT: 100 INJECTION, SOLUTION INTRAVENOUS; SUBCUTANEOUS at 21:00
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
## 2020-02-26 NOTE — NUR
RCD PT AT BED PT IS ALERT AND ORIENTED RESTING ON BED IV PATENT BY SALINE FLUSH BED LOW AND 
LOCKED CALL LIGHT IN REACH

## 2020-02-27 VITALS — DIASTOLIC BLOOD PRESSURE: 62 MMHG | SYSTOLIC BLOOD PRESSURE: 111 MMHG

## 2020-02-27 VITALS — DIASTOLIC BLOOD PRESSURE: 61 MMHG | SYSTOLIC BLOOD PRESSURE: 112 MMHG

## 2020-02-27 VITALS — DIASTOLIC BLOOD PRESSURE: 58 MMHG | SYSTOLIC BLOOD PRESSURE: 118 MMHG

## 2020-02-27 VITALS — SYSTOLIC BLOOD PRESSURE: 133 MMHG | DIASTOLIC BLOOD PRESSURE: 75 MMHG

## 2020-02-27 VITALS — SYSTOLIC BLOOD PRESSURE: 112 MMHG | DIASTOLIC BLOOD PRESSURE: 61 MMHG

## 2020-02-27 VITALS — SYSTOLIC BLOOD PRESSURE: 124 MMHG | DIASTOLIC BLOOD PRESSURE: 71 MMHG

## 2020-02-27 LAB
ANION GAP SERPL CALC-SCNC: 10 MMOL/L (ref 8–16)
ANISOCYTOSIS BLD QL SMEAR: SLIGHT
BASOPHILS # BLD AUTO: 0 10*3/UL (ref 0–0.1)
BASOPHILS NFR BLD AUTO: 0.3 % (ref 0–1)
BUN SERPL-MCNC: 13 MG/DL (ref 7–26)
BUN/CREAT SERPL: 18 (ref 6–25)
CALCIUM SERPL-MCNC: 8.2 MG/DL (ref 8.4–10.2)
CHLORIDE SERPL-SCNC: 102 MMOL/L (ref 98–107)
CO2 SERPL-SCNC: 25 MMOL/L (ref 22–29)
DEPRECATED NEUTROPHILS # BLD AUTO: 7.4 10*3/UL (ref 2.1–6.9)
DEPRECATED PHOSPHATE SERPL-MCNC: 3.5 MG/DL (ref 2.3–4.7)
EGFRCR SERPLBLD CKD-EPI 2021: > 60 ML/MIN (ref 60–?)
EOSINOPHIL # BLD AUTO: 0.2 10*3/UL (ref 0–0.4)
EOSINOPHIL NFR BLD AUTO: 1.5 % (ref 0–6)
ERYTHROCYTE [DISTWIDTH] IN CORD BLOOD: 19.4 % (ref 11.7–14.4)
GLUCOSE SERPLBLD-MCNC: 201 MG/DL (ref 74–118)
HCT VFR BLD AUTO: 29.7 % (ref 38.2–49.6)
HGB BLD-MCNC: 8.3 G/DL (ref 14–18)
HYPOCHROMIA BLD QL SMEAR: SLIGHT
LYMPHOCYTES # BLD: 3.8 10*3/UL (ref 1–3.2)
LYMPHOCYTES NFR BLD AUTO: 29.8 % (ref 18–39.1)
MACROCYTES BLD QL SMEAR: SLIGHT
MAGNESIUM SERPL-MCNC: 2.1 MG/DL (ref 1.3–2.1)
MCH RBC QN AUTO: 22.6 PG (ref 28–32)
MCHC RBC AUTO-ENTMCNC: 27.9 G/DL (ref 31–35)
MCV RBC AUTO: 80.9 FL (ref 81–99)
MICROCYTES BLD QL SMEAR: SLIGHT
MONOCYTES # BLD AUTO: 1.3 10*3/UL (ref 0.2–0.8)
MONOCYTES NFR BLD AUTO: 9.9 % (ref 4.4–11.3)
NEUTS SEG NFR BLD AUTO: 57.5 % (ref 38.7–80)
PLAT MORPH BLD: NORMAL
PLATELET # BLD AUTO: 901 X10E3/UL (ref 140–360)
PLATELET # BLD EST: (no result) 10*3/UL
POLYCHROMASIA BLD QL SMEAR: (no result)
POTASSIUM SERPL-SCNC: 4 MMOL/L (ref 3.5–5.1)
RBC # BLD AUTO: 3.67 X10E6/UL (ref 4.3–5.7)
RBC MORPH BLD: (no result)
SODIUM SERPL-SCNC: 133 MMOL/L (ref 136–145)

## 2020-02-27 RX ADMIN — INSULIN LISPRO SCH UNIT: 100 INJECTION, SOLUTION INTRAVENOUS; SUBCUTANEOUS at 07:30

## 2020-02-27 RX ADMIN — SODIUM HYPOCHLORITE SCH ML: 2.5 SOLUTION TOPICAL at 05:39

## 2020-02-27 RX ADMIN — HYDROCODONE BITARTRATE AND ACETAMINOPHEN PRN EA: 5; 325 TABLET ORAL at 12:30

## 2020-02-27 RX ADMIN — SODIUM CHLORIDE SCH MLS/HR: 9 INJECTION, SOLUTION INTRAVENOUS at 12:00

## 2020-02-27 RX ADMIN — OXYBUTYNIN CHLORIDE SCH MG: 5 TABLET, FILM COATED, EXTENDED RELEASE ORAL at 09:00

## 2020-02-27 RX ADMIN — INSULIN LISPRO SCH UNIT: 100 INJECTION, SOLUTION INTRAVENOUS; SUBCUTANEOUS at 11:30

## 2020-02-27 RX ADMIN — AMPICILLIN SODIUM AND SULBACTAM SODIUM SCH MLS/HR: 2; 1 INJECTION, POWDER, FOR SOLUTION INTRAMUSCULAR; INTRAVENOUS at 14:00

## 2020-02-27 RX ADMIN — ACETAMINOPHEN AND CODEINE PHOSPHATE PRN EA: 300; 30 TABLET ORAL at 03:29

## 2020-02-27 RX ADMIN — INSULIN LISPRO SCH UNIT: 100 INJECTION, SOLUTION INTRAVENOUS; SUBCUTANEOUS at 16:30

## 2020-02-27 RX ADMIN — ENOXAPARIN SODIUM SCH MG: 40 INJECTION SUBCUTANEOUS at 17:00

## 2020-02-27 RX ADMIN — FAMOTIDINE SCH MG: 20 TABLET, FILM COATED ORAL at 16:30

## 2020-02-27 RX ADMIN — HYDROCODONE BITARTRATE AND ACETAMINOPHEN PRN EA: 5; 325 TABLET ORAL at 05:38

## 2020-02-27 RX ADMIN — FAMOTIDINE SCH MG: 20 TABLET, FILM COATED ORAL at 07:30

## 2020-02-27 RX ADMIN — AMPICILLIN SODIUM AND SULBACTAM SODIUM SCH MLS/HR: 2; 1 INJECTION, POWDER, FOR SOLUTION INTRAMUSCULAR; INTRAVENOUS at 05:38

## 2020-02-27 RX ADMIN — LEVOTHYROXINE SODIUM SCH MCG: 50 TABLET ORAL at 05:38

## 2020-02-27 RX ADMIN — INSULIN GLARGINE SCH UNITS: 100 INJECTION, SOLUTION SUBCUTANEOUS at 09:00

## 2020-02-27 NOTE — NUR
Spoke with Aura at [facility]. They received denial for skilled services from insurance. 




TRE notified RAFAELA Case. Received order to set up home health for wound care and DC today. 

TRE spoke to pt at bedside. Informed him about denial. 

Pt stated that he was supposed to be set up for home health with [facility], but they 
never came out. 

Would like to use a different company. 

Choice letter signed for [facility], [facility], [facility]. 
Copy of choice letter with each company's contact information was given to pt. Informed him 
that referral will be sent to [facility]. TRE asked that he call them if he does not hear 
from them within 24 hrs of discharge. Signed choice letter placed in front of chart. 



Referral was faxed to [facility] at [phone] / Phone [phone].

-------------------------------------------------------------------------------

Addendum: 02/27/20 at 1734 by Traci Tiwari CM

-------------------------------------------------------------------------------

TRE spoke with Dr. Fonseca regarding follow up appt. He states to give pt number to wound 
clinic that he goes to and have him call for an appointment [phone]. 

Wound clinic number was given to pt and pt instructed to call as soon as possible.

## 2020-02-27 NOTE — NUR
PT REQUESTED THAT  HE DONT HAVE ANY RIDE WIFE IS NOT COMING HE NEED TAXI NOTIFIED THE 
SUPERVISOR SHE ARRANGING TAXI

## 2020-02-27 NOTE — NUR
Called pt's wife Surekha and had Dr. Diaz talk to her about treatment plan. 

He explained to her that if pt is denied skilled services for [facility], the plan will be 
for pt to go home on 8 weeks of oral abx. 

He also spoke with her regarding the importance of offloading wounds. 



Rx for abx placed on chart.

## 2020-02-27 NOTE — NUR
RCD PT AT BED PT IS ALERT AND ORIENTED RESTING ON BED IV PATENT BY SALINE FLUSH  LOPEZ 
DRAINING BY GRAVITY BED LOW AND LOCKED CALL LIGHT IN REACH
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
## 2020-02-28 NOTE — NUR
CALLED [facility] TO VERIFY THEY RECEIVED THE CLINICALS FOR THIS PT, DAWIT 
[phone] STATED THEY DID AND ARE RUNNING THE BENEFITS CURRENTLY AND WILL CALL BACK TO 
DETERMINE IF ABLE TO SEE PT.

## 2020-09-01 ENCOUNTER — HOSPITAL ENCOUNTER (EMERGENCY)
Dept: HOSPITAL 88 - FSED | Age: 40
Discharge: HOME | End: 2020-09-01
Payer: COMMERCIAL

## 2020-09-01 VITALS — SYSTOLIC BLOOD PRESSURE: 136 MMHG | DIASTOLIC BLOOD PRESSURE: 72 MMHG

## 2020-09-01 VITALS — HEIGHT: 75 IN | BODY MASS INDEX: 27.35 KG/M2 | WEIGHT: 220 LBS

## 2020-09-01 DIAGNOSIS — S82.401A: ICD-10-CM

## 2020-09-01 DIAGNOSIS — M79.604: Primary | ICD-10-CM

## 2020-09-01 DIAGNOSIS — Z98.0: ICD-10-CM

## 2020-09-01 DIAGNOSIS — G82.20: ICD-10-CM

## 2020-09-01 DIAGNOSIS — Z93.3: ICD-10-CM

## 2020-09-01 PROCEDURE — 93971 EXTREMITY STUDY: CPT

## 2020-09-01 PROCEDURE — 99284 EMERGENCY DEPT VISIT MOD MDM: CPT

## 2020-09-01 NOTE — DIAGNOSTIC IMAGING REPORT
FEMUR 2 VIEW RT - HOPD - 5 views



HISTORY:  Pain

COMPARISON: 2/21/2020

     

IMPRESSION: 

Heterotopic ossification adjacent to the greater trochanter and right inferior

pubic ramus.

No definite evidence of acute displaced fracture or dislocation of the right

femur.

Degenerative changes of right hip and knee joints.







   



Signed by: Dr. Umair Thomas MD on 9/1/2020 9:48 PM

## 2020-09-01 NOTE — DIAGNOSTIC IMAGING REPORT
FOOT 3 VIEW RT - HOPD - 3 views



HISTORY:  Pain

COMPARISON: None available.

     

IMPRESSION: 

Generalized mineralization limits evaluation.

No definite evidence of acute displaced fracture or dislocation of the right

foot. 

Mild diffuse soft tissue swelling.

Scattered degenerative changes.







   



Signed by: Dr. Umair Thomas MD on 9/1/2020 9:51 PM
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
## 2020-09-01 NOTE — XMS REPORT
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

 

                (CELLAVISION MANUAL DIFF) 2020 05:13:00 [facility]

 

                POCT-GLUCOSE METER 2020 21:51:00 Jose David, [facility]

 

                CT PELVIS WITH IV CONTRAST 2020 17:25:00 Mundo Varela

[facility]

 

                POCT-GLUCOSE METER 2020 16:49:00 [facility]

 

                POCT-[facility]UCOSE METER 2020 [facility]:28:00 [facility]

 

                POCT-GLUCOSE METER 2020 07:32:00 [facility]

 

                BASIC METABOLIC PANEL (7) 2020 04:50:00 [facility]

 

                HEPATIC FUNCTION PANEL 2020 04:50:00 [facility]

 

                MAGNESIUM       2020 04:50:00 [facility]

 

                CBC W/PLT COUNT & AUTO DIFFERENTIAL 2020 04:50:00 [facility]

 

                POCT-GLUCOSE METER 2020 23:15:00 Iona Ramires [facility]

 

                POCT-GLUCOSE METER 2020 16:47:00 Iona Ramires [facility]

 

                FERRITIN        2020 14:20:00 Miguel Angel Washingtonmo Robin [facility]

 

                PROCALCITONIN   2020 14:20:00 Felix [facility]

 

                POCT-GLUCOSE METER 2020 11:39:00 Iona Ramires [facility]

 

                LACTIC ACID, VENOUS 2020 06:31:00 Lizet [facility]

 

                XR PELVIS 1 OR 2 VIEWS 2020 03:50:00 Zeinalleonie [facility]

 

                URINALYSIS W/ REFLEX URINE CULTURE 2020 02:41:00 Zeagnes 

[facility]

 

                ECG 12-LEAD     2020 02:39:27 Unknown, Hl7 Doctor [facility]

 

                SARS-COV2/RT-PCR ([facility] & REF LABS) 2020 02:11:00 Zeinali, 

[facility]

 

                BLOOD CULTURE   2020 02:02:00 Zeinali, [facility]

 

                BLOOD CULTURE   2020 01:56:00 Zeinal, [facility]

 

                LACTIC ACID, VENOUS 2020 [facility]56[facility]

 

                COMPREHENSIVE METABOLIC PANEL 2020 01:56:00 [facility]

 

                PROTHROMBIN TIME/INR 2020 01:56:00 Zeinal, [facility]

 

                APTT            2020 01:56:00 Zeinal, [facility]

 

                C-REACTIVE PROTEIN 2020 [facility]:56:00 [facility]

 

                MAGNESIUM       2020 01:56:00 Zeinal, [facility]

 

                PHOSPHORUS      2020 01:56:00 [facility]

 

                LACTATE DEHYDROGENASE (LDH) 2020 01:56:00 Ge Washington 

[facility]

 

                CBC W/PLT COUNT & AUTO DIFFERENTIAL 2020 01:56:00 [facility]

 

                XR CHEST 1 VIEW PORTABLE/BEDSIDE 2020 00:25:00 Losstefan, Id

johanna  [facility]

 

                ED ECG INTERPRETATION 2020 23:44:57 [facility], [facility]

 

                POCT-GLUCOSE METER 2020 13:59:00 Nenita Schneider    [facility]

 

                SURGICALLY OBTAINED CULTURE + GRAM STAIN 2020 12:42:17 Ita Fitzgerald [facility]

 

                AFB CULTURE + SMEAR (NON-SPUTUM) 2020 11:20:09 Teddy, Car

a Sarina [facility]

 

                ANAEROBIC CULTURE 2020 11:20:09 Ita Espinal [facility]

 

                FUNGUS CULTURE +  SMEAR 2020 11:20:09 Ita Espinal [facility]

 

                DEBRIDEMENT/I&D,WOUND TRUNK POSTERIOR 2020 10:00:00 Ita Espinal [facility]

 

                    BIOPSY/EXCISION,SOFT TISSUE TORSO POSTERIOR 2020 10:00

:00 Ita Espinal                                    [facility]

 

                POCT-GLUCOSE METER 2020 07:01:00 [facility]

 

                BASIC METABOLIC PANEL (7) 2020 06:43:00 Sammie Chacko i de [facility]

 

                CBC W/PLT COUNT & AUTO DIFFERENTIAL 2020 06:43:00 Grisel Vasquez ramsey [facility]

 

                POCT-GLUCOSE METER 2020 20:59:00 [facility]

 

                POCT-GLUCOSE METER 2020 16:45:00 [facility]

 

                BLOOD CULTURE   2020 15:40:00 Julianna Recinos [facility]

 

                SURGICALLY OBTAINED CULTURE + GRAM STAIN 2020 15:36:00 Ita Fitzgerald [facility]

 

                BLOOD CULTURE   2020 13:27:00 Grisel Julianna [facility]

 

                POCT-GLUCOSE METER 2020 11:41:00 [facility]

 

                CBC W/PLT COUNT & AUTO DIFFERENTIAL 2020 09:22:00 Vasquez Recinos [facility]

 

                POCT-GLUCOSE METER 2020 07:51:00 [facility]

 

                BASIC METABOLIC PANEL (7) 2020 05:24:00 Sammie Chacko i [facility]

 

                POCT-GLUCOSE METER 2020 20:47:00 Bharathi Chacko [facility]

 

                POCT-GLUCOSE METER 2020 17:10:00 Bharathi Chacko [facility]

 

                POCT-GLUCOSE METER 2020 12:59:00 Bharathi Chacko [facility]

 

                POCT-GLUCOSE METER 2020 11:39:00 RogelioBharathi delgado [facility]

 

                POCT-GLUCOSE METER 2020 10:09:00 [facility]

 

                BASIC METABOLIC PANEL (7) 2020 10:01:00 Sammie Chacko i [facility]

 

                POCT-GLUCOSE METER 2020 07:38:00 Rogelio Priyadoramichael [facility]

 

                POCT-GLUCOSE METER 2020 20:25:00 Rogelio [facility]

 

                POCT-GLUCOSE METER 2020 16:46:00 Rogelio [facility]

 

                WOUND CULTURE + GRAM STAIN 2020 15:27:00 Stephanie Walter 

[facility]

 

                POCT-GLUCOSE METER 2020 12:09:00 Rogelio Bharathi [facility]

 

                POCT-GLUCOSE METER 2020 08:17:00 Rogelio [facility]

 

                HEMOGLOBIN A1C  2020 04:21:00 Rogelio [facility]

 

                WOUND CULTURE + GRAM STAIN 2020 22:28:00 Abbe Chacko [facility]

 

                POCT-GLUCOSE METER 2020 21:56:00 Rogelio Bharathi [facility]

 

                POCT-GLUCOSE METER 2020 18:18:00 Rogelio [facility]

 

                SARS-COV2/RT-PCR ([facility] REF [facility]) 2020 15:22:00 Cornel Cheng [facility]

 

                BLOOD CULTURE   2020 15:22:00 Alao Titilkhushbu [facility]

 

                BLOOD CULTURE   2020 15:21:00 Chico Chengitope [facility]

 

                BLOOD CULTURE IDENTIFICATION PANEL 2020 15:21:00 Cornel Cheng

ilkhushbu Anupama 

[facility]

 

                URINE CULTURE   2020 13:29:00 AlaoCorn[address]ilkhushbu [facility]

 

                URINALYSIS W/ REFLEX URINE CULTURE 2020 13:29:00 Cornel Cheng [facility]

 

                XR CHEST 1 VIEW PORTABLE/BEDSIDE 2020 13:19:00 Bob Cheng [facility]

 

                LACTIC ACID, VENOUS 2020 13:11:00 Alao[address]bu [facility]

 

                BLOOD GAS, VENOUS 2020 13:11:00 PraveenferchoChicoitope [facility]

 

                BASIC METABOLIC PANEL (7) 2020 13:10:00 PraveenferchoChico [facility]

itope [facility]

 

                HEPATIC FUNCTION PANEL 2020 13:10:00 Chico Cheng [facility]

 

                KETONE, BLOOD   2020 13:10:00 PraveenoCornelilkhushbu Anupama [facility]

 

                CBC W/PLT COUNT & AUTO DIFFERENTIAL 2020 13:10:[address] Prave[address] jeff [facility]

 

                POCT-GLUCOSE METER 2020-04-15 08:12:00 Altfat [facility]

 

                CBC W/PLT COUNT & AUTO DIFFERENTIAL 2020-04-15 05:26:00 NAILA Garay [facility]

 

                BASIC METABOLIC PANEL (7) 2020-04-15 02:35:00 Jake Garay [facility]

 

                POCT-GLUCOSE METER 2020 21:50:00 [facility]

 

                POCT-GLUCOSE METER 2020 17:08:00 Merchant, [facility]

 

                POCT-GLUCOSE METER 2020 11:57:00 [facility]

 

                POCT-GLUCOSE METER 2020 08:22:00 Mercangelest [facility]

 

                POCT-GLUCOSE METER 2020 21:21:00 Merchant, [facility]

 

                POCT-GLUCOSE METER 2020 16:50:00 Nirmalhant, [facility]

 

                POCT-GLUCOSE METER 2020 12:01:00 Merchant, [facility]

 

                POCT-GLUCOSE METER 2020 08:17:00 Merchant, [facility]

 

                POCT-GLUCOSE METER 2020 17:18:00 Gadicherla, Taya Muralin

ath [facility]

 

                POCT-GLUCOSE METER 2020 12:07:00 Gadicherla, Taya Muralin

ath [facility]

 

                POCT-GLUCOSE METER 2020 08:12:00 Gadicherla, Taya Muralin

[facility]

 

                BASIC METABOLIC PANEL (7) 2020 04:16:00 Jake Garay Pos

t [facility]

 

                CBC W/PLT COUNT & AUTO DIFFERENTIAL 2020 04:16:00 NAILA Garay Post [facility]

 

                POCT-GLUCOSE METER 2020 21:13:00 Gadicherla, Taya Muralin

ath [facility]

 

                POCT-GLUCOSE METER 2020 17:29:00 Gadicherla, Taya Muralin

ath [facility]

 

                POCT-GLUCOSE METER 2020 12:49:00 Gadicherla, Taya Muralin

ath [facility]

 

                POCT-GLUCOSE METER 2020 08:11:00 Gadicherla, Taya Muralin

ath [facility]

 

                POCT-GLUCOSE METER 2020-04-10 21:26:00 Gadicherla, Taya Muralin

ath [facility]

 

                POCT-GLUCOSE METER 2020-04-10 17:27:00 Gadicherla, Taya Muralin

ath [facility]

 

                POCT-GLUCOSE METER 2020-04-10 11:41:00 Gadicherla, Taya Muralin

ath [facility]

 

                POCT-GLUCOSE METER 2020-04-10 07:31:00 Gadicherla, Taya Muralin

ath [facility]

 

                POCT-GLUCOSE METER 2020 22:19:00 Gadicherla, Taya Muralin

[facility]

 

                VANCOMYCIN LEVEL, TROUGH 2020 21:26:00 Stephanie Walter [facility]

I [facility]

 

                POCT-GLUCOSE METER 2020 17:16:00 Gadicherla, Taya Muralin

[facility]

 

                POCT-GLUCOSE METER 2020 12:12:00 Gadicherla, Taya Muralin

[facility]

 

                POCT-GLUCOSE METER 2020 08:21:00 Gadicherla, Taya Muralin

[facility]

 

                CBC W/PLT COUNT & AUTO DIFFERENTIAL 2020 06:08:00 NAILA Garay Post [facility]

 

                BASIC METABOLIC PANEL (7) 2020 04:42:00 Jake Garay

t [facility]

 

                POCT-GLUCOSE METER 2020 22:09:00 Gadicherla, Taya Muralin

[facility]

 

                POCT-GLUCOSE METER 2020 17:20:00 Gadicherla, Taya Muralin

ath [facility]

 

                POCT-GLUCOSE METER 2020 12:07:00 Gadicherla, Taya Muralin

ath [facility]

 

                POCT-GLUCOSE METER 2020 08:09:00 Gadicherla, Taya Muralin

ath [facility]

 

                POCT-GLUCOSE METER 2020 20:57:00 Gadicherla, Taya Muralin

ath [facility]

 

                POCT-GLUCOSE METER 2020 17:26:00 Gadicherla, Taya Muralin

[facility]

 

                POCT-GLUCOSE METER 2020 12:59:00 Gadicherla, Taya Muralin

ath [facility]

 

                POCT-GLUCOSE METER 2020 07:59:00 Gadicherla, Taya Muralin

ath [facility]

 

                POCT-GLUCOSE METER 2020 21:00:00 Gadicherla, Taya Muralin

ath [facility]

 

                POCT-GLUCOSE METER 2020 16:33:00 Gadicherla, Taya Muralin

ath [facility]

 

                POCT-GLUCOSE METER 2020 11:46:00 Gadicherla, Taya Muralin

ath [facility]

 

                POCT-GLUCOSE METER 2020 07:41:00 Gadicherla, Taya Muralin

ath [facility]

 

                BASIC METABOLIC PANEL (7) 2020 05:29:00 Gadicherla, Taya 

Muralinath [facility]

 

                VANCOMYCIN LEVEL, TROUGH 2020 05:29:00 Jake Garay

 [facility]

 

                    CBC W/PLT COUNT & AUTO DIFFERENTIAL 2020 05:29:00 Brandon

cherla, Taya 

Muralinath                              [facility]

 

                (CELLAVISION MANUAL DIFF) 2020 05:29:00 Gadicherla, Taya 

Muralinath [facility]

 

                POCT-GLUCOSE METER 2020 21:21:00 Gadicherla, Taya Muralin

ath [facility]

 

                POCT-GLUCOSE METER 2020 17:28:00 Gadicherla, Taya Muralin

ath [facility]

 

                POCT-GLUCOSE METER 2020 12:33:00 Gadicherla, Taya Muralin

ath [facility]

 

                POCT-GLUCOSE METER 2020 08:35:00 Gadicherla, Taya Muralin

ath [facility]

 

                POCT-GLUCOSE METER 2020 21:34:00 Gadicherla, Taya Muralin

ath [facility]

 

                POCT-GLUCOSE METER 2020 17:45:00 Gadicherla, Taya Muralin

ath [facility]

 

                POCT-GLUCOSE METER 2020 12:29:00 Gadicherla, Taya Muralin

ath [facility]

 

                POCT-GLUCOSE METER 2020 08:55:00 Gadicherla, Taya Muralin

ath [facility]

 

                POCT-GLUCOSE METER 2020 21:52:00 Gadicherla, Taya Muralin

ath [facility]

 

                POCT-GLUCOSE METER 2020 17:31:00 Gadicherla, Taya Muralin

ath [facility]

 

                VANCOMYCIN LEVEL, TROUGH 2020 14:55:00 Bacilio-Ali-Fer, Ly

nn A [facility]

 

                BASIC METABOLIC PANEL (7) 2020 14:55:00 Jake Garay

t [facility]

 

                CBC (HEMOGRAM ONLY) 2020 14:55:00 Jake Garay [facility]

 

                POCT-GLUCOSE METER 2020 12:25:00 Gadicherla, Taya Muralin

ath [facility]

 

                POCT-GLUCOSE METER 2020 08:30:00 Gadicherla, Taya Muralin

ath [facility]

 

                POCT-GLUCOSE METER 2020 22:58:00 Gadicherla, Taya Muralin

ath [facility]

 

                POCT-GLUCOSE METER 2020 17:11:00 Gadicherla, Taya Muralin

ath [facility]

 

                POCT-GLUCOSE METER 2020 12:02:00 Gadicherla, Taya Muralin

ath [facility]

 

                POCT-GLUCOSE METER 2020 08:03:00 Gadicherla, Taya Muralin

ath [facility]

 

                POCT-GLUCOSE METER 2020 01:24:00 Gadicherla, Taya Muralin

ath [facility]

 

                POCT-GLUCOSE METER 2020 20:43:00 Gadicherla, Taya Muralin

ath [facility]

 

                POCT-GLUCOSE METER 2020 18:19:00 Gadicherla, Taya Muralin

ath [facility]

 

                BASIC METABOLIC PANEL (7) 2020 13:38:00 Jarrod, Jake Edwards

[facility]

 

                VANCOMYCIN LEVEL, TROUGH 2020 13:38:00 Jake Garay [facility]

 

                POCT-GLUCOSE METER 2020 12:06:00 Gadicherla, Taya Muralin

[facility]

 

                POCT-GLUCOSE METER 2020 08:04:00 Gadicherla, Taya Muralin

[facility]

 

                POCT-GLUCOSE METER 2020 22:37:00 Gadicherla, Taya Muralin

[facility]

 

                POCT-GLUCOSE METER 2020 18:04:00 Gadicherla, Taya Muralin

[facility]

 

                POCT-GLUCOSE METER 2020 12:54:00 Gadicherla, Taya Muralin

[facility]

 

                POCT-GLUCOSE METER 2020 07:40:00 Gadicherla, Taya Muralin

[facility]

 

                VANCOMYCIN LEVEL, TROUGH 2020 04:24:00 Jarrod [facility]

 

                CBC (HEMOGRAM ONLY) 2020 04:24:00 Jarrod [facility]

 

                BASIC METABOLIC PANEL (7) 2020 04:24:00 Jake Garay

[facility]

 

                POCT-GLUCOSE METER 2020 21:46:00 Gadicherla, Taya Muralin

[facility]

 

                POCT-GLUCOSE METER 2020 18:18:00 Gadicherla, Taya Muralin

[facility]

 

                BUN AND CREATININE W/RATIO 2020 13:22:00 Jovany Mishra

[facility]

 

                POCT-GLUCOSE METER 2020 11:39:00 Gadicherla, Taya Muralin

[facility]

 

                POCT-GLUCOSE METER 2020 07:46:00 Gadicherla, Taya Muralin

[facility]

 

                POCT-GLUCOSE METER 2020 02:15:00 Gadicherla, Taya Muralin

ath [facility]

 

                POCT-GLUCOSE METER 2020 20:57:00 Gadicherla, Taya Muralin

ath [facility]

 

                POCT-GLUCOSE METER 2020 17:19:00 Gadicherla, Taya Muralin

ath [facility]

 

                VANCOMYCIN LEVEL, TROUGH 2020 13:54:00 Elza Adan

[facility]

 

                POCT-GLUCOSE METER 2020 13:05:00 Gadicherla, Taya Muralin

[facility]

 

                POCT-GLUCOSE METER 2020 12:16:00 Gadicherla, Taya Muralin

ath [facility]

 

                POCT-GLUCOSE METER 2020 07:39:00 Gadicherla, Taya Muralin

ath [facility]

 

                POCT-GLUCOSE METER 2020 21:13:00 Gadicherla, Taya Muralin

ath [facility]

 

                POCT-GLUCOSE METER 2020 17:37:00 Gadicherla, Taya Muralin

ath [facility]

 

                POCT-GLUCOSE METER 2020 11:52:00 Gadicherla, Taya Muralin

ath [facility]

 

                POCT-GLUCOSE METER 2020 07:54:00 Gadicherla, Taya Muralin

ath [facility]

 

                POCT-GLUCOSE METER 2020 22:47:00 Gadicherla, Taya Muralin

ath [facility]

 

                POCT-GLUCOSE METER 2020 20:39:00 Gadicherla, Taya Muralin

ath [facility]

 

                POCT-GLUCOSE METER 2020 18:00:00 Gadicherla, Taya Muralin

ath [facility]

 

                POCT-GLUCOSE METER 2020 11:35:00 Gadicherla, Taya Muralin

ath [facility]

 

                POCT-GLUCOSE METER 2020 07:37:00 Gadicherla, Taya Muralin

ath [facility] [facility]

 

                POCT-GLUCOSE METER 2020 20:34:00 Gadicherla, Taya Muralin

ath [facility]

 

                POCT-GLUCOSE METER 2020 16:05:00 Gadicherla, Taya Muralin

ath [facility]

 

                POCT-GLUCOSE METER 2020 12:15:00 Gadicherla, Taya Muralin

ath [facility]

 

                POCT-GLUCOSE METER 2020 08:07:00 Gadicherla, Taya Muralin

ath [facility]

 

                POCT-GLUCOSE METER 2020 20:57:00 Gadicherla, Taya Muralin

[facility]

 

                POCT-GLUCOSE METER 2020 18:46:00 Gadicherla, Taya Muralin

[facility]

 

                POCT-GLUCOSE METER 2020 12:54:00 Gadicherla, Taya Muralin

[facility]

 

                POCT-GLUCOSE METER 2020 07:20:00 Gadicherla, Taya Muralin

[facility]

 

                CBC W/PLT COUNT & AUTO DIFFERENTIAL 2020 03:30:00 Elza Adan [facility]

 

                (CELLAVISION MANUAL DIFF) 2020 03:30:00 Elza Adan [facility]

 

                POCT-GLUCOSE METER 2020 21:19:00 Gadicherla, Taya Muralin

[facility]

 

                POCT-GLUCOSE METER 2020 19:51:00 Gadicherla, Taya Muralin

[facility]

 

                POCT-GLUCOSE METER 2020 11:44:00 Gadicherla, Taya Muralin

[facility]

 

                POCT-GLUCOSE METER 2020 07:41:00 Gadicherla, Taya Muralin

[facility]

 

                POCT-GLUCOSE METER 2020 21:02:00 Gadicherla, Taya Muralin

[facility]

 

                POCT-GLUCOSE METER 2020 17:34:00 Gadicherla, Taya Muralin

[facility]

 

                URINALYSIS W/ REFLEX URINE CULTURE 2020 14:37:00 Antionette 

Elza Jo [facility]

 

                POCT-GLUCOSE METER 2020 12:30:00 Gadicherla, Taya Muralin

ath [facility]

 

                POCT-GLUCOSE METER 2020 07:56:00 Gadicherla, Taya Muralin

ath [facility]

 

                BASIC METABOLIC PANEL (7) 2020 06:11:00 Carlos June 

[facility]

 

                CBC W/PLT COUNT & AUTO DIFFERENTIAL 2020 06:11:00 Carlos June [facility]

 

                POCT-GLUCOSE METER 2020 22:02:00 Carlos June [facility]

 

                POCT-GLUCOSE METER 2020 17:47:00 Carlos June [facility]

 

                POCT-GLUCOSE METER 2020 12:36:00 Carlos June [facility]

 

                POCT-GLUCOSE METER 2020 07:46:00 Carlos June [facility]

 

                POCT-GLUCOSE METER 2020 17:29:00 Carlos June [facility]

 

                POCT-GLUCOSE METER 2020 12:01:00 Carlos June [facility]

 

                POCT-GLUCOSE METER 2020 07:48:00 Carlos June [facility]

 

                POCT-GLUCOSE METER 2020 22:39:00 Carlos June [facility]

 

                POCT-GLUCOSE METER 2020 17:47:00 Carlos June [facility]

 

                POCT-GLUCOSE METER 2020 13:33:00 Carlos June [facility]

 

                POCT-GLUCOSE METER 2020 07:28:00 Carlos June [facility]

 

                POCT-GLUCOSE METER 2020 21:51:00 Carlos June [facility]

 

                POCT-GLUCOSE METER 2020 18:32:00 Carlos June [facility]

 

                POCT-GLUCOSE METER 2020 13:03:00 Carlos June [facility]

 

                POCT-GLUCOSE METER 2020 07:39:00 Carlos June [facility]

 

                POCT-GLUCOSE METER 2020 21:34:00 Carlos June [facility]

 

                POCT-GLUCOSE METER 2020 18:34:00 Carlos June [facility]

 

                POCT-GLUCOSE METER 2020 12:35:00 Carlos June [facility]

 

                POCT-GLUCOSE METER 2020 09:51:00 Carlos June [facility]

 

                POCT-GLUCOSE METER 2020 08:21:00 Carlos June [facility]

 

                POCT-GLUCOSE METER 2020 21:38:00 Carlos June [facility]

 

                POCT-GLUCOSE METER 2020 17:00:00 Carlos June [facility]

 

                POCT-GLUCOSE METER 2020 10:18:00 Carlos June [facility]

 

                POCT-GLUCOSE METER 2020 07:33:00 Carlos June [facility]

 

                MAGNESIUM       2020 03:04:00 Josiane Saravia [facility]

 

                BASIC METABOLIC PANEL (7) 2020 03:04:00 Rani [facility]

 

                CBC W/PLT COUNT & AUTO DIFFERENTIAL 2020 03:04:00 NORMA Saravia [facility]

 

                (CELLAVISION MANUAL DIFF) 2020 [facility]:04[facility]00 [facility]

 

                POCT-GLUCOSE METER 2020 22:42:00 Carlos June [facility]

 

                POCT-GLUCOSE METER 2020 22:00:00 Carlos June [facility]

 

                POCT-GLUCOSE METER 2020 17:24:00 Carlos June [facility]

 

                POCT-GLUCOSE METER 2020 11:01:00 Carlos June [facility]

 

                POCT-GLUCOSE METER 2020 07:52:00 Carlos June [facility]

 

                MAGNESIUM       2020 04:19:00 [facility]

 

                BASIC METABOLIC PANEL (7) 2020 04:19:00 [facility]

 

                HEPATIC FUNCTION PANEL 2020 04:19:00 [facility]

 

                CBC W/PLT COUNT & AUTO DIFFERENTIAL 2020 04:19:00 [facility] R

[facility]

 

                (CELLAVISION MANUAL DIFF) 2020 04:19:00 [facility]

 

                POCT-GLUCOSE METER 2020-03-15 21:49:00 [facility]

 

                POCT-GLUCOSE METER 2020-03-15 18:17:00 [facility]

 

                POCT-GLUCOSE METER 2020-03-15 12:23:00 [facility]

 

                XR CHEST 1 VIEW PORTABLE/BEDSIDE 2020-03-15 10:52:00 Janie Adan [facility]

 

                VANCOMYCIN LEVEL, TROUGH 2020-03-15 08:24:00 Elza Adan

[facility]

 

                POCT-GLUCOSE METER 2020-03-15 07:51:00 [facility], [facility]

 

                MAGNESIUM       2020-03-15 06:01:00 [facility], [facility]

 

                BASIC METABOLIC PANEL (7) 2020-03-15 06:01:00 Nalam, [facility]

 

                CBC W/PLT COUNT & AUTO DIFFERENTIAL 2020-03-15 06:01:00 Nalam, NORMA

[facility]

 

                PO[facility]UCOSE METER 2020 23:26:00 [facility]

 

                POCT-GLUCOSE METER 2020 17:45:00 Nalam, [facility]

 

                POCT-GLUCOSE METER 2020 12:44:00 Nalam, [facility]

 

                POCT-GLUCOSE METER 2020 08:04:00 [facility], [facility]

 

                MAGNESIUM       2020 06:25:00 [facility]

 

                BASIC METABOLIC PANEL (7) 2020 06:25:00 [facility], [facility]

 

                CBC W/PLT COUNT & AUTO DIFFERENTIAL 2020 06:25:00 [facility], [facility]

 

                (CELLAVISION MANUAL DIFF) 2020 06:25:00 [facility]

 

                POCT-GLUCOSE METER 2020 20:42:00 [facility]

 

                POCT-GLUCOSE METER 2020 17:27:00 [facility], [facility]

 

                POCT-GLUCOSE METER 2020 12:19:00 [facility]

 

                MAGNESIUM       2020 09:59:00 [facility]

 

                BASIC METABOLIC PANEL (7) 2020 09:59:00 [facility]

 

                PREALBUMIN      2020 09:59:00 [facility]

 

                C-REACTIVE PROTEIN 2020 09:59:00 [facility]

 

                TSH/FREE T4 IF INDICATED 2020 09:59:00 Keshia Ortiz   [facility]

 

                CBC W/PLT COUNT & AUTO DIFFERENTIAL 2020 09:59:00 Naljustin, NORMA

[facility]

 

                (CELLAVISION MANUAL DIFF) 2020 09:59:00 [facility], [facility]

 

                POCT-GLUCOSE METER 2020 08:10:00 Nalam, [facility]

 

                POCT-GLUCOSE METER 2020 21:53:00 Nalam, [facility]

 

                POCT-GLUCOSE METER 2020 18:35:00 Nalam, [facility]

 

                POCT-GLUCOSE METER 2020 14:06:00 [facility], [facility]

 

                HEMOGLOBIN A1C  2020 07:25:00 [facility], [facility]

 

                MAGNESIUM       2020 07:25:00 [facility], [facility]

 

                BASIC METABOLIC PANEL (7) 2020 07:25:00 [facility], [facility]

 

                HEPATIC FUNCTION PANEL 2020 07:25:00 [facility], [facility]

 

                POCT-GLUCOSE METER 2020 07:25:00 [facility], [facility]

 

                CBC W/PLT COUNT & AUTO DIFFERENTIAL 2020 07:25:00 Nalam, NORMA

[facility]

 

                POCT-GLUCOSE METER 2020 21:49:00                 [facility]

 

                POCT-GLUCOSE METER 2020 19:44:00 Natalie Hesham [facility]

 

                POCT-GLUCOSE METER 2020 18:35:00 Natalie [facility]

 

                XR CHEST 1 VIEW PORTABLE/BEDSIDE 2020 10:34:00 Laura Correa [facility]

 

                BLOOD CULTURE   2020 10:26:00 Hesham Correa [facility]

 

                LACTIC ACID, VENOUS 2020 10:26:00 Natalie Hesham K. [facility]

 

                COMPREHENSIVE METABOLIC PANEL 2020 10:26:00 Hesham Correa [facility]

 

                PROTHROMBIN TIME/INR 2020 10:26:00 Hesham Correa [facility]

 

                APTT            2020 10:26:00 Hesahm Correa [facility]

 

                CBC W/PLT COUNT & AUTO DIFFERENTIAL 2020 10:26:00 LEXIE Correa [facility]

 

                WOUND CULTURE + GRAM STAIN 2020 10:25:00 Natalie Hesham K.

 [facility]

 

                US GUIDE, VASCULAR ACCESS 2020 09:32:38 Natalie Hesham K. 

[facility]







Encounters





             Start Date/Time End Date/Time Encounter Type Admission Type Attendi

[facility]   Care Department Encounter ID    Source

 

           2020 21:02:00 2020 21:57:00 Discharged Inpatient 1       

   CLYDE NELSON 

[facility]              [id number]        [facility]

 

        2017-09-15 12:36:00 2017-09-15 12:36:00 Emergency E               [facility]  

  MED     [id number] [facility]







Results





           Test Description Test Time  Test Comments Results    Result Comments 

Source

 

                US EXREMEITY VEINS UNI-HOPD 2020 21:55:00                 

                              

                                                        [place] [address]      Patient Name: BALJEET JESSICA II                     
           MR #: [id number]                  : 1980                    
               Age/Sex: 40/M  Acct #: [id number]                              
Req #: [id number]  Adm Physician:                                               
       Ordered by: JACKELINE PERSAUD MD                         Report #: [id number]
[id number]        Location: [facility]                                    Room/Bed:         
          
________________________________________________________________________________

___________________    Procedure:  HOPD/US EXREMEITY VEINS UNI-HOPD  
Exam Date: 20                            Exam Time:                   
                            REPORT STATUS: Signed    EXAM: Right Lower Extremity
 Venous Duplex Ultrasound   INDICATION:            swelling    2020 
    COMPARISON:  None    TECHNIQUE: Gray scale, color Doppler and spectral 
waveform analysis of the   right lower extremity deep venous system was 
performed.       FINDINGS:       Very limited study.      Common Femoral:       
  Fully compressible with normal spontaneous waveforms.       Proximal Greater 
Saphenous:        Fully compressible.       Femoral:         Fully compressible 
with normal spontaneous waveforms.  Normal response to   augmentation.      
Proximal Deep Femoral:        Normal spontaneous waveforms.       Popliteal:    
         Fully compressible with normal spontaneous waveforms.        
IMPRESSION:        No definite evidence of deep venous thrombosis above the 
right calf.       Signed by: Dr. Vasu Thomas MD on 2020 9:58 PM        
Dictated By: VASU THOMAS MD  Electronically Signed By: VASU THOMAS MD on 
20  Transcribed By: KIRSTEN on 20       COPY TO:   
JACKELINE PERSAUD MD                                     

 

                TIB/FIB 2VW RT - HOPD 2020 21:51:00                       

                              

                                                  [place] [address]      Patient Name: BALJEET JESSICA II                               
 MR #: [id number]                  : 1980                              
     Age/Sex: 40/M  Acct #: [id number]                              Req #: [id number]
[id number]  Adm Physician:                                                      
Ordered by: JACKELINE PERSAUD MD                         Report #: [id number]     
   Location: [facility]                                    Room/Bed:                  
 
________________________________________________________________________________

___________________    Procedure:  HOPD/TIB/FIB 2VW RT - HOPD  Exam 
Date: 20                            Exam Time:                        
                       REPORT STATUS: Signed    TIB/FIB 2VW RT - HOPD - 5 views 
     HISTORY:  Pain   COMPARISON: None available.          IMPRESSION:    No 
evidence of acute displaced fracture or dislocation.   Old fracture deformities 
of mid to distal tibia. There is also a subtle lucent   line and mild deformity 
of distal fibula, probably also an old fracture   deformity. However, acute 
nondisplaced fibular fracture line in this area   cannot be entirely excluded.  
    Signed by: Dr. Vasu Thomas MD on 2020 9:55 PM        Dictated By: 
VASU THOMAS MD  Electronically Signed By: VASU THOMAS MD on 20  
Transcribed By: KIRSTEN on 20       COPY TO:   JACKELINE PERSAUD MD    
                                                     

 

                FOOT 3 VIEW RT - HOPD 2020 21:48:00                       

                              

                                                  [place] [address]      Patient Name: BALJEET JESSICA II                               
 MR #: [id number]                  : 1980                              
     Age/Sex: 40/M  Acct #: [id number]                              Req #: [id number]
[id number]  [facility] Physician:                                                      
Ordered by: JACKELINE PERSAUD MD                         Report #: [id number]     
   Location: [facility]                                    Room/Bed:                  
 
________________________________________________________________________________

___________________    Procedure:  HOPD/FOOT 3 VIEW RT - HOPD  Exam 
Date: 20                            Exam Time:                        
                       REPORT STATUS: Signed    FOOT 3 VIEW RT - HOPD - 3 views 
     HISTORY:  Pain   COMPARISON: None available.           IMPRESSION:    
Generalized mineralization limits evaluation.   No definite evidence of acute 
displaced fracture or dislocation of the right   foot.    Mild diffuse soft 
tissue swelling.   Scattered degenerative changes.                     Signed 
by: Dr. Vasu Thomas MD on 2020 9:51 PM        Dictated By: VASU THOMAS MD  Electronically Signed By: VASU THOMAS MD on 20  Transcribed By:
 KIRSTEN on 20       COPY TO:   JACKELINE PERSAUD MD                   

                  

 

                FEMUR 2 VIEW RT - HOPD 2020 21:34:00                      

                              

                                                   [place] [address]      Patient Name: BALJEET JESSICA II                               
 MR #: [id number]                  : 1980                              
     Age/Sex: 40/M  Acct #: [id number]                              Req #: [id number]
[id number]  Adm Physician:                                                      
Ordered by: JACKELINE PERSAUD MD                         Report #: [id number]     
   Location: [facility]                                    Room/Bed:                  
 
________________________________________________________________________________

___________________    Procedure: [id number] HOPD/FEMUR 2 VIEW RT - HOPD  Exam 
Date: 20                            Exam Time:                        
                       REPORT STATUS: Signed    FEMUR 2 VIEW RT - HOPD - 5 views
      HISTORY:  Pain   COMPARISON: 2020           IMPRESSION:    
Heterotopic ossification adjacent to the greater trochanter and right inferior  
 pubic ramus.   No definite evidence of acute displaced fracture or dislocation 
of the right   femur.   Degenerative changes of right hip and knee joints.      
               Signed by: Dr. Vasu Thomas MD on 2020 9:48 PM        
Dictated By: VASU THOMAS MD  Electronically Signed By: VASU THOMAS MD on 
20  Transcribed By: KIRSTEN on 20       COPY TO:   
JACKELINE PERSAUD MD                                     

 

                    Blood Culture #1    2020 04:00:00   

 

                                        Test Item

 

             Result (test code = 6463-4) No growth in 5 days                    

        





[id number] 



             Test Item    Value        Reference Range Interpretation Comments

 

             CULTURE (BEAKER) (test code = 1095) No growth in 5 days            

                





BLOOD [id number] 04:00:00* 



             Test Item    Value        Reference Range Interpretation Comments

 

             CULTURE (BEAKER) (test code = 1095) No growth in 5 days            

                





POC-Glucose [id number] 12:02:00* 



             Test Item    Value        Reference Range Interpretation Comments

 

             POC-Glucose Meter (test code = 1538) 198 mg/dL           H   

         : TESTED AT [facility] [address] 

[facility], [address]: /Technician ID = [id number] for PELON SOHEILA

 

             Lab Interpretation (test code = 03471-7) Abnormal                  

              





[facility] [id number] 12:02:00* 



             Test Item    Value        Reference Range Interpretation Comments

 

             POC-GLUCOSE METER (BEAKER) (test code = 1538) 198 mg/dL      

     H            : TESTED AT 

[place] [id number] [facility], [address]: /Technician ID = [id number] for 
SOHEILA BIRD





CBC with platelet count + automated [id number] 11:39:00* 



             Test Item    Value        Reference Range Interpretation Comments

 

             WBC (test code = 6690-2) 8.6          3.5- 10.5 K/L              

 

 

             RBC (test code = 789-8) 3.86         4.63- 6.08 M/L L            

 

 

             MCHC (test code = 786-4) 29.3         32.3- 36.5 GM/DL L           

  

 

             Hematocrit (test code = 4544-3) 30.4 %       40.1-51      L        

     

 

             MCV (test code = 787-2) 78.8 fL      79-92.2      L             

 

             MCH (test code = 785-6) 23.1 pg      25.7-32.2    L             

 

             RDW (test code = 788-0) 18.0 %       11.6-14.4    H             

 

             Platelets (test code = 777-3) 664          150- 450 K/CU MM H      

       

 

             MPV (test code = 72268-9) 8.4 fL       9.4-12.4     L             

 

             nRBC (test code = 413) 0            0- 0 /100 WBC               

 

             Lab Interpretation (test code = 36796-9) Abnormal                  

              





[facility] Center[facility] [id number] 11:39:00* 



             Test Item    Value        Reference Range Interpretation Comments

 

             % Neutros (test code = 2816) 55 %                                  

  

 

             % Lymphs (test code = 2817) 39 %                                   

 

 

             % Monos (test code = 2818) 3 %                                     

 

             % Eos (test code = 2819) 1 %                                     

 

             % Baso (test code = 2820) 2 %                                     

 

             # Neutros (test code = 2830) 4.73 K/ul    1.78-5.38                

  

 

             # Lymphs (test code = 2831) 3.35 K/ul    1.32-3.57                 

 

 

             # Monos (test code = 2832) 0.26 K/uL    0.3-0.82     L             

 

             # Eos (test code = 2834) 0.09 K/uL    0.04-0.54                  

 

             # Baso (test code = 2835) 0.17 K/uL    0.01-0.08    H             

 

             Total Counted (test code = 1351) 100                               

      

 

             WBC Morphology (test code = 487) Normal                            

      

 

             Platelet Morphology (test code = 486) Normal                       

           

 

             Polychromasia (test code = 478) 1+ few                             

     

 

             Anisocytosis (test code = 961) 1+ few                              

    

 

             Microcytes (test code = 965) 1+ few                                

  

 

             Spherocytes (test code = 768) 1+ few                               

   

 

             Artifact (test code = 3432) Present                                

 

 

             Platelet Conc (test code = 3438) Increased                         

      

 

                          LLOYD (test code = LLOYD)      ID - Eva Riggs

ser comments: Slide comments: 

                                                             

 

             Lab Interpretation (test code = 69309-5) Abnormal                  

              





[facility] & AUTO [id number] 
11:39:00* 



             Test Item    Value        Reference Range Interpretation Comments

 

             WHITE BLOOD CELL COUNT (BEAKER) (test code = 775) 8.6 K/ L     3.5-

10.5                   

 

             RED BLOOD CELL COUNT (BEAKER) (test code = 761) 3.86 M/ L    4.63-6

.08    L             

 

             HEMOGLOBIN (BEAKER) (test code = 410) 8.9 GM/DL    13.7-17.5    L  

           

 

             HEMATOCRIT (BEAKER) (test code = 411) 30.4 %       40.1-51.0    L  

           

 

             MEAN CORPUSCULAR VOLUME (BEAKER) (test code = 753) 78.8 fL      79.

0-92.2    L             

 

             MEAN CORPUSCULAR HEMOGLOBIN (BEAKER) (test code = 751) 23.1 pg     

 25.7-32.2    L             

 

                    MEAN CORPUSCULAR HEMOGLOBIN CONC (BEAKER) (test code = 752) 

29.3 GM/DL          32.3-36.5

                          L                          

 

             RED CELL DISTRIBUTION WIDTH (BEAKER) (test code = 412) 18.0 %      

 11.6-14.4    H             

 

             PLATELET COUNT (BEAKER) (test code = 756) 664 K/CU MM  150-450     

 H             

 

             MEAN PLATELET VOLUME (BEAKER) (test code = 754) 8.4 fL       9.4-12

.4     L             

 

             NUCLEATED RED BLOOD CELLS (BEAKER) (test code = 413) 0 /100 WBC   0

-0                        





(CELLAVISION MANUAL DIFF)2020 11:39:00* 



             Test Item    Value        Reference Range Interpretation Comments

 

             NEUTROPHILS - REL (CELLAVISION)(BEAKER) (test code = 2816) 55 %    

                                

 

             LYMPHOCYTES - REL (CELLAVISION)(BEAKER) (test code = 2817) 39 %    

                                

 

             MONOCYTES - REL (CELLAVISION)(BEAKER) (test code = 2818) 3 %       

                              

 

             EOSINOPHILS - REL (CELLAVISION)(BEAKER) (test code = 2819) 1 %     

                                

 

             BASOPHILS - REL (CELLAVISION)(BEAKER) (test code = 2820) 2 %       

                              

 

                NEUTROPHILS - ABS (CELLAVISION)(BEAKER) (test code = 2830) 4.73 

K/ul       1.78-5.38        

                                         

 

                LYMPHOCYTES - ABS (CELLAVISION)(BEAKER) (test code = 2831) 3.35 

K/ul       1.32-3.57        

                                         

 

             MONOCYTES - ABS (CELLAVISION)(BEAKER) (test code = 2832) 0.26 K/uL 

   0.30-0.82    L             

 

                EOSINOPHILS - ABS (CELLAVISION)(BEAKER) (test code = 2834) 0.09 

K/uL       0.04-0.54        

                                         

 

             BASOPHILS - ABS (CELLAVISION)(BEAKER) (test code = 2835) 0.17 K/uL 

   0.01-0.08    H             

 

             TOTAL COUNTED (BEAKER) (test code = 1351) 100                      

               

 

             WBC MORPHOLOGY (BEAKER) (test code = 487) Normal                   

               

 

             PLT MORPHOLOGY (BEAKER) (test code = 486) Normal                   

               

 

             POLYCHROMATOPHILLIC RBCS(BEAKER) (test code = 478) 1+ few          

                        

 

             ANISOCYTOSIS (BEAKER) (test code = 961) 1+ few                     

             

 

             MICROCYTES (BEAKER) (test code = 965) 1+ few                       

           

 

             SPHEROCYTES (BEAKER) (test code = 768) 1+ few                      

            

 

             ARTIFACT (CELLAVISION)(BEAKER) (test code = 3432) Present          

                       

 

             PLATELET CONCENTRATION (CELLAVISION)(BEAKER) (test code = 3438) Inc

reased                               





 ID - Eva Camarillo comments: Slide comments: POCT-GLUCOSE METER
2020 08:08:00* 



             Test Item    Value        Reference Range Interpretation Comments

 

             POC-GLUCOSE METER (BEAKER) (test code = 1538) 153 mg/dL      

     H            : TESTED AT 

[facility] [address] [place] [address], [address]: /Technician ID = [id number] for 
SOHEILA BIRD





Basic metabolic [id number] 06:06:00* 



             Test Item    Value        Reference Range Interpretation Comments

 

             Sodium (test code = 2951-2) 136 meq/L    136-145                   

 

 

             Potassium (test code = 2823-3) 3.6 meq/L    3.5-5.1                

    

 

             Chloride (test code = 2075-0) 104 meq/L                      

   

 

             CO2 (test code = 8-9) 24 meq/L     22-29                      

 

             BUN (test code = 3094-0) 6 mg/dL      7-21         L             

 

             Creatinine (test code = 2160-0) 0.68 mg/dL   0.57-1.25             

     

 

             Glucose (test code = 2345-7) 192 mg/dL           H           

  

 

             Calcium (test code = 53265-3) 9.0 mg/dL    8.4-10.2                

   

 

             EGFR (test code = 33914-3) 131          mL/min/1.73 sq m           

   ESTIMATED GFR IS NOT AS 

ACCURATE AS CREATININE CLEARANCE IN PREDICTING GLOMERULAR FILTRATION RATE. 
ESTIMATED GFR IS NOT APPLICABLE FOR DIALYSIS PATIENTS.

 

             LLOYD (test code = LLOYD)  ID - JENNIFERASI                          

  

 

             Lab Interpretation (test code = 92859-1) Abnormal                  

              





[facility] [id number] 06:06:00* 



             Test Item    Value        Reference Range Interpretation Comments

 

             Protein, Total (test code = 2885-2) 8.0          6.0- 8.3 gm/dL    

           

 

             Albumin (test code = 67314-8) 3.5 g/dL     3.5-5                   

   

 

             Total Bilirubin (test code = -) 0.3 mg/dL    0.2-1.2          

          

 

             Bilirubin, Direct (test code = -7) 0.2 mg/dL    0.1-0.5        

            

 

             Alkaline Phosphatase (test code = 6768-6) 291 U/L            

 H             

 

             AST (test code = 1920-8) 18 U/L       5-34                       

 

             ALT (test code = 1742-6) 25 U/L       6-55                       

 

             LLOYD (test code = LLOYD)  ID - EDASI                          

  

 

             Lab Interpretation (test code = 13702-6) Abnormal                  

              





[facility] 



             Test Item    Value        Reference Range Interpretation Comments

 

             Magnesium (test code = 14985-6) 2.0 mg/dL    1.6-2.6               

     

 

             LLOYD (test code = LLOYD)  ID - EDASI                          

  

 

             Lab Interpretation (test code = 21125-8) Normal                    

              





[facility] 



             Test Item    Value        Reference Range Interpretation Comments

 

             MAGNESIUM (BEAKER) (test code = 627) 2.0 mg/dL    1.6-2.6          

          





 ID - EDASIBASIC METABOLIC [id number] 06:06:00* 



             Test Item    Value        Reference Range Interpretation Comments

 

             SODIUM (BEAKER) (test code = 381) 136 meq/L    136-145             

       

 

             POTASSIUM (BEAKER) (test code = 379) 3.6 meq/L    3.5-5.1          

          

 

             CHLORIDE (BEAKER) (test code = 382) 104 meq/L                

         

 

             CO2 (BEAKER) (test code = 355) 24 meq/L     22-29                  

    

 

             BLOOD UREA NITROGEN (BEAKER) (test code = 354) 6 mg/dL      7-21   

      L             

 

             CREATININE (BEAKER) (test code = 358) 0.68 mg/dL   0.57-1.25       

           

 

             GLUCOSE RANDOM (BEAKER) (test code = 652) 192 mg/dL          

 H             

 

             CALCIUM (BEAKER) (test code = 697) 9.0 mg/dL    8.4-10.2           

        

 

             EGFR (BEAKER) (test code = 1092) 131 mL/min/1.73 sq m              

             ESTIMATED GFR IS NOT 

AS ACCURATE AS CREATININE CLEARANCE IN PREDICTING GLOMERULAR FILTRATION RATE. 
ESTIMATED GFR IS NOT APPLICABLE FOR DIALYSIS PATIENTS.





 ID - EDASIHEPATIC FUNCTION [id number] 06:06:00* 



             Test Item    Value        Reference Range Interpretation Comments

 

             TOTAL PROTEIN (BEAKER) (test code = 770) 8.0 gm/dL    6.0-8.3      

              

 

             ALBUMIN (BEAKER) (test code = 1145) 3.5 g/dL     3.5-5.0           

         

 

             BILIRUBIN TOTAL (BEAKER) (test code = 377) 0.3 mg/dL    0.2-1.2    

                

 

             BILIRUBIN DIRECT (BEAKER) (test code = 706) 0.2 mg/dL    0.1-0.5   

                 

 

             ALKALINE PHOSPHATASE (BEAKER) (test code = 346) 291 U/L      

       H             

 

             AST (SGOT) (BEAKER) (test code = 353) 18 U/L       5-34            

           

 

             ALT (SGPT) (BEAKER) (test code = 347) 25 U/L       6-55            

           





 ID - EDASIPOCT-GLUCOSE [id number] 22:02:00* 



             Test Item    Value        Reference Range Interpretation Comments

 

             POC-GLUCOSE METER (BEAKER) (test code = 1538) 167 mg/dL      

     H            : TESTED AT 

[facility] [address] [facility], [address]: /Technician ID = [id number] for 
KYREE STEPHANIE JONES





CT, PELVIS, W [id number] 17:49:00Reason for exam:->FEVERWhat is the 
patient's sedation requirement?->No SedationFINAL REPORT PATIENT ID:   [id number] 
TECHNIQUE: CT of the pelvis WITH intravenous contrast and WITHOUT oral contrast.
 Dose modulation, iterative reconstruction, and/or weight-based adjustment of 
the mA/kV was utilized to reduce the radiation dose to as low as reasonably 
achievable. INDICATION: Pelvic infection. COMPARISON: 2018.  FINDINGS: 
PELVIC ORGANS/BLADDER: Nonspecific circumferential wall thickening of the 
urinary bladder which may relate to incomplete distention. 
PERITONEUM/RETROPERITONEUM: Trace free pelvic fluid. No free intraperitoneal air
..LYMPH NODES: No lymphadenopathy.VESSELS: Unremarkable. GI TRACT: There is a le
ft lower quadrant and colostomy. 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 [place]
el loops are normal in caliber without evidence of obstruction. The appendix is 
normal.. Appendix is normal. BONES AND SOFT TISSUES: Prominent bilateral decubit
us ulcers which extend to the level of the initial tuberosities there is increas
ed sclerosis of the bilateral facial tuberosities with associated extensive hete
rotopic ossification. There are lateral soft tissue defects overlying the bilate
ral femora with increase sclerosis of the greater trochanter with adjacent heter
otopic ossification. Mild degenerative changes are noted in the bilateral hips. 
No dislocation.  IMPRESSION:Bilateral large decubitus ulcers extending to the in
itial tuberosities with findings suggestive of chronic osteomyelitis of the bila
teral ischial tuberosities. Large soft tissue defects over the bilateral proxima
l femora with findings suggestive of chronic osteomyelitis in the regions of the
 greater trochanter. No focal fluid collection to suggest abscess.. Signed: Jasmin Castañeda MDReport Verified Date/Time:  2020 17:49:33 Reading Loc
ation: [facility] [address] [place] [address] Radiology Reading Room   Electronically signed by: MINI RUSHING MD on 2020 05:49 PM CT pelvis with IV [id number] 
17:49:00Interface, External Ris In - 2020  5:51 PM CDTFINAL REPORT PATIENT
 ID:   [id number] TECHNIQUE: CT of the pelvis WITH intravenous contrast and 
WITHOUT oral contrast. Dose modulation, iterative reconstruction, and/or weight-
based adjustment of the mA/kV was utilized to reduce the radiation dose to as 
low as reasonably achievable. INDICATION: Pelvic infection. COMPARISON: 
2018.  FINDINGS: PELVIC ORGANS/BLADDER: Nonspecific circumferential wall 
thickening of the urinary bladder which may relate to incomplete distention. 
PERITONEUM/RETROPERITONEUM: Trace free pelvic fluid. No free intraperitoneal 
air..LYMPH NODES: No lymphadenopathy.VESSELS: Unremarkable. GI TRACT: There is a
 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 
Bowel loops are normal in caliber without evidence of obstruction. The appendix 
is normal.. Appendix is normal. BONES AND SOFT TISSUES: Prominent bilateral 
decubitus ulcers which extend to the level of the initial tuberosities there is 
increased sclerosis of the bilateral facial tuberosities with associated 
extensive heterotopic ossification. There are lateral soft tissue defects 
overlying the bilateral femora with increase sclerosis of the greater trochanter
 with adjacent heterotopic ossification. Mild degenerative changes are noted in 
the bilateral hips. No dislocation.  IMPRESSION:Bilateral large decubitus ulcers
 extending to the initial tuberosities with findings suggestive of chronic 
osteomyelitis of the bilateral ischial tuberosities. Large soft tissue defects 
over the bilateral proximal femora with findings suggestive of chronic 
osteomyelitis in the regions of the greater trochanter. No focal fluid 
collection to suggest abscess.. Signed: Jasmin Rushing MDReport Verified
 Date/Time:  2020 17:49:33 Reading Location: [address] Radiology 
Reading Room   Electronically signed by: JASMIN RUSHING MD on 2020
 05:49 PM [facility] CenterPOCT-GLUCOSE [id number] 17:00:00* 



             Test Item    Value        Reference Range Interpretation Comments

 

             POC-GLUCOSE METER (BEAKER) (test code = 1538) 170 mg/dL      

     H            : TESTED AT 

[place] [id number] [facility], [address]: /Technician ID = [id number] for 
DEMETRIO HELLEN





POCT-GLUCOSE [id number] 12:39:00* 



             Test Item    Value        Reference Range Interpretation Comments

 

             POC-GLUCOSE METER (BEAKER) (test code = 1538) 200 mg/dL      

     H            : TESTED AT 

[address], [address]: /Technician ID = [id number] for 
JEWELSDIAMOND BRENNAN





POCT-GLUCOSE [id number] 07:43:00* 



             Test Item    Value        Reference Range Interpretation Comments

 

             POC-GLUCOSE METER (BEAKER) (test code = 1538) 273 mg/dL      

     H            : TESTED AT 

[address], [address]: /Technician ID = [id number] for 
MINOO PHILLIPS





ECG 12 [id number] 06:46:06Interface, External Ris In - 2020  6:46 AM 
CDTVentricular Rate 90 BPMAtrial Rate 90 BPMP-R Interval 140 msQRS Duration 84 
msQ-T Interval 384 msQTC Calculation(Arashzeisidoro) 469 msP Axis 53 degreesR Axis 18 
degreesT Axis 4 degreesNormal sinus rhythmNonspecific ST and T wave 
abnormalityAbnormal ECGNo previous ECGs availableConfirmed by KEVIN MORILLO MICHAEL ([id number]) on 2020 6:46:01 [facility]
2020 06:04:00* 



             Test Item    Value        Reference Range Interpretation Comments

 

             MAGNESIUM (BEAKER) (test code = 627) 2.0 mg/dL    1.6-2.6          

          





 ID Olivia PASTOR WBASIC METABOLIC [id number] 06:04:00* 



             Test Item    Value        Reference Range Interpretation Comments

 

             SODIUM (BEAKER) (test code = 381) 137 meq/L    136-145             

       

 

             POTASSIUM (BEAKER) (test code = 379) 3.8 meq/L    3.5-5.1          

          

 

             CHLORIDE (BEAKER) (test code = 382) 106 meq/L                

         

 

             CO2 (BEAKER) (test code = 355) 23 meq/L     22-29                  

    

 

             BLOOD UREA NITROGEN (BEAKER) (test code = 354) 6 mg/dL      7-21   

      L             

 

             CREATININE (BEAKER) (test code = 358) 0.67 mg/dL   0.57-1.25       

           

 

             GLUCOSE RANDOM (BEAKER) (test code = 652) 170 mg/dL          

 H             

 

             CALCIUM (BEAKER) (test code = 697) 9.0 mg/dL    8.4-10.2           

        

 

             EGFR (BEAKER) (test code = 1092) 133 mL/min/1.73 sq m              

             ESTIMATED GFR IS NOT 

AS ACCURATE AS CREATININE CLEARANCE IN PREDICTING GLOMERULAR FILTRATION RATE. 
ESTIMATED GFR IS NOT APPLICABLE FOR DIALYSIS PATIENTS.





 ID Olivia PASTOR WHEPATIC FUNCTION [id number] 06:04:00* 



             Test Item    Value        Reference Range Interpretation Comments

 

             TOTAL PROTEIN (BEAKER) (test code = 770) 8.1 gm/dL    6.0-8.3      

              

 

             ALBUMIN (BEAKER) (test code = 1145) 3.4 g/dL     3.5-5.0      L    

         

 

             BILIRUBIN TOTAL (BEAKER) (test code = 377) 0.4 mg/dL    0.2-1.2    

                

 

             BILIRUBIN DIRECT (BEAKER) (test code = 706) 0.2 mg/dL    0.1-0.5   

                 

 

             ALKALINE PHOSPHATASE (BEAKER) (test code = 346) 301 U/L      

       H             

 

             AST (SGOT) (BEAKER) (test code = 353) 25 U/L       5-34            

           

 

             ALT (SGPT) (BEAKER) (test code = 347) 26 U/L       6-55            

           





 ID Olivia DAWIT WCBC W/PLT COUNT & AUTO [id number] 05:59:00* 



             Test Item    Value        Reference Range Interpretation Comments

 

             WHITE BLOOD CELL COUNT (BEAKER) (test code = 775) 9.9 K/ L     3.5-

10.5                   

 

             RED BLOOD CELL COUNT (BEAKER) (test code = 761) 4.08 M/ L    4.63-6

.08    L             

 

             HEMOGLOBIN (BEAKER) (test code = 410) 9.7 GM/DL    13.7-17.5    L  

           

 

             HEMATOCRIT (BEAKER) (test code = 411) 32.7 %       40.1-51.0    L  

           

 

             MEAN CORPUSCULAR VOLUME (BEAKER) (test code = 753) 80.1 fL      79.

0-92.2                  

 

             MEAN CORPUSCULAR HEMOGLOBIN (BEAKER) (test code = 751) 23.8 pg     

 25.7-32.2    L             

 

                    MEAN CORPUSCULAR HEMOGLOBIN CONC (BEAKER) (test code = 752) 

29.7 GM/DL          32.3-36.5

                          L                          

 

             RED CELL DISTRIBUTION WIDTH (BEAKER) (test code = 412) 18.4 %      

 11.6-14.4    H             

 

             PLATELET COUNT (BEAKER) (test code = 756) 644 K/CU MM  150-450     

 H             

 

             MEAN PLATELET VOLUME (BEAKER) (test code = 754) 8.5 fL       9.4-12

.4     L             

 

             NUCLEATED RED BLOOD CELLS (BEAKER) (test code = 413) 0 /100 WBC   0

-0                        

 

             NEUTROPHILS RELATIVE PERCENT (BEAKER) (test code = 429) 50 %       

                             

 

             LYMPHOCYTES RELATIVE PERCENT (BEAKER) (test code = 430) 38 %       

                             

 

             MONOCYTES RELATIVE PERCENT (BEAKER) (test code = 431) 9 %          

                           

 

             EOSINOPHILS RELATIVE PERCENT (BEAKER) (test code = 432) 3 %        

                             

 

             BASOPHILS RELATIVE PERCENT (BEAKER) (test code = 437) 0 %          

                           

 

             NEUTROPHILS ABSOLUTE COUNT (BEAKER) (test code = 670) 4.97 K/ L    

1.78-5.38                  

 

             LYMPHOCYTES ABSOLUTE COUNT (BEAKER) (test code = 414) 3.72 K/ L    

1.32-3.57    H             

 

             MONOCYTES ABSOLUTE COUNT (BEAKER) (test code = 415) 0.90 K/ L    0.

30-0.82    H             

 

             EOSINOPHILS ABSOLUTE COUNT (BEAKER) (test code = 416) 0.25 K/ L    

0.04-0.54                  

 

             BASOPHILS ABSOLUTE COUNT (BEAKER) (test code = 417) 0.04 K/ L    0.

01-0.08                  

 

             IMMATURE GRANULOCYTES-RELATIVE PERCENT (BEAKER) (test code = 2801) 

0 %          0-1                        





POCT-GLUCOSE [id number] 23:26:00* 



             Test Item    Value        Reference Range Interpretation Comments

 

             POC-GLUCOSE METER (BEAKER) (test code = 1538) 152 mg/dL      

     H            : TESTED AT 

[place] [id number] [facility], [address]: /Technician ID = [id number] for 
CHAD BAIN





POCT-GLUCOSE [id number] 17:00:00* 



             Test Item    Value        Reference Range Interpretation Comments

 

             POC-GLUCOSE METER (BEAKER) (test code = 1538) 123 mg/dL      

     H            : TESTED AT 

[address], [address]: /Technician ID = [id number] for 
HELLEN ATKINSON





[id number] 16:36:00* 



             Test Item    Value        Reference Range Interpretation Comments

 

             Procalcitonin (test code = 55336-9) 0.05 ng/mL   <0.05        H    

         

 

                          LLOYD (test code = LLOYD)     SEPSIS RISK (ng/mL)Low:     

     0.05-0.50Intermediate: 

0.51-2.00High:         >=2.01                                          

 

             Lab Interpretation (test code = 26181-3) Abnormal                  

              





[facility] CenterPROCALCITONIN2020-08-04 16:36:00* 



             Test Item    Value        Reference Range Interpretation Comments

 

             PROCALCITONIN (BEAKER) (test code = 3036) 0.05 ng/mL   <0.05       

 H             





SEPSIS RISK (ng/mL)Low:          0.05-0.50Intermediate: 0.51-2.00High:         >
=[id number] 15:08:00* 



             Test Item    Value        Reference Range Interpretation Comments

 

             Ferritin (test code = 2276-4) 131.18 ng/mL 5-275                   

   

 

             LLOYD (test code = LLOYD)  ID - NTP                            

 

             Lab Interpretation (test code = 87862-6) Normal                    

              





[facility] CenterFERRITIN2020-08-04 15:08:00* 



             Test Item    Value        Reference Range Interpretation Comments

 

             FERRITIN (BEAKER) (test code = 361) 131.18 ng/mL 5..00       

         





 ID - NTPLactate dehydrogenase (LDH)2020 12:03:00* 



             Test Item    Value        Reference Range Interpretation Comments

 

             LDH (test code = 2532-0) 245 U/L      125-220      H             

 

             LLOYD (test code = LLOYD)  ID - NTP                            

 

             Lab Interpretation (test code = 03683-0) Abnormal                  

              





[facility] CenterLACTATE DEHYDROGENASE (LDH)2020 12:03:00* 



             Test Item    Value        Reference Range Interpretation Comments

 

             LACTATE DEHYDROGENASE (BEAKER) (test code = 635) 245 U/L      125-2

20      H             





 ID - NTPPOCT-GLUCOSE [id number] 11:51:00* 



             Test Item    Value        Reference Range Interpretation Comments

 

             POC-GLUCOSE METER (BEAKER) (test code = 1538) 278 mg/dL      

     H            : Notified 

RN/MD: TESTED AT [address], [address]: /Technician ID = 
[id number] for DEMETRIO HELLEN





Lactic acid, venous [id number] 06:54:00* 



             Test Item    Value        Reference Range Interpretation Comments

 

             Lactate, Venous (test code = 2872) 1.03 mmol/L  0.5-2.2            

        

 

             LLOYD (test code = LLOYD)  ID - NTP                            

 

             Lab Interpretation (test code = 10129-3) Normal                    

              





[facility] CenterLACTIC ACID, [id number] 06:54:00* 



             Test Item    Value        Reference Range Interpretation Comments

 

             LACTATE BLOOD VENOUS (2) (BEAKER) (test code = 2872) 1.03 mmol/L  0

.50-2.20                  





 ID - NTPUrinalysis w/Microscopic + Reflex to [id number] 05:06:00
  * 



             Test Item    Value        Reference Range Interpretation Comments

 

             Color, UA (test code = 5778-6) Yellow                              

    

 

             Clarity, UA (test code = 5767-9) Clear                             

      

 

             Specific Gravity, UA (test code = 5811-5) 1.036        1.001-1.035 

 H             

 

             pH, UA (test code = 5803-2) 6.0          5.0-8.0                   

 

 

             Protein, UA (test code = 81938-6) 100 mg/dL    Negative     A      

       

 

             Glucose, UA (test code = 365) 30 mg/dL     Negative     A          

   

 

             Ketones, UA (test code = 2514-8) Negative     Negative             

      

 

             Bilirubin, UA (test code = 57027-3) Negative     Negative          

         

 

             Blood, UA (test code = 66999-9) Trace        Negative     A        

     

 

             Nitrite, UA (test code = 5802-4) Negative     Negative             

      

 

             Leukocytes, UA (test code = 5799-2) Negative     Negative          

         

 

             Urobilinogen, UA (test code = 15494-3) 4.0 mg/dL    0.2-1        H 

            

 

             RBC, UA (test code = 23349-5) 1            /HPF                    

   

 

             WBC, UA (test code = 5821-4) 5            /HPF                     

  

 

             Mucus (test code = 8247-9) Occasional                              

 

             Squam Epithel, UA (test code = 26170-0) 1            /HPF          

             

 

             Specimen Source (test code = 2795)                                 

        

 

             LLOYD (test code = LLOYD)  ID - [auto] ID - larry       

                     

 

             Lab Interpretation (test code = 73938-7) Abnormal                  

              





[facility] [id number] 
05:06:00* 



             Test Item    Value        Reference Range Interpretation Comments

 

             COLOR (BEAKER) (test code = 470) Yellow                            

      

 

             CLARITY (BEAKER) (test code = 469) Clear                           

        

 

             SPECIFIC GRAVITY UA (BEAKER) (test code = 468) 1.036        1.001-1

.035  H             

 

             PH UA (BEAKER) (test code = 467) 6.0          5.0-8.0              

      

 

             PROTEIN UA (BEAKER) (test code = 464) 100 mg/dL    Negative     A  

           

 

             GLUCOSE UA (BEAKER) (test code = 365) 30 mg/dL     Negative     A  

           

 

             KETONES UA (BEAKER) (test code = 371) Negative     Negative        

           

 

             BILIRUBIN UA (BEAKER) (test code = 462) Negative     Negative      

             

 

             BLOOD UA (BEAKER) (test code = 461) Trace        Negative     A    

         

 

             NITRITE UA (BEAKER) (test code = 465) Negative     Negative        

           

 

             LEUKOCYTE ESTERASE UA (BEAKER) (test code = 466) Negative     Negat

yobani                   

 

             UROBILINOGEN UA (BEAKER) (test code = 463) 4.0 mg/dL    0.2-1.0    

  H             

 

             RBC UA (BEAKER) (test code = 519) 1 /HPF                           

       

 

             WBC UA (BEAKER) (test code = 520) 5 /HPF                           

       

 

             MUCUS (BEAKER) (test code = 1574) Occasional                       

       

 

             SQUAMOUS EPITHELIAL (BEAKER) (test code = 516) 1 /HPF              

                    

 

             SOURCE(BEAKER) (test code = 2795)                                  

       





 ID - [auto] ID - Arleen-CoV2/RT-PCR (Symptomatic ONLY)
2020 04:05:00* 



             Test Item    Value        Reference Range Interpretation Comments

 

                    SARS-COV2/RT-PCR (test code = 42377-2) Positive            N

ot Detected, Negative, See 

external report for linked test AA                         

 

             SARS-COV-2 PERFORMING LAB (test code = 68994-3) [facility]              

                     

 

                          LLOYD (test code = LLOYD)     Results are for the detectio

n of SARS-CoV-2 RNA. The 

SARS-CoV-2 RNA is generally detectable in nasopharyngeal swab specimens during 
the acute phase of infection. Positive results are indicative of active 
infection with SARS-CoV-2; clinical correlation with patient history and other 
diagnostic information is necessary to determine patient infection status. 
Positive results do not rule out bacterial infection or co-infection with other 
viruses. The agent detected may not be the definite cause of disease.  The limit
 of detection for this assay is 250 copies/mL. This SARS CoV-2 test is a rapid, 
real-time RT-PCR test intended for the qualitative detection of nucleic acid 
from SARS-CoV-2 in a nasopharyngeal swab specimen collected from individuals 
suspected of COVID-19 by their healthcare provider. This test has not been Food 
and Drug Administration (FDA) cleared or approved and has been authorized by FDA
 under an Emergency Use Authorization (EUA). This EUA will be effective until 
the declaration that circumstances exist justifying the authorization of the 
emergency use of in vitro diagnostic tests for detection and/or diagnosis of 
COVID-19 is terminated under Section 564(b)(2) of the Act or the EUA is revoked 
under Section 564(g) of the Act. Fact Sheet for Healthcare 
Providers:https://www.[employer].com/Documents/Xpert%20Xpress%20SARS%20CoV-2/Fact%2

0Sheets/390-3072%66HVJU-LND-8%20HEALTHCARE%20PROVIDERS%20FACT%20SHEET.pdf Fact 
Sheet for Healthcare 
Patients:https://www.[employer]/Documents/Xpert%20Xpress%20SARS%20CoV-2/Fact%20

Sheets/113-4781%88DQDU-OEY-7%20PATIENT%20FACT%20SHEET.pdf Performing Labo
ratory:[facility] [place] [address]       

                                   

 

             Lab Interpretation (test code = 11749-1) Abnormal                  

              





[facility] 
04:05:00* 



             Test Item    Value        Reference Range Interpretation Comments

 

                    SARS-COV2/RT-PCR (test code = 3240673) Positive            N

ot Detected, Negative, See 

external report for linked test AA                         

 

             SARS-COV-2 PERFORMING LAB (test code = 3617652) [facility]              

                     





Results are for the detection of SARS-CoV-2 RNA. The SARS-CoV-2 RNA is generally
 detectable in nasopharyngeal swab specimens during the acute phase of infection
. Positive results are indicative of active infection with SARS-CoV-2; clinical 
correlation with patient history and other diagnostic information is necessary t
o determine patient infection status. Positive results do not rule out bacterial
 infection or co-infection with other viruses. The agent detected may not be the
 definite cause of disease. The limit of detection for this assay is 250 copies/
mL.This SARS CoV-2 test is a rapid, real-time RT-PCR test intended for the quali
tative detection of nucleic acid from SARS-CoV-2 in a nasopharyngeal swab specim
en collected from individuals suspected of COVID-19 by their healthcare provider
.This test has not been Food and Drug Administration (FDA) cleared or approved a
nd has been authorized by FDA under an Emergency Use Authorization (EUA). This E
UA will be effective until the declaration that circumstances exist justifying t
he authorization of the emergency use of in vitro diagnostic tests for detection
 and/or diagnosis of COVID-19 is terminated under Section 564(b)(2) of the Act o
r the EUA is revoked under Section 564(g) of the Act.Fact Sheet for Healthcare P
christophders:https://www.[employer]/Documents/Xpert%20Xpress%20SARS%20CoV-2/Fact%20
Sheets/302-2871%25FNJU-ACB-2%20HEALTHCARE%20PROVIDERS%20FACT%20SHEET.pdfFact She
et for Healthcare Patients:https://www.[employer]/Documents/Xpert%20Xpress%20SA
RS%20CoV-2/Fact%20Sheets/302-3801%49SUEH-DDL-3%20PATIENT%20FACT%20SHEET.pdfPerfo
rming Laboratory:[facility], [place] [address]
030RAD, PELVIS, 1 OR 2 [id number] 03:59:00Reason for exam:->FEVERReason for
 exam:->decub ulcersFINAL REPORT PATIENT ID:   [id number] RAD, PELVIS, 1 OR 2 
VIEWS CLINICAL HISTORY:FEVERdecub ulcers TECHNIQUE: RAD, PELVIS, 1 OR 2 VIEWS 
COMPARISON: CT pelvis on 2018IMPRESSION:Extensive heterotopic ossification
 overlies the inferior pubic rami which demonstrate diffuse sclerosis. Sclerosis
 of the left proximal femur may alternatively represent superimposition artifact
 from heterotopic ossification. Lucency overlying the right hip and gluteal 
region concerning for subcutaneous gas/abscesses or deep ulcerations. There is 
soft tissue irregularity concerning for decubitus ulcerations overlying the 
ischial tuberosities. No definite bone erosion but osteomyelitis may be 
radiographically occult and given extent of bony abnormalities, cannot be 
reliably excluded on the current exam. Correlation with CT pelvis with IV 
contrast should be considered to exclude abscess formation and osseous erosion. 
No acute hip dislocation. Signed: Flako Mascorro MDReport Verified Date/Time:  
2020 03:59:07     Electronically signed by: FLAKO MASCORRO MD on 
2020 03:59 AM XR pelvis 1 or 2 [id number] 03:59:00Interface, External
 Ris In - 2020  4:01 AM CDTFINAL REPORT PATIENT ID:   [id number] RAD, 
PELVIS, 1 OR 2 VIEWS CLINICAL HISTORY:FEVERdecub ulcers TECHNIQUE: RAD, PELVIS, 
1 OR 2 VIEWS COMPARISON: CT pelvis on 2018IMPRESSION:Extensive heterotopic
 ossification overlies the inferior pubic rami which demonstrate diffuse 
sclerosis. Sclerosis of the left proximal femur may alternatively represent 
superimposition artifact from heterotopic ossification. Lucency overlying the 
right hip and gluteal region concerning for subcutaneous gas/abscesses or deep u
lcerations. There is soft tissue irregularity concerning for decubitus ulceratio
ns overlying the ischial tuberosities. No definite bone erosion but osteomyeliti
s may be radiographically occult and given extent of bony abnormalities, cannot 
be reliably excluded on the current exam. Correlation with CT pelvis with IV con
trast should be considered to exclude abscess formation and osseous erosion. No 
acute hip dislocation. Signed: Flako Mascorro MDReport Verified Date/Time:  2020 03:59:07     Electronically signed by: FLAKO MASCORRO MD on  03:59 AM [facility]Com[facility]
2020 02:42:00* 



             Test Item    Value        Reference Range Interpretation Comments

 

             Protein, Total (test code = 2885-2) 8.1          6.0- 8.3 gm/dL    

           

 

             Albumin (test code = 26025-4) 3.5 g/dL     3.5-5                   

   

 

             Alkaline Phosphatase (test code = 6768-6) 316 U/L            

 H             

 

             Total Bilirubin (test code = 1975-2) 0.4 mg/dL    0.2-1.2          

          

 

             Sodium (test code = 2951-2) 138 meq/L    136-145                   

 

 

             Potassium (test code = 2823-3) 3.7 meq/L    3.5-5.1                

    

 

             Chloride (test code = 2075-0) 105 meq/L                      

   

 

             CO2 (test code = 8-9) 24 meq/L     22-29                      

 

             BUN (test code = 3094-0) 11 mg/dL     7-21                       

 

             Creatinine (test code = 2160-0) 0.80 mg/dL   0.57-1.25             

     

 

             Glucose (test code = 2345-7) 257 mg/dL           H           

  

 

             Calcium (test code = 09123-2) 9.0 mg/dL    8.4-10.2                

   

 

             AST (test code = 1920-8) 32 U/L       5-34                       

 

             ALT (test code = 1742-6) 27 U/L       6-55                       

 

             EGFR (test code = 78197-3) 108          mL/min/1.73 sq m           

   ESTIMATED GFR IS NOT AS 

ACCURATE AS CREATININE CLEARANCE IN PREDICTING GLOMERULAR FILTRATION RATE. 
ESTIMATED GFR IS NOT APPLICABLE FOR DIALYSIS PATIENTS.

 

             LLOYD (test code = LLOYD)  ID - DAWIT W                        

    

 

             Lab Interpretation (test code = 91275-3) Abnormal                  

              





[facility] Center[facility]osph[facility]2020-08-[facility] 02:42:00* 



             Test Item    Value        Reference Range Interpretation Comments

 

             Phosphorus (test code = 2777-1) 3.2 mg/dL    2.3-4.7               

     

 

             LLOYD (test code = LLOYD)  ID Olivia PASTOR W                        

    

 

             Lab Interpretation (test code = 40594-6) Normal                    

              





[facility] CenterC-Reactive [id number] 02:42:00* 



             Test Item    Value        Reference Range Interpretation Comments

 

             CRP (test code = 676) 21.99 mg/dL  0-0.5        H             

 

             LLOYD (test code = LLOYD)  ID - DAWIT W                        

    

 

             Lab Interpretation (test code = 16337-3) Abnormal                  

              





[facility] CenterPHOSPHORUS2020-08-04 02:42:00* 



             Test Item    Value        Reference Range Interpretation Comments

 

             PHOSPHORUS (BEAKER) (test code = 604) 3.2 mg/dL    2.3-4.7         

           





 ID - DAWIT [id number] 02:42:00* 



             Test Item    Value        Reference Range Interpretation Comments

 

             MAGNESIUM (BEAKER) (test code = 627) 2.0 mg/dL    1.6-2.6          

          





 ID - DAWIT WCOMPREHENSIVE METABOLIC [id number] 02:42:00* 



             Test Item    Value        Reference Range Interpretation Comments

 

             TOTAL PROTEIN (BEAKER) (test code = 770) 8.1 gm/dL    6.0-8.3      

              

 

             ALBUMIN (BEAKER) (test code = 1145) 3.5 g/dL     3.5-5.0           

         

 

             ALKALINE PHOSPHATASE (BEAKER) (test code = 346) 316 U/L      

       H             

 

             BILIRUBIN TOTAL (BEAKER) (test code = 377) 0.4 mg/dL    0.2-1.2    

                

 

             SODIUM (BEAKER) (test code = 381) 138 meq/L    136-145             

       

 

             POTASSIUM (BEAKER) (test code = 379) 3.7 meq/L    3.5-5.1          

          

 

             CHLORIDE (BEAKER) (test code = 382) 105 meq/L                

         

 

             CO2 (BEAKER) (test code = 355) 24 meq/L     22-29                  

    

 

             BLOOD UREA NITROGEN (BEAKER) (test code = 354) 11 mg/dL     7-21   

                    

 

             CREATININE (BEAKER) (test code = 358) 0.80 mg/dL   0.57-1.25       

           

 

             GLUCOSE RANDOM (BEAKER) (test code = 652) 257 mg/dL          

 H             

 

             CALCIUM (BEAKER) (test code = 697) 9.0 mg/dL    8.4-10.2           

        

 

             AST (SGOT) (BEAKER) (test code = 353) 32 U/L       5-34            

           

 

             ALT (SGPT) (BEAKER) (test code = 347) 27 U/L       6-55            

           

 

             EGFR (VALERIE) (test code = 1092) 108 mL/min/1.73 sq m              

             ESTIMATED GFR IS NOT 

AS ACCURATE AS CREATININE CLEARANCE IN PREDICTING GLOMERULAR FILTRATION RATE. 
ESTIMATED GFR IS NOT APPLICABLE FOR DIALYSIS PATIENTS.





 ID - DAWIT WC-REACTIVE [id number] 02:42:00* 



             Test Item    Value        Reference Range Interpretation Comments

 

             C-REACTIVE PROTEIN (VALERIE) (test code = 676) 21.99 mg/dL  0.00-0.5

0    H             





 ID - DAWIT WLACTIC ACID, [id number] 02:39:00* 



             Test Item    Value        Reference Range Interpretation Comments

 

             LACTATE BLOOD VENOUS (2) (VALREIE) (test code = 2872) 1.55 mmol/L  0

.50-2.20                 

Specimen slightly hemolyzed





 ID - DAWIT [id number] 02:37:00* 



             Test Item    Value        Reference Range Interpretation Comments

 

             PTT (test code = 54268-6) 40.7         22.5- 36.0 seconds H        

     

 

             Lab Interpretation (test code = 89677-3) Abnormal                  

              





[facility] 02[facility]37[facility]00* 



             Test Item    Value        Reference Range Interpretation Comments

 

             PARTIAL THROMBOPLASTIN TIME (VALERIE) (test code = 760) 40.7 seconds

 22.5-36.0    H            

 





Prothrombin time/[id number] 02:35:00* 



             Test Item    Value        Reference Range Interpretation Comments

 

             Protime (test code = 5902-2) 15.1         11.9- 14.2 seconds H     

        

 

             INR (test code = 6301-6) 1.2          <=5.9                      

 

                          LLOYD (test code = LLOYD)     Effective 2019: PT Refe

rence Range ChangeNew: 11.9-

14.2  Previous: 11.7-14.7 RECOMMENDED COUMADIN/WARFARIN INR THERAPY 
RANGESSTANDARD DOSE: 2.0-3.0  Includes: PROPHYLAXIS for venous thrombosis, sys
temic embolization; TREATMENT for venous thrombosis and/or pulmonary 
embolus.HIGH RISK: Target INR is 2.5-3.5 for patients wiht mechanical heart 
valves.                                                      

 

             Lab Interpretation (test code = 87069-2) Abnormal                  

              





[facility] HospitalPROTHROMBIN TIME/[id number] 02:35:00* 



             Test Item    Value        Reference Range Interpretation Comments

 

             PROTIME (BEAKER) (test code = 759) 15.1 seconds 11.9-14.2    H     

        

 

             INR (BEAKER) (test code = 370) 1.2          <=5.9                  

    





Effective 2019: PT Reference Range ChangeNew: 11.9-14.2  Previous: 11.7-14.
7RECOMMENDED COUMADIN/WARFARIN INR THERAPY RANGESSTANDARD DOSE: 2.0-3.0  Include
s: PROPHYLAXIS for venous thrombosis, systemic embolization; TREATMENT for venou
s thrombosis and/or pulmonary embolus.HIGH RISK: Target INR is 2.5-3.5 for patie
nts wiht mechanical heart valves.CBC W/PLT COUNT & AUTO [id number] 
02:29:00* 



             Test Item    Value        Reference Range Interpretation Comments

 

             WHITE BLOOD CELL COUNT (BEAKER) (test code = 775) 13.3 K/ L    3.5-

10.5     H             

 

             RED BLOOD CELL COUNT (BEAKER) (test code = 761) 4.01 M/ L    4.63-6

.08    L             

 

             HEMOGLOBIN (BEAKER) (test code = 410) 9.7 GM/DL    13.7-17.5    L  

           

 

             HEMATOCRIT (BEAKER) (test code = 411) 32.4 %       40.1-51.0    L  

           

 

             MEAN CORPUSCULAR VOLUME (BEAKER) (test code = 753) 80.8 fL      79.

0-92.2                  

 

             MEAN CORPUSCULAR HEMOGLOBIN (BEAKER) (test code = 751) 24.2 pg     

 25.7-32.2    L             

 

                    MEAN CORPUSCULAR HEMOGLOBIN CONC (BEAKER) (test code = 752) 

29.9 GM/DL          32.3-36.5

                          L                          

 

             RED CELL DISTRIBUTION WIDTH (BEAKER) (test code = 412) 18.6 %      

 11.6-14.4    H             

 

             PLATELET COUNT (BEAKER) (test code = 756) 655 K/CU MM  150-450     

 H             

 

             MEAN PLATELET VOLUME (BEAKER) (test code = 754) 8.9 fL       9.4-12

.4     L             

 

             NUCLEATED RED BLOOD CELLS (BEAKER) (test code = 413) 0 /100 WBC   0

-0                        

 

             NEUTROPHILS RELATIVE PERCENT (BEAKER) (test code = 429) 64 %       

                             

 

             LYMPHOCYTES RELATIVE PERCENT (BEAKER) (test code = 430) 25 %       

                             

 

             MONOCYTES RELATIVE PERCENT (BEAKER) (test code = 431) 10 %         

                           

 

             EOSINOPHILS RELATIVE PERCENT (BEAKER) (test code = 432) 1 %        

                             

 

             BASOPHILS RELATIVE PERCENT (BEAKER) (test code = 437) 0 %          

                           

 

             NEUTROPHILS ABSOLUTE COUNT (BEAKER) (test code = 670) 8.43 K/ L    

1.78-5.38    H             

 

             LYMPHOCYTES ABSOLUTE COUNT (BEAKER) (test code = 414) 3.32 K/ L    

1.32-3.57                  

 

             MONOCYTES ABSOLUTE COUNT (BEAKER) (test code = 415) 1.30 K/ L    0.

30-0.82    H             

 

             EOSINOPHILS ABSOLUTE COUNT (BEAKER) (test code = 416) 0.12 K/ L    

0.04-0.54                  

 

             BASOPHILS ABSOLUTE COUNT (BEAKER) (test code = 417) 0.04 K/ L    0.

01-0.08                  

 

             IMMATURE GRANULOCYTES-RELATIVE PERCENT (BEAKER) (test code = 2801) 

0 %          0-1                        





RAD, CHEST, 1 VIEW, NON [id number] 00:37:00Reason for exam:->FEVERShould 
this be performed at the bedside?->YesFINAL REPORT PATIENT ID:   [id number] RAD, 
CHEST, 1 VIEW, NON DEPT CLINICAL HISTORY: FEVER  TECHNIQUE: Single view of the 
chest. COMPARISON: May 1, 2020 IMPRESSION:Stable elevated right hemidiaphragm. 
The right costophrenic angle blunting suggests stable small right pleural 
effusion. There are stable reticulations within the right lung likely 
bronchovascular crowding, atelectasis versus asymmetric edema. No new lung 
consolidation. No pneumothorax. Left lung is well aerated. Cardiomediastinal 
silhouette is stable. In summary, examination is not significant change compared
 to May 1, 2020. Signed: Flako Mascorro MDReport Verified Date/Time:  
2020 00:37:09     Electronically signed by: FLAKO MASCORRO MD on 
2020 12:37 AM XR chest 1 view portable / [id number] 00:37:00
Interface, External Ris In - 2020 12:39 AM CDTFINAL REPORT PATIENT ID:   [id number]
[id number] RAD, CHEST, 1 VIEW, NON DEPT CLINICAL HISTORY: FEVER  TECHNIQUE: Single 
view of the chest. COMPARISON: May 1, 2020 IMPRESSION:Stable elevated right latanya
diaphragm. The right costophrenic angle blunting suggests stable small right ple
ural effusion. There are stable reticulations within the right lung likely bronc
hovascular crowding, atelectasis versus asymmetric edema. No new lung consolidat
ion. No pneumothorax. Left lung is well aerated. Cardiomediastinal silhouette is
 stable. In summary, examination is not significant change compared to May 1, 20
20. Signed: Flako Mascorro MDReport Verified Date/Time:  2020 00:37:09   
  Electronically signed by: FLAKO MASCORRO MD on 2020 12:37 AM [facility] CenterECG/EKG [id number] 23:44:57Mundo Varela MD     8/10/2020 12:58 AMECG/EKG InterpretationDate/Time: 2020 2:42 
AMPerformed by: Mundo Varela MDAuthorized by: Elda Main MD The ECG was 
interpreted by ED physician. The ECG is interpreted as sinus rhythm. Rate is 
normal rate. Conduction: conduction normal. ST segments normal. T waves normal. 
Axis is normal. Other findings: no other findings. Right sided lead use: right-
sided leads not used. Left sided lead use: Posterior leads were not used. Clinic
al Impression: normal ECGECG reviewed and does not meet STEMI criteria. [facility] CenterCB W/AUTO [id number] 13:15:00* 



             Test Item    Value        Reference Range Interpretation Comments

 

             WHITE BLOOD CELL (test code = WBC) 10.48 x10 3/uL 4.5-11.0     N   

          

 

             RED BLOOD CELL (test code = RBC) 4.41 x10 6/uL 4.00-5.60    N      

       

 

             HEMOGLOBIN (test code = HGB) 10.4 g/dL    12.5-16.9    L           

  

 

             HEMATOCRIT (test code = HCT) 35.3 %       37.5-50.7    L           

  

 

             MEAN CELL VOLUME (test code = MCV) 80.0 fL      81.0-99.0    L     

        

 

             MEAN CELL HGB (test code = MCH) 23.6 pg      27.0-33.0    L        

     

 

             MEAN CELL HGB CONCETRATION (test code = MCHC) 29.5 g/dL    33.0-37.

0    L             

 

             RED CELL DISTRIBUTION WIDTH CV (test code = RDW) 15.9 %       11.5-

14.5    H             

 

             RED CELL DISTRIBUTION WIDTH SD (test code = RDW-SD) 46.5 fL      37

.0-54.0    N             

 

             PLATELET COUNT (test code = PLT) 712 x10 3/uL 150-400      H       

      

 

             MEAN PLATELET VOLUME (test code = MPV) 8.1 fL       7.0-9.0      N 

            

 

             NEUTROPHIL % (test code = NT%) 51.6 %       56.0-77.0    L         

    

 

             IMMATURE GRANULOCYTE % (test code = IG%) 0.4 %        0.0-2.0      

N             

 

             LYMPHOCYTE % (test code = LY%) 38.5 %       14.0-32.0    H         

    

 

             MONOCYTE % (test code = MO%) 6.8 %        4.8-9.0      N           

  

 

             EOSINOPHIL % (test code = EO%) 2.1 %        0.3-3.7      N         

    

 

             BASOPHIL % (test code = BA%) 0.6 %        0.0-2.0      N           

  

 

             NUCLEATED RBC % (test code = NRBC%) 0.0 %        0-0          N    

         

 

             NEUTROPHIL # (test code = NT#) 5.41 x10 3/uL 2.0-7.6      N        

     

 

             IMMATURE GRANULOCYTE # (test code = IG#) 0.04 x10 3/uL 0.00-0.03   

 H             

 

             LYMPHOCYTE # (test code = LY#) 4.04 x10 3/uL 1.0-3.8      H        

     

 

             MONOCYTE # (test code = MO#) 0.71 x10 3/uL 0.1-0.8      N          

   

 

             EOSINOPHIL # (test code = EO#) 0.22 x10 3/uL 0.0-0.2      H        

     

 

             BASOPHIL # (test code = BA#) 0.06 x10 3/uL 0.0-0.2      N          

   

 

             NUCLEATED RBC # (test code = NRBC#) 0.00 x10 3/uL 0.0-0.1      N   

          

 

             MANUAL DIFF REQUIRED (test code = MDIFF) NO                        

             SLIDE REVIEWED, CONSISTENT WITH 

AUTO DIFF.





BASIC METABOLIC [id number] 12:56:00* 



             Test Item    Value        Reference Range Interpretation Comments

 

             SODIUM (test code = NA) 135 mEq/L    134-147      N             

 

             POTASSIUM (test code = K) 4.1 mEq/L    3.4-5.0      N             

 

             CHLORIDE (test code = CL) 104 mEq/L    100-108      N             

 

             CARBON DIOXIDE (test code = CO2) 27 mEq/L     21-33        N       

      

 

             ANION GAP (test code = GAP) 8            0-20         N            

 

 

             GLUCOSE (test code = GLU) 233 mg/dL           H             

 

             BLOOD UREA NITROGEN (test code = BUN) 11 mg/dL     7-18         N  

           

 

             GLOMERULAR FILTRATION RATE (test code = GFR) 126.2        105-110  

    H            Units of measure = 

ml/min/1.73 m2

 

             CREATININE (test code = CREAT) 0.7 mg/dL    0.6-1.3      N         

    

 

             CALCIUM (test code = CA) 8.8 mg/dL    8.0-10.5     N             





CBC W/AUTO [id number] 12:34:00* 



             Test Item    Value        Reference Range Interpretation Comments

 

             WHITE BLOOD CELL (test code = WBC) 10.48 x10 3/uL 4.5-11.0     N   

          

 

             RED BLOOD CELL (test code = RBC) 4.41 x10 6/uL 4.00-5.60    N      

       

 

             HEMOGLOBIN (test code = HGB) 10.4 g/dL    12.5-16.9    L           

  

 

             HEMATOCRIT (test code = HCT) 35.3 %       37.5-50.7    L           

  

 

             MEAN CELL VOLUME (test code = MCV) 80.0 fL      81.0-99.0    L     

        

 

             MEAN CELL HGB (test code = MCH) 23.6 pg      27.0-33.0    L        

     

 

             MEAN CELL HGB CONCETRATION (test code = MCHC) 29.5 g/dL    33.0-37.

0    L             

 

             RED CELL DISTRIBUTION WIDTH CV (test code = RDW) 15.9 %       11.5-

14.5    H             

 

             RED CELL DISTRIBUTION WIDTH SD (test code = RDW-SD) 46.5 fL      37

.0-54.0    N             

 

             PLATELET COUNT (test code = PLT) 712 x10 3/uL 150-400      H       

      

 

             MEAN PLATELET VOLUME (test code = MPV) 8.1 fL       7.0-9.0      N 

            

 

             NEUTROPHIL % (test code = NT%)  %           56.0-77.0              

    

 

             LYMPHOCYTE % (test code = LY%)  %           14.0-32.0              

    

 

             NEUTROPHIL # (test code = NT#)  x10 3/uL    2.0-7.6                

    

 

             LYMPHOCYTE # (test code = LY#)  x10 3/uL    1.0-3.8                

    

 

             MANUAL DIFF REQUIRED (test code = MDIFF)                           

              





AFB culture + smear (non-sputum)2020 10:51:00* 



             Test Item    Value        Reference Range Interpretation Comments

 

             Result (test code = 6463-4) No acid-fast bacilli isolated in 42 day

s                            

 

             AFB Smear (test code = 19688-3) No acid fast bacilli seen          

                  





[facility]2020 10:51:00
  * 



             Test Item    Value        Reference Range Interpretation Comments

 

                CULTURE (BEAKER) (test code = 1095) No acid-fast bacilli isolate

d in 42 days                  

                                         

 

             AFB SMEAR (BEAKER) (test code = 994) No acid fast bacilli seen     

                       





Fungus culture +  [id number] 16:33:00* 



             Test Item    Value        Reference Range Interpretation Comments

 

             Result (test code = 6463-4) No fungus isolated in 28 days          

                  

 

             Fungus Smear (test code = 1406) No fungi seen                      

      





[facility]  [id number] 16:33:00* 



             Test Item    Value        Reference Range Interpretation Comments

 

             CULTURE (BEAKER) (test code = 1095) No fungus isolated in 28 days  

                          

 

             FUNGUS SMEAR (BEAKER) (test code = 1406) No fungi seen             

               





Anaerobic [id number] 17:25:00* 



             Test Item    Value        Reference Range Interpretation Comments

 

             Result (test code = 6463-4) No anaerobes isolated                  

          





[facility] [id number] 17:25:00* 



             Test Item    Value        Reference Range Interpretation Comments

 

             CULTURE (BEAKER) (test code = 1095) No anaerobes isolated          

                  





BLOOD [id number] 17:00:00* 



             Test Item    Value        Reference Range Interpretation Comments

 

             CULTURE (BEAKER) (test code = 1095) No growth in 5 days            

                





BLOOD [id number] 17:00:00* 



             Test Item    Value        Reference Range Interpretation Comments

 

             CULTURE (BEAKER) (test code = 1095) No growth in 5 days            

                





Surgically obtained culture + gram [id number] 12:43:00* 



             Test Item    Value        Reference Range Interpretation Comments

 

                          Result (test code = 6463-4) <1+ Same organism has been

 isolated from cultures(s)

 of the same body site and collection date. Repeat identification and 
susceptibility testing performed only after consultation with the clinical mi
crobiology laboratory.                     A                   Refer to previous

 culture ofAcinetobacter baumannii

 

             Gram Stain Result (test code = 1123) No organisms seen             

               

 

             Lab Interpretation (test code = 92868-6) Abnormal                  

              





[facility] [id number] 
12:43:00* 



             Test Item    Value        Reference Range Interpretation Comments

 

             CULTURE (BEAKER) (test code = 1095)                           A    

        <1+ Same organism has been isolated 

from cultures(s) of the same body site and collection date. Repeat 
identification and susceptibility testing performed only after consultation with
 the clinical microbiology laboratory.Refer to previous culture ofAcinetobacter 
baumannii

 

             GRAM STAIN RESULT (BEAKER) (test code = 1123) <1+ WBCs             

                   

 

             GRAM STAIN RESULT (BEAKER) (test code = 920137) No organisms seen  

                          





SURGICALLY OBTAINED CULTURE + GRAM [id number] 12:35:00* 



             Test Item    Value        Reference Range Interpretation Comments

 

                    CULTURE (BEAKER) (test code = 1095) METHICILLIN RESISTANT ST

APHYLOCOCCUS AUREUS 

                          A                         4+ Methicillin resistant Sta

phylococcus aureus

 

             Clindamycin (test code = 10)                           S           

  

 

             Erythromycin (test code = 4)                           R           

  

 

             Linezolid (test code = 40)                           S             

 

             Nitrofurantoin (test code = 23)                           S        

     

 

             Oxacillin (test code = 14)                           R             

 

             Rifampin (test code = 43)                           S             

 

             Tetracycline (test code = 2)                           S           

  

 

             Trimethoprim + Sulfamethoxazole (test code = 47)                   

        S             

 

             Vancomycin (test code = 13)                           S            

 

 

             CULTURE (BEAKER) (test code = 1095) ACINETOBACTER BAUMANNII COMPLEX

              A            4+ 

Acinetobacter baumannii complex

 

             Amikacin (test code = 1)              Susceptible 0-16 , Resistant 

<0 or >16  S             

 

             Aztreonam (test code = 32)                                         

 

             Cefepime (test code = 51)              Susceptible 0-8 , Resistant 

<0 or >8  R             

 

             Ceftazidime (test code = 27)              Susceptible 0-8 , Resista

nt <0 or >8  R             

 

             Ciprofloxacin (test code = 7)              Susceptible 0-1 , Resist

ant <0 or >1  R             

 

             Gentamicin (test code = 18)              Susceptible 0-4 , Resistan

t <0 or >4  S             

 

             Imipenem (test code = 19)              Susceptible 0-2 , Resistant 

<0 or >2  R             

 

             Levofloxacin (test code = 22)              Susceptible 0-2 , Resist

ant <0 or >2  R             

 

             Meropenem (test code = 34)              Susceptible 0-2 , Resistant

 <0 or >2  R             

 

             Piperacillin (test code = 24)              Susceptible 0-16 , Resis

tant <0 or >16  R             

 

                    Piperacillin + Tazobactam (test code = 29)                  

   Susceptible 0-16 , Resistant <0 or 

>16                       R                          

 

             Tobramycin (test code = 25)              Susceptible 0-4 , Resistan

t <0 or >4  S             

 

                    Trimethoprim + Sulfamethoxazole (test code = 47)            

         Susceptible 0-40 , Resistant 

<0 or >40                 S                          

 

             GRAM STAIN RESULT (BEAKER) (test code = 1123) 1+ WBCs              

                   

 

                          GRAM STAIN RESULT (BEAKER) (test code = 431548) 4+ gra

m positive cocci in pairs 

and clusters                                                 





BLOOD [id number] 18:00:00* 



             Test Item    Value        Reference Range Interpretation Comments

 

             CULTURE (BEAKER) (test code = 1095) No growth in 5 days            

                





BLOOD [id number] 09:05:00* 



             Test Item    Value        Reference Range Interpretation Comments

 

             CULTURE (BEAKER) (test code = 1095)                           A    

        From Aerobic Bottle Only Coagulase 

negative Staphylococcus

 

                          GRAM STAIN RESULT (BEAKER) (test code = 1123) From aer

obic bottle only: gram 

positive cocci in clusters                                          





POCT-GLUCOSE [id number] 14:17:00* 



             Test Item    Value        Reference Range Interpretation Comments

 

             POC-GLUCOSE METER (BEAKER) (test code = 1538) 182 mg/dL      

     H            : Notified 

RN/MD: TESTED AT [address], [address]: /Technician ID = 
[id number] for Jennifer Cano





WOUND CULTURE + GRAM [id number] 09:13:00* 



             Test Item    Value        Reference Range Interpretation Comments

 

             CULTURE (BEAKER) (test code = 1095) PSEUDOMONAS AERUGINOSA         

     A            4+ Pseudomonas 

aeruginosa

 

             Amikacin (test code = 1)              Susceptible 0-16 , Resistant 

<0 or >16  S             

 

             Aztreonam (test code = 32)              Susceptible 0-8 , Resistant

 <0 or >8  S             

 

             Cefepime (test code = 51)              Susceptible 0-8 , Resistant 

<0 or >8  S             

 

             Ceftazidime (test code = 27)              Susceptible 0-8 , Resista

nt <0 or >8  S             

 

             Ciprofloxacin (test code = 7)              Susceptible 0-0.5 , Resi

stant <0 or >.5  S             

 

             Gentamicin (test code = 18)              Susceptible 0-4 , Resistan

t <0 or >4  S             

 

             Levofloxacin (test code = 22)              Susceptible 0-1 , Resist

ant <0 or >1  S             

 

             Meropenem (test code = 34)              Susceptible 0-2 , Resistant

 <0 or >2  S             

 

             Piperacillin (test code = 24)              Susceptible 0-16 , Resis

tant <0 or >16  S             

 

                    Piperacillin + Tazobactam (test code = 29)                  

   Susceptible 0-16 , Resistant <0 or 

>16                       S                          

 

             Tobramycin (test code = 25)              Susceptible 0-4 , Resistan

t <0 or >4  S             

 

                    CULTURE (BEAKER) (test code = 1095) METHICILLIN RESISTANT ST

APHYLOCOCCUS AUREUS 

                          A                         4+ Methicillin resistant Sta

phylococcus aureus

 

             Clindamycin (test code = 10)                           S           

  

 

             Erythromycin (test code = 4)                           R           

  

 

             Linezolid (test code = 40)                           S             

 

             Nitrofurantoin (test code = 23)                           S        

     

 

             Oxacillin (test code = 14)                           R             

 

             Rifampin (test code = 43)                           S             

 

             Tetracycline (test code = 2)                           S           

  

 

             Trimethoprim + Sulfamethoxazole (test code = 47)                   

        S             

 

             Vancomycin (test code = 13)                           S            

 

 

             GRAM STAIN RESULT (BEAKER) (test code = 1123) 1+ WBCs              

                   

 

                GRAM STAIN RESULT (BEAKER) (test code = 514864) 2+ gram negative

 coccobacilli                 

                                         

 

                          GRAM STAIN RESULT (BEAKER) (test code = 123670) 1+ gra

m positive cocci in pairs 

and clusters                                                 





Urine [id number] 08:58:00* 



             Test Item    Value        Reference Range Interpretation Comments

 

             Result (test code = 6463-4) <10,000 col/mL Staphylococcus epidermid

is              A             

 

             Lab Interpretation (test code = 59721-2) Abnormal                  

              





[facility] W/PLT COUNT & AUTO [id number] 
07:48:00* 



             Test Item    Value        Reference Range Interpretation Comments

 

             WHITE BLOOD CELL COUNT (BEAKER) (test code = 775) 9.6 K/ L     3.5-

10.5                   

 

             RED BLOOD CELL COUNT (BEAKER) (test code = 761) 4.03 M/ L    4.63-6

.08    L             

 

             HEMOGLOBIN (BEAKER) (test code = 410) 10.1 GM/DL   13.7-17.5    L  

           

 

             HEMATOCRIT (BEAKER) (test code = 411) 33.8 %       40.1-51.0    L  

           

 

             MEAN CORPUSCULAR VOLUME (BEAKER) (test code = 753) 83.9 fL      79.

0-92.2                  

 

             MEAN CORPUSCULAR HEMOGLOBIN (BEAKER) (test code = 751) 25.1 pg     

 25.7-32.2    L             

 

                    MEAN CORPUSCULAR HEMOGLOBIN CONC (BEAKER) (test code = 752) 

29.9 GM/DL          32.3-36.5

                          L                          

 

             RED CELL DISTRIBUTION WIDTH (BEAKER) (test code = 412) 16.4 %      

 11.6-14.4    H             

 

             PLATELET COUNT (BEAKER) (test code = 756) 1043 K/CU -450     

 H             

 

             MEAN PLATELET VOLUME (BEAKER) (test code = 754) 8.0 fL       9.4-12

.4     L             

 

             NUCLEATED RED BLOOD CELLS (BEAKER) (test code = 413) 0 /100 WBC   0

-0                        

 

             NEUTROPHILS RELATIVE PERCENT (BEAKER) (test code = 429) 47 %       

                             

 

             LYMPHOCYTES RELATIVE PERCENT (BEAKER) (test code = 430) 41 %       

                             

 

             MONOCYTES RELATIVE PERCENT (BEAKER) (test code = 431) 8 %          

                           

 

             EOSINOPHILS RELATIVE PERCENT (BEAKER) (test code = 432) 3 %        

                             

 

             BASOPHILS RELATIVE PERCENT (BEAKER) (test code = 437) 0 %          

                           

 

             NEUTROPHILS ABSOLUTE COUNT (BEAKER) (test code = 670) 4.48 K/ L    

1.78-5.38                  

 

             LYMPHOCYTES ABSOLUTE COUNT (BEAKER) (test code = 414) 3.98 K/ L    

1.32-3.57    H             

 

             MONOCYTES ABSOLUTE COUNT (BEAKER) (test code = 415) 0.79 K/ L    0.

30-0.82                  

 

             EOSINOPHILS ABSOLUTE COUNT (BEAKER) (test code = 416) 0.25 K/ L    

0.04-0.54                  

 

             BASOPHILS ABSOLUTE COUNT (BEAKER) (test code = 417) 0.04 K/ L    0.

01-0.08                  

 

             IMMATURE GRANULOCYTES-RELATIVE PERCENT (BEAKER) (test code = 2801) 

1 %          0-1                        





POCT-GLUCOSE [id number] 07:35:00* 



             Test Item    Value        Reference Range Interpretation Comments

 

             POC-GLUCOSE METER (BEAKER) (test code = 1538) 133 mg/dL      

     H            : TESTED AT 

[address], [address]: /Technician ID = [id number] for 
Jennifer Cano





BASIC METABOLIC [id number] 07:08:00* 



             Test Item    Value        Reference Range Interpretation Comments

 

             SODIUM (BEAKER) (test code = 381) 136 meq/L    136-145             

       

 

             POTASSIUM (BEAKER) (test code = 379) 4.1 meq/L    3.5-5.1          

          

 

             CHLORIDE (BEAKER) (test code = 382) 101 meq/L                

         

 

             CO2 (BEAKER) (test code = 355) 25 meq/L     22-29                  

    

 

             BLOOD UREA NITROGEN (BEAKER) (test code = 354) 15 mg/dL     7-21   

                    

 

             CREATININE (BEAKER) (test code = 358) 0.68 mg/dL   0.57-1.25       

           

 

             GLUCOSE RANDOM (BEAKER) (test code = 652) 145 mg/dL          

 H             

 

             CALCIUM (BEAKER) (test code = 697) 8.8 mg/dL    8.4-10.2           

        

 

             EGFR (BEAKER) (test code = 1092) 131 mL/min/1.73 sq m              

             ESTIMATED GFR IS NOT 

AS ACCURATE AS CREATININE CLEARANCE IN PREDICTING GLOMERULAR FILTRATION RATE. 
ESTIMATED GFR IS NOT APPLICABLE FOR DIALYSIS PATIENTS.





 ID - PIAYA LPOCT-GLUCOSE [id number] 21:12:00* 



             Test Item    Value        Reference Range Interpretation Comments

 

             POC-GLUCOSE METER (BEAKER) (test code = 1538) 119 mg/dL      

     H            : TESTED AT 

[address], [address]: /Technician ID = [id number] for 
CAMILLE BLOCK





POCT-GLUCOSE [id number] 16:58:00* 



             Test Item    Value        Reference Range Interpretation Comments

 

             POC-GLUCOSE METER (BEAKER) (test code = 1538) 177 mg/dL      

     H            : TESTED AT 

[address], [address]: /Technician ID = [id number] for 
QUEEN BROWN





POCT-GLUCOSE [id number] 11:55:00* 



             Test Item    Value        Reference Range Interpretation Comments

 

             POC-GLUCOSE METER (BEAKER) (test code = 1538) 121 mg/dL      

     H            : TESTED AT 

[address], [address]: /Technician ID = [id number] for 
QUEEN BROWN





CBC W/PLT COUNT & AUTO [id number] 10:06:00* 



             Test Item    Value        Reference Range Interpretation Comments

 

             WHITE BLOOD CELL COUNT (BEAKER) (test code = 775) 9.6 K/ L     3.5-

10.5                   

 

             RED BLOOD CELL COUNT (BEAKER) (test code = 761) 3.89 M/ L    4.63-6

.08    L             

 

             HEMOGLOBIN (BEAKER) (test code = 410) 9.5 GM/DL    13.7-17.5    L  

           

 

             HEMATOCRIT (BEAKER) (test code = 411) 31.9 %       40.1-51.0    L  

           

 

             MEAN CORPUSCULAR VOLUME (BEAKER) (test code = 753) 82.0 fL      79.

0-92.2                  

 

             MEAN CORPUSCULAR HEMOGLOBIN (BEAKER) (test code = 751) 24.4 pg     

 25.7-32.2    L             

 

                    MEAN CORPUSCULAR HEMOGLOBIN CONC (BEAKER) (test code = 752) 

29.8 GM/DL          32.3-36.5

                          L                          

 

             RED CELL DISTRIBUTION WIDTH (BEAKER) (test code = 412) 16.3 %      

 11.6-14.4    H             

 

             PLATELET COUNT (BEAKER) (test code = 756) 1005 K/CU -450     

 H             

 

             MEAN PLATELET VOLUME (BEAKER) (test code = 754) 8.1 fL       9.4-12

.4     L             

 

             NUCLEATED RED BLOOD CELLS (BEAKER) (test code = 413) 0 /100 WBC   0

-0                        

 

             NEUTROPHILS RELATIVE PERCENT (BEAKER) (test code = 429) 56 %       

                             

 

             LYMPHOCYTES RELATIVE PERCENT (BEAKER) (test code = 430) 33 %       

                             

 

             MONOCYTES RELATIVE PERCENT (BEAKER) (test code = 431) 8 %          

                           

 

             EOSINOPHILS RELATIVE PERCENT (BEAKER) (test code = 432) 2 %        

                             

 

             BASOPHILS RELATIVE PERCENT (BEAKER) (test code = 437) 1 %          

                           

 

             NEUTROPHILS ABSOLUTE COUNT (BEAKER) (test code = 670) 5.39 K/ L    

1.78-5.38    H             

 

             LYMPHOCYTES ABSOLUTE COUNT (BEAKER) (test code = 414) 3.12 K/ L    

1.32-3.57                  

 

             MONOCYTES ABSOLUTE COUNT (BEAKER) (test code = 415) 0.76 K/ L    0.

30-0.82                  

 

             EOSINOPHILS ABSOLUTE COUNT (BEAKER) (test code = 416) 0.21 K/ L    

0.04-0.54                  

 

             BASOPHILS ABSOLUTE COUNT (BEAKER) (test code = 417) 0.05 K/ L    0.

01-0.08                  

 

             IMMATURE GRANULOCYTES-RELATIVE PERCENT (BEAKER) (test code = 2801) 

1 %          0-1                        





WOUND CULTURE + GRAM [id number] 08:37:00* 



             Test Item    Value        Reference Range Interpretation Comments

 

                    CULTURE (BEAKER) (test code = 1095) METHICILLIN RESISTANT ST

APHYLOCOCCUS AUREUS 

                          A                         4+ Methicillin resistant Sta

phylococcus aureus

 

             Clindamycin (test code = 10)                           S           

  

 

             Erythromycin (test code = 4)                           R           

  

 

             Linezolid (test code = 40)                           S             

 

             Nitrofurantoin (test code = 23)                           S        

     

 

             Oxacillin (test code = 14)                           R             

 

             Rifampin (test code = 43)                           S             

 

             Tetracycline (test code = 2)                           S           

  

 

             Trimethoprim + Sulfamethoxazole (test code = 47)                   

        S             

 

             Vancomycin (test code = 13)                           S            

 

 

             GRAM STAIN RESULT (BEAKER) (test code = 1123) 1+ WBCs              

                   

 

             GRAM STAIN RESULT (BEAKER) (test code = 729940) 1+ gram positive ro

ds                            

 

                    GRAM STAIN RESULT (BEAKER) (test code = 194522) 4+ gram posi

tive cocci in pairs 

                                                     

 

                          GRAM STAIN RESULT (BEAKER) (test code = 618560) 1+ gra

m positive cocci in 

clusters                                                     





WOUND CULTURE + GRAM [id number] 08:36:00* 



             Test Item    Value        Reference Range Interpretation Comments

 

             CULTURE (BEAKER) (test code = 1095)                           A    

        4+ Same organism has been isolated 

from cultures(s) of the same body site and collection date. Repeat 
identification and susceptibility testing performed only after consultation with
 the clinical microbiology laboratory.Refer to previous culture ofStaphylococcus
 aureus

 

             GRAM STAIN RESULT (BEAKER) (test code = 1123) 1+ WBCs              

                   

 

                GRAM STAIN RESULT (BEAKER) (test code = 178900) 2+ gram variable

 coccobacilli                 

                                         

 

                          GRAM STAIN RESULT (BEAKER) (test code = 500442) 1+ gra

m positive cocci in pairs 

and clusters                                                 





4+ Skin floraPOCT-GLUCOSE [id number] 08:05:00* 



             Test Item    Value        Reference Range Interpretation Comments

 

             POC-GLUCOSE METER (BEAKER) (test code = 1538) 179 mg/dL      

     H            : TESTED AT 

[facility] [address] [facility], [address]: /Technician ID = [id number] for 
GLYNN SHAH





BASIC METABOLIC [id number] 05:59:00* 



             Test Item    Value        Reference Range Interpretation Comments

 

             SODIUM (BEAKER) (test code = 381) 138 meq/L    136-145             

       

 

             POTASSIUM (BEAKER) (test code = 379) 4.5 meq/L    3.5-5.1          

          

 

             CHLORIDE (BEAKER) (test code = 382) 103 meq/L                

         

 

             CO2 (BEAKER) (test code = 355) 28 meq/L     22-29                  

    

 

             BLOOD UREA NITROGEN (BEAKER) (test code = 354) 12 mg/dL     7-21   

                    

 

             CREATININE (BEAKER) (test code = 358) 0.75 mg/dL   0.57-1.25       

           

 

             GLUCOSE RANDOM (BEAKER) (test code = 652) 251 mg/dL          

 H             

 

             CALCIUM (BEAKER) (test code = 697) 8.6 mg/dL    8.4-10.2           

        

 

             EGFR (BEAKER) (test code = 1092) 117 mL/min/1.73 sq m              

             ESTIMATED GFR IS NOT 

AS ACCURATE AS CREATININE CLEARANCE IN PREDICTING GLOMERULAR FILTRATION RATE. 
ESTIMATED GFR IS NOT APPLICABLE FOR DIALYSIS PATIENTS.





 ID - PIAYA LPOCT-GLUCOSE [id number] 20:59:00* 



             Test Item    Value        Reference Range Interpretation Comments

 

             POC-GLUCOSE METER (BEAKER) (test code = 1538) 197 mg/dL      

     H            : TESTED AT 

[address], [address]: /Technician ID = [id number] for 
PALMIRA POLLOCK





POCT-GLUCOSE [id number] 17:21:00* 



             Test Item    Value        Reference Range Interpretation Comments

 

             POC-GLUCOSE METER (BEAKER) (test code = 1538) 220 mg/dL      

     H            : TESTED AT 

[address], [address]: /Technician ID = [id number] for 
KEIRA HOLLAND





POCT-GLUCOSE [id number] 13:11:00* 



             Test Item    Value        Reference Range Interpretation Comments

 

             POC-GLUCOSE METER (BEAKER) (test code = 1538) 131 mg/dL      

     H            : TESTED AT 

[address], [address]: /Technician ID = [id number] for 
AMALIA, KEIRA





POCT-GLUCOSE [id number] 11:51:00* 



             Test Item    Value        Reference Range Interpretation Comments

 

             POC-GLUCOSE METER (BEAKER) (test code = 1538) 202 mg/dL      

     H            : TESTED AT 

[place] [id number] [facility], [address]: /Technician ID = [id number] for 
KEIRA HOLLAND





POCT-GLUCOSE [id number] 11:09:00* 



             Test Item    Value        Reference Range Interpretation Comments

 

             POC-GLUCOSE METER (BEAKER) (test code = 1538) 258 mg/dL      

     H            : TESTED AT 

[address], [address]: /Technician ID = [id number] for 
AYAZ OSORIO





BASIC METABOLIC [id number] 10:53:00* 



             Test Item    Value        Reference Range Interpretation Comments

 

             SODIUM (BEAKER) (test code = 381) 134 meq/L    136-145      L      

       

 

             POTASSIUM (BEAKER) (test code = 379) 4.3 meq/L    3.5-5.1          

          

 

             CHLORIDE (BEAKER) (test code = 382) 98 meq/L                 

         

 

             CO2 (BEAKER) (test code = 355) 27 meq/L     22-29                  

    

 

             BLOOD UREA NITROGEN (BEAKER) (test code = 354) 14 mg/dL     7-21   

                    

 

             CREATININE (BEAKER) (test code = 358) 0.71 mg/dL   0.57-1.25       

           

 

             GLUCOSE RANDOM (BEAKER) (test code = 652) 271 mg/dL          

 H             

 

             CALCIUM (BEAKER) (test code = 697) 8.6 mg/dL    8.4-10.2           

        

 

             EGFR (BEAKER) (test code = 1092) 124 mL/min/1.73 sq m              

             ESTIMATED GFR IS NOT 

AS ACCURATE AS CREATININE CLEARANCE IN PREDICTING GLOMERULAR FILTRATION RATE. 
ESTIMATED GFR IS NOT APPLICABLE FOR DIALYSIS PATIENTS.





 ID - MATTI WPOCT-GLUCOSE [id number] 07:49:00* 



             Test Item    Value        Reference Range Interpretation Comments

 

             POC-GLUCOSE METER (BEAKER) (test code = 1538) 155 mg/dL      

     H            : TESTED AT 

[address], [address]: /Technician ID = [id number] for 
KEIRA HOLLAND





POCT-GLUCOSE [id number] 20:37:00* 



             Test Item    Value        Reference Range Interpretation Comments

 

             POC-GLUCOSE METER (BEAKER) (test code = 1538) 194 mg/dL      

     H            : TESTED AT 

[address] [place], [address]: /Technician ID = [id number] for 
YAIR QUINTANILLA





POCT-GLUCOSE [id number] 16:57:00* 



             Test Item    Value        Reference Range Interpretation Comments

 

             POC-GLUCOSE METER (BEAKER) (test code = 1538) 137 mg/dL      

     H            : TESTED AT 

[facility] [address] [facility], [address]: /Technician ID = [id number] for 
KEIRA HOLLAND





Blood Culture Panel([employer])2020 13:11:00* 



             Test Item    Value        Reference Range Interpretation Comments

 

             LISTERIA MONOCYTOGENES (test code = 74780-6) Not detected Not detec

bre               

 

             STAPHYLOCOCCUS (test code = 20038-1) Detected     Not detected A   

         Coagulase negative 

Staph species (CoNS)- methicillin susceptibleFirst-line therapy: Cefazolin or 
Oxacillin (Oxacillin preferred if CNS involvement) MecA NOT DETECTEDPossible 
contamination. The likelihood of pathogenicity is increased if the organism is 
observed in multiple blood cultures obtained from separate 
venipunctures.Reference Range: Not Detected 

 

             STAPHYLOCOCCUS AUREUS (test code = 99540-4) Not detected Not detect

ed               

 

             Streptococcus (test code = 35401-7) Not detected Not detected      

         

 

                    STREPTOCOCCUS AGALACTIAE (GROUP B) (test code = 08720-0) Not

 detected        Not 

detected                                             

 

             STREPTOCOCCUS PNEUMONIAE (test code = 73185-8) Not detected Not det

ected               

 

                    Streptococcus pyogenes (Group A) (test code = 20072-7) Not d

etected        Not detected

                                                     

 

             ACINETOBACTER BAUMANNII (test code = 03743-9) Not detected Not dete

cted               

 

             HAEMOPHILUS INFLUENZAE (test code = 04614-8) Not detected Not detec

bre               

 

             NEISSERIA MENINGITIDIS (test code = 87342-2) Not detected Not detec

bre               

 

             ENTEROBACTERIACEAE (test code = 01665-9) Not detected Not detected 

              

 

             ENTEROBACTER CLOACOE COMPLEX (test code = 71029-7) Not detected Not

 detected               

 

             KLEBSIELLA OXYTOCA (test code = 67765-6) Not detected Not detected 

              

 

             KLEBSIELLA PNEUMONIAE (test code = 22435-2) Not detected Not detect

ed               

 

             PROTEUS (test code = 85776-3) Not detected Not detected            

   

 

             SERRATIA MARCESCENS (test code = 01108-8) Not detected Not detected

               

 

             EB ALBICANS (test code = 85781-3) Not detected Not detected   

            

 

             CANDIDA GLABRATA (test code = 77648-3) Not detected Not detected   

            

 

             EB KRUSEI (test code = 13976-2) Not detected Not detected     

          

 

             EB PARAPSILOSIS (test code = 10453-4) Not detected Not detecte

d               

 

             EB TROPICALIS (test code = 49341-2) Not detected Not detected 

              

 

             ESCHERICHIA COLI (test code = 89470-1) Not detected Not detected   

            

 

             METHICILLIN-RESISTANCE GENE (test code = 74059-2) Not detected Not 

detected               

 

             VANCOMYCIN-RESISTANCE GENE (test code = 62261-3)                   

                      

 

             CARBAPENEM-RESISTANCE GENE (test code = 38626-7)                   

                      

 

             ENTEROCOCCUS (test code = 17072-7) Not detected Not detected       

        

 

             PSEUDOMONAS AERUGINOSA (test code = 43001-4) Not detected Not detec

bre               

 

                          LLOYD (test code = LLOYD)     Other bacteria and resistanc

e markers not targeted by this

 PCR panel cannot be excluded; therefore clinical correlation and follow up of 
serology, culture results, and other molecular studies is required. The results 
are not intended to be used as the sole means for clinical diagnosis or patient 
management decisions. This sample was tested at the [facility] using the [facility] Blood Culture ID Panel. It is FDA cleared
 and has been verified and approved by the [facility] for clinical use. This laboratory is CLIA-certified and College of 
American Pathologists (CAP)-accredited to perform high complexity testing.      

                                    

 

             Lab Interpretation (test code = 65653-9) Abnormal                  

              





[facility] [id number] 
13:11:00* 



             Test Item    Value        Reference Range Interpretation Comments

 

             LISTERIA MONOCYTOGENES (test code = 3787388) Not detected Not detec

bre               

 

             STAPHYLOCOCCUS (test code = 2645071) Detected     Not detected A   

         Coagulase negative 

Staph species (CoNS)- methicillin susceptibleFirst-line therapy: Cefazolin or 
Oxacillin (Oxacillin preferred if CNS involvement) MecA NOT DETECTEDPossible 
contamination. The likelihood of pathogenicity is increased if the organism is 
observed in multiple blood cultures obtained from separate 
venipunctures.Reference Range: Not Detected

 

             STAPHYLOCOCCUS AUREUS (test code = 0471795) Not detected Not detect

ed               

 

             STREPTOCOCCUS (test code = 7944304) Not detected Not detected      

         

 

                    STREPTOCOCCUS AGALACTIAE (GROUP B) (test code = 1992397) Not

 detected        Not 

detected                                             

 

             STREPTOCOCCUS PNEUMONIAE (test code = 2365406) Not detected Not det

ected               

 

                    STREPTOCOCCUS PYOGENES (GROUP A) (test code = 6261476) Not d

etected        Not detected

                                                     

 

             ACINETOBACTER BAUMANNII (test code = 1354051) Not detected Not dete

cted               

 

             HAEMOPHILUS INFLUENZAE (test code = 3782470) Not detected Not detec

bre               

 

             NEISSERIA MENINGITIDIS (test code = 8314947) Not detected Not detec

bre               

 

             ENTEROBACTERIACEAE (test code = 8896010) Not detected Not detected 

              

 

             ENTEROBACTER CLOACOE COMPLEX (test code = 9795871) Not detected Not

 detected               

 

             KLEBSIELLA OXYTOCA (test code = 2792144) Not detected Not detected 

              

 

             KLEBSIELLA PNEUMONIAE (test code = 1650) Not detected Not detected 

              

 

             PROTEUS (test code = 4224705) Not detected Not detected            

   

 

             SERRATIA MARCESCENS (test code = 4233899) Not detected Not detected

               

 

             EB ALBICANS (test code = 2837246) Not detected Not detected   

            

 

             CANDIDA GLABRATA (test code = 3314456) Not detected Not detected   

            

 

             EB KRUSEI (test code = 4579980) Not detected Not detected     

          

 

             EB PARAPSILOSIS (test code = 5721003) Not detected Not detecte

d               

 

             EB TROPICALIS (test code = 3046812) Not detected Not detected 

              

 

             ESCHERICHIA COLI (test code = 4147004) Not detected Not detected   

            

 

             METHICILLIN-RESISTANCE GENE (test code = 7670239) Not detected Not 

detected               

 

             VANCOMYCIN-RESISTANCE GENE (test code = 1523061)                   

                      

 

             CARBAPENEM-RESISTANCE GENE (test code = 0977721)                   

                      

 

             ENTEROCOCCUS-BEAKER (test code = 4388007) Not detected Not detected

               

 

             PSEUDOMONAS AERUGINOSA-BEAKER (test code = 5014752) Not detected No

t detected              

 





Other bacteria and resistance markers not targeted by this PCR panel cannot be e
xcluded; therefore clinical correlation and follow up of serology, culture resul
ts, and other molecular studies is required. The results are not intended to be 
used as the sole means for clinical diagnosis or patient management decisions. T
his sample was tested at the [facility] using the [employer] Blood Culture ID Panel. It is FDA cleared and has been verified 
and approved by the [facility] for clinical use. Thi
s laboratory is CLIA-certified and College of American Pathologists (CAP)-accred
ited to perform high complexity testing.POCT-GLUCOSE [id number] 12:21:00* 



             Test Item    Value        Reference Range Interpretation Comments

 

             POC-GLUCOSE METER (BEAKER) (test code = 1538) 241 mg/dL      

     H            : TESTED AT 

[facility] [address] [place] [address], [address]: /Technician ID = [id number] for 
KEIRA HOLLAND





Hemoglobin [id number] 08:31:00* 



             Test Item    Value        Reference Range Interpretation Comments

 

             Hemoglobin A1C (test code = 4548-4) 8.0 %        4.3-6.1      H    

         

 

             Lab Interpretation (test code = 33646-6) Abnormal                  

              





[facility] [id number] 08:31:00* 



             Test Item    Value        Reference Range Interpretation Comments

 

             HEMOGLOBIN A1C (BEAKER) (test code = 368) 8.0 %        4.3-6.1     

 H             





POCT-GLUCOSE [id number] 08:29:00* 



             Test Item    Value        Reference Range Interpretation Comments

 

             POC-GLUCOSE METER (BEAKER) (test code = 1538) 261 mg/dL      

     H            : Notified 

RN/MD: TESTED AT [address], [address]: /Technician ID = 
[id number] for SARAH BETH TIERA





POCT-GLUCOSE [id number] 22:07:00* 



             Test Item    Value        Reference Range Interpretation Comments

 

             POC-GLUCOSE METER (BEAKER) (test code = 1538) 319 mg/dL      

     H            : TESTED AT 

[address], [address]: /Technician ID = [id number] for 
YAIR QUINTANILLA





POCT-GLUCOSE [id number] 18:30:00* 



             Test Item    Value        Reference Range Interpretation Comments

 

             POC-GLUCOSE METER (BEAKER) (test code = 1538) 303 mg/dL      

     H            : TESTED AT 

[address], [address]: /Technician ID = [id number] for CRYER, JAIME





SARS-COV2/RT-PCR ([facility])2020 17:19:00* 



             Test Item    Value        Reference Range Interpretation Comments

 

             SARS-COV2/RT-PCR (test code = 6001792) Not Detected Not Detected, N

egative               

 

             SARS-COV-2 PERFORMING LAB (test code = 9436025) [facility]              

                     





Negative results do not preclude SARS-CoV-2 infection and should not be used as 
the sole basis for patient management decisions. Negative results must be combin
ed with clinical observations, patient history, and epidemiological information.
 A false negative result may occur if a specimen is improperly collected, transp
orted or handled.The limit of detection for this assay is 250 copies/mL.This [facility]
S CoV-2 test is a rapid, real-time RT-PCR test intended for the qualitative dete
ction of nucleic acid from SARS-CoV-2 in a nasopharyngeal swab specimen collecte
d from individuals suspected of COVID-19 by their healthcare provider.This test 
has not been Food and Drug Administration (FDA) cleared or approved and has been
 authorized by FDA under an Emergency Use Authorization (EUA). This EUA will be 
effective until the declaration that circumstances exist justifying the authoriz
ation of the emergency use of in vitro diagnostic tests for detection and/or mannie
gnosis of COVID-19 is terminated under Section 564(b)(2) of the Act or the EUA i
s revoked under Section 564(g) of the Act.Fact Sheet for Healthcare Providers:ht
tps://www.[employer]/Documents/Xpert%20Xpress%20SARS%20CoV-2/Fact%20Sheets/302
3802%69HNOH-ZOP-3%20HEALTHCARE%20PROVIDERS%20FACT%20SHEET.pdfFact Sheet for Heal
thcare Patients:https://www.[employer]/Documents/Xpert%20Xpress%20SARS%20CoV-2/
Fact%20Sheets/3023801%83CBDP-VZX-0%20PATIENT%20FACT%20SHEET.pdfPerforming Labor
atory:[facility] [facility] [place] [address]
URINALYSIS W/ REFLEX URINE [id number] 14:15:00* 



             Test Item    Value        Reference Range Interpretation Comments

 

             COLOR (BEAKER) (test code = 470) Yellow                            

      

 

             CLARITY (BEAKER) (test code = 469) Clear                           

        

 

             SPECIFIC GRAVITY UA (BEAKER) (test code = 468) 1.030        1.001-1

.035                

 

             PH UA (BEAKER) (test code = 467) 6.5          5.0-8.0              

      

 

             PROTEIN UA (BEAKER) (test code = 464) 70 mg/dL     Negative     A  

           

 

             GLUCOSE UA (BEAKER) (test code = 365) 500 mg/dL    Negative     A  

           

 

             KETONES UA (BEAKER) (test code = 371) Negative     Negative        

           

 

             BILIRUBIN UA (BEAKER) (test code = 462) Negative     Negative      

             

 

             BLOOD UA (BEAKER) (test code = 461) Small        Negative     A    

         

 

             NITRITE UA (BEAKER) (test code = 465) Negative     Negative        

           

 

             LEUKOCYTE ESTERASE UA (BEAKER) (test code = 466) Trace        Negat

yobani     A             

 

             UROBILINOGEN UA (BEAKER) (test code = 463) 2.0 mg/dL    0.2-1.0    

  H             

 

             RBC UA (BEAKER) (test code = 519) 24 /HPF                          

       

 

             WBC UA (BEAKER) (test code = 520) 10 /HPF                          

       

 

             MUCUS (BEAKER) (test code = 1574) Rare                             

       

 

             SQUAMOUS EPITHELIAL (BEAKER) (test code = 516) < /HPF              

                    

 

             HYALINE CASTS (BEAKER) (test code = 514) 1 /LPF                    

              

 

             YEAST (BEAKER) (test code = 1585) Rare                             

       

 

             SOURCE(BEAKER) (test code = 2795)                                  

       





 ID - [auto] ID - techHEPATIC FUNCTION [id number] 13:38:00
  * 



             Test Item    Value        Reference Range Interpretation Comments

 

             TOTAL PROTEIN (BEAKER) (test code = 770) 8.3 gm/dL    6.0-8.3      

              

 

             ALBUMIN (BEAKER) (test code = 1145) 3.1 g/dL     3.5-5.0      L    

         

 

             BILIRUBIN TOTAL (BEAKER) (test code = 377) 0.2 mg/dL    0.2-1.2    

                

 

             BILIRUBIN DIRECT (BEAKER) (test code = 706) 0.2 mg/dL    0.1-0.5   

                 

 

             ALKALINE PHOSPHATASE (BEAKER) (test code = 346) 491 U/L      

       H             

 

             AST (SGOT) (BEAKER) (test code = 353) 16 U/L       5-34            

           

 

             ALT (SGPT) (BEAKER) (test code = 347) 38 U/L       6-55            

           





 ID - EMERSONBASIC METABOLIC [id number] 13:38:00* 



             Test Item    Value        Reference Range Interpretation Comments

 

             SODIUM (BEAKER) (test code = 381) 135 meq/L    136-145      L      

       

 

             POTASSIUM (BEAKER) (test code = 379) 4.4 meq/L    3.5-5.1          

          

 

             CHLORIDE (BEAKER) (test code = 382) 97 meq/L            L    

         

 

             CO2 (BEAKER) (test code = 355) 28 meq/L     22-29                  

    

 

             BLOOD UREA NITROGEN (BEAKER) (test code = 354) 14 mg/dL     7-21   

                    

 

             CREATININE (BEAKER) (test code = 358) 0.86 mg/dL   0.57-1.25       

           

 

             GLUCOSE RANDOM (BEAKER) (test code = 652) 363 mg/dL          

 H             

 

             CALCIUM (BEAKER) (test code = 697) 8.8 mg/dL    8.4-10.2           

        

 

             EGFR (BEAKER) (test code = 1092) 100 mL/min/1.73 sq m              

             ESTIMATED GFR IS NOT 

AS ACCURATE AS CREATININE CLEARANCE IN PREDICTING GLOMERULAR FILTRATION RATE. 
ESTIMATED GFR IS NOT APPLICABLE FOR DIALYSIS PATIENTS.





 ID - NICK, CHEST, 1 VIEW, NON [id number] 13:33:00Reason for 
exam:->FEVERShould this be performed at the bedside?->YesFINAL REPORT PATIENT 
ID:   [id number] INDICATION: FEVER COMPARISON: April 15, 2020 TECHNIQUE: Single 
frontal view of the chest. IMPRESSION:Lungs and pleura: Small right effusion and
 associated relaxation atelectasis. Left lung is clear.Heart and mediastinum: 
Normal heart size. Unremarkable mediastinal contours.Osseous structures: No 
acute abnormality.Other: None. Signed: JR Tabares Robert MDRepcristy 
Verified Date/Time:  2020 13:33:06 Reading Location: [facility] Reading Room    Electronically signed by: ADOLPH TABARES on 2020 01:33 PM LACTIC ACID, [id number] 13:30:00* 



             Test Item    Value        Reference Range Interpretation Comments

 

             LACTATE BLOOD VENOUS (2) (BEAKER) (test code = 2872) 1.76 mmol/L  0

.50-2.20                  





 ID - Morales, [id number] 13:28:00* 



             Test Item    Value        Reference Range Interpretation Comments

 

             Ketones, Blood (test code = 1103) 0.0 mmol/L   <0.4                

       

 

             Lab Interpretation (test code = 10378-0) Normal                    

              





[facility]ET[facility], [id number] 13:28:00* 



             Test Item    Value        Reference Range Interpretation Comments

 

             KETONES, BLOOD (BEAKER) (test code = 1103) 0.0 mmol/L   <0.4       

                





CBC W/PLT COUNT & AUTO [id number] 13:27:00* 



             Test Item    Value        Reference Range Interpretation Comments

 

             WHITE BLOOD CELL COUNT (BEAKER) (test code = 775) 16.8 K/ L    3.5-

10.5     H             

 

             RED BLOOD CELL COUNT (BEAKER) (test code = 761) 3.67 M/ L    4.63-6

.08    L             

 

             HEMOGLOBIN (BEAKER) (test code = 410) 9.3 GM/DL    13.7-17.5    L  

           

 

             HEMATOCRIT (BEAKER) (test code = 411) 30.6 %       40.1-51.0    L  

           

 

             MEAN CORPUSCULAR VOLUME (BEAKER) (test code = 753) 83.4 fL      79.

0-92.2                  

 

             MEAN CORPUSCULAR HEMOGLOBIN (BEAKER) (test code = 751) 25.3 pg     

 25.7-32.2    L             

 

                    MEAN CORPUSCULAR HEMOGLOBIN CONC (BEAKER) (test code = 752) 

30.4 GM/DL          32.3-36.5

                          L                          

 

             RED CELL DISTRIBUTION WIDTH (BEAKER) (test code = 412) 16.7 %      

 11.6-14.4    H             

 

             PLATELET COUNT (BEAKER) (test code = 756) 832 K/CU MM  150-450     

 H             

 

             MEAN PLATELET VOLUME (BEAKER) (test code = 754) 8.5 fL       9.4-12

.4     L             

 

             NUCLEATED RED BLOOD CELLS (BEAKER) (test code = 413) 0 /100 WBC   0

-0                        

 

             NEUTROPHILS RELATIVE PERCENT (BEAKER) (test code = 429) 70 %       

                             

 

             LYMPHOCYTES RELATIVE PERCENT (BEAKER) (test code = 430) 21 %       

                             

 

             MONOCYTES RELATIVE PERCENT (BEAKER) (test code = 431) 8 %          

                           

 

             EOSINOPHILS RELATIVE PERCENT (BEAKER) (test code = 432) 1 %        

                             

 

             BASOPHILS RELATIVE PERCENT (BEAKER) (test code = 437) 0 %          

                           

 

             NEUTROPHILS ABSOLUTE COUNT (BEAKER) (test code = 670) 11.67 K/ L   

1.78-5.38    H             

 

             LYMPHOCYTES ABSOLUTE COUNT (BEAKER) (test code = 414) 3.48 K/ L    

1.32-3.57                  

 

             MONOCYTES ABSOLUTE COUNT (BEAKER) (test code = 415) 1.31 K/ L    0.

30-0.82    H             

 

             EOSINOPHILS ABSOLUTE COUNT (BEAKER) (test code = 416) 0.15 K/ L    

0.04-0.54                  

 

             BASOPHILS ABSOLUTE COUNT (BEAKER) (test code = 417) 0.04 K/ L    0.

01-0.08                  

 

             IMMATURE GRANULOCYTES-RELATIVE PERCENT (BEAKER) (test code = 2801) 

1 %          0-1                        





Blood gas, [id number] 13:26:00* 



             Test Item    Value        Reference Range Interpretation Comments

 

             pH, Aime (test code = 2746-6) 7.41         7.32-7.42                

  

 

             pCO2, Aime (test code = 755) 48           41- 51 mmHg               

 

 

             pO2, Aime (test code = 2705-2) 50           25- 40 mmHg  H          

   

 

             O2 Sat, Aime (test code = 2711-0) 82.8 %       40-70        H       

      

 

             HCO3, Aime (test code = 14140-3) 30 mmol/L    21-29        H        

     

 

             Base Excess, Aime (test code = 1927-3) 4.8 mmol/L   -2-3         H  

           

 

             Patient Temperature (test code = 8310-5) 38.3 C                    

              

 

             FIO2 (test code = 1819) 21 %                                    

 

             Lab Interpretation (test code = 91594-8) Abnormal                  

              





[facility] Hospital[facility], [id number] 13:26:00* 



             Test Item    Value        Reference Range Interpretation Comments

 

             PH VENOUS (BEAKER) (test code = 701) 7.41         7.32-7.42        

          

 

             PCO2 VENOUS (BEAKER) (test code = 755) 48 mmHg      41-51          

            

 

             PO2 VENOUS (BEAKER) (test code = 702) 50 mmHg      25-40        H  

           

 

             O2 SATURATION VENOUS (BEAKER) (test code = 703) 82.8 %       40.0-7

0.0    H             

 

             HCO3 VENOUS (BEAKER) (test code = 705) 30 mmol/L    21-29        H 

            

 

             BASE EXCESS VENOUS (BEAKER) (test code = 704) 4.8 mmol/L   -2.0-3.0

     H             

 

             PATIENT TEMPERATURE (BEAKER) (test code = 1818) 38.3 C             

                     

 

             FIO2 (BEAKER) (test code = 1819) 21.0 %                            

      





POCT-GLUCOSE METER2020-04-15 11:31:00* 



             Test Item    Value        Reference Range Interpretation Comments

 

             POC-GLUCOSE METER (BEAKER) (test code = 1538) 144 mg/dL      

     H            : TESTED AT 

[place] [id number] [facility], [address]: /Technician ID = [id number] for 
CARMITA AGUSTIN





POCT-GLUCOSE METER2020-04-15 11:30:00* 



             Test Item    Value        Reference Range Interpretation Comments

 

             POC-GLUCOSE METER (BEAKER) (test code = 1538) 122 mg/dL      

     H            : TESTED AT 

[address], [address]: /Technician ID = [id number] for GERARDO SLOAN





POCT-GLUCOSE [id number] 11:30:00* 



             Test Item    Value        Reference Range Interpretation Comments

 

             POC-GLUCOSE METER (BEAKER) (test code = 1538) 110 mg/dL      

                  : TESTED AT 

[address], [address]: /Technician ID = [id number] for GERARDO SLOAN





POCT-GLUCOSE [id number] 11:30:00* 



             Test Item    Value        Reference Range Interpretation Comments

 

             POC-GLUCOSE METER (BEAKER) (test code = 1538) 128 mg/dL      

     H            : TESTED AT 

[address], [address]: /Technician ID = [id number] for GERARDO SLOAN





POCT-GLUCOSE [id number] 11:28:00* 



             Test Item    Value        Reference Range Interpretation Comments

 

             POC-GLUCOSE METER (BEAKER) (test code = 1538) 176 mg/dL      

     H            : TESTED AT 

[address], [address]: /Technician ID = [id number] for 
CARMITA AGUSTIN





POCT-GLUCOSE METER2020-04-15 08:29:00* 



             Test Item    Value        Reference Range Interpretation Comments

 

             POC-GLUCOSE METER (BEAKER) (test code = 1538) 185 mg/dL      

     H            : TESTED AT 

[address], [address]: /Technician ID = [id number] for 
ANDREE CHACON





CBC W/PLT COUNT & AUTO DIFFERENTIAL2020-04-15 06:09:00* 



             Test Item    Value        Reference Range Interpretation Comments

 

             WHITE BLOOD CELL COUNT (BEAKER) (test code = 775) 10.7 K/ L    3.5-

10.5     H             

 

             RED BLOOD CELL COUNT (BEAKER) (test code = 761) 4.66 M/ L    4.63-6

.08                  

 

             HEMOGLOBIN (BEAKER) (test code = 410) 11.8 GM/DL   13.7-17.5    L  

           

 

             HEMATOCRIT (BEAKER) (test code = 411) 38.7 %       40.1-51.0    L  

           

 

             MEAN CORPUSCULAR VOLUME (BEAKER) (test code = 753) 83.0 fL      79.

0-92.2                  

 

             MEAN CORPUSCULAR HEMOGLOBIN (BEAKER) (test code = 751) 25.3 pg     

 25.7-32.2    L             

 

                    MEAN CORPUSCULAR HEMOGLOBIN CONC (BEAKER) (test code = 752) 

30.5 GM/DL          32.3-36.5

                          L                          

 

             RED CELL DISTRIBUTION WIDTH (BEAKER) (test code = 412) 18.9 %      

 11.6-14.4    H             

 

             PLATELET COUNT (BEAKER) (test code = 756) 457 K/CU MM  150-450     

 H             

 

             MEAN PLATELET VOLUME (BEAKER) (test code = 754) 8.2 fL       9.4-12

.4     L             

 

             NUCLEATED RED BLOOD CELLS (BEAKER) (test code = 413) 0 /100 WBC   0

-0                        

 

             NEUTROPHILS RELATIVE PERCENT (BEAKER) (test code = 429) 44 %       

                             

 

             LYMPHOCYTES RELATIVE PERCENT (BEAKER) (test code = 430) 42 %       

                             

 

             MONOCYTES RELATIVE PERCENT (BEAKER) (test code = 431) 11 %         

                           

 

             EOSINOPHILS RELATIVE PERCENT (BEAKER) (test code = 432) 2 %        

                             

 

             BASOPHILS RELATIVE PERCENT (BEAKER) (test code = 437) 1 %          

                           

 

             NEUTROPHILS ABSOLUTE COUNT (BEAKER) (test code = 670) 4.74 K/ L    

1.78-5.38                  

 

             LYMPHOCYTES ABSOLUTE COUNT (BEAKER) (test code = 414) 4.47 K/ L    

1.32-3.57    H             

 

             MONOCYTES ABSOLUTE COUNT (BEAKER) (test code = 415) 1.16 K/ L    0.

30-0.82    H             

 

             EOSINOPHILS ABSOLUTE COUNT (BEAKER) (test code = 416) 0.22 K/ L    

0.04-0.54                  

 

             BASOPHILS ABSOLUTE COUNT (BEAKER) (test code = 417) 0.06 K/ L    0.

01-0.08                  

 

             IMMATURE GRANULOCYTES-RELATIVE PERCENT (BEAKER) (test code = 2801) 

0 %          0-1                        





BASIC METABOLIC PANEL2020-04-15 03:02:00* 



             Test Item    Value        Reference Range Interpretation Comments

 

             SODIUM (BEAKER) (test code = 381) 135 meq/L    136-145      L      

       

 

             POTASSIUM (BEAKER) (test code = 379) 4.1 meq/L    3.5-5.1          

         Specimen slightly 

hemolyzed

 

             CHLORIDE (BEAKER) (test code = 382) 104 meq/L                

         

 

             CO2 (BEAKER) (test code = 355) 22 meq/L     22-29                  

    

 

             BLOOD UREA NITROGEN (BEAKER) (test code = 354) 32 mg/dL     7-21   

      H             

 

             CREATININE (BEAKER) (test code = 358) 0.67 mg/dL   0.57-1.25       

          Specimen slightly 

hemolyzed

 

             GLUCOSE RANDOM (BEAKER) (test code = 652) 151 mg/dL          

 H             

 

             CALCIUM (BEAKER) (test code = 697) 9.3 mg/dL    8.4-10.2           

        

 

             EGFR (BEAKER) (test code = 1092) 133 mL/min/1.73 sq m              

             ESTIMATED GFR IS NOT 

AS ACCURATE AS CREATININE CLEARANCE IN PREDICTING GLOMERULAR FILTRATION RATE. 
ESTIMATED GFR IS NOT APPLICABLE FOR DIALYSIS PATIENTS.





 ID - PIAYA LPOCT-GLUCOSE [id number] 17:04:00* 



             Test Item    Value        Reference Range Interpretation Comments

 

             POC-GLUCOSE METER (BEAKER) (test code = 1538) 94 mg/dL       

                  : TESTED AT 

[address], [address]: /Technician ID = [id number] for Hali Peacock





POCT-GLUCOSE [id number] 12:37:00* 



             Test Item    Value        Reference Range Interpretation Comments

 

             POC-GLUCOSE METER (BEAKER) (test code = 1538) 150 mg/dL      

     H            : TESTED AT 

[address], [address]: /Technician ID = [id number] for GERARDO SLOAN





POCT-GLUCOSE [id number] 08:31:00* 



             Test Item    Value        Reference Range Interpretation Comments

 

             POC-GLUCOSE METER (BEAKER) (test code = 1538) 180 mg/dL      

     H            : TESTED AT 

[address], [address]: /Technician ID = [id number] for NATHALIA,
 GERARDO





POCT-GLUCOSE [id number] 17:40:00* 



             Test Item    Value        Reference Range Interpretation Comments

 

             POC-GLUCOSE METER (BEAKER) (test code = 1538) 177 mg/dL      

     H            : TESTED AT 

[address], [address]: /Technician ID = [id number] for NATHALIA,
 GERARDO





POCT-GLUCOSE [id number] 12:33:00* 



             Test Item    Value        Reference Range Interpretation Comments

 

             POC-GLUCOSE METER (BEAKER) (test code = 1538) 105 mg/dL      

                  : TESTED AT 

[facility] [address] [facility], [address]: /Technician ID = [id number] for GERARDO SLOAN





POCT-GLUCOSE [id number] 08:39:00* 



             Test Item    Value        Reference Range Interpretation Comments

 

             POC-GLUCOSE METER (BEAKER) (test code = 1538) 165 mg/dL      

     H            : TESTED AT 

[facility] [address] [facility], [address]: /Technician ID = [id number] for GERARDO SLOAN





CBC W/PLT COUNT & AUTO [id number] 05:10:00* 



             Test Item    Value        Reference Range Interpretation Comments

 

             WHITE BLOOD CELL COUNT (BEAKER) (test code = 775) 7.1 K/ L     3.5-

10.5                   

 

             RED BLOOD CELL COUNT (BEAKER) (test code = 761) 4.23 M/ L    4.63-6

.08    L             

 

             HEMOGLOBIN (BEAKER) (test code = 410) 10.6 GM/DL   13.7-17.5    L  

           

 

             HEMATOCRIT (BEAKER) (test code = 411) 34.6 %       40.1-51.0    L  

           

 

             MEAN CORPUSCULAR VOLUME (BEAKER) (test code = 753) 81.8 fL      79.

0-92.2                  

 

             MEAN CORPUSCULAR HEMOGLOBIN (BEAKER) (test code = 751) 25.1 pg     

 25.7-32.2    L             

 

                    MEAN CORPUSCULAR HEMOGLOBIN CONC (BEAKER) (test code = 752) 

30.6 GM/DL          32.3-36.5

                          L                          

 

             RED CELL DISTRIBUTION WIDTH (BEAKER) (test code = 412) 19.1 %      

 11.6-14.4    H             

 

             PLATELET COUNT (BEAKER) (test code = 756) 386 K/CU MM  150-450     

               

 

             MEAN PLATELET VOLUME (BEAKER) (test code = 754) 8.3 fL       9.4-12

.4     L             

 

             NUCLEATED RED BLOOD CELLS (BEAKER) (test code = 413) 0 /100 WBC   0

-0                        

 

             NEUTROPHILS RELATIVE PERCENT (BEAKER) (test code = 429) 31 %       

                             

 

             LYMPHOCYTES RELATIVE PERCENT (BEAKER) (test code = 430) 54 %       

                             

 

             MONOCYTES RELATIVE PERCENT (BEAKER) (test code = 431) 12 %         

                           

 

             EOSINOPHILS RELATIVE PERCENT (BEAKER) (test code = 432) 3 %        

                             

 

             BASOPHILS RELATIVE PERCENT (BEAKER) (test code = 437) 1 %          

                           

 

             NEUTROPHILS ABSOLUTE COUNT (BEAKER) (test code = 670) 2.20 K/ L    

1.78-5.38                  

 

             LYMPHOCYTES ABSOLUTE COUNT (BEAKER) (test code = 414) 3.84 K/ L    

1.32-3.57    H             

 

             MONOCYTES ABSOLUTE COUNT (BEAKER) (test code = 415) 0.82 K/ L    0.

30-0.82                  

 

             EOSINOPHILS ABSOLUTE COUNT (BEAKER) (test code = 416) 0.19 K/ L    

0.04-0.54                  

 

             BASOPHILS ABSOLUTE COUNT (BEAKER) (test code = 417) 0.04 K/ L    0.

01-0.08                  

 

             IMMATURE GRANULOCYTES-RELATIVE PERCENT (BEAKER) (test code = 2801) 

0 %          0-1                        





BASIC METABOLIC [id number] 05:03:00* 



             Test Item    Value        Reference Range Interpretation Comments

 

             SODIUM (BEAKER) (test code = 381) 136 meq/L    136-145             

       

 

             POTASSIUM (BEAKER) (test code = 379) 3.8 meq/L    3.5-5.1          

          

 

             CHLORIDE (BEAKER) (test code = 382) 103 meq/L                

         

 

             CO2 (BEAKER) (test code = 355) 23 meq/L     22-29                  

    

 

             BLOOD UREA NITROGEN (BEAKER) (test code = 354) 30 mg/dL     7-21   

      H             

 

             CREATININE (BEAKER) (test code = 358) 0.69 mg/dL   0.57-1.25       

           

 

             GLUCOSE RANDOM (BEAKER) (test code = 652) 263 mg/dL          

 H             

 

             CALCIUM (BEAKER) (test code = 697) 9.2 mg/dL    8.4-10.2           

        

 

             EGFR (BEAKER) (test code = 1092) 128 mL/min/1.73 sq m              

             ESTIMATED GFR IS NOT 

AS ACCURATE AS CREATININE CLEARANCE IN PREDICTING GLOMERULAR FILTRATION RATE. 
ESTIMATED GFR IS NOT APPLICABLE FOR DIALYSIS PATIENTS.





 ID - PIAYA LPOCT-GLUCOSE [id number] 21:26:00* 



             Test Item    Value        Reference Range Interpretation Comments

 

             POC-GLUCOSE METER (BEAKER) (test code = 1538) 155 mg/dL      

     H            : TESTED AT 

[address], [address]: /Technician ID = [id number] for ZAYRA 
WESLEY





POCT-GLUCOSE [id number] 17:41:00* 



             Test Item    Value        Reference Range Interpretation Comments

 

             POC-GLUCOSE METER (BEAKER) (test code = 1538) 158 mg/dL      

     H            : TESTED AT 

[address], [address]: /Technician ID = [id number] for 
OSWALDO, ANDREE





POCT-GLUCOSE [id number] 13:01:00* 



             Test Item    Value        Reference Range Interpretation Comments

 

             POC-GLUCOSE METER (BEAKER) (test code = 1538) 148 mg/dL      

     H            : TESTED AT 

[address], [address]: /Technician ID = [id number] for 
OSWALDO, ANDREE





POCT-GLUCOSE [id number] 08:24:00* 



             Test Item    Value        Reference Range Interpretation Comments

 

             POC-GLUCOSE METER (BEAKER) (test code = 1538) 173 mg/dL      

     H            : TESTED AT 

[address], [address]: /Technician ID = [id number] for 
OSWALDO, ANDREE





POCT-GLUCOSE [id number] 21:37:00* 



             Test Item    Value        Reference Range Interpretation Comments

 

             POC-GLUCOSE METER (BEAKER) (test code = 1538) 158 mg/dL      

     H            : TESTED AT 

[address], [address]: /Technician ID = [id number] for JAMIEU, 
WESLEY





POCT-GLUCOSE METER2020-04-10 17:41:00* 



             Test Item    Value        Reference Range Interpretation Comments

 

             POC-GLUCOSE METER (BEAKER) (test code = 1538) 121 mg/dL      

     H            : TESTED AT 

[address], [address]: /Technician ID = [id number] for 
OSWALDO, ANDREE





POCT-GLUCOSE [id number] 17:06:00* 



             Test Item    Value        Reference Range Interpretation Comments

 

             POC-GLUCOSE METER (BEAKER) (test code = 1538) 185 mg/dL      

     H            : TESTED AT 

[address], [address]: /Technician ID = [id number] for 
OSWALDO, ANDREE





POCT-GLUCOSE [id number] 07:50:00* 



             Test Item    Value        Reference Range Interpretation Comments

 

             POC-GLUCOSE METER (BEAKER) (test code = 1538) 157 mg/dL      

     H            : TESTED AT 

[address], [address]: /Technician ID = [id number] for 
ANDREE CHACON





POCT-GLUCOSE [id number] 22:31:00* 



             Test Item    Value        Reference Range Interpretation Comments

 

             POC-GLUCOSE METER (BEAKER) (test code = 1538) 224 mg/dL      

     H            : TESTED AT 

[address], [address]: /Technician ID = [id number] for 
ROLAN NYE





Vancomycin level, [id number] 21:52:00* 



             Test Item    Value        Reference Range Interpretation Comments

 

             Vancomycin Tr (test code = 4092-3) 15.4 ug/mL   10-20              

        

 

             LLOYD (test code = LLOYD)  ID - BS                            

 

             Lab Interpretation (test code = 83633-7) Normal                    

              





[facility]AN[facility], [id number] 21:52:00* 



             Test Item    Value        Reference Range Interpretation Comments

 

             VANCOMYCIN TROUGH (BEAKER) (test code = 522) 15.4 ug/mL   10.0-20.0

                  





 ID - BSPOCT-GLUCOSE [id number] 17:36:00* 



             Test Item    Value        Reference Range Interpretation Comments

 

             POC-GLUCOSE METER (BEAKER) (test code = 1538) 115 mg/dL      

     H            : TESTED AT 

[address], [address]: /Technician ID = [id number] for NATHALIA
 GERARDO





POCT-GLUCOSE [id number] 12:36:00* 



             Test Item    Value        Reference Range Interpretation Comments

 

             POC-GLUCOSE METER (BEAKER) (test code = 1538) 85 mg/dL       

                  : TESTED AT 

[address], [address]: /Technician ID = [id number] for NATHALIA
 GERARDO





POCT-GLUCOSE [id number] 08:33:00* 



             Test Item    Value        Reference Range Interpretation Comments

 

             POC-GLUCOSE METER (BEAKER) (test code = 1538) 121 mg/dL      

     H            : TESTED AT 

[address], [address]: /Technician ID = [id number] for GERARDO SLOAN





CBC W/PLT COUNT & AUTO [id number] 06:36:00* 



             Test Item    Value        Reference Range Interpretation Comments

 

             WHITE BLOOD CELL COUNT (BEAKER) (test code = 775) 9.3 K/ L     3.5-

10.5                   

 

             RED BLOOD CELL COUNT (BEAKER) (test code = 761) 4.24 M/ L    4.63-6

.08    L             

 

             HEMOGLOBIN (BEAKER) (test code = 410) 10.5 GM/DL   13.7-17.5    L  

           

 

             HEMATOCRIT (BEAKER) (test code = 411) 35.1 %       40.1-51.0    L  

           

 

             MEAN CORPUSCULAR VOLUME (BEAKER) (test code = 753) 82.8 fL      79.

0-92.2                  

 

             MEAN CORPUSCULAR HEMOGLOBIN (BEAKER) (test code = 751) 24.8 pg     

 25.7-32.2    L             

 

                    MEAN CORPUSCULAR HEMOGLOBIN CONC (BEAKER) (test code = 752) 

29.9 GM/DL          32.3-36.5

                          L                          

 

             RED CELL DISTRIBUTION WIDTH (BEAKER) (test code = 412) 19.9 %      

 11.6-14.4    H             

 

             PLATELET COUNT (BEAKER) (test code = 756) 341 K/CU MM  150-450     

               

 

             MEAN PLATELET VOLUME (BEAKER) (test code = 754) 8.4 fL       9.4-12

.4     L             

 

             NUCLEATED RED BLOOD CELLS (BEAKER) (test code = 413) 0 /100 WBC   0

-0                        

 

             NEUTROPHILS RELATIVE PERCENT (BEAKER) (test code = 429) 34 %       

                             

 

             LYMPHOCYTES RELATIVE PERCENT (BEAKER) (test code = 430) 48 %       

                             

 

             MONOCYTES RELATIVE PERCENT (BEAKER) (test code = 431) 13 %         

                           

 

             EOSINOPHILS RELATIVE PERCENT (BEAKER) (test code = 432) 3 %        

                             

 

             BASOPHILS RELATIVE PERCENT (BEAKER) (test code = 437) 1 %          

                           

 

             NEUTROPHILS ABSOLUTE COUNT (BEAKER) (test code = 670) 3.16 K/ L    

1.78-5.38                  

 

             LYMPHOCYTES ABSOLUTE COUNT (BEAKER) (test code = 414) 4.49 K/ L    

1.32-3.57    H             

 

             MONOCYTES ABSOLUTE COUNT (BEAKER) (test code = 415) 1.22 K/ L    0.

30-0.82    H             

 

             EOSINOPHILS ABSOLUTE COUNT (BEAKER) (test code = 416) 0.31 K/ L    

0.04-0.54                  

 

             BASOPHILS ABSOLUTE COUNT (BEAKER) (test code = 417) 0.06 K/ L    0.

01-0.08                  

 

             IMMATURE GRANULOCYTES-RELATIVE PERCENT (BEAKER) (test code = 2801) 

0 %          0-1                        





BASIC METABOLIC [id number] 05:31:00* 



             Test Item    Value        Reference Range Interpretation Comments

 

             SODIUM (BEAKER) (test code = 381) 137 meq/L    136-145             

       

 

             POTASSIUM (BEAKER) (test code = 379) 4.2 meq/L    3.5-5.1          

         Specimen slightly 

hemolyzed

 

             CHLORIDE (BEAKER) (test code = 382) 104 meq/L                

         

 

             CO2 (BEAKER) (test code = 355) 24 meq/L     22-29                  

    

 

             BLOOD UREA NITROGEN (BEAKER) (test code = 354) 27 mg/dL     7-21   

      H             

 

             CREATININE (BEAKER) (test code = 358) 0.63 mg/dL   0.57-1.25       

          Specimen slightly 

hemolyzed

 

             GLUCOSE RANDOM (BEAKER) (test code = 652) 100 mg/dL          

               

 

             CALCIUM (BEAKER) (test code = 697) 9.7 mg/dL    8.4-10.2           

        

 

             EGFR (BEAKER) (test code = 1092) 143 mL/min/1.73 sq m              

             ESTIMATED GFR IS NOT 

AS ACCURATE AS CREATININE CLEARANCE IN PREDICTING GLOMERULAR FILTRATION RATE. 
ESTIMATED GFR IS NOT APPLICABLE FOR DIALYSIS PATIENTS.





 ID - PIAYA LPOCT-GLUCOSE [id number] 22:22:00* 



             Test Item    Value        Reference Range Interpretation Comments

 

             POC-GLUCOSE METER (BEAKER) (test code = 1538) 117 mg/dL      

     H            : TESTED AT 

[place] [id number] [facility], [address]: /Technician ID = [id number] for 
TAMI NYEIA





POCT-GLUCOSE [id number] 17:32:00* 



             Test Item    Value        Reference Range Interpretation Comments

 

             POC-GLUCOSE METER (BEAKER) (test code = 1538) 156 mg/dL      

     H            : TESTED AT 

[place] [id number] [facility], [address]: /Technician ID = [id number] for GERARDO SLOAN





POCT-GLUCOSE [id number] 12:20:00* 



             Test Item    Value        Reference Range Interpretation Comments

 

             POC-GLUCOSE METER (BEAKER) (test code = 1538) 115 mg/dL      

     H            : TESTED AT 

[address], [address]: /Technician ID = [id number] for GERARDO SLOAN





POCT-GLUCOSE [id number] 08:20:00* 



             Test Item    Value        Reference Range Interpretation Comments

 

             POC-GLUCOSE METER (BEAKER) (test code = 1538) 110 mg/dL      

                  : TESTED AT 

[address], [address]: /Technician ID = [id number] for GERARDO SLOAN





POCT-GLUCOSE [id number] 21:09:00* 



             Test Item    Value        Reference Range Interpretation Comments

 

             POC-GLUCOSE METER (BEAKER) (test code = 1538) 133 mg/dL      

     H            : TESTED AT 

[address], [address]: /Technician ID = [id number] for SASU, 
WESLEY





POCT-GLUCOSE [id number] 17:38:00* 



             Test Item    Value        Reference Range Interpretation Comments

 

             POC-GLUCOSE METER (BEAKER) (test code = 1538) 132 mg/dL      

     H            : TESTED AT 

[address], [address]: /Technician ID = [id number] for 
OSWALDO, ANDREE





POCT-GLUCOSE [id number] 14:44:00* 



             Test Item    Value        Reference Range Interpretation Comments

 

             POC-GLUCOSE METER (BEAKER) (test code = 1538) 123 mg/dL      

     H            : TESTED AT 

[address], [address]: /Technician ID = [id number] for 
OSWALDO, ANDREE





POCT-GLUCOSE [id number] 08:15:00* 



             Test Item    Value        Reference Range Interpretation Comments

 

             POC-GLUCOSE METER (BEAKER) (test code = 1538) 132 mg/dL      

     H            : TESTED AT 

[address], [address]: /Technician ID = [id number] for 
OSWALDO, ANDREE





POCT-GLUCOSE [id number] 21:12:00* 



             Test Item    Value        Reference Range Interpretation Comments

 

             POC-GLUCOSE METER (BEAKER) (test code = 1538) 164 mg/dL      

     H            : TESTED AT 

[address], [address]: /Technician ID = [id number] for SASU, 
WESLEY





POCT-GLUCOSE [id number] 17:13:00* 



             Test Item    Value        Reference Range Interpretation Comments

 

             POC-GLUCOSE METER (BEAKER) (test code = 1538) 170 mg/dL      

     H            : TESTED AT 

[facility] [address] [facility] [place], [address]: /Technician ID = [id number] for 
ANDREE CHACON





CBC W/PLT COUNT & AUTO [id number] 13:36:00* 



             Test Item    Value        Reference Range Interpretation Comments

 

             WHITE BLOOD CELL COUNT (BEAKER) (test code = 775) 8.9 K/ L     3.5-

10.5                   

 

             RED BLOOD CELL COUNT (BEAKER) (test code = 761) 4.38 M/ L    4.63-6

.08    L             

 

             HEMOGLOBIN (BEAKER) (test code = 410) 10.6 GM/DL   13.7-17.5    L  

           

 

             HEMATOCRIT (BEAKER) (test code = 411) 35.9 %       40.1-51.0    L  

           

 

             MEAN CORPUSCULAR VOLUME (BEAKER) (test code = 753) 82.0 fL      79.

0-92.2                  

 

             MEAN CORPUSCULAR HEMOGLOBIN (BEAKER) (test code = 751) 24.2 pg     

 25.7-32.2    L             

 

                    MEAN CORPUSCULAR HEMOGLOBIN CONC (BEAKER) (test code = 752) 

29.5 GM/DL          32.3-36.5

                          L                          

 

             RED CELL DISTRIBUTION WIDTH (BEAKER) (test code = 412) 19.9 %      

 11.6-14.4    H             

 

             PLATELET COUNT (BEAKER) (test code = 756) 461 K/CU MM  150-450     

 H             

 

             MEAN PLATELET VOLUME (BEAKER) (test code = 754) 8.2 fL       9.4-12

.4     L             

 

             NUCLEATED RED BLOOD CELLS (BEAKER) (test code = 413) 0 /100 WBC   0

-0                        





(CELLAVISION MANUAL DIFF)2020 13:36:00* 



             Test Item    Value        Reference Range Interpretation Comments

 

             NEUTROPHILS - REL (CELLAVISION)(BEAKER) (test code = 2816) 30 %    

                                

 

             LYMPHOCYTES - REL (CELLAVISION)(BEAKER) (test code = 2817) 52 %    

                                

 

             MONOCYTES - REL (CELLAVISION)(BEAKER) (test code = 2818) 10 %      

                              

 

             EOSINOPHILS - REL (CELLAVISION)(BEAKER) (test code = 2819) 6 %     

                                

 

             BASOPHILS - REL (CELLAVISION)(BEAKER) (test code = 2820) 1 %       

                              

 

             ATYPICAL LYMPHOCYTES - REL (CELLAVISION)(BEAKER) (test code = 2829)

 2 %          0-0          H             

 

                NEUTROPHILS - ABS (CELLAVISION)(BEAKER) (test code = 2830) 2.67 

K/ul       1.78-5.38        

                                         

 

                LYMPHOCYTES - ABS (CELLAVISION)(BEAKER) (test code = 2831) 4.63 

K/ul       1.32-3.57       H

                                         

 

             MONOCYTES - ABS (CELLAVISION)(BEAKER) (test code = 2832) 0.89 K/uL 

   0.30-0.82    H             

 

                EOSINOPHILS - ABS (CELLAVISION)(BEAKER) (test code = 2834) 0.53 

K/uL       0.04-0.54        

                                         

 

             BASOPHILS - ABS (CELLAVISION)(BEAKER) (test code = 2835) 0.09 K/uL 

   0.01-0.08    H             

 

                    ATYPICAL LYMPHOCYTES - ABS (CELLAVISION)(BEAKER) (test code 

= 2858) 0.18 K/uL           

0.00-0.00                 H                          

 

             TOTAL COUNTED (BEAKER) (test code = 1351) 100                      

               

 

             MANUAL NRBC  CELLS (BEAKER) (test code = 1353) 1 /100 WBC   

0-0          H             

 

             PLT MORPHOLOGY (BEAKER) (test code = 486) Normal                   

               

 

             TOXIC GRANULATION (BEAKER) (test code = 771) Present               

                  

 

             POLYCHROMATOPHILLIC RBCS(BEAKER) (test code = 478) 1+ few          

                        

 

             HYPOCHROMIA (BEAKER) (test code = 963) 1+ few                      

            

 

             ANISOCYTOSIS (BEAKER) (test code = 961) 1+ few                     

             

 

             ARTIFACT (CELLAVISION)(BEAKER) (test code = 3432) Present          

                       

 

             PLATELET CONCENTRATION (CELLAVISION)(BEAKER) (test code = 3438) Inc

reased                               





 ID - Mary Barnett comments: Slide comments: POCT-GLUCOSE [id number] 
12:08:00* 



             Test Item    Value        Reference Range Interpretation Comments

 

             POC-GLUCOSE METER (BEAKER) (test code = 1538) 112 mg/dL      

     H            : TESTED AT 

[facility] [address] [facility], [address]: /Technician ID = [id number] for 
ANDREE CHACON





POCT-GLUCOSE [id number] 08:24:00* 



             Test Item    Value        Reference Range Interpretation Comments

 

             POC-GLUCOSE METER (BEAKER) (test code = 1538) 161 mg/dL      

     H            : TESTED AT 

[place] [id number] [facility], [address]: /Technician ID = [id number] for 
ANDREE CHACON





VANCOMYCIN LEVEL, [id number] 07:15:00* 



             Test Item    Value        Reference Range Interpretation Comments

 

             VANCOMYCIN TROUGH (BEAKER) (test code = 522) 15.1 ug/mL   10.0-20.0

                  





 [facility] [id number] 06:03:00* 



             Test Item    Value        Reference Range Interpretation Comments

 

             SODIUM (BEAKER) (test code = 381) 137 meq/L    136-145             

       

 

             POTASSIUM (BEAKER) (test code = 379) 4.2 meq/L    3.5-5.1          

          

 

             CHLORIDE (BEAKER) (test code = 382) 104 meq/L                

         

 

             CO2 (BEAKER) (test code = 355) 27 meq/L     22-29                  

    

 

             BLOOD UREA NITROGEN (BEAKER) (test code = 354) 27 mg/dL     7-21   

      H             

 

             CREATININE (BEAKER) (test code = 358) 0.68 mg/dL   0.57-1.25       

           

 

             GLUCOSE RANDOM (BEAKER) (test code = 652) 134 mg/dL          

 H             

 

             CALCIUM (BEAKER) (test code = 697) 9.4 mg/dL    8.4-10.2           

        

 

             EGFR (BEAKER) (test code = 1092) 131 mL/min/1.73 sq m              

             ESTIMATED GFR IS NOT 

AS ACCURATE AS CREATININE CLEARANCE IN PREDICTING GLOMERULAR FILTRATION RATE. 
ESTIMATED GFR IS NOT APPLICABLE FOR DIALYSIS PATIENTS.





 ID - DAWIT WPOCT-GLUCOSE [id number] 21:32:00* 



             Test Item    Value        Reference Range Interpretation Comments

 

             POC-GLUCOSE METER (BEAKER) (test code = 1538) 177 mg/dL      

     H            : TESTED AT 

[place] [id number] [facility], [address]: /Technician ID = [id number] for 
CARMITA AGUSTIN





POCT-GLUCOSE [id number] 17:39:00* 



             Test Item    Value        Reference Range Interpretation Comments

 

             POC-GLUCOSE METER (BEAKER) (test code = 1538) 149 mg/dL      

     H            : TESTED AT 

[address], [address]: /Technician ID = [id number] for ASHLEY SLOANY





POCT-GLUCOSE [id number] 12:45:00* 



             Test Item    Value        Reference Range Interpretation Comments

 

             POC-GLUCOSE METER (BEAKER) (test code = 1538) 228 mg/dL      

     H            : TESTED AT 

[address], [address]: /Technician ID = [id number] for NATHALIA
 GERARDO





POCT-GLUCOSE [id number] 08:47:00* 



             Test Item    Value        Reference Range Interpretation Comments

 

             POC-GLUCOSE METER (BEAKER) (test code = 1538) 160 mg/dL      

     H            : TESTED AT 

[address], [address]: /Technician ID = [id number] for NATHALIA
 GERARDO





POCT-GLUCOSE [id number] 21:46:00* 



             Test Item    Value        Reference Range Interpretation Comments

 

             POC-GLUCOSE METER (BEAKER) (test code = 1538) 154 mg/dL      

     H            : TESTED AT 

[address], [address]: /Technician ID = [id number] for 
ROLAN NYE





POCT-GLUCOSE [id number] 18:34:00* 



             Test Item    Value        Reference Range Interpretation Comments

 

             POC-GLUCOSE METER (BEAKER) (test code = 1538) 107 mg/dL      

                  : TESTED AT 

[address], [address]: /Technician ID = [id number] for ASHLEY SLOANY





POCT-GLUCOSE [id number] 18:30:00* 



             Test Item    Value        Reference Range Interpretation Comments

 

             POC-GLUCOSE METER (BEAKER) (test code = 1538) 121 mg/dL      

     H            : TESTED AT 

[address], [address]: /Technician ID = [id number] for ASHLEY SLOANY





POCT-GLUCOSE [id number] 18:26:00* 



             Test Item    Value        Reference Range Interpretation Comments

 

             POC-GLUCOSE METER (BEAKER) (test code = 1538) 143 mg/dL      

     H            : TESTED AT 

[address], [address]: /Technician ID = [id number] for NATHALIA
 GERARDO





POCT-GLUCOSE [id number] 22:04:00* 



             Test Item    Value        Reference Range Interpretation Comments

 

             POC-GLUCOSE METER (BEAKER) (test code = 1538) 188 mg/dL      

     H            : TESTED AT 

[facility] [address] [facility], [address]: /Technician ID = [id number] for WESLEY IVERSON





POCT-GLUCOSE [id number] 17:43:00* 



             Test Item    Value        Reference Range Interpretation Comments

 

             POC-GLUCOSE METER (BEAKER) (test code = 1538) 121 mg/dL      

     H            : TESTED AT 

[facility] [address] [facility], [address]: /Technician ID = [id number] for GERARDO SLOAN





BASIC METABOLIC [id number] 15:24:00* 



             Test Item    Value        Reference Range Interpretation Comments

 

             SODIUM (BEAKER) (test code = 381) 137 meq/L    136-145             

       

 

             POTASSIUM (BEAKER) (test code = 379) 4.0 meq/L    3.5-5.1          

          

 

             CHLORIDE (BEAKER) (test code = 382) 105 meq/L                

         

 

             CO2 (BEAKER) (test code = 355) 25 meq/L     22-29                  

    

 

             BLOOD UREA NITROGEN (BEAKER) (test code = 354) 31 mg/dL     7-21   

      H             

 

             CREATININE (BEAKER) (test code = 358) 0.64 mg/dL   0.57-1.25       

           

 

             GLUCOSE RANDOM (BEAKER) (test code = 652) 108 mg/dL          

 H             

 

             CALCIUM (BEAKER) (test code = 697) 9.3 mg/dL    8.4-10.2           

        

 

             EGFR (BEAKER) (test code = 1092) 140 mL/min/1.73 sq m              

             ESTIMATED GFR IS NOT 

AS ACCURATE AS CREATININE CLEARANCE IN PREDICTING GLOMERULAR FILTRATION RATE. 
ESTIMATED GFR IS NOT APPLICABLE FOR DIALYSIS PATIENTS.





 ID - MOHSEN MVANCOMYCIN LEVEL, [id number] 15:22:00* 



             Test Item    Value        Reference Range Interpretation Comments

 

             VANCOMYCIN TROUGH (BEAKER) (test code = 522) 14.0 ug/mL   10.0-20.0

                  





 ID - MOHSEN [facility] (Hemogram only)2020 15:12:00* 



             Test Item    Value        Reference Range Interpretation Comments

 

             WBC (test code = 6690-2) 12.1         3.5- 10.5 K/L H            

 

 

             RBC (test code = 789-8) 4.04         4.63- 6.08 M/L L            

 

 

             MCHC (test code = 786-4) 30.7         32.3- 36.5 GM/DL L           

  

 

             Hematocrit (test code = 4544-3) 32.9 %       40.1-51      L        

     

 

             MCV (test code = 787-2) 81.4 fL      79-92.2                    

 

             MCH (test code = 785-6) 25.0 pg      25.7-32.2    L             

 

             RDW (test code = 788-0) 19.9 %       11.6-14.4    H             

 

             Platelets (test code = 777-3) 576          150- 450 K/CU MM H      

       

 

             MPV (test code = 80859-5) 8.2 fL       9.4-12.4     L             

 

             nRBC (test code = 413) 0            0- 0 /100 WBC               

 

             Lab Interpretation (test code = 66228-3) Abnormal                  

              





[facility] (HEMOGRAM ONLY)2020 15:12:00* 



             Test Item    Value        Reference Range Interpretation Comments

 

             WHITE BLOOD CELL COUNT (BEAKER) (test code = 775) 12.1 K/ L    3.5-

10.5     H             

 

             RED BLOOD CELL COUNT (BEAKER) (test code = 761) 4.04 M/ L    4.63-6

.08    L             

 

             HEMOGLOBIN (BEAKER) (test code = 410) 10.1 GM/DL   13.7-17.5    L  

           

 

             HEMATOCRIT (BEAKER) (test code = 411) 32.9 %       40.1-51.0    L  

           

 

             MEAN CORPUSCULAR VOLUME (BEAKER) (test code = 753) 81.4 fL      79.

0-92.2                  

 

             MEAN CORPUSCULAR HEMOGLOBIN (BEAKER) (test code = 751) 25.0 pg     

 25.7-32.2    L             

 

                    MEAN CORPUSCULAR HEMOGLOBIN CONC (BEAKER) (test code = 752) 

30.7 GM/DL          32.3-36.5

                          L                          

 

             RED CELL DISTRIBUTION WIDTH (BEAKER) (test code = 412) 19.9 %      

 11.6-14.4    H             

 

             PLATELET COUNT (BEAKER) (test code = 756) 576 K/CU MM  150-450     

 H             

 

             MEAN PLATELET VOLUME (BEAKER) (test code = 754) 8.2 fL       9.4-12

.4     L             

 

             NUCLEATED RED BLOOD CELLS (BEAKER) (test code = 413) 0 /100 WBC   0

-0                        





POCT-GLUCOSE [id number] 12:37:00* 



             Test Item    Value        Reference Range Interpretation Comments

 

             POC-GLUCOSE METER (BEAKER) (test code = 1538) 123 mg/dL      

     H            : TESTED AT 

[address], [address]: /Technician ID = [id number] for GERARDO SLOAN





POCT-GLUCOSE [id number] 08:42:00* 



             Test Item    Value        Reference Range Interpretation Comments

 

             POC-GLUCOSE METER (BEAKER) (test code = 1538) 162 mg/dL      

     H            : TESTED AT 

[address], [address]: /Technician ID = [id number] for GERARDO SLOAN





POCT-GLUCOSE [id number] 23:09:00* 



             Test Item    Value        Reference Range Interpretation Comments

 

             POC-GLUCOSE METER (BEAKER) (test code = 1538) 221 mg/dL      

     H            : TESTED AT 

[address], [address]: /Technician ID = [id number] for 
ROLAN NYE





POCT-GLUCOSE [id number] 17:30:00* 



             Test Item    Value        Reference Range Interpretation Comments

 

             POC-GLUCOSE METER (BEAKER) (test code = 1538) 116 mg/dL      

     H            : TESTED AT 

[address], [address]: /Technician ID = [id number] for 
OSWALDO, ANDREE





POCT-GLUCOSE [id number] 12:40:00* 



             Test Item    Value        Reference Range Interpretation Comments

 

             POC-GLUCOSE METER (BEAKER) (test code = 1538) 90 mg/dL       

                  : TESTED AT 

[address], [address]: /Technician ID = [id number] for 
OSWALDO, ANDREE





POCT-GLUCOSE [id number] 08:16:00* 



             Test Item    Value        Reference Range Interpretation Comments

 

             POC-GLUCOSE METER (BEAKER) (test code = 1538) 126 mg/dL      

     H            : TESTED AT 

[address], [address]: /Technician ID = [id number] for 
OSWALDO, ANDREE





POCT-GLUCOSE [id number] 01:36:00* 



             Test Item    Value        Reference Range Interpretation Comments

 

             POC-GLUCOSE METER (BEAKER) (test code = 1538) 124 mg/dL      

     H            : TESTED AT 

[facility] [address] [facility], [address]: /Technician ID = [id number] for JAMIEUWESLEY





POCT-GLUCOSE [id number] 20:59:00* 



             Test Item    Value        Reference Range Interpretation Comments

 

             POC-GLUCOSE METER (BEAKER) (test code = 1538) 112 mg/dL      

     H            : TESTED AT 

[place] [id number] [facility], [address]: /Technician ID = [id number] for SASU 
WESLEY





POCT-GLUCOSE [id number] 18:31:00* 



             Test Item    Value        Reference Range Interpretation Comments

 

             POC-GLUCOSE METER (BEAKER) (test code = 1538) 174 mg/dL      

     H            : TESTED AT 

[address], [address]: /Technician ID = [id number] for 
NATHEN WARNER





BASIC METABOLIC [id number] 14:27:00* 



             Test Item    Value        Reference Range Interpretation Comments

 

             SODIUM (BEAKER) (test code = 381) 135 meq/L    136-145      L      

       

 

             POTASSIUM (BEAKER) (test code = 379) 4.0 meq/L    3.5-5.1          

          

 

             CHLORIDE (BEAKER) (test code = 382) 105 meq/L                

         

 

             CO2 (BEAKER) (test code = 355) 21 meq/L     22-29        L         

    

 

             BLOOD UREA NITROGEN (BEAKER) (test code = 354) 24 mg/dL     7-21   

      H             

 

             CREATININE (BEAKER) (test code = 358) 0.66 mg/dL   0.57-1.25       

           

 

             GLUCOSE RANDOM (BEAKER) (test code = 652) 112 mg/dL          

 H             

 

             CALCIUM (BEAKER) (test code = 697) 9.3 mg/dL    8.4-10.2           

        

 

             EGFR (BEAKER) (test code = 1092) 135 mL/min/1.73 sq m              

             ESTIMATED GFR IS NOT 

AS ACCURATE AS CREATININE CLEARANCE IN PREDICTING GLOMERULAR FILTRATION RATE. 
ESTIMATED GFR IS NOT APPLICABLE FOR DIALYSIS PATIENTS.





 ID Olivia NARVAEZ FVANCOMYCIN LEVEL, [id number] 14:26:00* 



             Test Item    Value        Reference Range Interpretation Comments

 

             VANCOMYCIN TROUGH (BEAKER) (test code = 522) 11.4 ug/mL   10.0-20.0

                  





 ID Olivia NARVAEZ FPOCT-GLUCOSE [id number] 12:27:00* 



             Test Item    Value        Reference Range Interpretation Comments

 

             POC-GLUCOSE METER (BEAKER) (test code = 1538) 83 mg/dL       

                  : TESTED AT 

[address], [address]: /Technician ID = [id number] for 
ANDREE CHACON





POCT-GLUCOSE [id number] 08:16:00* 



             Test Item    Value        Reference Range Interpretation Comments

 

             POC-GLUCOSE METER (BEAKER) (test code = 1538) 158 mg/dL      

     H            : TESTED AT 

[address], [address]: /Technician ID = [id number] for 
JOVON CHACONINA





POCT-GLUCOSE [id number] 22:49:00* 



             Test Item    Value        Reference Range Interpretation Comments

 

             POC-GLUCOSE METER (BEAKER) (test code = 1538) 152 mg/dL      

     H            : TESTED AT 

[address], [address]: /Technician ID = [id number] for 
ROLAN NYE





POCT-GLUCOSE [id number] 18:15:00* 



             Test Item    Value        Reference Range Interpretation Comments

 

             POC-GLUCOSE METER (BEAKER) (test code = 1538) 125 mg/dL      

     H            : TESTED AT 

[address], [address]: /Technician ID = [id number] for GERARDO SLOAN





POCT-GLUCOSE [id number] 13:05:00* 



             Test Item    Value        Reference Range Interpretation Comments

 

             POC-GLUCOSE METER (BEAKER) (test code = 1538) 105 mg/dL      

                  : TESTED AT 

[address], [address]: /Technician ID = [id number] for GERARDO SLOAN





POCT-GLUCOSE [id number] 07:52:00* 



             Test Item    Value        Reference Range Interpretation Comments

 

             POC-GLUCOSE METER (BEAKER) (test code = 1538) 150 mg/dL      

     H            : TESTED AT 

[address], [address]: /Technician ID = [id number] for GERARDO SLOAN





BASIC METABOLIC [id number] 05:35:00* 



             Test Item    Value        Reference Range Interpretation Comments

 

             SODIUM (BEAKER) (test code = 381) 136 meq/L    136-145             

       

 

             POTASSIUM (BEAKER) (test code = 379) 4.2 meq/L    3.5-5.1          

          

 

             CHLORIDE (BEAKER) (test code = 382) 103 meq/L                

         

 

             CO2 (BEAKER) (test code = 355) 25 meq/L     22-29                  

    

 

             BLOOD UREA NITROGEN (BEAKER) (test code = 354) 27 mg/dL     7-21   

      H             

 

             CREATININE (BEAKER) (test code = 358) 0.65 mg/dL   0.57-1.25       

           

 

             GLUCOSE RANDOM (BEAKER) (test code = 652) 128 mg/dL          

 H             

 

             CALCIUM (BEAKER) (test code = 697) 9.5 mg/dL    8.4-10.2           

        

 

             EGFR (BEAKER) (test code = 1092) 137 mL/min/1.73 sq m              

             ESTIMATED GFR IS NOT 

AS ACCURATE AS CREATININE CLEARANCE IN PREDICTING GLOMERULAR FILTRATION RATE. 
ESTIMATED GFR IS NOT APPLICABLE FOR DIALYSIS PATIENTS.





 ID - MOHSEN MVANCOMYCIN LEVEL, [id number] 05:20:00* 



             Test Item    Value        Reference Range Interpretation Comments

 

             VANCOMYCIN TROUGH (BEAKER) (test code = 522) 12.1 ug/mL   10.0-20.0

                  





 ID - MOHSEN [facility] (HEMOGRAM ONLY)2020 04:54:00* 



             Test Item    Value        Reference Range Interpretation Comments

 

             WHITE BLOOD CELL COUNT (BEAKER) (test code = 775) 9.8 K/ L     3.5-

10.5                   

 

             RED BLOOD CELL COUNT (BEAKER) (test code = 761) 4.16 M/ L    4.63-6

.08    L             

 

             HEMOGLOBIN (BEAKER) (test code = 410) 10.2 GM/DL   13.7-17.5    L  

           

 

             HEMATOCRIT (BEAKER) (test code = 411) 34.3 %       40.1-51.0    L  

           

 

             MEAN CORPUSCULAR VOLUME (BEAKER) (test code = 753) 82.5 fL      79.

0-92.2                  

 

             MEAN CORPUSCULAR HEMOGLOBIN (BEAKER) (test code = 751) 24.5 pg     

 25.7-32.2    L             

 

                    MEAN CORPUSCULAR HEMOGLOBIN CONC (BEAKER) (test code = 752) 

29.7 GM/DL          32.3-36.5

                          L                          

 

             RED CELL DISTRIBUTION WIDTH (BEAKER) (test code = 412) 20.1 %      

 11.6-14.4    H             

 

             PLATELET COUNT (BEAKER) (test code = 756) 688 K/CU MM  150-450     

 H             

 

             MEAN PLATELET VOLUME (BEAKER) (test code = 754) 8.2 fL       9.4-12

.4     L             

 

             NUCLEATED RED BLOOD CELLS (BEAKER) (test code = 413) 0 /100 WBC   0

-0                        





POCT-GLUCOSE [id number] 21:58:00* 



             Test Item    Value        Reference Range Interpretation Comments

 

             POC-GLUCOSE METER (BEAKER) (test code = 1538) 110 mg/dL      

                  : TESTED AT 

[facility] [address] [facility], [address]: /Technician ID = [id number] for 
ROLO JIMENEZ





POCT-GLUCOSE [id number] 18:30:00* 



             Test Item    Value        Reference Range Interpretation Comments

 

             POC-GLUCOSE METER (BEAKER) (test code = 1538) 193 mg/dL      

     H            : TESTED AT 

[place] [id number] [facility], [address]: /Technician ID = [id number] for 
ANDREE CHACON





BUN and [id number] 13:55:00* 



             Test Item    Value        Reference Range Interpretation Comments

 

             BUN (test code = 3094-0) 21 mg/dL     7-21                       

 

             Creatinine (test code = 2160-0) 0.48 mg/dL   0.57-1.25    L        

     

 

             BUN/Creatinine Ratio (test code = 3097-3) 43.75                    

              For a normal individual on a

 normal diet, the reference interval for the mass ratio ranges between 12:1 and 
20:1 (BUN in mg/dL/creatinine in mg/dL)

 

             EGFR (test code = 04533-1) 195          mL/min/1.73 sq m           

   ESTIMATED GFR IS NOT AS 

ACCURATE AS CREATININE CLEARANCE IN PREDICTING GLOMERULAR FILTRATION RATE. 
ESTIMATED GFR IS NOT APPLICABLE FOR DIALYSIS PATIENTS.

 

             LLOYD (test code = LLOYD)  ID - MOHSEN M                         

   

 

             Lab Interpretation (test code = 21822-6) Abnormal                  

              





[id number] 13:55:00* 



             Test Item    Value        Reference Range Interpretation Comments

 

             BLOOD UREA NITROGEN (BEAKER) (test code = 354) 21 mg/dL     7-21   

                    

 

             CREATININE (BEAKER) (test code = 358) 0.48 mg/dL   0.57-1.25    L  

           

 

             BUN/CREATININE RATIO (BEAKER) (test code = 1800) 43.7500           

                     For a normal 

individual on a normal diet, the reference interval for the mass ratio ranges 
between 12:1 and 20:1 (BUN in mg/dL/creatinine in mg/dL)

 

             EGFR (BEAKER) (test code = 1092) 195 mL/min/1.73 sq m              

             ESTIMATED GFR IS NOT 

AS ACCURATE AS CREATININE CLEARANCE IN PREDICTING GLOMERULAR FILTRATION RATE. 
ESTIMATED GFR IS NOT APPLICABLE FOR DIALYSIS PATIENTS.





 ID - MOHSEN MPOCT-GLUCOSE [id number] 12:38:00* 



             Test Item    Value        Reference Range Interpretation Comments

 

             POC-GLUCOSE METER (BEAKER) (test code = 1538) 107 mg/dL      

                  : TESTED AT 

[address], [address]: /Technician ID = [id number] for 
OSWALDO, ANDREE





POCT-GLUCOSE [id number] 08:29:00* 



             Test Item    Value        Reference Range Interpretation Comments

 

             POC-GLUCOSE METER (BEAKER) (test code = 1538) 144 mg/dL      

     H            : TESTED AT 

[address], [address]: /Technician ID = [id number] for 
OSWALDO, ANDREE





POCT-GLUCOSE [id number] 02:27:00* 



             Test Item    Value        Reference Range Interpretation Comments

 

             POC-GLUCOSE METER (BEAKER) (test code = 1538) 128 mg/dL      

     H            : TESTED AT 

[address], [address]: /Technician ID = [id number] for SASU, 
WESLEY





POCT-GLUCOSE [id number] 21:10:00* 



             Test Item    Value        Reference Range Interpretation Comments

 

             POC-GLUCOSE METER (BEAKER) (test code = 1538) 106 mg/dL      

                  : TESTED AT 

[address], [address]: /Technician ID = [id number] for SASU, 
WESLEY





POCT-GLUCOSE [id number] 17:30:00* 



             Test Item    Value        Reference Range Interpretation Comments

 

             POC-GLUCOSE METER (BEAKER) (test code = 1538) 129 mg/dL      

     H            : TESTED AT 

[address], [address]: /Technician ID = [id number] for 
JR BIRD





VANCOMYCIN LEVEL, [id number] 14:29:00* 



             Test Item    Value        Reference Range Interpretation Comments

 

             VANCOMYCIN TROUGH (BEAKER) (test code = 522) 6.0 ug/mL    10.0-20.0

    L             





 ID - DBPOCT-GLUCOSE [id number] 13:16:00* 



             Test Item    Value        Reference Range Interpretation Comments

 

             POC-GLUCOSE METER (BEAKER) (test code = 1538) 108 mg/dL      

                  : TESTED AT 

[address], [address]: /Technician ID = [id number] for 
JR BIRD





POCT-GLUCOSE [id number] 12:28:00* 



             Test Item    Value        Reference Range Interpretation Comments

 

             POC-GLUCOSE METER (BEAKER) (test code = 1538) 126 mg/dL      

     H            : TESTED AT 

[address], [address]: /Technician ID = [id number] for 
OUMOU BIRDISSA





POCT-GLUCOSE [id number] 07:51:00* 



             Test Item    Value        Reference Range Interpretation Comments

 

             POC-GLUCOSE METER (BEAKER) (test code = 1538) 138 mg/dL      

     H            : TESTED AT 

[address], [address]: /Technician ID = [id number] for 
OUMOU BIRDISSA





POCT-GLUCOSE [id number] 21:25:00* 



             Test Item    Value        Reference Range Interpretation Comments

 

             POC-GLUCOSE METER (BEAKER) (test code = 1538) 77 mg/dL       

                  : TESTED AT 

[address], [address]: /Technician ID = [id number] for ZAYRA 
WESLEY





POCT-GLUCOSE [id number] 17:52:00* 



             Test Item    Value        Reference Range Interpretation Comments

 

             POC-GLUCOSE METER (BEAKER) (test code = 1538) 133 mg/dL      

     H            : TESTED AT 

[address], [address]: /Technician ID = [id number] for 
ANDREE CHACON





POCT-GLUCOSE [id number] 12:05:00* 



             Test Item    Value        Reference Range Interpretation Comments

 

             POC-GLUCOSE METER (BEAKER) (test code = 1538) 140 mg/dL      

     H            : TESTED AT 

[address], [address]: /Technician ID = [id number] for 
OSWALDO, ANDREE





POCT-GLUCOSE [id number] 08:46:00* 



             Test Item    Value        Reference Range Interpretation Comments

 

             POC-GLUCOSE METER (BEAKER) (test code = 1538) 130 mg/dL      

     H            : TESTED AT 

[address], [address]: /Technician ID = [id number] for 
OSWALDO, ANDREE





POCT-GLUCOSE [id number] 23:04:00* 



             Test Item    Value        Reference Range Interpretation Comments

 

             POC-GLUCOSE METER (BEAKER) (test code = 1538) 162 mg/dL      

     H            : TESTED AT 

[address], [address]: /Technician ID = [id number] for SASU, 
WESLEY





POCT-GLUCOSE [id number] 20:52:00* 



             Test Item    Value        Reference Range Interpretation Comments

 

             POC-GLUCOSE METER (BEAKER) (test code = 1538) 97 mg/dL       

                  : TESTED AT 

[address], [address]: /Technician ID = [id number] for SASU, 
WESLEY





POCT-GLUCOSE [id number] 18:13:00* 



             Test Item    Value        Reference Range Interpretation Comments

 

             POC-GLUCOSE METER (BEAKER) (test code = 1538) 134 mg/dL      

     H            : TESTED AT 

[address], [address]: /Technician ID = [id number] for 
OSWALDO, ANDREE





POCT-GLUCOSE [id number] 18:11:00* 



             Test Item    Value        Reference Range Interpretation Comments

 

             POC-GLUCOSE METER (BEAKER) (test code = 1538) 89 mg/dL       

                  : TESTED AT 

[address], [address]: /Technician ID = [id number] for 
OSWALDO, ANDREE





POCT-GLUCOSE [id number] 07:50:00* 



             Test Item    Value        Reference Range Interpretation Comments

 

             POC-GLUCOSE METER (BEAKER) (test code = 1538) 114 mg/dL      

     H            : TESTED AT 

[address], [address]: /Technician ID = [id number] for 
OSWALDO, ANDREE





POCT-GLUCOSE [id number] 21:34:00* 



             Test Item    Value        Reference Range Interpretation Comments

 

             POC-GLUCOSE METER (BEAKER) (test code = 1538) 140 mg/dL      

     H            : TESTED AT 

[address], [address]: /Technician ID = [id number] for 
ANDREE CHACON





POCT-GLUCOSE [id number] 21:16:00* 



             Test Item    Value        Reference Range Interpretation Comments

 

             POC-GLUCOSE METER (BEAKER) (test code = 1538) 124 mg/dL      

     H            : TESTED AT 

[address], [address]: /Technician ID = [id number] for 
FELIX LIMA





POCT-GLUCOSE [id number] 12:27:00* 



             Test Item    Value        Reference Range Interpretation Comments

 

             POC-GLUCOSE METER (BEAKER) (test code = 1538) 103 mg/dL      

                  : TESTED AT 

[address], [address]: /Technician ID = [id number] for 
OSWALDO, ANDREE





POCT-GLUCOSE [id number] 10:03:00* 



             Test Item    Value        Reference Range Interpretation Comments

 

             POC-GLUCOSE METER (BEAKER) (test code = 1538) 146 mg/dL      

     H            : TESTED AT 

[address], [address]: /Technician ID = [id number] for 
JOVON CHACONINA





POCT-GLUCOSE [id number] 21:08:00* 



             Test Item    Value        Reference Range Interpretation Comments

 

             POC-GLUCOSE METER (BEAKER) (test code = 1538) 114 mg/dL      

     H            : TESTED AT 

[address], [address]: /Technician ID = [id number] for 
ULROOPA, CARMITA





POCT-GLUCOSE [id number] 18:58:00* 



             Test Item    Value        Reference Range Interpretation Comments

 

             POC-GLUCOSE METER (BEAKER) (test code = 1538) 110 mg/dL      

                  : TESTED AT 

[address], [address]: /Technician ID = [id number] for 
Stephanie Muhmamad





POCT-GLUCOSE [id number] 13:06:00* 



             Test Item    Value        Reference Range Interpretation Comments

 

             POC-GLUCOSE METER (BEAKER) (test code = 1538) 139 mg/dL      

     H            : TESTED AT 

[address], [address]: /Technician ID = [id number] for 
Stephanie Muhammad





POCT-GLUCOSE [id number] 07:35:00* 



             Test Item    Value        Reference Range Interpretation Comments

 

             POC-GLUCOSE METER (BEAKER) (test code = 1538) 144 mg/dL      

     H            : TESTED AT 

[facility] [address] [facility], [address]: /Technician ID = [id number] for WAYNE NGUYEN





CBC W/PLT COUNT & AUTO [id number] 07:09:00* 



             Test Item    Value        Reference Range Interpretation Comments

 

             WHITE BLOOD CELL COUNT (BEAKER) (test code = 775) 11.1 K/ L    3.5-

10.5     H             

 

             RED BLOOD CELL COUNT (BEAKER) (test code = 761) 3.88 M/ L    4.63-6

.08    L             

 

             HEMOGLOBIN (BEAKER) (test code = 410) 9.3 GM/DL    13.7-17.5    L  

           

 

             HEMATOCRIT (BEAKER) (test code = 411) 31.7 %       40.1-51.0    L  

           

 

             MEAN CORPUSCULAR VOLUME (BEAKER) (test code = 753) 81.7 fL      79.

0-92.2                  

 

             MEAN CORPUSCULAR HEMOGLOBIN (BEAKER) (test code = 751) 24.0 pg     

 25.7-32.2    L             

 

                    MEAN CORPUSCULAR HEMOGLOBIN CONC (BEAKER) (test code = 752) 

29.3 GM/DL          32.3-36.5

                          L                          

 

             RED CELL DISTRIBUTION WIDTH (BEAKER) (test code = 412) 20.1 %      

 11.6-14.4    H             

 

             PLATELET COUNT (BEAKER) (test code = 756) 887 K/CU MM  150-450     

 H             

 

             MEAN PLATELET VOLUME (BEAKER) (test code = 754) 8.1 fL       9.4-12

.4     L             

 

             NUCLEATED RED BLOOD CELLS (BEAKER) (test code = 413) 0 /100 WBC   0

-0                        





(CELLAVISION MANUAL DIFF)2020 07:09:00* 



             Test Item    Value        Reference Range Interpretation Comments

 

             NEUTROPHILS - REL (CELLAVISION)(BEAKER) (test code = 2816) 44 %    

                                

 

             LYMPHOCYTES - REL (CELLAVISION)(BEAKER) (test code = 2817) 53 %    

                                

 

             MONOCYTES - REL (CELLAVISION)(BEAKER) (test code = 2818) 2 %       

                              

 

             EOSINOPHILS - REL (CELLAVISION)(BEAKER) (test code = 2819) 1 %     

                                

 

             BASOPHILS - REL (CELLAVISION)(BEAKER) (test code = 2820) 1 %       

                              

 

                NEUTROPHILS - ABS (CELLAVISION)(BEAKER) (test code = 2830) 4.88 

K/ul       1.78-5.38        

                                         

 

                LYMPHOCYTES - ABS (CELLAVISION)(BEAKER) (test code = 2831) 5.88 

K/ul       1.32-3.57       H

                                         

 

             MONOCYTES - ABS (CELLAVISION)(BEAKER) (test code = 2832) 0.22 K/uL 

   0.30-0.82    L             

 

                EOSINOPHILS - ABS (CELLAVISION)(BEAKER) (test code = 2834) 0.11 

K/uL       0.04-0.54        

                                         

 

             BASOPHILS - ABS (CELLAVISION)(BEAKER) (test code = 2835) 0.11 K/uL 

   0.01-0.08    H             

 

             TOTAL COUNTED (BEAKER) (test code = 1351) 100                      

               

 

             WBC MORPHOLOGY (BEAKER) (test code = 487) Normal                   

               

 

             PLT MORPHOLOGY (BEAKER) (test code = 486) Normal                   

               

 

             ANISOCYTOSIS (BEAKER) (test code = 961) 1+ few                     

             

 

             MICROCYTES (BEAKER) (test code = 965) 1+ few                       

           

 

             ARTIFACT (CELLAVISION)(BEAKER) (test code = 3432) Present          

                       

 

             PLATELET CONCENTRATION (CELLAVISION)(BEAKER) (test code = 3438) Inc

reased                               





 ID - Yoon OverholtUser comments: Slide comments: POCT-GLUCOSE METER
2020 21:32:00* 



             Test Item    Value        Reference Range Interpretation Comments

 

             POC-GLUCOSE METER (BEAKER) (test code = 1538) 110 mg/dL      

                  : TESTED AT 

[place] [id number] [facility], [address]: /Technician ID = [id number] for ZAYRA 
WESLEY





POCT-GLUCOSE [id number] 20:07:00* 



             Test Item    Value        Reference Range Interpretation Comments

 

             POC-GLUCOSE METER (BEAKER) (test code = 1538) 168 mg/dL      

     H            : TESTED AT 

[place] [address] [facility], [address]: /Technician ID = [id number] for 
NORBERT LANDRUM





POCT-GLUCOSE [id number] 12:14:00* 



             Test Item    Value        Reference Range Interpretation Comments

 

             POC-GLUCOSE METER (BEAKER) (test code = 1538) 123 mg/dL      

     H            : TESTED AT 

[place] [address] [facility], [address]: /Technician ID = [id number] for 
JOVON CHACONINA





POCT-GLUCOSE [id number] 07:53:00* 



             Test Item    Value        Reference Range Interpretation Comments

 

             POC-GLUCOSE METER (BEAKER) (test code = 1538) 147 mg/dL      

     H            : TESTED AT 

[address], [address]: /Technician ID = [id number] for 
JOVON CHACONINA





POCT-GLUCOSE [id number] 21:17:00* 



             Test Item    Value        Reference Range Interpretation Comments

 

             POC-GLUCOSE METER (BEAKER) (test code = 1538) 151 mg/dL      

     H            : TESTED AT 

[address], [address]: /Technician ID = [id number] for WESLEY IVERSON





POCT-GLUCOSE [id number] 17:47:00* 



             Test Item    Value        Reference Range Interpretation Comments

 

             POC-GLUCOSE METER (BEAKER) (test code = 1538) 132 mg/dL      

     H            : TESTED AT 

[address], [address]: /Technician ID = [id number] for 
BRITNI ROA





URINALYSIS W/ REFLEX URINE [id number] 15:19:00* 



             Test Item    Value        Reference Range Interpretation Comments

 

             COLOR (BEAKER) (test code = 470) Yellow                            

      

 

             CLARITY (BEAKER) (test code = 469) Clear                           

        

 

             SPECIFIC GRAVITY UA (BEAKER) (test code = 468) 1.020        1.001-1

.035                

 

             PH UA (BEAKER) (test code = 467) 5.5          5.0-8.0              

      

 

             PROTEIN UA (BEAKER) (test code = 464) 10 mg/dL     Negative     A  

           

 

             GLUCOSE UA (BEAKER) (test code = 365) Negative     Negative        

           

 

             KETONES UA (BEAKER) (test code = 371) Negative     Negative        

           

 

             BILIRUBIN UA (BEAKER) (test code = 462) Negative     Negative      

             

 

             BLOOD UA (BEAKER) (test code = 461) Negative     Negative          

         

 

             NITRITE UA (BEAKER) (test code = 465) Negative     Negative        

           

 

             LEUKOCYTE ESTERASE UA (BEAKER) (test code = 466) Negative     Negat

yobani                   

 

             UROBILINOGEN UA (BEAKER) (test code = 463) 0.2 mg/dL    0.2-1.0    

                

 

             RBC UA (BEAKER) (test code = 519) < /HPF                           

       

 

             WBC UA (BEAKER) (test code = 520) < /HPF                           

       

 

             BACTERIA (BEAKER) (test code = 517) Rare                           

         

 

             SOURCE(BEAKER) (test code = 2795)                                  

       





 ID - [auto] ID - techPOCT-GLUCOSE [id number] 12:42:00* 



             Test Item    Value        Reference Range Interpretation Comments

 

             POC-GLUCOSE METER (BEAKER) (test code = 1538) 93 mg/dL       

                  : TESTED AT 

[address], [address]: /Technician ID = [id number] for 
BRITNI ROA





POCT-GLUCOSE [id number] 08:07:00* 



             Test Item    Value        Reference Range Interpretation Comments

 

             POC-GLUCOSE METER (BEAKER) (test code = 1538) 131 mg/dL      

     H            : TESTED AT 

[address], [address]: /Technician ID = [id number] for 
BRITNI ROA





BASIC METABOLIC [id number] 07:18:00* 



             Test Item    Value        Reference Range Interpretation Comments

 

             SODIUM (BEAKER) (test code = 381) 136 meq/L    136-145             

       

 

             POTASSIUM (BEAKER) (test code = 379) 4.0 meq/L    3.5-5.1          

          

 

             CHLORIDE (BEAKER) (test code = 382) 103 meq/L                

         

 

             CO2 (BEAKER) (test code = 355) 24 meq/L     22-29                  

    

 

             BLOOD UREA NITROGEN (BEAKER) (test code = 354) 20 mg/dL     7-21   

                    

 

             CREATININE (BEAKER) (test code = 358) 0.64 mg/dL   0.57-1.25       

           

 

             GLUCOSE RANDOM (BEAKER) (test code = 652) 123 mg/dL          

 H             

 

             CALCIUM (BEAKER) (test code = 697) 9.0 mg/dL    8.4-10.2           

        

 

             EGFR (BEAKER) (test code = 1092) 140 mL/min/1.73 sq m              

             ESTIMATED GFR IS NOT 

AS ACCURATE AS CREATININE CLEARANCE IN PREDICTING GLOMERULAR FILTRATION RATE. 
ESTIMATED GFR IS NOT APPLICABLE FOR DIALYSIS PATIENTS.





 ID - PIAYA LCBC W/PLT COUNT & AUTO [id number] 06:54:00* 



             Test Item    Value        Reference Range Interpretation Comments

 

             WHITE BLOOD CELL COUNT (BEAKER) (test code = 775) 17.6 K/ L    3.5-

10.5     H             

 

             RED BLOOD CELL COUNT (BEAKER) (test code = 761) 4.03 M/ L    4.63-6

.08    L             

 

             HEMOGLOBIN (BEAKER) (test code = 410) 9.6 GM/DL    13.7-17.5    L  

           

 

             HEMATOCRIT (BEAKER) (test code = 411) 32.6 %       40.1-51.0    L  

           

 

             MEAN CORPUSCULAR VOLUME (BEAKER) (test code = 753) 80.9 fL      79.

0-92.2                  

 

             MEAN CORPUSCULAR HEMOGLOBIN (BEAKER) (test code = 751) 23.8 pg     

 25.7-32.2    L             

 

                    MEAN CORPUSCULAR HEMOGLOBIN CONC (BEAKER) (test code = 752) 

29.4 GM/DL          32.3-36.5

                          L                          

 

             RED CELL DISTRIBUTION WIDTH (BEAKER) (test code = 412) 19.9 %      

 11.6-14.4    H             

 

             PLATELET COUNT (BEAKER) (test code = 756) 886 K/CU MM  150-450     

 H             

 

             MEAN PLATELET VOLUME (BEAKER) (test code = 754) 7.9 fL       9.4-12

.4     L             

 

             NUCLEATED RED BLOOD CELLS (BEAKER) (test code = 413) 0 /100 WBC   0

-0                        

 

             NEUTROPHILS RELATIVE PERCENT (BEAKER) (test code = 429) 63 %       

                             

 

             LYMPHOCYTES RELATIVE PERCENT (BEAKER) (test code = 430) 28 %       

                             

 

             MONOCYTES RELATIVE PERCENT (BEAKER) (test code = 431) 7 %          

                           

 

             EOSINOPHILS RELATIVE PERCENT (BEAKER) (test code = 432) 1 %        

                             

 

             BASOPHILS RELATIVE PERCENT (BEAKER) (test code = 437) 1 %          

                           

 

             NEUTROPHILS ABSOLUTE COUNT (BEAKER) (test code = 670) 11.12 K/ L   

1.78-5.38    H             

 

             LYMPHOCYTES ABSOLUTE COUNT (BEAKER) (test code = 414) 4.86 K/ L    

1.32-3.57    H             

 

             MONOCYTES ABSOLUTE COUNT (BEAKER) (test code = 415) 1.19 K/ L    0.

30-0.82    H             

 

             EOSINOPHILS ABSOLUTE COUNT (BEAKER) (test code = 416) 0.23 K/ L    

0.04-0.54                  

 

             BASOPHILS ABSOLUTE COUNT (BEAKER) (test code = 417) 0.10 K/ L    0.

01-0.08    H             

 

             IMMATURE GRANULOCYTES-RELATIVE PERCENT (BEAKER) (test code = 2801) 

1 %          0-1                        





POCT-GLUCOSE [id number] 22:14:00* 



             Test Item    Value        Reference Range Interpretation Comments

 

             POC-GLUCOSE METER (BEAKER) (test code = 1538) 179 mg/dL      

     H            : TESTED AT 

[address], [address]: /Technician ID = [id number] for 
ROLO JIMENEZ





POCT-GLUCOSE [id number] 17:58:00* 



             Test Item    Value        Reference Range Interpretation Comments

 

             POC-GLUCOSE METER (BEAKER) (test code = 1538) 161 mg/dL      

     H            : TESTED AT 

[address], [address]: /Technician ID = [id number] for 
JR BIRD





POCT-GLUCOSE [id number] 13:11:00* 



             Test Item    Value        Reference Range Interpretation Comments

 

             POC-GLUCOSE METER (BEAKER) (test code = 1538) 135 mg/dL      

     H            : TESTED AT 

[address], [address]: /Technician ID = [id number] for 
MARILYN JEAN BAPTISTE





POCT-GLUCOSE [id number] 07:58:00* 



             Test Item    Value        Reference Range Interpretation Comments

 

             POC-GLUCOSE METER (BEAKER) (test code = 1538) 130 mg/dL      

     H            : TESTED AT 

[address], [address]: /Technician ID = [id number] for GERARDO SLOAN





POCT-GLUCOSE [id number] 17:41:00* 



             Test Item    Value        Reference Range Interpretation Comments

 

             POC-GLUCOSE METER (BEAKER) (test code = 1538) 113 mg/dL      

     H            : TESTED AT 

[address], [address]: /Technician ID = [id number] for 
Surekha Pitt





POCT-GLUCOSE [id number] 12:13:00* 



             Test Item    Value        Reference Range Interpretation Comments

 

             POC-GLUCOSE METER (BEAKER) (test code = 1538) 171 mg/dL      

     H            : TESTED AT 

[address], [address]: /Technician ID = [id number] for AJ FLORES





POCT-GLUCOSE [id number] 08:00:00* 



             Test Item    Value        Reference Range Interpretation Comments

 

             POC-GLUCOSE METER (BEAKER) (test code = 1538) 145 mg/dL      

     H            : TESTED AT 

[address], [address]: /Technician ID = [id number] for GERARDO SLOAN





POCT-GLUCOSE [id number] 22:51:00* 



             Test Item    Value        Reference Range Interpretation Comments

 

             POC-GLUCOSE METER (BEAKER) (test code = 1538) 196 mg/dL      

     H            : TESTED AT 

[address], [address]: /Technician ID = [id number] for 
ROLO JIMENEZ





POCT-GLUCOSE [id number] 18:00:00* 



             Test Item    Value        Reference Range Interpretation Comments

 

             POC-GLUCOSE METER (BEAKER) (test code = 1538) 176 mg/dL      

     H            : TESTED AT 

[address], [address]: /Technician ID = [id number] for 
TOLU YUAN





POCT-GLUCOSE [id number] 13:46:00* 



             Test Item    Value        Reference Range Interpretation Comments

 

             POC-GLUCOSE METER (BEAKER) (test code = 1538) 101 mg/dL      

                  : TESTED AT 

[address], [address]: /Technician ID = [id number] for 
PELON JR





POCT-GLUCOSE [id number] 07:42:00* 



             Test Item    Value        Reference Range Interpretation Comments

 

             POC-GLUCOSE METER (BEAKER) (test code = 1538) 107 mg/dL      

                  : TESTED AT 

[address], [address]: /Technician ID = [id number] for 
PELON, JR





POCT-GLUCOSE [id number] 22:03:00* 



             Test Item    Value        Reference Range Interpretation Comments

 

             POC-GLUCOSE METER (BEAKER) (test code = 1538) 161 mg/dL      

     H            : TESTED AT 

[address], [address]: /Technician ID = [id number] for 
ROLAN NYE





POCT-GLUCOSE [id number] 18:46:00* 



             Test Item    Value        Reference Range Interpretation Comments

 

             POC-GLUCOSE METER (BEAKER) (test code = 1538) 97 mg/dL       

                  : TESTED AT 

[address], [address]: /Technician ID = [id number] for 
OSWALDO, ANDREE





POCT-GLUCOSE [id number] 13:41:00* 



             Test Item    Value        Reference Range Interpretation Comments

 

             POC-GLUCOSE METER (BEAKER) (test code = 1538) 142 mg/dL      

     H            : TESTED AT 

[address], [address]: /Technician ID = [id number] for 
OSWALDO, ANDREE





POCT-GLUCOSE [id number] 08:21:00* 



             Test Item    Value        Reference Range Interpretation Comments

 

             POC-GLUCOSE METER (BEAKER) (test code = 1538) 124 mg/dL      

     H            : TESTED AT 

[address], [address]: /Technician ID = [id number] for 
OSWALDO, ANDREE





POCT-GLUCOSE [id number] 21:50:00* 



             Test Item    Value        Reference Range Interpretation Comments

 

             POC-GLUCOSE METER (BEAKER) (test code = 1538) 139 mg/dL      

     H            : TESTED AT 

[address], [address]: /Technician ID = [id number] for 
SIMMS, NORMA





POCT-GLUCOSE [id number] 18:45:00* 



             Test Item    Value        Reference Range Interpretation Comments

 

             POC-GLUCOSE METER (BEAKER) (test code = 1538) 115 mg/dL      

     H            : TESTED AT 

[address], [address]: /Technician ID = [id number] for WENDY,
 WAYNE





POCT-GLUCOSE [id number] 13:04:00* 



             Test Item    Value        Reference Range Interpretation Comments

 

             POC-GLUCOSE METER (BEAKER) (test code = 1538) 172 mg/dL      

     H            : TESTED AT 

[address], [address]: /Technician ID = [id number] for 
OSWALDO, ANDREE





POCT-GLUCOSE [id number] 10:05:00* 



             Test Item    Value        Reference Range Interpretation Comments

 

             POC-GLUCOSE METER (BEAKER) (test code = 1538) 248 mg/dL      

     H            : TESTED AT 

[address], [address]: /Technician ID = [id number] for WAYNE NGUYEN





POCT-GLUCOSE [id number] 10:02:00* 



             Test Item    Value        Reference Range Interpretation Comments

 

             POC-GLUCOSE METER (BEAKER) (test code = 1538) 163 mg/dL      

     H            : TESTED AT 

[address], [address]: /Technician ID = [id number] for 
ANDREE CHACON





WOUND CULTURE + GRAM [id number] 09:14:00* 



             Test Item    Value        Reference Range Interpretation Comments

 

             CULTURE (BEAKER) (test code = 1095)                           A    

        3+ Escherichia coliAmpC Positive

 

             GRAM STAIN RESULT (BEAKER) (test code = 1123) 4+ WBCs              

                   

 

             GRAM STAIN RESULT (BEAKER) (test code = 974710) 3+ gram negative ro

ds                            

 

                          GRAM STAIN RESULT (BEAKER) (test code = 829793) 3+ gra

m positive cocci in 

chains, pairs and clusters                                          





4+ Skin floraPOCT-GLUCOSE [id number] 21:50:00* 



             Test Item    Value        Reference Range Interpretation Comments

 

             POC-GLUCOSE METER (BEAKER) (test code = 1538) 236 mg/dL      

     H            : TESTED AT 

[address], [address]: /Technician ID = [id number] for 
ROLO JIMENEZ





POCT-GLUCOSE [id number] 17:11:00* 



             Test Item    Value        Reference Range Interpretation Comments

 

             POC-GLUCOSE METER (BEAKER) (test code = 1538) 131 mg/dL      

     H            : TESTED AT 

[address], [address]: /Technician ID = [id number] for 
JR BIRD





POCT-GLUCOSE [id number] 10:30:00* 



             Test Item    Value        Reference Range Interpretation Comments

 

             POC-GLUCOSE METER (BEAKER) (test code = 1538) 220 mg/dL      

     H            : TESTED AT 

[address], [address]: /Technician ID = [id number] for 
JR BIRD





CBC W/PLT COUNT & AUTO [id number] 09:39:00* 



             Test Item    Value        Reference Range Interpretation Comments

 

             WHITE BLOOD CELL COUNT (BEAKER) (test code = 775) 11.2 K/ L    3.5-

10.5     H             

 

             RED BLOOD CELL COUNT (BEAKER) (test code = 761) 3.77 M/ L    4.63-6

.08    L             

 

             HEMOGLOBIN (BEAKER) (test code = 410) 8.9 GM/DL    13.7-17.5    L  

           

 

             HEMATOCRIT (BEAKER) (test code = 411) 30.5 %       40.1-51.0    L  

           

 

             MEAN CORPUSCULAR VOLUME (BEAKER) (test code = 753) 80.9 fL      79.

0-92.2                  

 

             MEAN CORPUSCULAR HEMOGLOBIN (BEAKER) (test code = 751) 23.6 pg     

 25.7-32.2    L             

 

                    MEAN CORPUSCULAR HEMOGLOBIN CONC (BEAKER) (test code = 752) 

29.2 GM/DL          32.3-36.5

                          L                          

 

             RED CELL DISTRIBUTION WIDTH (BEAKER) (test code = 412) 19.2 %      

 11.6-14.4    H             

 

             PLATELET COUNT (BEAKER) (test code = 756) 791 K/CU MM  150-450     

 H             

 

             MEAN PLATELET VOLUME (BEAKER) (test code = 754) 8.3 fL       9.4-12

.4     L             

 

             NUCLEATED RED BLOOD CELLS (BEAKER) (test code = 413) 0 /100 WBC   0

-0                        





(CELLAVISION MANUAL DIFF)2020 09:39:00* 



             Test Item    Value        Reference Range Interpretation Comments

 

             NEUTROPHILS - REL (CELLAVISION)(BEAKER) (test code = 2816) 48 %    

                                

 

             LYMPHOCYTES - REL (CELLAVISION)(BEAKER) (test code = 2817) 40 %    

                                

 

             MONOCYTES - REL (CELLAVISION)(BEAKER) (test code = 2818) 7 %       

                              

 

             EOSINOPHILS - REL (CELLAVISION)(BEAKER) (test code = 2819) 3 %     

                                

 

             ATYPICAL LYMPHOCYTES - REL (CELLAVISION)(BEAKER) (test code = 2829)

 2 %          0-0          H             

 

                NEUTROPHILS - ABS (CELLAVISION)(BEAKER) (test code = 2830) 5.38 

K/ul       1.78-5.38        

                                         

 

                LYMPHOCYTES - ABS (CELLAVISION)(BEAKER) (test code = 2831) 4.48 

K/ul       1.32-3.57       H

                                         

 

             MONOCYTES - ABS (CELLAVISION)(BEAKER) (test code = 2832) 0.78 K/uL 

   0.30-0.82                  

 

                EOSINOPHILS - ABS (CELLAVISION)(BEAKER) (test code = 2834) 0.34 

K/uL       0.04-0.54        

                                         

 

                    ATYPICAL LYMPHOCYTES - ABS (CELLAVISION)(BEAKER) (test code 

= 2858) 0.22 K/uL           

0.00-0.00                 H                          

 

             TOTAL COUNTED (BEAKER) (test code = 1351) 100                      

               

 

             WBC MORPHOLOGY (BEAKER) (test code = 487) Normal                   

               

 

             PLT MORPHOLOGY (BEAKER) (test code = 486) Normal                   

               

 

             POLYCHROMATOPHILLIC RBCS(BEAKER) (test code = 478) 1+ few          

                        

 

             HYPOCHROMIA (BEAKER) (test code = 963) 1+ few                      

            

 

             ARTIFACT (CELLAVISION)(BEAKER) (test code = 3432) Present          

                       

 

             PLATELET CONCENTRATION (CELLAVISION)(BEAKER) (test code = 3438) Inc

reased                               





 ID - Shannan Alba comments: Slide comments: POCT-GLUCOSE METER
2020 07:45:00* 



             Test Item    Value        Reference Range Interpretation Comments

 

             POC-GLUCOSE METER (BEAKER) (test code = 1538) 136 mg/dL      

     H            : TESTED AT 

[facility] [address] [facility], [address]: /Technician ID = [id number] for 
JR BIRD





[id number] 04:52:00* 



             Test Item    Value        Reference Range Interpretation Comments

 

             MAGNESIUM (BEAKER) (test code = 627) 2.1 mg/dL    1.6-2.6          

          





 ID - MOHSEN MBASIC METABOLIC [id number] 04:52:00* 



             Test Item    Value        Reference Range Interpretation Comments

 

             SODIUM (BEAKER) (test code = 381) 137 meq/L    136-145             

       

 

             POTASSIUM (BEAKER) (test code = 379) 4.2 meq/L    3.5-5.1          

          

 

             CHLORIDE (BEAKER) (test code = 382) 102 meq/L                

         

 

             CO2 (BEAKER) (test code = 355) 24 meq/L     22-29                  

    

 

             BLOOD UREA NITROGEN (BEAKER) (test code = 354) 20 mg/dL     7-21   

                    

 

             CREATININE (BEAKER) (test code = 358) 0.77 mg/dL   0.57-1.25       

           

 

             GLUCOSE RANDOM (BEAKER) (test code = 652) 151 mg/dL          

 H             

 

             CALCIUM (BEAKER) (test code = 697) 9.0 mg/dL    8.4-10.2           

        

 

             EGFR (BEAKER) (test code = 1092) 113 mL/min/1.73 sq m              

             ESTIMATED GFR IS NOT 

AS ACCURATE AS CREATININE CLEARANCE IN PREDICTING GLOMERULAR FILTRATION RATE. 
ESTIMATED GFR IS NOT APPLICABLE FOR DIALYSIS PATIENTS.





 ID - MOHSEN MPOCT-GLUCOSE [id number] 22:54:00* 



             Test Item    Value        Reference Range Interpretation Comments

 

             POC-GLUCOSE METER (BEAKER) (test code = 1538) 222 mg/dL      

     H            : TESTED AT 

[address], [address]: /Technician ID = [id number] for 
ROLO JIMENEZ





POCT-GLUCOSE [id number] 22:14:00* 



             Test Item    Value        Reference Range Interpretation Comments

 

             POC-GLUCOSE METER (BEAKER) (test code = 1538) > mg/dL        

     HH           : Notified 

RN/MD: TESTED AT [address], [address]: /Technician ID = 
[id number] for ROLO JIMENEZ





POCT-GLUCOSE [id number] 17:38:00* 



             Test Item    Value        Reference Range Interpretation Comments

 

             POC-GLUCOSE METER (BEAKER) (test code = 1538) 140 mg/dL      

     H            : TESTED AT 

[address], [address]: /Technician ID = [id number] for GERARDO SLOAN





BLOOD [id number] 12:00:00* 



             Test Item    Value        Reference Range Interpretation Comments

 

             CULTURE (BEAKER) (test code = 1095) No growth in 5 days            

                





BLOOD [id number] 12:00:00* 



             Test Item    Value        Reference Range Interpretation Comments

 

             CULTURE (BEAKER) (test code = 1095) No growth in 5 days            

                





POCT-GLUCOSE [id number] 11:13:00* 



             Test Item    Value        Reference Range Interpretation Comments

 

             POC-GLUCOSE METER (BEAKER) (test code = 1538) 241 mg/dL      

     H            : TESTED AT 

[address], [address]: /Technician ID = [id number] for 
JR BIRD





CBC W/PLT COUNT & AUTO [id number] 09:33:00* 



             Test Item    Value        Reference Range Interpretation Comments

 

             WHITE BLOOD CELL COUNT (BEAKER) (test code = 775) 11.3 K/ L    3.5-

10.5     H             

 

             RED BLOOD CELL COUNT (BEAKER) (test code = 761) 3.76 M/ L    4.63-6

.08    L             

 

             HEMOGLOBIN (BEAKER) (test code = 410) 8.8 GM/DL    13.7-17.5    L  

           

 

             HEMATOCRIT (BEAKER) (test code = 411) 30.3 %       40.1-51.0    L  

           

 

             MEAN CORPUSCULAR VOLUME (BEAKER) (test code = 753) 80.6 fL      79.

0-92.2                  

 

             MEAN CORPUSCULAR HEMOGLOBIN (BEAKER) (test code = 751) 23.4 pg     

 25.7-32.2    L             

 

                    MEAN CORPUSCULAR HEMOGLOBIN CONC (BEAKER) (test code = 752) 

29.0 GM/DL          32.3-36.5

                          L                          

 

             RED CELL DISTRIBUTION WIDTH (BEAKER) (test code = 412) 19.2 %      

 11.6-14.4    H             

 

             PLATELET COUNT (BEAKER) (test code = 756) 729 K/CU MM  150-450     

 H             

 

             MEAN PLATELET VOLUME (BEAKER) (test code = 754) 8.3 fL       9.4-12

.4     L             

 

             NUCLEATED RED BLOOD CELLS (BEAKER) (test code = 413) 0 /100 WBC   0

-0                        





(CELLAVISION MANUAL DIFF)2020 09:33:00* 



             Test Item    Value        Reference Range Interpretation Comments

 

             NEUTROPHILS - REL (CELLAVISION)(BEAKER) (test code = 2816) 50 %    

                                

 

             LYMPHOCYTES - REL (CELLAVISION)(BEAKER) (test code = 2817) 41 %    

                                

 

             MONOCYTES - REL (CELLAVISION)(BEAKER) (test code = 2818) 5 %       

                              

 

             EOSINOPHILS - REL (CELLAVISION)(BEAKER) (test code = 2819) 2 %     

                                

 

             MYELOCYTES - REL (CELLAVISION)(BEAKER) (test code = 2822) 1 %      

    0-0          H             

 

             BANDS - REL (CELLAVISION)(BEAKER) (test code = 2826) 1 %          0

-10                       

 

                NEUTROPHILS - ABS (CELLAVISION)(BEAKER) (test code = 2830) 5.65 

K/ul       1.78-5.38       H

                                         

 

                LYMPHOCYTES - ABS (CELLAVISION)(BEAKER) (test code = 2831) 4.63 

K/ul       1.32-3.57       H

                                         

 

             MONOCYTES - ABS (CELLAVISION)(BEAKER) (test code = 2832) 0.57 K/uL 

   0.30-0.82                  

 

                EOSINOPHILS - ABS (CELLAVISION)(BEAKER) (test code = 2834) 0.23 

K/uL       0.04-0.54        

                                         

 

             MYELOCYTES-ABS (CELLAVISION)(BEAKER) (test code = 2837) 0.11 K/uL  

  0.00-0.00    H             

 

             BANDS - ABS (CELLAVISION)(BEAKER) (test code = 2840) 0.11 K/uL    0

.00-0.80                  

 

             TOTAL COUNTED (BEAKER) (test code = 1351) 100                      

               

 

             WBC MORPHOLOGY (BEAKER) (test code = 487) Normal                   

               

 

             PLT MORPHOLOGY (BEAKER) (test code = 486) Normal                   

               

 

             POLYCHROMATOPHILLIC RBCS(BEAKER) (test code = 478) 2+ moderate     

                        

 

             ANISOCYTOSIS (BEAKER) (test code = 961) 1+ few                     

             

 

             MICROCYTES (BEAKER) (test code = 965) 1+ few                       

           

 

             ARTIFACT (CELLAVISION)(BEAKER) (test code = 3432) Present          

                       

 

             PLATELET CONCENTRATION (CELLAVISION)(BEAKER) (test code = 3438) Inc

reased                               





 ID - Yoon OverholtUser comments: Slide comments: POCT-GLUCOSE METER
2020 08:04:00* 



             Test Item    Value        Reference Range Interpretation Comments

 

             POC-GLUCOSE METER (BEAKER) (test code = 1538) 155 mg/dL      

     H            : TESTED AT 

[facility] [address] [facility], [address]: /Technician ID = [id number] for 
JR BIRD





[id number] 06:56:00* 



             Test Item    Value        Reference Range Interpretation Comments

 

             MAGNESIUM (BEAKER) (test code = 627) 2.3 mg/dL    1.6-2.6          

          





 ID - SOLANGE FBASIC METABOLIC [id number] 06:56:00* 



             Test Item    Value        Reference Range Interpretation Comments

 

             SODIUM (BEAKER) (test code = 381) 135 meq/L    136-145      L      

       

 

             POTASSIUM (BEAKER) (test code = 379) 4.0 meq/L    3.5-5.1          

          

 

             CHLORIDE (BEAKER) (test code = 382) 101 meq/L                

         

 

             CO2 (BEAKER) (test code = 355) 24 meq/L     22-29                  

    

 

             BLOOD UREA NITROGEN (BEAKER) (test code = 354) 19 mg/dL     7-21   

                    

 

             CREATININE (BEAKER) (test code = 358) 0.63 mg/dL   0.57-1.25       

           

 

             GLUCOSE RANDOM (BEAKER) (test code = 652) 133 mg/dL          

 H             

 

             CALCIUM (BEAKER) (test code = 697) 9.0 mg/dL    8.4-10.2           

        

 

             EGFR (BEAKER) (test code = 1092) 143 mL/min/1.73 sq m              

             ESTIMATED GFR IS NOT 

AS ACCURATE AS CREATININE CLEARANCE IN PREDICTING GLOMERULAR FILTRATION RATE. 
ESTIMATED GFR IS NOT APPLICABLE FOR DIALYSIS PATIENTS.





 RHONDA NARVAEZ EPATIC FUNCTION [id number] 06:56:00* 



             Test Item    Value        Reference Range Interpretation Comments

 

             TOTAL PROTEIN (BEAKER) (test code = 770) 8.1 gm/dL    6.0-8.3      

              

 

             ALBUMIN (BEAKER) (test code = 1145) 3.1 g/dL     3.5-5.0      L    

         

 

             BILIRUBIN TOTAL (BEAKER) (test code = 377) 0.2 mg/dL    0.2-1.2    

                

 

             BILIRUBIN DIRECT (BEAKER) (test code = 706) 0.2 mg/dL    0.1-0.5   

                 

 

             ALKALINE PHOSPHATASE (BEAKER) (test code = 346) 408 U/L      

       H             

 

             AST (SGOT) (BEAKER) (test code = 353) 65 U/L       5-34         H  

           

 

             ALT (SGPT) (BEAKER) (test code = 347) 86 U/L       6-55         H  

           





 ID Olivia NARVAEZ FPOCT-GLUCOSE METER2020-03-15 22:03:00* 



             Test Item    Value        Reference Range Interpretation Comments

 

             POC-GLUCOSE METER (BEAKER) (test code = 1538) 134 mg/dL      

     H            : TESTED AT 

[facility] [address] [facility], [address]: /Technician ID = [id number] for WESLEY IVERSON





POCT-GLUCOSE METER2020-03-15 18:29:00* 



             Test Item    Value        Reference Range Interpretation Comments

 

             POC-GLUCOSE METER (BEAKER) (test code = 1538) 128 mg/dL      

     H            : TESTED AT 

[facility] [address] [facility], [address]: /Technician ID = [id number] for Hali Peacock





POCT-GLUCOSE [id number] 12:36:00* 



             Test Item    Value        Reference Range Interpretation Comments

 

             POC-GLUCOSE METER (BEAKER) (test code = 1538) 204 mg/dL      

     H            : TESTED AT 

[place] [id number] [facility], [address]: /Technician ID = [id number] for 
ANDREE CHACON





RAD, CHEST, 1 VIEW, NON DEPT2020-03-15 11:05:00Reason for exam:->post picc 
placementShould this be performed at the bedside?->YesFINAL REPORT PATIENT ID:  
 [id number]  Chest one view. Clinical history: post picc placement Comparison: 
2020 Discussion: A frontal chest is provided. Cardiomediastinal 
contours are unremarkable. A right IJ line projects over the proximal SVC, and 
there is interval placement of left PICC line tip projects over the cavoatrial 
junction. There is mild degree of vascular prominence. A small right effusion is
 present. No new consolidation. No pneumothorax. Osseous structures appear 
intact. Signed: Eulalio Waterman Verified Date/Time:  03/15/2020 11:05:17 
Reading Location: [address] Ortho Consult Reading Room        Electronically 
signed by: EULALIO WATERMAN M.D. on 03/15/2020 11:05 AM VANCOMYCIN LEVEL, TROUGH
2020-03-15 08:59:00* 



             Test Item    Value        Reference Range Interpretation Comments

 

             VANCOMYCIN TROUGH (BEAKER) (test code = 522) 9.8 ug/mL    10.0-20.0

    L             





 ID - MOHSEN MPOCT-GLUCOSE METER2020-03-15 08:08:00* 



             Test Item    Value        Reference Range Interpretation Comments

 

             POC-GLUCOSE METER (BEAKER) (test code = 1538) 153 mg/dL      

     H            : TESTED AT 

[facility] [address] [facility], [address]: /Technician ID = [id number] for 
ANDREE CHACON





CBC W/PLT COUNT & AUTO DIFFERENTIAL2020-03-15 08:04:00* 



             Test Item    Value        Reference Range Interpretation Comments

 

             WHITE BLOOD CELL COUNT (BEAKER) (test code = 775) 10.3 K/ L    3.5-

10.5                   

 

             RED BLOOD CELL COUNT (BEAKER) (test code = 761) 3.61 M/ L    4.63-6

.08    L             

 

             HEMOGLOBIN (BEAKER) (test code = 410) 8.3 GM/DL    13.7-17.5    L  

           

 

             HEMATOCRIT (BEAKER) (test code = 411) 29.1 %       40.1-51.0    L  

           

 

             MEAN CORPUSCULAR VOLUME (BEAKER) (test code = 753) 80.6 fL      79.

0-92.2                  

 

             MEAN CORPUSCULAR HEMOGLOBIN (BEAKER) (test code = 751) 23.0 pg     

 25.7-32.2    L             

 

                    MEAN CORPUSCULAR HEMOGLOBIN CONC (BEAKER) (test code = 752) 

28.5 GM/DL          32.3-36.5

                          L                          

 

             RED CELL DISTRIBUTION WIDTH (BEAKER) (test code = 412) 19.0 %      

 11.6-14.4    H             

 

             PLATELET COUNT (BEAKER) (test code = 756) 665 K/CU MM  150-450     

 H             

 

             MEAN PLATELET VOLUME (BEAKER) (test code = 754) 8.3 fL       9.4-12

.4     L             

 

             NUCLEATED RED BLOOD CELLS (BEAKER) (test code = 413) 0 /100 WBC   0

-0                        

 

             NEUTROPHILS RELATIVE PERCENT (BEAKER) (test code = 429) 52 %       

                             

 

             LYMPHOCYTES RELATIVE PERCENT (BEAKER) (test code = 430) 35 %       

                             

 

             MONOCYTES RELATIVE PERCENT (BEAKER) (test code = 431) 10 %         

                           

 

             EOSINOPHILS RELATIVE PERCENT (BEAKER) (test code = 432) 2 %        

                             

 

             BASOPHILS RELATIVE PERCENT (BEAKER) (test code = 437) 0 %          

                           

 

             NEUTROPHILS ABSOLUTE COUNT (BEAKER) (test code = 670) 5.33 K/ L    

1.78-5.38                  

 

             LYMPHOCYTES ABSOLUTE COUNT (BEAKER) (test code = 414) 3.55 K/ L    

1.32-3.57                  

 

             MONOCYTES ABSOLUTE COUNT (BEAKER) (test code = 415) 0.99 K/ L    0.

30-0.82    H             

 

             EOSINOPHILS ABSOLUTE COUNT (BEAKER) (test code = 416) 0.25 K/ L    

0.04-0.54                  

 

             BASOPHILS ABSOLUTE COUNT (BEAKER) (test code = 417) 0.04 K/ L    0.

01-0.08                  

 

             IMMATURE GRANULOCYTES-RELATIVE PERCENT (BEAKER) (test code = 2801) 

1 %          0-1                        





MAGNESIUM2020-03-15 07:00:00* 



             Test Item    Value        Reference Range Interpretation Comments

 

             MAGNESIUM (BEAKER) (test code = 627) 2.3 mg/dL    1.6-2.6          

          





 ID - MOHSEN MBASIC METABOLIC PANEL2020-03-15 07:00:00* 



             Test Item    Value        Reference Range Interpretation Comments

 

             SODIUM (BEAKER) (test code = 381) 136 meq/L    136-145             

       

 

             POTASSIUM (BEAKER) (test code = 379) 4.1 meq/L    3.5-5.1          

          

 

             CHLORIDE (BEAKER) (test code = 382) 101 meq/L                

         

 

             CO2 (BEAKER) (test code = 355) 26 meq/L     22-29                  

    

 

             BLOOD UREA NITROGEN (BEAKER) (test code = 354) 13 mg/dL     7-21   

                    

 

             CREATININE (BEAKER) (test code = 358) 0.62 mg/dL   0.57-1.25       

           

 

             GLUCOSE RANDOM (BEAKER) (test code = 652) 160 mg/dL          

 H             

 

             CALCIUM (BEAKER) (test code = 697) 8.8 mg/dL    8.4-10.2           

        

 

             EGFR (BEAKER) (test code = 1092) 145 mL/min/1.73 sq m              

             ESTIMATED GFR IS NOT 

AS ACCURATE AS CREATININE CLEARANCE IN PREDICTING GLOMERULAR FILTRATION RATE. 
ESTIMATED GFR IS NOT APPLICABLE FOR DIALYSIS PATIENTS.





 ID - MOHSEN MPOCT-GLUCOSE [id number] 23:43:00* 



             Test Item    Value        Reference Range Interpretation Comments

 

             POC-GLUCOSE METER (BEAKER) (test code = 1538) 259 mg/dL      

     H            : TESTED AT 

[facility] [address] [facility], [address]: /Technician ID = [id number] for WESLEY IVERSON





POCT-GLUCOSE [id number] 18:18:00* 



             Test Item    Value        Reference Range Interpretation Comments

 

             POC-GLUCOSE METER (BEAKER) (test code = 1538) 105 mg/dL      

                  : TESTED AT 

[facility] [address] [facility], [address]: /Technician ID = [id number] for 
ANDREE CHACON





POCT-GLUCOSE [id number] 12:57:00* 



             Test Item    Value        Reference Range Interpretation Comments

 

             POC-GLUCOSE METER (BEAKER) (test code = 1538) 134 mg/dL      

     H            : TESTED AT 

[facility] [address] [facility] [place], [address]: /Technician ID = [id number] for 
ANDREE CHACON





WOUND CULTURE + GRAM [id number] 11:27:00* 



             Test Item    Value        Reference Range Interpretation Comments

 

                    CULTURE (BEAKER) (test code = 1095) METHICILLIN RESISTANT ST

APHYLOCOCCUS AUREUS 

                          A                         4+ Methicillin resistant Sta

phylococcus aureus

 

             Clindamycin (test code = 10)                           S           

  

 

             Erythromycin (test code = 4)                           R           

  

 

             Linezolid (test code = 40)                           S             

 

             Nitrofurantoin (test code = 23)                           S        

     

 

             Oxacillin (test code = 14)                           R             

 

             Rifampin (test code = 43)                           S             

 

             Tetracycline (test code = 2)                           S           

  

 

             Trimethoprim + Sulfamethoxazole (test code = 47)                   

        S             

 

             Vancomycin (test code = 13)                           S            

 

 

             CULTURE (BEAKER) (test code = 1095)                           A    

        4+ Beta-hemolytic streptococcus group 

B, by serological grouping

 

             CULTURE (DaptivAKER) (test code = 1095) PSEUDOMONAS AERUGINOSA         

     A            4+ Pseudomonas 

aeruginosa

 

             Amikacin (test code = 1)              Susceptible 0-16 , Resistant 

<0 or >16  S             

 

             Aztreonam (test code = 32)              Susceptible 0-8 , Resistant

 <0 or >8  R             

 

             Cefepime (test code = 51)              Susceptible 0-8 , Resistant 

<0 or >8  R             

 

             Ceftazidime (test code = 27)              Susceptible 0-8 , Resista

nt <0 or >8  R             

 

             Ciprofloxacin (test code = 7)              Susceptible 0-0.5 , Resi

stant <0 or >.5  R             

 

             Gentamicin (test code = 18)              Susceptible 0-4 , Resistan

t <0 or >4  S             

 

             Levofloxacin (test code = 22)              Susceptible 0-1 , Resist

ant <0 or >1  R             

 

             Meropenem (test code = 34)              Susceptible 0-2 , Resistant

 <0 or >2  R             

 

             Piperacillin (test code = 24)              Susceptible 0-16 , Resis

tant <0 or >16  R             

 

                    Piperacillin + Tazobactam (test code = 29)                  

   Susceptible 0-16 , Resistant <0 or 

>16                       R                          

 

             Tobramycin (test code = 25)              Susceptible 0-4 , Resistan

t <0 or >4  S             

 

             Ceftazidime/Avibactam (test code = 250)              Susceptible <=

8 , Resistant >8  S             

 

             Ceftolozane/Tazobactam (test code = 249)              Susceptible <

=4 , Resistant >4  S             

 

             GRAM STAIN RESULT (BEAKER) (test code = 1123) 2+ WBCs              

                   

 

             GRAM STAIN RESULT (BEAKER) (test code = 591370) 2+ gram negative ro

ds                            

 

                          GRAM STAIN RESULT (BEAKER) (test code = 847834) 2+ gra

m positive cocci in pairs 

and clusters                                                 





4+ Skin floraCBC W/PLT COUNT & AUTO [id number] 10:21:00* 



             Test Item    Value        Reference Range Interpretation Comments

 

             WHITE BLOOD CELL COUNT (BEAKER) (test code = 775) 8.5 K/ L     3.5-

10.5                   

 

             RED BLOOD CELL COUNT (BEAKER) (test code = 761) 3.47 M/ L    4.63-6

.08    L             

 

             HEMOGLOBIN (BEAKER) (test code = 410) 8.1 GM/DL    13.7-17.5    L  

           

 

             HEMATOCRIT (BEAKER) (test code = 411) 28.0 %       40.1-51.0    L  

           

 

             MEAN CORPUSCULAR VOLUME (BEAKER) (test code = 753) 80.7 fL      79.

0-92.2                  

 

             MEAN CORPUSCULAR HEMOGLOBIN (BEAKER) (test code = 751) 23.3 pg     

 25.7-32.2    L             

 

                    MEAN CORPUSCULAR HEMOGLOBIN CONC (BEAKER) (test code = 752) 

28.9 GM/DL          32.3-36.5

                          L                          

 

             RED CELL DISTRIBUTION WIDTH (BEAKER) (test code = 412) 19.0 %      

 11.6-14.4    H             

 

             PLATELET COUNT (BEAKER) (test code = 756) 582 K/CU MM  150-450     

 H             

 

             MEAN PLATELET VOLUME (BEAKER) (test code = 754) 8.4 fL       9.4-12

.4     L             

 

             NUCLEATED RED BLOOD CELLS (BEAKER) (test code = 413) 0 /100 WBC   0

-0                        





(CELLAVISION MANUAL DIFF)2020 10:21:00* 



             Test Item    Value        Reference Range Interpretation Comments

 

             NEUTROPHILS - REL (CELLAVISION)(BEAKER) (test code = 2816) 48 %    

                                

 

             LYMPHOCYTES - REL (CELLAVISION)(BEAKER) (test code = 2817) 42 %    

                                

 

             MONOCYTES - REL (CELLAVISION)(BEAKER) (test code = 2818) 7 %       

                              

 

             EOSINOPHILS - REL (CELLAVISION)(BEAKER) (test code = 2819) 1 %     

                                

 

             BASOPHILS - REL (CELLAVISION)(BEAKER) (test code = 2820) 1 %       

                              

 

             ATYPICAL LYMPHOCYTES - REL (CELLAVISION)(BEAKER) (test code = 2829)

 1 %          0-0          H             

 

                NEUTROPHILS - ABS (CELLAVISION)(BEAKER) (test code = 2830) 4.08 

K/ul       1.78-5.38        

                                         

 

                LYMPHOCYTES - ABS (CELLAVISION)(BEAKER) (test code = 2831) 3.57 

K/ul       1.32-3.57        

                                         

 

             MONOCYTES - ABS (CELLAVISION)(BEAKER) (test code = 2832) 0.60 K/uL 

   0.30-0.82                  

 

                EOSINOPHILS - ABS (CELLAVISION)(BEAKER) (test code = 2834) 0.09 

K/uL       0.04-0.54        

                                         

 

             BASOPHILS - ABS (CELLAVISION)(BEAKER) (test code = 2835) 0.09 K/uL 

   0.01-0.08    H             

 

                    ATYPICAL LYMPHOCYTES - ABS (CELLAVISION)(BEAKER) (test code 

= 2858) 0.09 K/uL           

0.00-0.00                 H                          

 

             TOTAL COUNTED (BEAKER) (test code = 1351) 100                      

               

 

             PLT MORPHOLOGY (BEAKER) (test code = 486) Normal                   

               

 

             TOXIC GRANULATION (BEAKER) (test code = 771) Present               

                  

 

             POLYCHROMATOPHILLIC RBCS(BEAKER) (test code = 478) 1+ few          

                        

 

             HYPOCHROMIA (BEAKER) (test code = 963) 1+ few                      

            

 

             ANISOCYTOSIS (BEAKER) (test code = 961) 1+ few                     

             

 

             ARTIFACT (CELLAVISION)(BEAKER) (test code = 3432) Present          

                       

 

             PLATELET CONCENTRATION (CELLAVISION)(BEAKER) (test code = 3438) Inc

reased                               





 ID - Mary Barnett comments: Slide comments: POCT-GLUCOSE [id number] 
08:16:00* 



             Test Item    Value        Reference Range Interpretation Comments

 

             POC-GLUCOSE METER (BEAKER) (test code = 1538) 184 mg/dL      

     H            : TESTED AT 

[facility] [address] [facility], [address]: /Technician ID = [id number] for 
ANDREE CHACON





[id number] 07:19:00* 



             Test Item    Value        Reference Range Interpretation Comments

 

             MAGNESIUM (BEAKER) (test code = 627) 2.1 mg/dL    1.6-2.6          

          





 ID - MOHSEN MBASIC METABOLIC [id number] 07:19:00* 



             Test Item    Value        Reference Range Interpretation Comments

 

             SODIUM (BEAKER) (test code = 381) 135 meq/L    136-145      L      

       

 

             POTASSIUM (BEAKER) (test code = 379) 4.0 meq/L    3.5-5.1          

          

 

             CHLORIDE (BEAKER) (test code = 382) 99 meq/L                 

         

 

             CO2 (BEAKER) (test code = 355) 26 meq/L     22-29                  

    

 

             BLOOD UREA NITROGEN (BEAKER) (test code = 354) 12 mg/dL     7-21   

                    

 

             CREATININE (BEAKER) (test code = 358) 0.65 mg/dL   0.57-1.25       

           

 

             GLUCOSE RANDOM (BEAKER) (test code = 652) 191 mg/dL          

 H             

 

             CALCIUM (BEAKER) (test code = 697) 8.7 mg/dL    8.4-10.2           

        

 

             EGFR (BEAKER) (test code = 1092) 137 mL/min/1.73 sq m              

             ESTIMATED GFR IS NOT 

AS ACCURATE AS CREATININE CLEARANCE IN PREDICTING GLOMERULAR FILTRATION RATE. 
ESTIMATED GFR IS NOT APPLICABLE FOR DIALYSIS PATIENTS.





 ID - MOHSEN MPOCT-GLUCOSE [id number] 20:54:00* 



             Test Item    Value        Reference Range Interpretation Comments

 

             POC-GLUCOSE METER (BEAKER) (test code = 1538) 209 mg/dL      

     H            : TESTED AT 

[place] [id number] [facility], [address]: /Technician ID = [id number] for WESLEY IVERSON





POCT-GLUCOSE [id number] 18:07:00* 



             Test Item    Value        Reference Range Interpretation Comments

 

             POC-GLUCOSE METER (BEAKER) (test code = 1538) 132 mg/dL      

     H            : TESTED AT 

[facility] [address] [facility], [address]: /Technician ID = [id number] for 
ANDREE CHACON





CBC W/PLT COUNT & AUTO [id number] 14:08:00* 



             Test Item    Value        Reference Range Interpretation Comments

 

             WHITE BLOOD CELL COUNT (BEAKER) (test code = 775) 9.1 K/ L     3.5-

10.5                   

 

             RED BLOOD CELL COUNT (BEAKER) (test code = 761) 3.13 M/ L    4.63-6

.08    L             

 

             HEMOGLOBIN (BEAKER) (test code = 410) 7.4 GM/DL    13.7-17.5    L  

           

 

             HEMATOCRIT (BEAKER) (test code = 411) 25.3 %       40.1-51.0    L  

           

 

             MEAN CORPUSCULAR VOLUME (BEAKER) (test code = 753) 80.8 fL      79.

0-92.2                  

 

             MEAN CORPUSCULAR HEMOGLOBIN (BEAKER) (test code = 751) 23.6 pg     

 25.7-32.2    L             

 

                    MEAN CORPUSCULAR HEMOGLOBIN CONC (BEAKER) (test code = 752) 

29.2 GM/DL          32.3-36.5

                          L                          

 

             RED CELL DISTRIBUTION WIDTH (BEAKER) (test code = 412) 19.0 %      

 11.6-14.4    H             

 

             PLATELET COUNT (BEAKER) (test code = 756) 469 K/CU MM  150-450     

 H             

 

             MEAN PLATELET VOLUME (BEAKER) (test code = 754) 8.4 fL       9.4-12

.4     L             

 

             NUCLEATED RED BLOOD CELLS (BEAKER) (test code = 413) 0 /100 WBC   0

-0                        





(CELLAVISION MANUAL DIFF)2020 14:08:00* 



             Test Item    Value        Reference Range Interpretation Comments

 

             NEUTROPHILS - REL (CELLAVISION)(BEAKER) (test code = 2816) 53 %    

                                

 

             LYMPHOCYTES - REL (CELLAVISION)(BEAKER) (test code = 2817) 40 %    

                                

 

             MONOCYTES - REL (CELLAVISION)(BEAKER) (test code = 2818) 5 %       

                              

 

             EOSINOPHILS - REL (CELLAVISION)(BEAKER) (test code = 2819) 1 %     

                                

 

             BASOPHILS - REL (CELLAVISION)(BEAKER) (test code = 2820) 1 %       

                              

 

                NEUTROPHILS - ABS (CELLAVISION)(BEAKER) (test code = 2830) 4.82 

K/ul       1.78-5.38        

                                         

 

                LYMPHOCYTES - ABS (CELLAVISION)(BEAKER) (test code = 2831) 3.64 

K/ul       1.32-3.57       H

                                         

 

             MONOCYTES - ABS (CELLAVISION)(BEAKER) (test code = 2832) 0.46 K/uL 

   0.30-0.82                  

 

                EOSINOPHILS - ABS (CELLAVISION)(BEAKER) (test code = 2834) 0.09 

K/uL       0.04-0.54        

                                         

 

             BASOPHILS - ABS (CELLAVISION)(BEAKER) (test code = 2835) 0.09 K/uL 

   0.01-0.08    H             

 

             TOTAL COUNTED (BEAKER) (test code = 1351) 100                      

               

 

             PLT MORPHOLOGY (BEAKER) (test code = 486) Normal                   

               

 

             TOXIC GRANULATION (BEAKER) (test code = 771) Present               

                  

 

             POLYCHROMATOPHILLIC RBCS(BEAKER) (test code = 478) 1+ few          

                        

 

             HYPOCHROMIA (BEAKER) (test code = 963) 1+ few                      

            

 

             ANISOCYTOSIS (BEAKER) (test code = 961) 1+ few                     

             

 

             ARTIFACT (CELLAVISION)(BEAKER) (test code = 3432) Present          

                       

 

             PLATELET CONCENTRATION (CELLAVISION)(BEAKER) (test code = 3438) Inc

reased                               





 ID - Mary Barnett comments: Slide comments: POCT-GLUCOSE [id number] 
12:31:00* 



             Test Item    Value        Reference Range Interpretation Comments

 

             POC-GLUCOSE METER (BEAKER) (test code = 1538) 211 mg/dL      

     H            : TESTED AT 

[address], [address]: /Technician ID = [id number] for 
OTILIO JIMENEZ





TSH/Free T4 If [id number] 10:46:00* 



             Test Item    Value        Reference Range Interpretation Comments

 

             TSH (test code = 61821-7) 2.67         0.35- 4.94 uIU/mL           

    

 

             LLOYD (test code = LLOYD)  ID - NTP                            

 

             Lab Interpretation (test code = 44544-8) Normal                    

              





[facility] [id number] 10:46:00* 



             Test Item    Value        Reference Range Interpretation Comments

 

             THYROID STIMULATING HORMONE (BEAKER) (test code = 772) 2.67 uIU/mL 

 0.35-4.94                  





 ID - [id number] 10:26:00* 



             Test Item    Value        Reference Range Interpretation Comments

 

             Prealbumin (test code = 59862-0) 7 mg/dL      14-45        L       

      

 

             LLOYD (test code = LLOYD)  ID - NTP                            

 

             Lab Interpretation (test code = 41447-5) Abnormal                  

              





[facility] 10:26:00* 



             Test Item    Value        Reference Range Interpretation Comments

 

             MAGNESIUM (BEAKER) (test code = 627) 1.9 mg/dL    1.6-2.6          

          





 ID - NTPBASIC METABOLIC [id number] 10:26:00* 



             Test Item    Value        Reference Range Interpretation Comments

 

             SODIUM (BEAKER) (test code = 381) 135 meq/L    136-145      L      

       

 

             POTASSIUM (BEAKER) (test code = 379) 4.0 meq/L    3.5-5.1          

          

 

             CHLORIDE (BEAKER) (test code = 382) 100 meq/L                

         

 

             CO2 (BEAKER) (test code = 355) 27 meq/L     22-29                  

    

 

             BLOOD UREA NITROGEN (BEAKER) (test code = 354) 9 mg/dL      7-21   

                    

 

             CREATININE (BEAKER) (test code = 358) 0.67 mg/dL   0.57-1.25       

           

 

             GLUCOSE RANDOM (BEAKER) (test code = 652) 266 mg/dL          

 H             

 

             CALCIUM (BEAKER) (test code = 697) 8.4 mg/dL    8.4-10.2           

        

 

             EGFR (BEAKER) (test code = 1092) 133 mL/min/1.73 sq m              

             ESTIMATED GFR IS NOT 

AS ACCURATE AS CREATININE CLEARANCE IN PREDICTING GLOMERULAR FILTRATION RATE. 
ESTIMATED GFR IS NOT APPLICABLE FOR DIALYSIS PATIENTS.





 ID - NTPC-REACTIVE [id number] 10:26:00* 



             Test Item    Value        Reference Range Interpretation Comments

 

             C-REACTIVE PROTEIN (BEAKER) (test code = 676) 19.81 mg/dL  0.00-0.5

0    H             





 ID - [id number] 10:26:00* 



             Test Item    Value        Reference Range Interpretation Comments

 

             PREALBUMIN (BEAKER) (test code = 586) 7 mg/dL      14-45        L  

           





 ID - NTPPOCT-GLUCOSE [id number] 08:42:00* 



             Test Item    Value        Reference Range Interpretation Comments

 

             POC-GLUCOSE METER (BEAKER) (test code = 1538) 321 mg/dL      

     H            : TESTED AT 

[address], [address]: /Technician ID = [id number] for 
ANDERE CHACON





POCT-GLUCOSE [id number] 22:05:00* 



             Test Item    Value        Reference Range Interpretation Comments

 

             POC-GLUCOSE METER (BEAKER) (test code = 1538) 355 mg/dL      

     H            : TESTED AT 

[facility] [address] [facility] [facility], [address]: /Technician ID = [id number] for 
CARMITA AGUSTIN





POCT-GLUCOSE [id number] 18:47:00* 



             Test Item    Value        Reference Range Interpretation Comments

 

             POC-GLUCOSE METER (BEAKER) (test code = 1538) 191 mg/dL      

     H            : TESTED AT 

[place] [id number] [facility], [address]: /Technician ID = [id number] for 
JR BIRD





POCT-GLUCOSE [id number] 14:18:00* 



             Test Item    Value        Reference Range Interpretation Comments

 

             POC-GLUCOSE METER (BEAKER) (test code = 1538) 157 mg/dL      

     H            : TESTED AT 

[place] [id number] [facility], [address]: /Technician ID = [id number] for 
JR BIRD





[id number] 10:18:00* 



             Test Item    Value        Reference Range Interpretation Comments

 

             MAGNESIUM (BEAKER) (test code = 627) 1.9 mg/dL    1.6-2.6          

          





 ID Olivia NARVAEZ FBASIC METABOLIC [id number] 10:18:00* 



             Test Item    Value        Reference Range Interpretation Comments

 

             SODIUM (BEAKER) (test code = 381) 133 meq/L    136-145      L      

       

 

             POTASSIUM (BEAKER) (test code = 379) 4.1 meq/L    3.5-5.1          

          

 

             CHLORIDE (BEAKER) (test code = 382) 99 meq/L                 

         

 

             CO2 (BEAKER) (test code = 355) 24 meq/L     22-29                  

    

 

             BLOOD UREA NITROGEN (BEAKER) (test code = 354) 8 mg/dL      7-21   

                    

 

             CREATININE (BEAKER) (test code = 358) 0.69 mg/dL   0.57-1.25       

           

 

             GLUCOSE RANDOM (BEAKER) (test code = 652) 232 mg/dL          

 H             

 

             CALCIUM (BEAKER) (test code = 697) 8.5 mg/dL    8.4-10.2           

        

 

             EGFR (BEAKER) (test code = 1092) 128 mL/min/1.73 sq m              

             ESTIMATED GFR IS NOT 

AS ACCURATE AS CREATININE CLEARANCE IN PREDICTING GLOMERULAR FILTRATION RATE. 
ESTIMATED GFR IS NOT APPLICABLE FOR DIALYSIS PATIENTS.





 ID Olivia NARVAEZ EPATIC FUNCTION [id number] 10:18:00* 



             Test Item    Value        Reference Range Interpretation Comments

 

             TOTAL PROTEIN (BEAKER) (test code = 770) 7.1 gm/dL    6.0-8.3      

              

 

             ALBUMIN (BEAKER) (test code = 1145) 2.7 g/dL     3.5-5.0      L    

         

 

             BILIRUBIN TOTAL (BEAKER) (test code = 377) 0.3 mg/dL    0.2-1.2    

                

 

             BILIRUBIN DIRECT (BEAKER) (test code = 706) 0.2 mg/dL    0.1-0.5   

                 

 

             ALKALINE PHOSPHATASE (BEAKER) (test code = 346) 274 U/L      

       H             

 

             AST (SGOT) (BEAKER) (test code = 353) 26 U/L       5-34            

           

 

             ALT (SGPT) (BEAKER) (test code = 347) 21 U/L       6-55            

           





 RHONDA NARVAEZ FHEMOGLOBIN [id number] 09:52:00* 



             Test Item    Value        Reference Range Interpretation Comments

 

             HEMOGLOBIN A1C (BEAKER) (test code = 368) 9.6 %        4.3-6.1     

 H             





CBC W/PLT COUNT & AUTO [id number] 08:48:00* 



             Test Item    Value        Reference Range Interpretation Comments

 

             WHITE BLOOD CELL COUNT (BEAKER) (test code = 775) 9.2 K/ L     3.5-

10.5                   

 

             RED BLOOD CELL COUNT (BEAKER) (test code = 761) 3.43 M/ L    4.63-6

.08    L             

 

             HEMOGLOBIN (BEAKER) (test code = 410) 8.0 GM/DL    13.7-17.5    L  

           

 

             HEMATOCRIT (BEAKER) (test code = 411) 27.9 %       40.1-51.0    L  

           

 

             MEAN CORPUSCULAR VOLUME (BEAKER) (test code = 753) 81.3 fL      79.

0-92.2                  

 

             MEAN CORPUSCULAR HEMOGLOBIN (BEAKER) (test code = 751) 23.3 pg     

 25.7-32.2    L             

 

                    MEAN CORPUSCULAR HEMOGLOBIN CONC (BEAKER) (test code = 752) 

28.7 GM/DL          32.3-36.5

                          L                          

 

             RED CELL DISTRIBUTION WIDTH (BEAKER) (test code = 412) 19.4 %      

 11.6-14.4    H             

 

             PLATELET COUNT (BEAKER) (test code = 756) 511 K/CU MM  150-450     

 H             

 

             MEAN PLATELET VOLUME (BEAKER) (test code = 754) 8.5 fL       9.4-12

.4     L             

 

             NUCLEATED RED BLOOD CELLS (BEAKER) (test code = 413) 0 /100 WBC   0

-0                        

 

             NEUTROPHILS RELATIVE PERCENT (BEAKER) (test code = 429) 49 %       

                             

 

             LYMPHOCYTES RELATIVE PERCENT (BEAKER) (test code = 430) 33 %       

                             

 

             MONOCYTES RELATIVE PERCENT (BEAKER) (test code = 431) 16 %         

                           

 

             EOSINOPHILS RELATIVE PERCENT (BEAKER) (test code = 432) 1 %        

                             

 

             BASOPHILS RELATIVE PERCENT (BEAKER) (test code = 437) 0 %          

                           

 

             NEUTROPHILS ABSOLUTE COUNT (BEAKER) (test code = 670) 4.51 K/ L    

1.78-5.38                  

 

             LYMPHOCYTES ABSOLUTE COUNT (BEAKER) (test code = 414) 3.08 K/ L    

1.32-3.57                  

 

             MONOCYTES ABSOLUTE COUNT (BEAKER) (test code = 415) 1.46 K/ L    0.

30-0.82    H             

 

             EOSINOPHILS ABSOLUTE COUNT (BEAKER) (test code = 416) 0.11 K/ L    

0.04-0.54                  

 

             BASOPHILS ABSOLUTE COUNT (BEAKER) (test code = 417) 0.04 K/ L    0.

01-0.08                  

 

             IMMATURE GRANULOCYTES-RELATIVE PERCENT (BEAKER) (test code = 2801) 

0 %          0-1                        





POCT-GLUCOSE [id number] 07:37:00* 



             Test Item    Value        Reference Range Interpretation Comments

 

             POC-GLUCOSE METER (BEAKER) (test code = 1538) 222 mg/dL      

     H            : TESTED AT 

[address], [address]: /Technician ID = [id number] for 
PELON JR





POCT-GLUCOSE [id number] 22:01:00* 



             Test Item    Value        Reference Range Interpretation Comments

 

             POC-GLUCOSE METER (BEAKER) (test code = 1538) 260 mg/dL      

     H            : TESTED AT 

[address], [address]: /Technician ID = [id number] for 
CARMITA AGUSTIN





POCT-GLUCOSE [id number] 20:05:00* 



             Test Item    Value        Reference Range Interpretation Comments

 

             POC-GLUCOSE METER (BEAKER) (test code = 1538) 327 mg/dL      

     H            : TESTED AT 

[address], [address]: /Technician ID = [id number] for 
JOSE JUAN HERNÁNDEZ





POCT-GLUCOSE [id number] 18:47:00* 



             Test Item    Value        Reference Range Interpretation Comments

 

             POC-GLUCOSE METER (BEAKER) (test code = 1538) 314 mg/dL      

     H            : TESTED AT 

[facility] [address] [facility], [address]: /Technician ID = [id number] for LOS WHITLOCK





COMPREHENSIVE METABOLIC [id number] 10:59:00* 



             Test Item    Value        Reference Range Interpretation Comments

 

             TOTAL PROTEIN (BEAKER) (test code = 770) 7.7 gm/dL    6.0-8.3      

              

 

             ALBUMIN (BEAKER) (test code = 1145) 3.0 g/dL     3.5-5.0      L    

         

 

             ALKALINE PHOSPHATASE (BEAKER) (test code = 346) 343 U/L      

       H             

 

             BILIRUBIN TOTAL (BEAKER) (test code = 377) 0.4 mg/dL    0.2-1.2    

                

 

             SODIUM (BEAKER) (test code = 381) 133 meq/L    136-145      L      

       

 

             POTASSIUM (BEAKER) (test code = 379) 3.9 meq/L    3.5-5.1          

          

 

             CHLORIDE (BEAKER) (test code = 382) 98 meq/L                 

         

 

             CO2 (BEAKER) (test code = 355) 26 meq/L     22-29                  

    

 

             BLOOD UREA NITROGEN (BEAKER) (test code = 354) 8 mg/dL      7-21   

                    

 

             CREATININE (BEAKER) (test code = 358) 0.80 mg/dL   0.57-1.25       

           

 

             GLUCOSE RANDOM (BEAKER) (test code = 652) 387 mg/dL          

 H             

 

             CALCIUM (BEAKER) (test code = 697) 8.6 mg/dL    8.4-10.2           

        

 

             AST (SGOT) (BEAKER) (test code = 353) 15 U/L       5-34            

           

 

             ALT (SGPT) (BEAKER) (test code = 347) 18 U/L       6-55            

           

 

             EGFR (BEAKER) (test code = 1092) 108 mL/min/1.73 sq m              

             ESTIMATED GFR IS NOT 

AS ACCURATE AS CREATININE CLEARANCE IN PREDICTING GLOMERULAR FILTRATION RATE. 
ESTIMATED GFR IS NOT APPLICABLE FOR DIALYSIS PATIENTS.





 ID - MOHSEN [id number] 10:53:00* 



             Test Item    Value        Reference Range Interpretation Comments

 

             PARTIAL THROMBOPLASTIN TIME (BEAKER) (test code = 760) 44.4 seconds

 22.5-36.0    H            

 





PROTHROMBIN TIME/[id number] 10:52:00* 



             Test Item    Value        Reference Range Interpretation Comments

 

             PROTIME (BEAKER) (test code = 759) 15.8 seconds 11.9-14.2    H     

        

 

             INR (BEAKER) (test code = 370) 1.3          <=5.9                  

    





Effective 2019: PT Reference Range ChangeNew: 11.9-14.2  Previous: 11.7-14.
7RECOMMENDED COUMADIN/WARFARIN INR THERAPY RANGESSTANDARD DOSE: 2.0-3.0  Include
s: PROPHYLAXIS for venous thrombosis, systemic embolization; TREATMENT for venou
s thrombosis and/or pulmonary embolus.HIGH RISK: Target INR is 2.5-3.5 for patie
nts wiht mechanical heart valves.LACTIC ACID, [id number] 10:50:00* 



             Test Item    Value        Reference Range Interpretation Comments

 

             LACTATE BLOOD VENOUS (2) (BEAKER) (test code = 2872) 1.1 mmol/L   0

.5-2.2                    





 ID - MOHSEN ORELLANAD, CHEST, 1 VIEW, NON [id number] 10:43:00Reason for 
exam:->Central lineShould this be performed at the bedside?->YesFINAL REPORT 
PATIENT ID:   [id number] RAD, CHEST, 1 VIEW, NON DEPT INDICATION: Central line 
COMPARISON: 2018 FINDINGS: Portable frontal view of the chest.   
IMPRESSION:  Support Lines: PICC tip has been removed. Right-sided central 
catheter tip overlies the SVC. Lungs and pleura: Mild bilateral subsegmental 
atelectasis No pneumothorax.Heart and mediastinum: Stable contours.    Additiona
l findings: None. Signed: Keshia Geller MDReport Verified Date/Time:  20 10:43:26 Reading Location: [facility] Radiology Reading Room  Electronical
ly signed by: KESHIA GELLER MD on 2020 10:43 AM CBC W/PLT 
COUNT & AUTO [id number] 10:42:00* 



             Test Item    Value        Reference Range Interpretation Comments

 

             WHITE BLOOD CELL COUNT (BEAKER) (test code = 775) 10.4 K/ L    3.5-

10.5                   

 

             RED BLOOD CELL COUNT (BEAKER) (test code = 761) 3.36 M/ L    4.63-6

.08    L             

 

             HEMOGLOBIN (BEAKER) (test code = 410) 7.9 GM/DL    13.7-17.5    L  

           

 

             HEMATOCRIT (BEAKER) (test code = 411) 26.9 %       40.1-51.0    L  

           

 

             MEAN CORPUSCULAR VOLUME (BEAKER) (test code = 753) 80.1 fL      79.

0-92.2                  

 

             MEAN CORPUSCULAR HEMOGLOBIN (BEAKER) (test code = 751) 23.5 pg     

 25.7-32.2    L             

 

                    MEAN CORPUSCULAR HEMOGLOBIN CONC (BEAKER) (test code = 752) 

29.4 GM/DL          32.3-36.5

                          L                          

 

             RED CELL DISTRIBUTION WIDTH (BEAKER) (test code = 412) 19.1 %      

 11.6-14.4    H             

 

             PLATELET COUNT (BEAKER) (test code = 756) 501 K/CU MM  150-450     

 H             

 

             MEAN PLATELET VOLUME (BEAKER) (test code = 754) 8.3 fL       9.4-12

.4     L             

 

             NUCLEATED RED BLOOD CELLS (BEAKER) (test code = 413) 0 /100 WBC   0

-0                        

 

             NEUTROPHILS RELATIVE PERCENT (BEAKER) (test code = 429) 68 %       

                             

 

             LYMPHOCYTES RELATIVE PERCENT (BEAKER) (test code = 430) 20 %       

                             

 

             MONOCYTES RELATIVE PERCENT (BEAKER) (test code = 431) 11 %         

                           

 

             EOSINOPHILS RELATIVE PERCENT (BEAKER) (test code = 432) 0 %        

                             

 

             BASOPHILS RELATIVE PERCENT (BEAKER) (test code = 437) 0 %          

                           

 

             NEUTROPHILS ABSOLUTE COUNT (BEAKER) (test code = 670) 7.02 K/ L    

1.78-5.38    H             

 

             LYMPHOCYTES ABSOLUTE COUNT (BEAKER) (test code = 414) 2.08 K/ L    

1.32-3.57                  

 

             MONOCYTES ABSOLUTE COUNT (BEAKER) (test code = 415) 1.18 K/ L    0.

30-0.82    H             

 

             EOSINOPHILS ABSOLUTE COUNT (BEAKER) (test code = 416) 0.03 K/ L    

0.04-0.54    L             

 

             BASOPHILS ABSOLUTE COUNT (BEAKER) (test code = 417) 0.04 K/ L    0.

01-0.08                  

 

             IMMATURE GRANULOCYTES-RELATIVE PERCENT (BEAKER) (test code = 2801) 

0 %          0-1                        





Central [id number] 09:32:38Heshma Correa MD     3/11/2020 12:37 
PMCentral LineDate/Time: 3/11/2020 10:22 AMPerformed by: Hesham Correa 
MDAuthorized by: Hesham Correa MD Consent: Verbal consent obtained.Risks 
and benefits: risks, benefits and alternatives were discussedConsent given by: 
patientPatient identity confirmed: verbally with patientIndications: vascular 
accessAnesthesia: local infiltrationPreparation: skin prepped with 
ChloraPrepSkin prep agent dried: skin prep agent completely dried prior to 
procedureSterile barriers: all five maximum sterile barriers used - cap, mask, 
sterile gown, sterile gloves, and large sterile sheetHand hygiene: hand hygiene 
performed prior to central venous catheter insertionLocation details: right 
internal jugularPatient position: flatCatheter type: triple lumenCatheter size: 
7.5 FrPre-procedure: landmarks identifiedUltrasound guidance: yesSterile 
ultrasound techniques: sterile gel and sterile probe covers were usedNumber of 
attempts: 1Successful placement: yesPost-procedure: line suturedImmediate Post-
Procedure Note Assistants to the procedure: NonePre-procedure diagnosis: SEPSIS
Post-procedure diagnosis: SEPSISProcedures Performed: Central LineSpecimens ebenezer
luisana: NoneEstimated blood loss (mL): NoneComplications: NoneType of anesthesia: N
oneGrafts or Implants: None  [facility] CenterBedside Glucose
2020 16:19:00* 



             Test Item    Value        Reference Range Interpretation Comments

 

             Bedside Glucose (test code = 53548-3) 248                 H  

           





Meter ID: [id number] [facility] Medical CenterPlatelet Estimate
2020 08:54:00* 



             Test Item    Value        Reference Range Interpretation Comments

 

             Platelet Estimate (test code = 72641-3) MARKEDLY INCREASED         

                   





[facility] ClinicsPlatelet Morphology [id number] 
08:54:00* 



             Test Item    Value        Reference Range Interpretation Comments

 

             Platelet Morphology Comment (test code = 42816-7) NORMAL           

                       





[facility] ClinicsPolychromasia2020-02-27 08:54:00* 



             Test Item    Value        Reference Range Interpretation Comments

 

             Polychromasia (test code = 96008-3) FEW                            

         





[facility] ClinicsHypochromasia2020-02-27 08:54:00* 



             Test Item    Value        Reference Range Interpretation Comments

 

             Hypochromasia (test code = 728-6) SLIGHT                           

       





[facility] ClinicsAnisocytosis2020-02-27 08:54:00* 



             Test Item    Value        Reference Range Interpretation Comments

 

             Anisocytosis (test code = 702-1) SLIGHT                            

      





[facility] ClinicsMicrocytosis2020-02-27 08:54:00* 



             Test Item    Value        Reference Range Interpretation Comments

 

             Microcytosis (test code = 741-9) SLIGHT                            

      





[facility] ClinicsMacrocytosis2020-02-27 08:54:00* 



             Test Item    Value        Reference Range Interpretation Comments

 

             Macrocytosis (test code = 738-5) SLIGHT                            

      





[facility] ClinicsRed Cell Morphology [id number] 
08:54:00* 



             Test Item    Value        Reference Range Interpretation Comments

 

             Red Cell Morphology Comment (test code = 6742-1) ABNORMAL          

                      





[facility] Centerodium [id number] 05:50:00* 



             Test Item    Value        Reference Range Interpretation Comments

 

             Sodium Level (test code = 2951-2) 133          136-145      L      

       





[facility] ClinicsPotassium [id number] 05:50:00* 



             Test Item    Value        Reference Range Interpretation Comments

 

             Potassium Level (test code = 2823-3) 4.0          3.5-5.1          

          





[facility] ClinicsChloride [id number] 05:50:00* 



             Test Item    Value        Reference Range Interpretation Comments

 

             Chloride Level (test code = 2075-0) 102                      

         





[facility] and ClinicsCarbon Dioxide [id number] 05:50:00* 



             Test Item    Value        Reference Range Interpretation Comments

 

             Carbon Dioxide Level (test code = 2028-9) 25           22-29       

               





[facility] ClinicsAnion [id number] 05:50:00* 



             Test Item    Value        Reference Range Interpretation Comments

 

             Anion Gap (test code = 33037-3) 10.0         8-16                  

     





[facility] ClinicsBlood Urea [id number] 05:50:00* 



             Test Item    Value        Reference Range Interpretation Comments

 

             Blood Urea Nitrogen (test code = 3094-0) 13           7-26         

              





[facility] ClinicsCreatinine2020-02-27 05:50:00* 



             Test Item    Value        Reference Range Interpretation Comments

 

             Creatinine (test code = 2160-0) 0.71         0.72-1.25    L        

     





[facility] and ClinicsBUN/Creatinine [id number] 05:50:00* 



             Test Item    Value        Reference Range Interpretation Comments

 

             BUN/Creatinine Ratio (test code = 3097-3) 18                   

               





[facility] and ClinicsEstimat Glomerular Filtration Rate
2020 05:50:00* 



             Test Item    Value        Reference Range Interpretation Comments

 

             Estimat Glomerular Filtration Rate (test code = 931358123) > 60    

     >60                        





Ranges were taken from the National Kidney Disease Education Program and the [facility] Foundation literature.Reference ranges:60 or greater: Bnifje26-48 (
for 3 consecutive months): Chronic kidney disease 15 or less: Kidney failureEast Houston Hospital and ClinicsGlucose Neclo8241-37-49 05:50:00* 



             Test Item    Value        Reference Range Interpretation Comments

 

             Glucose Level (test code = EDX1032) 201                 H    

         





[facility] and ClinicsCalcium [id number] 05:50:00* 



             Test Item    Value        Reference Range Interpretation Comments

 

             Calcium Level (test code = 21095-6) 8.2          8.4-10.2     L    

         





[facility] and ClinicsPhosphorus [id number] 05:50:00* 



             Test Item    Value        Reference Range Interpretation Comments

 

             Phosphorus Level (test code = BNW2870) 3.5          2.3-4.7        

            





[facility] and ClinicsMagnesium [id number] 05:50:00* 



             Test Item    Value        Reference Range Interpretation Comments

 

             Magnesium Level (test code = 94942-3) 2.1          1.3-2.1         

           





[facility] ClinicsWhite Blood [id number] 05:15:00* 



             Test Item    Value        Reference Range Interpretation Comments

 

             White Blood Count (test code = 6690-2) 12.87        4.8-10.8     H 

            





[facility] and ClinicsRed Blood [id number] 05:15:00* 



             Test Item    Value        Reference Range Interpretation Comments

 

             Red Blood Count (test code = 789-8) 3.67         4.3-5.7      L    

         





[facility] ClinicsHemoglobin2020-02-27 05:15:00* 



             Test Item    Value        Reference Range Interpretation Comments

 

             Hemoglobin (test code = 65224-5) 8.3          14.0-18.0    L       

      





[facility] ClinicsHematocrit2020-02-27 05:15:00* 



             Test Item    Value        Reference Range Interpretation Comments

 

             Hematocrit (test code = 4544-3) 29.7         38.2-49.6    L        

     





[facility] and ClinicsMean Corpuscular [id number] 
05:15:00* 



             Test Item    Value        Reference Range Interpretation Comments

 

             Mean Corpuscular Volume (test code = 787-2) 80.9         81-99     

   L             





[facility] ClinicsMean Corpuscular [id number] 
05:15:00* 



             Test Item    Value        Reference Range Interpretation Comments

 

             Mean Corpuscular Hemoglobin (test code = 785-6) 22.6         28-32 

       L             





[facility] and ClinicsMean Corpuscular Hemoglobin Concent
2020 05:15:00* 



             Test Item    Value        Reference Range Interpretation Comments

 

             Mean Corpuscular Hemoglobin Concent (test code = 786-4) 27.9       

  31-35        L             





[facility] and ClinicsRed Cell Distribution [id number] 
05:15:00* 



             Test Item    Value        Reference Range Interpretation Comments

 

             Red Cell Distribution Width (test code = 11344-3) 19.4         11.7

-14.4    H             





[facility] and ClinicsPlatelet [id number] 05:15:00* 



             Test Item    Value        Reference Range Interpretation Comments

 

             Platelet Count (test code = 777-3) 901          140-360      H     

        





[facility] and ClinicsNeutrophils (%) (Auto)2020 05:15:00
  * 



             Test Item    Value        Reference Range Interpretation Comments

 

             Neutrophils (%) (Auto) (test code = 08970-3) 57.5         38.7-80.0

                  





[facility] and ClinicsLymphocytes (%) (Auto)2020 05:15:00
  * 



             Test Item    Value        Reference Range Interpretation Comments

 

             Lymphocytes (%) (Auto) (test code = 736-9) 29.8         18.0-39.1  

                





[facility] ClinicsMonocytes (%) (Auto)2020 05:15:00* 



             Test Item    Value        Reference Range Interpretation Comments

 

             Monocytes (%) (Auto) (test code = 5905-5) 9.9          4.4-11.3    

               





[facility] ClinicsEosinophils (%) (Auto)2020 05:15:00
  * 



             Test Item    Value        Reference Range Interpretation Comments

 

             Eosinophils (%) (Auto) (test code = 713-8) 1.5          0.0-6.0    

                





[facility] and ClinicsBasophils (%) (Auto)2020 05:15:00* 



             Test Item    Value        Reference Range Interpretation Comments

 

             Basophils (%) (Auto) (test code = 706-2) 0.3          0.0-1.0      

              





[facility]icsIM [facility]ULOC[facility] %2020 05:15:00* 



             Test Item    Value        Reference Range Interpretation Comments

 

             IM GRANULOCYTES % (test code = IM GRANULOCYTES %) 1.0          0.0-

1.0                    





[facility]icsNeutrophils # (Auto)2020 05:15:00* 



             Test Item    Value        Reference Range Interpretation Comments

 

             Neutrophils # (Auto) (test code = 751-8) 7.4          2.1-6.9      

H             





[facility] ClinicsLymphocytes # (Auto)2020 05:15:00* 



             Test Item    Value        Reference Range Interpretation Comments

 

             Lymphocytes # (Auto) (test code = 84009-9) 3.8          1.0-3.2    

  H             





[facility]icsMonocytes # (Auto)2020 05:15:00* 



             Test Item    Value        Reference Range Interpretation Comments

 

             Monocytes # (Auto) (test code = 742-7) 1.3          0.2-0.8      H 

            





[facility]icsEosinophils # (Auto)2020 05:15:00* 



             Test Item    Value        Reference Range Interpretation Comments

 

             Eosinophils # (Auto) (test code = 711-2) 0.2          0.0-0.4      

              





[facility]icsBasophils # (Auto)2020 05:15:00* 



             Test Item    Value        Reference Range Interpretation Comments

 

             Basophils # (Auto) (test code = 704-7) 0.0          0.0-0.1        

            





[facility] ClinicsAbsolute Immature Granulocyte (auto
2020 05:15:00* 



             Test Item    Value        Reference Range Interpretation Comments

 

                                        Absolute Immature Granulocyte (auto (sb

t code = Absolute Immature Granulocyte 

(auto)          0.13            0-0.1           H                





[facility] ClinicsBlood Ztamicu8038-67-44 21:19:00* 



             Test Item    Value        Reference Range Interpretation Comments

 

             Blood Culture (test code = 00548779) NO GROWTH AFTER 5 DAYS, FINAL 

REPORT                            





[facility] ClinicsPrealbumin2020-02-26 12:09:00* 



             Test Item    Value        Reference Range Interpretation Comments

 

             Prealbumin (test code = 79865-6) 15           14-35                

      





Performed at:   - Lab[address]  [id number]
 Director: Nikos Norris MD, Phone:  [phone] [facility] Hospital and ClinicsVancomycin Level Fssohm0739-88-90 21:18:00* 



             Test Item    Value        Reference Range Interpretation Comments

 

             Vancomycin Level Trough (test code = 4092-3) < 1.1        5.0-10.0 

    L             





[facility] ClinicsVitamin B12 Btnpv3839-11-74 07:01:00* 



             Test Item    Value        Reference Range Interpretation Comments

 

             Vitamin B12 Level (test code = 09559-9) 426          213-816       

             





[facility] ClinicsFerritin2020-02-25 06:52:00* 



             Test Item    Value        Reference Range Interpretation Comments

 

             Ferritin (test code = 2276-4) 54.35        21..66            

   





[facility] ClinicsIron Zuqaq3512-64-47 06:41:00* 



             Test Item    Value        Reference Range Interpretation Comments

 

             Iron Level (test code = 2498-4) 27                  L        

     





[facility] ClinicsTotal Iron Binding Rmquudiv2082-14-17 
06:41:00* 



             Test Item    Value        Reference Range Interpretation Comments

 

             Total Iron Binding Capacity (test code = 2500-7) 311          261-4

78                    





[facility] ClinicsPercent Iron Vgcweuvkec0567-52-68 
06:41:00* 



             Test Item    Value        Reference Range Interpretation Comments

 

             Percent Iron Saturation (test code = 2502-3) 9            15-50    

    L             





[facility] ClinicsTransferrin2020-02-25 06:41:00* 



             Test Item    Value        Reference Range Interpretation Comments

 

             Transferrin (test code = 3034-6) 222          174-364              

      





[facility] ClinicsWound Tglgrbh8350-98-77 12:02:00* 



             Test Item    Value        Reference Range Interpretation Comments

 

             Wound Culture (test code = 6462-6) No Result Data Provided         

                   





[facility] Qmmiewo5042-98-73 11:36:00* 



             Test Item    Value        Reference Range Interpretation Comments

 

             Wound Culture (test code = 6462-6) No Result Data Provided         

                   





[facility] Vgbiaav3333-22-08 11:34:00* 



             Test Item    Value        Reference Range Interpretation Comments

 

             Wound Culture (test code = 6462-6) No Result Data Provided         

                   





[facility] ClinicsC-Reactive Sxisvwh0746-16-30 22:40:00* 



             Test Item    Value        Reference Range Interpretation Comments

 

             C-Reactive Protein (test code = 1988-5) 207          0-10         H

             





Performed at:  [facility] Lab[address]  [id number]
 Director: Nikos Norris MD, Phone:  [phone] [facility] Hospital and ClinicsFolate2020-02-23 06:22:00* 



             Test Item    Value        Reference Range Interpretation Comments

 

             Folate (test code = 2284-8) 8.5          7.0-15.4                  

 





[facility] ClinicsThyroid Stimulating Hormone (TSH)
2020 06:00:00* 



             Test Item    Value        Reference Range Interpretation Comments

 

             Thyroid Stimulating Hormone (TSH) (test code = 98781-3) 5.023      

  0.350-4.940  H             





[facility] ClinicsHemoglobin A1c Eynlrzm7889-07-49 05:47:00
  * 



             Test Item    Value        Reference Range Interpretation Comments

 

             Hemoglobin A1c Percent (test code = Hemoglobin A1c Percent) 10.1   

      4.0-7.0      H             





[facility] ClinicsCHEST XRAY LINE EAQYVUBAT9754-82-25 
12:26:00                                                                        
              [place] [address]      Patient Name: BALJEET JESSICA II                                   MR #: [id number]                    
 : 1980                                   Age/Sex: 40/M  Acct #: 
[id number]                              Req #: [id number]  Adm Physician: 
CLYDE NELSON MD                                      Ordered by: Karen Holloawy NP                            Report #: [id number]        Location: MED/SURG2  
                             Room/Bed: [facility]               
__________________________________________
_________________________________________________________    Procedure: [id number]
0 DX/CHEST XRAY LINE PLACEMENT  Exam Date: 20                            E
xam Time: 113                                              REPORT STATUS: Jaclyn
d    EXAMINATION:  CHEST XRAY LINE PLACEMENT          INDICATION:          VERIF
Y LINE PLACEMENT.    2020      COMPARISON:  None           FINDINGS:
  AP view         TUBES and LINES:  Right-sided PICC line.      LUNGS/PLEURA:  L
ungs are well inflated.  There is obscuration of the right   costophrenic angle 
likely due to atelectasis and effusion.      HEART AND MEDIASTINUM:  The cardiom
ediastinal silhouette is unremarkable.          BONES AND SOFT TISSUES:  No acut
e osseous lesion.  Soft tissues are   unremarkable.      UPPER ABDOMEN: No free 
air under the diaphragm.          IMPRESSION:    Obscuration of the right costop
hrenic angle likely due to atelectasis and   effusion.         Signed by: Eugenio Rudolph MD on 2020 12:27 PM        Dictated By: MARIELY RUDOLPH MD  
Electronically Signed By: MARIELY RUDOLPH MD on 207  Transcribed By:
 KIRSTEN on 207       COPY TO:   KAREN HOLLOWAY NP         Total 
Pyaumarrp6894-34-70 06:31:00* 



             Test Item    Value        Reference Range Interpretation Comments

 

             Total Bilirubin (test code = 1975-2) 0.1          0.2-1.2      L   

          





[facility] ClinicsAspartate Amino Transf (AST/SGOT)
2020 06:31:00* 



             Test Item    Value        Reference Range Interpretation Comments

 

                                        Aspartate Amino Transf (AST/SGOT) (test 

code = Aspartate Amino Transf 

(AST/SGOT))     18              5-34                             





[facility] ClinicsAlanine Aminotransferase (ALT/SGPT)
2020 06:31:00* 



             Test Item    Value        Reference Range Interpretation Comments

 

             Alanine Aminotransferase (ALT/SGPT) (test code = 1742-6) 17        

   0-55                       





[facility] ClinicsTotal Ulhebay5106-05-86 06:31:00* 



             Test Item    Value        Reference Range Interpretation Comments

 

             Total Protein (test code = 2885-2) 6.8          6.5-8.1            

        





[facility] ClinicsAlbumin2020-02-22 06:31:00* 



             Test Item    Value        Reference Range Interpretation Comments

 

             Albumin (test code = 1751-7) 1.7          3.5-5.0      L           

  





[facility] ClinicsGlobulin2020-02-22 06:31:00* 



             Test Item    Value        Reference Range Interpretation Comments

 

             Globulin (test code = 40314-1) 5.1          2.3-3.5      H         

    





[facility] ClinicsAlbumin/Globulin Lezkn2239-68-00 06:31:00
  * 



             Test Item    Value        Reference Range Interpretation Comments

 

             Albumin/Globulin Ratio (test code = 1759-0) 0.3          0.8-2.0   

   L             





[facility] ClinicsAlkaline Hfvwfqbqfvd5350-01-84 06:31:00* 



             Test Item    Value        Reference Range Interpretation Comments

 

             Alkaline Phosphatase (test code = 6768-6) 384                

 H             





[facility] ClinicsLactic Acid Ypmyb0415-56-00 06:23:00* 



             Test Item    Value        Reference Range Interpretation Comments

 

             Lactic Acid Level (test code = Lactic Acid Level) 1.9          0.5-

2.0                    





[facility] ClinicsErythrocyte Sedimentation Boeh6148-16-63 
22:01:00* 



             Test Item    Value        Reference Range Interpretation Comments

 

             Erythrocyte Sedimentation Rate (test code = 4537-7) 125          0-

13         H             





[facility] [id number] 
20:41:00                                                                        
              [facility]                        [address] [address]      Patient Name: BALJEET JESSICA II                                   MR #: [id number]                    
 : 1980                                   Age/Sex: 40/M  Acct #: 
[id number]                              Req #: [id number]  Adm Physician:     
                                                 Ordered by: DAGO HENDERSON MD 
                           Report #: [id number]        Location: [facility]            
                        Room/Bed:                     
___________________________________________
________________________________________________________    Procedure: [id number]
 HOPD/HIP 2 VIEW BILATERAL- HOPD  Exam Date: 20                           
 Exam Time:                                               REPORT STATUS: Sig
bethany    PELVIS ONE IMAGE - BILATERAL HIP FOUR Images      HISTORY:  Pressure sore
s, evaluate for osteomyelitis      COMPARISON: CT pelvis performed at [facility] 2019           FINDINGS:   Bones:   Hips: Intact and normal
ly positioned. Mild degenerative changes of the right   hip.      Pelvis: Intact
. No diastases of the pubic symphysis or sacroiliac joints.   Cortical thickenin
g and heterotopic ossifications of the inferior pubic   rami/ischial tuberositie
s, right greater than left. There are erosive changes   of the left issue tubero
sity similar to previous exam. Cortical thickening of   the lateral aspect of th
e left greater trochanter.      Lower lumbar spine: Unremarkable.      Soft tiss
ues:   Heterotopic ossifications of the soft tissues of the greater trochanter o
f the   right femur and the lesser trochanter of the left femur. Extensive heter
otopic   ossifications in the soft tissues of the posterior left pelvis extendin
g to the   level of the iliac crest and throughout the proximal left thigh. Deep
 soft   tissue ulceration of the lateral aspect of the left thigh/hip extending 
to the   bone is larger. Soft tissue ulceration at the posterior aspect of the l
eft   proximal thigh is redemonstrated.         IMPRESSION:    Large decubitus u
lcer of the lateral aspect of the left thigh/hip and decubitus   ulcer of the pr
oximal left thigh.      Extensive heterotopic ossifications of the left pelvis, 
ischial tuberosities,   and right greater trochanter suggestive of chronic osteo
myelitis.      Signed by: Dr. Sada Rocha MD on 2020 8:54 PM        
Dictated By: SADA ROCHA MD  Electronically Signed By: SADA MONTIEL on 20  Transcribed By: KIRSTEN on 20       COPY TO:   DAGO VAZQUEZ MD         PELVIS 1-2 VIEW - [id number] 20:41:00               
                                                                       [place] [address]      Patient Name: BALJEET JESSICA II       
                            MR #: [id number]                     : 1980
                                   Age/Sex: 40/M  Acct #: [id number]          
                    Req #: [id number]  Adm Physician:                           
                           Ordered by: DAGO HENDERSON MD                       
     Report #: [id number]        Location: [facility]                                  
  Room/Bed:                     ___________________________________________
________________________________________________________    Procedure: [id number]
 HOPD/PELVIS 1-2 VIEW - HOPD  Exam Date: 20                            Exa
m Time:                                               REPORT STATUS: Signed 
   PELVIS ONE IMAGE - BILATERAL HIP FOUR Images      HISTORY:  Pressure sores, e
valuate for osteomyelitis      COMPARISON: CT pelvis performed at [facility] 2019           FINDINGS:   Bones:   Hips: Intact and normally p
ositioned. Mild degenerative changes of the right   hip.      Pelvis: Intact. No
 diastases of the pubic symphysis or sacroiliac joints.   Cortical thickening an
d heterotopic ossifications of the inferior pubic   rami/ischial tuberosities, r
ight greater than left. There are erosive changes   of the left issue tuberosity
 similar to previous exam. Cortical thickening of   the lateral aspect of the le
ft greater trochanter.      Lower lumbar spine: Unremarkable.      Soft tissues:
   Heterotopic ossifications of the soft tissues of the greater trochanter of th
e   right femur and the lesser trochanter of the left femur. Extensive heterotop
ic   ossifications in the soft tissues of the posterior left pelvis extending to
 the   level of the iliac crest and throughout the proximal left thigh. Deep sof
t   tissue ulceration of the lateral aspect of the left thigh/hip extending to t
he   bone is larger. Soft tissue ulceration at the posterior aspect of the left 
  proximal thigh is redemonstrated.         IMPRESSION:    Large decubitus ulcer
 of the lateral aspect of the left thigh/hip and decubitus   ulcer of the proxim
al left thigh.      Extensive heterotopic ossifications of the left pelvis, isch
ial tuberosities,   and right greater trochanter suggestive of chronic osteomyel
itis.      Signed by: Dr. Sada Rocha MD on 2020 8:54 PM        Dict
ated By: SADA ROCHA MD  Electronically Signed By: SADA ROCHA MD on
 20  Transcribed By: KIRSTEN on 20       COPY TO:   DAGO MIRELES MD         POC Nzchjpz6641-22-67 11:24:41* 



             Test Item    Value        Reference Range Interpretation Comments

 

             Glucose POC (test code = Glucose POC) 183 mg/dL           H  

          If you consider your 

patient critically ill, the Roche-Accu Check Infrom II meter should not be used 
for Glucose determination. Draw a venous Glucose and send to the main Lab for 
analysis.





POC Eefozwg1661-95-46 07:12:47* 



             Test Item    Value        Reference Range Interpretation Comments

 

             Glucose POC (test code = Glucose POC) 259 mg/dL           H  

          If you consider your 

patient critically ill, the Roche-Accu Check Infrom II meter should not be used 
for Glucose determination. Draw a venous Glucose and send to the main Lab for 
analysis.





POC Wwcyznq1823-05-59 20:28:10* 



             Test Item    Value        Reference Range Interpretation Comments

 

             Glucose POC (test code = Glucose POC) 227 mg/dL           H  

          Notify RN or MDIf you 

consider your patient critically ill, the Roche-Accu Check Infrom II meter 
should not be used for Glucose determination. Draw a venous Glucose and send to 
the main Lab for analysis.





POC Qzgacqk4915-26-92 16:21:11* 



             Test Item    Value        Reference Range Interpretation Comments

 

             Glucose POC (test code = Glucose POC) 202 mg/dL           H  

          If you consider your 

patient critically ill, the Roche-Accu Check Infrom II meter should not be used 
for Glucose determination. Draw a venous Glucose and send to the main Lab for 
analysis.





Vancomycin Level Zuuqgm1877-69-71 13:25:28* 



             Test Item    Value        Reference Range Interpretation Comments

 

             Vanco Tr (test code = Vanco Tr) 13.5 mcg/mL  10.0-20.0             

     





POC Smczlxo8566-65-80 12:13:43* 



             Test Item    Value        Reference Range Interpretation Comments

 

             Glucose POC (test code = Glucose POC) 217 mg/dL           H  

          If you consider your 

patient critically ill, the Roche-Accu Check Infrom II meter should not be used 
for Glucose determination. Draw a venous Glucose and send to the main Lab for 
analysis.





POC Jyvpiiu8796-30-04 07:42:42* 



             Test Item    Value        Reference Range Interpretation Comments

 

             Glucose POC (test code = Glucose POC) 183 mg/dL           H  

          If you consider your 

patient critically ill, the Roche-Accu Check Infrom II meter should not be used 
for Glucose determination. Draw a venous Glucose and send to the main Lab for 
analysis.





POC Eepvysv9901-42-43 20:55:39* 



             Test Item    Value        Reference Range Interpretation Comments

 

             Glucose POC (test code = Glucose POC) 158 mg/dL           H  

          If you consider your 

patient critically ill, the Roche-Accu Check Infrom II meter should not be used 
for Glucose determination. Draw a venous Glucose and send to the main Lab for 
analysis.





POC Yutlijj9487-52-27 16:22:41* 



             Test Item    Value        Reference Range Interpretation Comments

 

             Glucose POC (test code = Glucose POC) 134 mg/dL           H  

          Notify RN or MDIf you 

consider your patient critically ill, the Roche-Accu Check Infrom II meter 
should not be used for Glucose determination. Draw a venous Glucose and send to 
the main Lab for analysis.





POC Alusmpk2299-84-35 11:01:35* 



             Test Item    Value        Reference Range Interpretation Comments

 

             Glucose POC (test code = Glucose POC) 297 mg/dL           H  

          Notify RN or MDIf you 

consider your patient critically ill, the Roche-Accu Check Infrom II meter 
should not be used for Glucose determination. Draw a venous Glucose and send to 
the main Lab for analysis.





ABORh Ppmfid3712-18-32 08:17:25* 



             Test Item    Value        Reference Range Interpretation Comments

 

             Methodology (test code = Methodology) Test-Tube(TT)                

            

 

             Anti-A (test code = Anti-A) 0                                      

 

 

             Anti-B (test code = Anti-B) 0                                      

 

 

             Anti-AB (test code = Anti-AB) NT                                   

   

 

             Anti-D (test code = Anti-D) 4+                                     

 

 

             ABORh Retype (test code = ABORh Retype) O POS                      

             





POC [id number] 07:20:40* 



             Test Item    Value        Reference Range Interpretation Comments

 

             Glucose POC (test code = Glucose POC) 185 mg/dL           H  

          Notify RN or MDIf you 

consider your patient critically ill, the Roche-Accu Check Infrom II meter 
should not be used for Glucose determination. Draw a venous Glucose and send to 
the main Lab for analysis.





3C ABSC [id number] 06:21:17* 



             Test Item    Value        Reference Range Interpretation Comments

 

             SC1 IS (test code = SC1 IS) NT                                     

 

 

             SC2 IS (test code = SC2 IS) NT                                     

 

 

             SC3 IS (test code = SC3 IS) NT                                     

 

 

             SC1 37 (test code = SC1 37) 0                                      

 

 

             SC2 37 (test code = SC2 37) 0                                      

 

 

             SC3 37 (test code = SC3 37) 0                                      

 

 

             SC1 AHG (test code = SC1 AHG) 0                                    

   

 

             SC2 AHG (test code = SC2 AHG) 0                                    

   

 

             SC3 AHG (test code = SC3 AHG) 0                                    

   

 

             SC1 CC (test code = SC1 CC) 2+                                     

 

 

             SC2 CC (test code = SC2 CC) 2+                                     

 

 

             SC3 CC (test code = SC3 CC) 2+                                     

 

 

             Antibody Screen (3C) (test code = Antibody Screen (3C)) Negative AB

SC                            





[id number] 05:54:23* 



             Test Item    Value        Reference Range Interpretation Comments

 

             Previous History (test code = Previous History) No Prev History    

                        

 

             BBID (test code = BBID) [id number]                                

 

             Methodology (test code = Methodology) Test-Tube(TT)                

            

 

             Anti-A (test code = Anti-A) 0                                      

 

 

             Anti-B (test code = Anti-B) 0                                      

 

 

             Anti-D (test code = Anti-D) 4+                                     

 

 

             DCon (test code = DCon) NT                                      

 

             A1 (test code = A1) 4+                                      

 

             B cells (test code = B cells) 4+                                   

   

 

             ABORh (test code = ABORh) O POS                                   





Automated [id number] 05:41:48* 



             Test Item    Value        Reference Range Interpretation Comments

 

             Neutro Auto (test code = Neutro Auto) 50.8 %       36.0-70.0       

           

 

             Lymph Auto (test code = Lymph Auto) 35.3 %       12.0-44.0         

         

 

             Mono Auto (test code = Mono Auto) 9.3 %        0.0-11.0            

       

 

             Eos, Auto (test code = Eos, Auto) 3.4 %        0.0-7.0             

       

 

             Basophil Auto (test code = Basophil Auto) 0.3 %        0.0-2.0     

               

 

             Neutro Absolute (test code = Neutro Absolute) 4.9 x10      1.6-7.4 

                   

 

             Lymph Absolute (test code = Lymph Absolute) 3.41 x10     .50-4.60  

                 

 

             Mono Absolute (test code = Mono Absolute) .90 x10      .00-1.20    

               

 

             Eos Absolute (test code = Eos Absolute) 0.33 x10     0.00-0.74     

             

 

             Baso Absolute (test code = Baso Absolute) 0.03 x10     0.00-0.21   

               





IG [id number] 05:41:48* 



             Test Item    Value        Reference Range Interpretation Comments

 

             IG (test code = IG) 0.9 %        0.0-5.0                    

 

             IG Abs (test code = IG Abs) 0 x10                     N            

 





Complete Blood Count with [id number] 05:41:47* 



             Test Item    Value        Reference Range Interpretation Comments

 

             WBC (test code = WBC) 9.6 x10      4.4-10.5                   

 

             RBC (test code = RBC) 3.62 x10     4.10-5.70    L             

 

             Hgb (test code = Hgb) 8.5 g/dL     13.4-17.4    L             

 

             Hct (test code = Hct) 28.9 %       38.7-52.0    L             

 

             MCV (test code = MCV) 79.80 fL     80..00 L             

 

             MCHC (test code = MCHC) 29.40 g/dL   32.00-37.50  L             

 

             RDW CV (test code = RDW CV) 16.0 %       11.5-14.5    H            

 

 

             MCH (test code = MCH) 23.5 pg      27.0-32.5    L             

 

             Platelets (test code = Platelets) 634.0 x10    140.0-440.0  H      

       

 

             MPV (test code = MPV) 8.3 fL                    N             

 

             Slide Review (test code = Slide Review) Auto         Auto          

            Result created by 

[email or web address]_SLIDE_REV_AUTO [email or web address]

 

             nRBC (test code = nRBC) 0                         N             

 

             NRBC Abs (test code = NRBC Abs) 0.00 x10                  N        

     

 

             Pos Morph XN (test code = Pos Morph XN) A                         N

             

 

             IPF (test code = IPF) 0 %                       N             





Basic Metabolic [id number] 05:25:44* 



             Test Item    Value        Reference Range Interpretation Comments

 

             Sodium Level (test code = Sodium Level) 136.0 mmol/L 135.0-145.0   

             

 

             Potassium Level (test code = Potassium Level) 4.4 mmol/L   3.5-5.1 

                   

 

             Chloride Level (test code = Chloride Level) 95 mmol/L        

   L             

 

             CO2 (test code = CO2) 30 mmol/L    22-29        H             

 

             Anion Gap (test code = Anion Gap) 11 mmol/L    7-16                

       

 

             BUN (test code = BUN) 14.50 mg/dL  6.00-20.00                 

 

             Creatinine Level (test code = Creatinine Level) 0.60 mg/dL   0.70-1

.20    L             

 

             BUN/Creat Ratio (test code = BUN/Creat Ratio) 24                   

     N             

 

             Glucose Level (test code = Glucose Level) 179 mg/dL          

 H             

 

             Calcium Level (test code = Calcium Level) 8.9 mg/dL    8.3-10.5    

               





Basic Metabolic [id number] 05:25:44* 



             Test Item    Value        Reference Range Interpretation Comments

 

             Sodium Level (test code = Sodium Level) 136.0 mmol/L 135.0-145.0   

             

 

             Potassium Level (test code = Potassium Level) 4.4 mmol/L   3.5-5.1 

                   

 

             Chloride Level (test code = Chloride Level) 95 mmol/L        

   L             

 

             CO2 (test code = CO2) 30 mmol/L    22-29        H             

 

             Anion Gap (test code = Anion Gap) 11 mmol/L    7-16                

       

 

             BUN (test code = BUN) 14.50 mg/dL  6.00-20.00                 

 

             Creatinine Level (test code = Creatinine Level) 0.60 mg/dL   0.70-1

.20    L             

 

             BUN/Creat Ratio (test code = BUN/Creat Ratio) 24                   

     N             

 

             Glucose Level (test code = Glucose Level) 179 mg/dL          

 H             

 

             Calcium Level (test code = Calcium Level) 8.9 mg/dL    8.3-10.5    

               

 

             eGFR AA (test code = eGFR AA) >60 mL/min/1.73 m2              N    

        eGFR (estimated Glomerular 

Filtration Rate) is an estimated value, calculated from the patient's serum 
creatinine using the MDRD equation. It is NOT the patient's actual GFR. The eGFR
 provides a more clinically useful measure of kidney disease than serum 
creatinine alone.***This calculation takes sex and race into account, if the 
information is provided. If the race is not provided, and the patient is 
-American, multiply by 1.212. If sex is not provided, and the patient is 
female, multiply by 0.742. Results for patients <18 years of age have not been 
validated by the MDRD study and should be interpreted with caution. eGFR Result 
Interpretation:eGFR > or = 60 is in the Normal RangeeGFR < 60 may mean kidney 
diseaseeGFR < 15 may mean kidney failure*** Ranges recommended by the National 
[employer] Foundation, http://nkdep.nih.gov





Basic Metabolic [id number] 05:25:44* 



             Test Item    Value        Reference Range Interpretation Comments

 

             Sodium Level (test code = Sodium Level) 136.0 mmol/L 135.0-145.0   

             

 

             Potassium Level (test code = Potassium Level) 4.4 mmol/L   3.5-5.1 

                   

 

             Chloride Level (test code = Chloride Level) 95 mmol/L        

   L             

 

             CO2 (test code = CO2) 30 mmol/L    22-29        H             

 

             Anion Gap (test code = Anion Gap) 11 mmol/L    7-16                

       

 

             BUN (test code = BUN) 14.50 mg/dL  6.00-20.00                 

 

             Creatinine Level (test code = Creatinine Level) 0.60 mg/dL   0.70-1

.20    L             

 

             BUN/Creat Ratio (test code = BUN/Creat Ratio) 24                   

     N             

 

             Glucose Level (test code = Glucose Level) 179 mg/dL          

 H             

 

             Calcium Level (test code = Calcium Level) 8.9 mg/dL    8.3-10.5    

               

 

             eGFR AA (test code = eGFR AA) >60 mL/min/1.73 m2              N    

        eGFR (estimated Glomerular 

Filtration Rate) is an estimated value, calculated from the patient's serum 
creatinine using the MDRD equation. It is NOT the patient's actual GFR. The eGFR
 provides a more clinically useful measure of kidney disease than serum 
creatinine alone.***This calculation takes sex and race into account, if the 
information is provided. If the race is not provided, and the patient is 
-American, multiply by 1.212. If sex is not provided, and the patient is 
female, multiply by 0.742. Results for patients <18 years of age have not been 
validated by the MDRD study and should be interpreted with caution. eGFR Result 
Interpretation:eGFR > or = 60 is in the Normal RangeeGFR < 60 may mean kidney 
diseaseeGFR < 15 may mean kidney failure*** Ranges recommended by the National 
K[employer]ney Foundation, http://nkdep.nih.gov

 

             eGFR Non-AA (test code = eGFR Non-AA) >60.00 mL/min/1.73 m2        

      N            eGFR (estimated 

Glomerular Filtration Rate) is an estimated value, calculated from the patient's
 serum creatinine using the MDRD equation. It is NOT the patient's actual GFR. 
The eGFR provides a more clinically useful measure of kidney disease than serum 
creatinine alone.***This calculation takes sex and race into account, if the 
information is provided. If the race is not provided, and the patient is 
-American, multiply by 1.212. If sex is not provided, and the patient is 
female, multiply by 0.742. Results for patients <18 years of age have not been 
validated by the MDRD study and should be interpreted with caution. eGFR Result 
Interpretation:eGFR > or = 60 is in the Normal RangeeGFR < 60 may mean kidney 
diseaseeGFR < 15 may mean kidney failure*** Ranges recommended by the National 
K[employer]ney Foundation, http://nkdep.nih.gov





POC [id number] 20:32:07* 



             Test Item    Value        Reference Range Interpretation Comments

 

             Glucose POC (test code = Glucose POC) 155 mg/dL           H  

          If you consider your 

patient critically ill, the Roche-Accu Check Infrom II meter should not be used 
for Glucose determination. Draw a venous Glucose and send to the main Lab for 
analysis.





POC [id number] 16:56:40* 



             Test Item    Value        Reference Range Interpretation Comments

 

             Glucose POC (test code = Glucose POC) 161 mg/dL           H  

          If you consider your 

patient critically ill, the Roche-Accu Check Infrom II meter should not be used 
for Glucose determination. Draw a venous Glucose and send to the main Lab for 
analysis.





POC [id number] 11:55:05* 



             Test Item    Value        Reference Range Interpretation Comments

 

             Glucose POC (test code = Glucose POC) 291 mg/dL           H  

          If you consider your 

patient critically ill, the Roche-Accu Check Infrom II meter should not be used 
for Glucose determination. Draw a venous Glucose and send to the main Lab for 
analysis.





POC [id number] 07:58:10* 



             Test Item    Value        Reference Range Interpretation Comments

 

             Glucose POC (test code = Glucose POC) 227 mg/dL           H  

          If you consider your 

patient critically ill, the Roche-Accu Check Infrom II meter should not be used 
for Glucose determination. Draw a venous Glucose and send to the main Lab for 
analysis.





Vancomycin Level [id number] 21:57:56* 



             Test Item    Value        Reference Range Interpretation Comments

 

             Vanco Tr (test code = Vanco Tr) 11.6 mcg/mL  10.0-20.0             

     





POC [id number] 20:04:38* 



             Test Item    Value        Reference Range Interpretation Comments

 

             Glucose POC (test code = Glucose POC) 123 mg/dL           H  

          If you consider your 

patient critically ill, the Roche-Accu Check Infrom II meter should not be used 
for Glucose determination. Draw a venous Glucose and send to the main Lab for 
analysis.





POC [id number] 16:27:10* 



             Test Item    Value        Reference Range Interpretation Comments

 

             Glucose POC (test code = Glucose POC) 164 mg/dL           H  

          If you consider your 

patient critically ill, the Roche-Accu Check Infrom II meter should not be used 
for Glucose determination. Draw a venous Glucose and send to the main Lab for 
analysis.





POC [id number] 11:56:10* 



             Test Item    Value        Reference Range Interpretation Comments

 

             Glucose POC (test code = Glucose POC) 161 mg/dL           H  

          If you consider your 

patient critically ill, the Roche-Accu Check Infrom II meter should not be used 
for Glucose determination. Draw a venous Glucose and send to the main Lab for 
analysis.





Blood [id number] 09:01:35No growth at 5 days.POC [id number] 
07:39:33* 



             Test Item    Value        Reference Range Interpretation Comments

 

             Glucose POC (test code = Glucose POC) 195 mg/dL           H  

          If you consider your 

patient critically ill, the Roche-Accu Check Infrom II meter should not be used 
for Glucose determination. Draw a venous Glucose and send to the main Lab for 
analysis.





Basic Metabolic [id number] 06:17:36* 



             Test Item    Value        Reference Range Interpretation Comments

 

             Sodium Level (test code = Sodium Level) 138.0 mmol/L 135.0-145.0   

             

 

             Potassium Level (test code = Potassium Level) 4.0 mmol/L   3.5-5.1 

                   

 

             Chloride Level (test code = Chloride Level) 100 mmol/L       

                 

 

             CO2 (test code = CO2) 27 mmol/L    22-29                      

 

             Anion Gap (test code = Anion Gap) 11 mmol/L    7-16                

       

 

             BUN (test code = BUN) 9.00 mg/dL   6.00-20.00                 

 

             Creatinine Level (test code = Creatinine Level) 0.60 mg/dL   0.70-1

.20    L             

 

             BUN/Creat Ratio (test code = BUN/Creat Ratio) 15                   

     N             

 

             Glucose Level (test code = Glucose Level) 189 mg/dL          

 H             

 

             Calcium Level (test code = Calcium Level) 8.5 mg/dL    8.3-10.5    

               





Basic Metabolic [id number] 06:17:36* 



             Test Item    Value        Reference Range Interpretation Comments

 

             Sodium Level (test code = Sodium Level) 138.0 mmol/L 135.0-145.0   

             

 

             Potassium Level (test code = Potassium Level) 4.0 mmol/L   3.5-5.1 

                   

 

             Chloride Level (test code = Chloride Level) 100 mmol/L       

                 

 

             CO2 (test code = CO2) 27 mmol/L    22-29                      

 

             Anion Gap (test code = Anion Gap) 11 mmol/L    7-16                

       

 

             BUN (test code = BUN) 9.00 mg/dL   6.00-20.00                 

 

             Creatinine Level (test code = Creatinine Level) 0.60 mg/dL   0.70-1

.20    L             

 

             BUN/Creat Ratio (test code = BUN/Creat Ratio) 15                   

     N             

 

             Glucose Level (test code = Glucose Level) 189 mg/dL          

 H             

 

             Calcium Level (test code = Calcium Level) 8.5 mg/dL    8.3-10.5    

               

 

             eGFR AA (test code = eGFR AA) >60 mL/min/1.73 m2              N    

        eGFR (estimated Glomerular 

Filtration Rate) is an estimated value, calculated from the patient's serum 
creatinine using the MDRD equation. It is NOT the patient's actual GFR. The eGFR
 provides a more clinically useful measure of kidney disease than serum 
creatinine alone.***This calculation takes sex and race into account, if the 
information is provided. If the race is not provided, and the patient is 
-American, multiply by 1.212. If sex is not provided, and the patient is 
female, multiply by 0.742. Results for patients <18 years of age have not been 
validated by the MDRD study and should be interpreted with caution. eGFR Result 
Interpretation:eGFR > or = 60 is in the Normal RangeeGFR < 60 may mean kidney 
diseaseeGFR < 15 may mean kidney failure*** Ranges recommended by the National 
K[employer]ney Foundation, http://nkdep.nih.gov





Basic Metabolic [id number] 06:17:36* 



             Test Item    Value        Reference Range Interpretation Comments

 

             Sodium Level (test code = Sodium Level) 138.0 mmol/L 135.0-145.0   

             

 

             Potassium Level (test code = Potassium Level) 4.0 mmol/L   3.5-5.1 

                   

 

             Chloride Level (test code = Chloride Level) 100 mmol/L       

                 

 

             CO2 (test code = CO2) 27 mmol/L    22-29                      

 

             Anion Gap (test code = Anion Gap) 11 mmol/L    7-16                

       

 

             BUN (test code = BUN) 9.00 mg/dL   6.00-20.00                 

 

             Creatinine Level (test code = Creatinine Level) 0.60 mg/dL   0.70-1

.20    L             

 

             BUN/Creat Ratio (test code = BUN/Creat Ratio) 15                   

     N             

 

             Glucose Level (test code = Glucose Level) 189 mg/dL          

 H             

 

             Calcium Level (test code = Calcium Level) 8.5 mg/dL    8.3-10.5    

               

 

             eGFR AA (test code = eGFR AA) >60 mL/min/1.73 m2              N    

        eGFR (estimated Glomerular 

Filtration Rate) is an estimated value, calculated from the patient's serum 
creatinine using the MDRD equation. It is NOT the patient's actual GFR. The eGFR
 provides a more clinically useful measure of kidney disease than serum 
creatinine alone.***This calculation takes sex and race into account, if the 
information is provided. If the race is not provided, and the patient is 
-American, multiply by 1.212. If sex is not provided, and the patient is 
female, multiply by 0.742. Results for patients <18 years of age have not been 
validated by the MDRD study and should be interpreted with caution. eGFR Result 
Interpretation:eGFR > or = 60 is in the Normal RangeeGFR < 60 may mean kidney 
diseaseeGFR < 15 may mean kidney failure*** Ranges recommended by the National 
Kidney Foundation, http://nkdep.nih.gov

 

             eGFR Non-AA (test code = eGFR Non-AA) >60.00 mL/min/1.73 m2        

      N            eGFR (estimated 

Glomerular Filtration Rate) is an estimated value, calculated from the patient's
 serum creatinine using the MDRD equation. It is NOT the patient's actual GFR. 
The eGFR provides a more clinically useful measure of kidney disease than serum 
creatinine alone.***This calculation takes sex and race into account, if the 
information is provided. If the race is not provided, and the patient is 
-American, multiply by 1.212. If sex is not provided, and the patient is 
female, multiply by 0.742. Results for patients <18 years of age have not been 
validated by the MDRD study and should be interpreted with caution. eGFR Result 
Interpretation:eGFR > or = 60 is in the Normal RangeeGFR < 60 may mean kidney 
diseaseeGFR < 15 may mean kidney failure*** Ranges recommended by the National 
Kidney Foundation, http://nkdep.nih.gov





Complete Blood Count with [id number] 05:15:48* 



             Test Item    Value        Reference Range Interpretation Comments

 

             WBC (test code = WBC) 10.2 x10     4.4-10.5                   

 

             RBC (test code = RBC) 3.15 x10     4.10-5.70    L             

 

             Hgb (test code = Hgb) 7.5 g/dL     13.4-17.4    L             

 

             Hct (test code = Hct) 25.8 %       38.7-52.0    L             

 

             MCV (test code = MCV) 81.90 fL     80..00               

 

             MCHC (test code = MCHC) 29.10 g/dL   32.00-37.50  L             

 

             RDW CV (test code = RDW CV) 16.4 %       11.5-14.5    H            

 

 

             MCH (test code = MCH) 23.8 pg      27.0-32.5    L             

 

             Platelets (test code = Platelets) 595.0 x10    140.0-440.0  H      

       

 

             MPV (test code = MPV) 8.7 fL                    N             

 

             Slide Review (test code = Slide Review) Auto         Auto          

            Result created by 

[email or web address]_REV_AUTO [email or web address]

 

             nRBC (test code = nRBC) 0                         N             

 

             NRBC Abs (test code = NRBC Abs) 0.00 x10                  N        

     

 

             Pos Morph XN (test code = Pos Morph XN) A                         N

             

 

             IPF (test code = IPF) 0 %                       N             





Automated [id number] 05:15:48* 



             Test Item    Value        Reference Range Interpretation Comments

 

             Neutro Auto (test code = Neutro Auto) 46.3 %       36.0-70.0       

           

 

             Lymph Auto (test code = Lymph Auto) 41.3 %       12.0-44.0         

         

 

             Mono Auto (test code = Mono Auto) 8.3 %        0.0-11.0            

       

 

             Eos, Auto (test code = Eos, Auto) 3.2 %        0.0-7.0             

       

 

             Basophil Auto (test code = Basophil Auto) 0.3 %        0.0-2.0     

               

 

             Neutro Absolute (test code = Neutro Absolute) 4.7 x10      1.6-7.4 

                   

 

             Lymph Absolute (test code = Lymph Absolute) 4.21 x10     .50-4.60  

                 

 

             Mono Absolute (test code = Mono Absolute) .85 x10      .00-1.20    

               

 

             Eos Absolute (test code = Eos Absolute) 0.33 x10     0.00-0.74     

             

 

             Baso Absolute (test code = Baso Absolute) 0.03 x10     0.00-0.21   

               





IG [id number] 05:15:48* 



             Test Item    Value        Reference Range Interpretation Comments

 

             IG (test code = IG) 0.6 %        0.0-5.0                    

 

             IG Abs (test code = IG Abs) 0 x10                     N            

 





Vancomycin Level [id number] 23:32:37* 



             Test Item    Value        Reference Range Interpretation Comments

 

             Vanco Tr (test code = Vanco Tr) 2.8 mcg/mL   10.0-20.0    L        

     





POC [id number] 20:57:44* 



             Test Item    Value        Reference Range Interpretation Comments

 

             Glucose POC (test code = Glucose POC) 309 mg/dL           H  

          If you consider your 

patient critically ill, the Roche-Accu Check Infrom II meter should not be used 
for Glucose determination. Draw a venous Glucose and send to the main Lab for 
analysis.





POC [id number] 16:21:03* 



             Test Item    Value        Reference Range Interpretation Comments

 

             Glucose POC (test code = Glucose POC) 199 mg/dL           H  

          Notify RN or MDIf you 

consider your patient critically ill, the Roche-Accu Check Infrom II meter 
should not be used for Glucose determination. Draw a venous Glucose and send to 
the main Lab for analysis.





POC [id number] 11:51:37* 



             Test Item    Value        Reference Range Interpretation Comments

 

             Glucose POC (test code = Glucose POC) 234 mg/dL           H  

          Notify RN or MDIf you 

consider your patient critically ill, the Roche-Accu Check Infrom II meter 
should not be used for Glucose determination. Draw a venous Glucose and send to 
the main Lab for analysis.





Urine [id number] 10:38:47 C Urine Added by [email or web address]_CUL_IND>=100,000 
cfu/ml YeastPOC [id number] 07:40:34* 



             Test Item    Value        Reference Range Interpretation Comments

 

             Glucose POC (test code = Glucose POC) 228 mg/dL           H  

          Notify RN or MDIf you 

consider your patient critically ill, the Roche-Accu Check Infrom II meter 
should not be used for Glucose determination. Draw a venous Glucose and send to 
the main Lab for analysis.





POC [id number] 19:55:40* 



             Test Item    Value        Reference Range Interpretation Comments

 

             Glucose POC (test code = Glucose POC) 217 mg/dL           H  

          If you consider your 

patient critically ill, the Roche-Accu Check Infrom II meter should not be used 
for Glucose determination. Draw a venous Glucose and send to the main Lab for 
analysis.





POC [id number] 16:06:29* 



             Test Item    Value        Reference Range Interpretation Comments

 

             Glucose POC (test code = Glucose POC) 228 mg/dL           H  

          Notify RN or MDIf you 

consider your patient critically ill, the Roche-Accu Check Infrom II meter 
should not be used for Glucose determination. Draw a venous Glucose and send to 
the main Lab for analysis.





POC [id number] 11:15:06* 



             Test Item    Value        Reference Range Interpretation Comments

 

             Glucose POC (test code = Glucose POC) 251 mg/dL           H  

          Notify RN or MDIf you 

consider your patient critically ill, the Roche-Accu Check Infrom II meter 
should not be used for Glucose determination. Draw a venous Glucose and send to 
the main Lab for analysis.





Urinalysis [id number] 06:51:37* 



             Test Item    Value        Reference Range Interpretation Comments

 

             UA WBC (test code = UA WBC) 20-29        0-5          A            

 

 

             UA RBC (test code = UA RBC) 6-10         0-5          A            

 

 

             UA Bacteria (test code = UA Bacteria) Few                       A  

           

 

             UA Squam Epithelial (test code = UA Squam Epithelial) 11-19        

             A             

 

             UA Yeast (test code = UA Yeast) Many                      A        

     

 

             UA Ca Ox Crystal (test code = UA Ca Ox Crystal) Few                

       A             





Basic Metabolic [id number] 06:42:47* 



             Test Item    Value        Reference Range Interpretation Comments

 

             Sodium Level (test code = Sodium Level) 144.0 mmol/L 135.0-145.0   

             

 

             Potassium Level (test code = Potassium Level) 3.8 mmol/L   3.5-5.1 

                   

 

             Chloride Level (test code = Chloride Level) 106 mmol/L       

   H             

 

             CO2 (test code = CO2) 27 mmol/L    22-29                      

 

             Anion Gap (test code = Anion Gap) 11 mmol/L    7-16                

       

 

             BUN (test code = BUN) 8.10 mg/dL   6.00-20.00                 

 

             Creatinine Level (test code = Creatinine Level) 0.80 mg/dL   0.70-1

.20                  

 

             BUN/Creat Ratio (test code = BUN/Creat Ratio) 10                   

     N             

 

             Glucose Level (test code = Glucose Level) 315 mg/dL          

 H             

 

             Calcium Level (test code = Calcium Level) 8.6 mg/dL    8.3-10.5    

               





Basic Metabolic [id number] 06:42:47* 



             Test Item    Value        Reference Range Interpretation Comments

 

             Sodium Level (test code = Sodium Level) 144.0 mmol/L 135.0-145.0   

             

 

             Potassium Level (test code = Potassium Level) 3.8 mmol/L   3.5-5.1 

                   

 

             Chloride Level (test code = Chloride Level) 106 mmol/L       

   H             

 

             CO2 (test code = CO2) 27 mmol/L    22-29                      

 

             Anion Gap (test code = Anion Gap) 11 mmol/L    7-16                

       

 

             BUN (test code = BUN) 8.10 mg/dL   6.00-20.00                 

 

             Creatinine Level (test code = Creatinine Level) 0.80 mg/dL   0.70-1

.20                  

 

             BUN/Creat Ratio (test code = BUN/Creat Ratio) 10                   

     N             

 

             Glucose Level (test code = Glucose Level) 315 mg/dL          

 H             

 

             Calcium Level (test code = Calcium Level) 8.6 mg/dL    8.3-10.5    

               

 

             eGFR AA (test code = eGFR AA) >60 mL/min/1.73 m2              N    

        eGFR (estimated Glomerular 

Filtration Rate) is an estimated value, calculated from the patient's serum 
creatinine using the MDRD equation. It is NOT the patient's actual GFR. The eGFR
 provides a more clinically useful measure of kidney disease than serum 
creatinine alone.***This calculation takes sex and race into account, if the 
information is provided. If the race is not provided, and the patient is 
-American, multiply by 1.212. If sex is not provided, and the patient is 
female, multiply by 0.742. Results for patients <18 years of age have not been 
validated by the MDRD study and should be interpreted with caution. eGFR Result 
Interpretation:eGFR > or = 60 is in the Normal RangeeGFR < 60 may mean kidney 
diseaseeGFR < 15 may mean kidney failure*** Ranges recommended by the National 
[employer], http://nkdep.nih.gov





Basic Metabolic [id number] 06:42:47* 



             Test Item    Value        Reference Range Interpretation Comments

 

             Sodium Level (test code = Sodium Level) 144.0 mmol/L 135.0-145.0   

             

 

             Potassium Level (test code = Potassium Level) 3.8 mmol/L   3.5-5.1 

                   

 

             Chloride Level (test code = Chloride Level) 106 mmol/L       

   H             

 

             CO2 (test code = CO2) 27 mmol/L    22-29                      

 

             Anion Gap (test code = Anion Gap) 11 mmol/L    7-16                

       

 

             BUN (test code = BUN) 8.10 mg/dL   6.00-20.00                 

 

             Creatinine Level (test code = Creatinine Level) 0.80 mg/dL   0.70-1

.20                  

 

             BUN/Creat Ratio (test code = BUN/Creat Ratio) 10                   

     N             

 

             Glucose Level (test code = Glucose Level) 315 mg/dL          

 H             

 

             Calcium Level (test code = Calcium Level) 8.6 mg/dL    8.3-10.5    

               

 

             eGFR AA (test code = eGFR AA) >60 mL/min/1.73 m2              N    

        eGFR (estimated Glomerular 

Filtration Rate) is an estimated value, calculated from the patient's serum 
creatinine using the MDRD equation. It is NOT the patient's actual GFR. The eGFR
 provides a more clinically useful measure of kidney disease than serum 
creatinine alone.***This calculation takes sex and race into account, if the 
information is provided. If the race is not provided, and the patient is 
-American, multiply by 1.212. If sex is not provided, and the patient is 
female, multiply by 0.742. Results for patients <18 years of age have not been 
validated by the MDRD study and should be interpreted with caution. eGFR Result 
Interpretation:eGFR > or = 60 is in the Normal RangeeGFR < 60 may mean kidney 
diseaseeGFR < 15 may mean kidney failure*** Ranges recommended by the National 
Kidney Foundation, http://nkdep.nih.gov

 

             eGFR Non-AA (test code = eGFR Non-AA) >60.00 mL/min/1.73 m2        

      N            eGFR (estimated 

Glomerular Filtration Rate) is an estimated value, calculated from the patient's
 serum creatinine using the MDRD equation. It is NOT the patient's actual GFR. 
The eGFR provides a more clinically useful measure of kidney disease than serum 
creatinine alone.***This calculation takes sex and race into account, if the 
information is provided. If the race is not provided, and the patient is 
-American, multiply by 1.212. If sex is not provided, and the patient is 
female, multiply by 0.742. Results for patients <18 years of age have not been 
validated by the MDRD study and should be interpreted with caution. eGFR Result 
Interpretation:eGFR > or = 60 is in the Normal RangeeGFR < 60 may mean kidney 
diseaseeGFR < 15 may mean kidney failure*** Ranges recommended by the National 
K[employer] Foundation, http://nkdep.nih.gov





Urinalysis with Culture, if [id number] 06:35:06* 



             Test Item    Value        Reference Range Interpretation Comments

 

             UA Color (test code = UA Color) YELLO        Yellow                

     

 

             UA Appear (test code = UA Appear) CLDY         Clear        A      

       

 

             UA pH (test code = UA pH) 6                         N             

 

             UA Spec Grav (test code = UA Spec Grav) 1.023        1.001-1.035   

             

 

             UA Glucose (test code = UA Glucose) 300 mg/dL    Negative     A    

         

 

             UA Bili (test code = UA Bili) NEG          Negative                

   

 

             UA Ketones (test code = UA Ketones) 5 mg/dL      Negative          

         

 

             UA Blood (test code = UA Blood) 50 cells/mcL Negative     A        

     

 

             UA Protein (test code = UA Protein) 75 mg/dL     Negative     A    

         

 

             UA Urobilinogen (test code = UA Urobilinogen) 4 mg/dL      >0.2    

     A             

 

             UA Nitrite (test code = UA Nitrite) NEG          Negative          

         

 

             UA Leuk Est (test code = UA Leuk Est) 500 cells/mcL Negative     A 

            

 

             UA Micro Ind? (test code = UA Micro Ind?) Indicated    Not Indicate

d A            Result 

created by rule GL_SJM_UA_MICRO_IND





Complete Blood Count without [id number] 06:23:13* 



             Test Item    Value        Reference Range Interpretation Comments

 

             WBC (test code = WBC) 11.5 x10     4.4-10.5     H             

 

             RBC (test code = RBC) 3.46 x10     4.10-5.70    L             

 

             Hgb (test code = Hgb) 8.3 g/dL     13.4-17.4    L             

 

             Hct (test code = Hct) 28.7 %       38.7-52.0    L             

 

             MCV (test code = MCV) 82.90 fL     80..00               

 

             MCH (test code = MCH) 24.0 pg      27.0-32.5    L             

 

             MCHC (test code = MCHC) 28.90 g/dL   32.00-37.50  L             

 

             RDW CV (test code = RDW CV) 16.9 %       11.5-14.5    H            

 

 

             Platelets (test code = Platelets) 712.0 x10    140.0-440.0  H      

       

 

             MPV (test code = MPV) 8.3 fL                    N             

 

             nRBC (test code = nRBC) 0                         N             

 

             NRBC Abs (test code = NRBC Abs) 0.00 x10                  N        

     

 

             IPF (test code = IPF) 0 %                       N             





POC [id number] 20:26:35* 



             Test Item    Value        Reference Range Interpretation Comments

 

             Glucose POC (test code = Glucose POC) 217 mg/dL           H  

          If you consider your 

patient critically ill, the Roche-Accu Check Infrom II meter should not be used 
for Glucose determination. Draw a venous Glucose and send to the main Lab for 
analysis.





XR Chest 1 View CVAD Insertion [id number] 19:34:01Patient:   BALJEET JESSICA                                 MRN:    [id number] Date/Time2019
 19:29 CDTReason for ExampiccReportChest one view AP 2019 7:33 PMCLINICAL 
INDICATION: PICC placementCOMPARISON: 2019 at 1849LOCATION: 
[id number]:Tip of a left PICC now projects over the cavoatrial junction. 
There is a trace right pleural effusion. The lungs are otherwise well aerated. 
Cardiomediastinal contours are within normal limits. The central pulmonary vascu
lature is not engorged.***** Final *****Dictated by:    Seipel, MD, Timothy JDic
tated DT/TM: 2019 7:33 pmSigned by:  Seipel, MD, Timothy JSigned (Electron
ic Signature):  2019 7:34 pmXR Chest 1 View [id number] 19:22:55
Patient:   BALJEET JESSICA                                 MRN:    [id number]
am Date/Time2019 19:02 CDTReason for ExamLine placementReportChest one view
 AP 2019 7:22 PMCLINICAL INDICATION: Line placementCOMPARISON: None availab
leLOCATION: [id number]:The tip of a left PICC projects over the right atrium 
and could be retracted 4 to 5 cm. The lungs are well aerated. Cardiomediastinal 
contours are within normal limits. The central pulmonary vasculature is not engo
rged.***** Final *****Dictated by:    Seipel, MD, Timothy JDictated DT/TM:  7:22 pmSigned by:  Seipel, MD, Timothy JSigned (Electronic Signature):   7:22 pmBlood [id number] 18:03:00* 



             Test Item    Value        Reference Range Interpretation Comments

 

             ORGANISM (test code = ORGANISM) Methicillin-Resistant Staphylococcu

s aureus                           

 

 

             Chloramphenicol (test code = Chlor)                           S    

         

 

             Ciprofloxacin (test code = Cipro)                           R      

       

 

             Clindamycin (test code = Clinda)                           S       

      

 

             Erythromycin (test code = Eryth)                           R       

      

 

             Gentamicin (test code = Gent)                           R          

   

 

             Levofloxacin (test code = Levo)                           I        

     

 

             Linezolid (test code = Linez)                           S          

   

 

             Oxacillin (test code = Ox)                           R             

 

             Rifampin (test code = Rif)                           S             

 

             Tetracycline (test code = Tetra)                           S       

      

 

             Trimethoprim/Sulfa (test code = SXT)                           S   

          

 

             Vancomycin (test code = Vanc)                           S          

   

 

                          Final Report (test code = Final Report) Methicillin-Re

sistant Staphylococcus 

aureus Contact isolation recommended Results called to and read back by: Nicanor Hendricks RN 19 10:23:39/PXS                                          

 

                          Anaerobic Gram Stain Report (test code = Anaerobic Gra

m Stain Report) Evidence 

of growth Gram Positive Cocci in Clusters Results called to and read back by: LORETO Ellis  19 10:31:45 YA                                          





Blood [id number] 18:03:00No growth at 5 days.POC [id number] 15:54:30* 



             Test Item    Value        Reference Range Interpretation Comments

 

             pH Art (test code = pH Art) 7.45         7.35-7.45                 

 

 

             pH Temp Andrei Art (test code = pH Temp Andrei Art) 7.45               

       N             

 

             pCO2 Art (test code = pCO2 Art) 40 mmHg      35-45                 

     

 

             pCO2 Temp Andrei Art (test code = pCO2 Temp Andrei Art) 40 mmHg        

           N             

 

             pO2 Art (test code = pO2 Art) 78 mmHg             L          

   

 

             pO2 Temp Andrei Art (test code = pO2 Temp Andrei Art) 78 mmHg          

         N             

 

             ctHb Art (test code = ctHb Art) 8.7 g/dL     12.2-17.4    L        

     

 

             O2 Sat Art (test code = O2 Sat Art) 96.5 %       80.0-100.0        

         

 

             FO2Hb Art (test code = FO2Hb Art) 94.0 %       0.0-100.0           

       

 

             FCOHb Art (test code = FCOHb Art) 2.5 %        0.0-20.0            

       

 

             FMetHb Art (test code = FMetHb Art) 0.1 %        0.0-20.0          

         

 

             HCO3 Art (test code = HCO3 Art) 27.6 mmol/L  22.0-26.0             

    L

 

             Hct Art (test code = Hct Art) 27 %         34-52        L          

   

 

             Na Art (test code = Na Art) 143 mmol/L   135-145                   

 

 

             K Art (test code = K Art) 3.4 mmol/L   3.5-4.5      L             

 

             iCa Art (test code = iCa Art) 1.18 mmol/L  1.00-1.50               

   

 

             Cl Art (test code = Cl Art) 106 mmol/L          H            

 

 

             Glu Art (test code = Glu Art) 294 mg/dL           H          

   

 

             Base Excess Arterial (test code = Base Excess Arterial) 3.4        

               N             

 

             FiO2 Art (test code = FiO2 Art) 21 %                      N        

     

 

             Lactate Art (test code = Lactate Art) 0.8 mmol/L   0.5-2.2         

           

 

             Draw Site (test code = Draw Site) Radial. L                 N      

       





POC [id number] 15:41:58* 



             Test Item    Value        Reference Range Interpretation Comments

 

             Glucose POC (test code = Glucose POC) 287 mg/dL           H  

          If you consider your 

patient critically ill, the Roche-Accu Check Infrom II meter should not be used 
for Glucose determination. Draw a venous Glucose and send to the main Lab for 
analysis.





Lactic Acid, Plasma (Venous)2019 15:37:44* 



             Test Item    Value        Reference Range Interpretation Comments

 

                          Lactic Acid, Plasma (Venous) (test code = Lactic Acid,

 Plasma (Venous)) 1.0 

mmol/L              0.5-1.9                                  





Urine Drug [id number] 22:56:06* 



             Test Item    Value        Reference Range Interpretation Comments

 

             Amphetamine Screen Ur (test code = Amphetamine Screen Ur) Negative 

    Negative                   

 

             Barbiturate Screen Ur (test code = Barbiturate Screen Ur) Negative 

    Negative                   

 

             Benzodiazepines Ur (test code = Benzodiazepines Ur) Negative     Ne

gative                   

 

             Cocaine Screen Ur (test code = Cocaine Screen Ur) POSITIVE     Nega

tive     A             

 

             U Methadone Scr (test code = U Methadone Scr) Negative     Negative

                   

 

             Opiate Screen Ur (test code = Opiate Screen Ur) Negative     Negati

ve                   

 

             U PCP Scrn (test code = U PCP Scrn) Negative     Negative          

         

 

             Cannabinoid Screen Ur (test code = Cannabinoid Screen Ur) POSITIVE 

    Negative     A             

 

             U TCA (test code = U TCA) POSITIVE     Negative     A            Th

e results of all drug screen 

tests are only preliminary. Clinical consideration and professional judgment 
should be applied to any drug of abuse test result, particularly when 
preliminary positive results are obtained. Please order a separate confirmatory 
test if desired.





Comprehensive Metabolic [id number] 21:46:03* 



             Test Item    Value        Reference Range Interpretation Comments

 

             Sodium Level (test code = Sodium Level) 135.0 mmol/L 135.0-145.0   

             

 

             Potassium Level (test code = Potassium Level) 3.7 mmol/L   3.5-5.1 

                   

 

             Chloride Level (test code = Chloride Level) 96 mmol/L        

   L             

 

             CO2 (test code = CO2) 20 mmol/L    22-29        L             

 

             Anion Gap (test code = Anion Gap) 19 mmol/L    7-16         H      

       

 

             BUN (test code = BUN) 13.00 mg/dL  6.00-20.00                 

 

             Creatinine Level (test code = Creatinine Level) 0.80 mg/dL   0.70-1

.20                  

 

             BUN/Creat Ratio (test code = BUN/Creat Ratio) 16                   

     N             

 

             Glucose Level (test code = Glucose Level) 252 mg/dL          

 H             

 

             Calcium Level (test code = Calcium Level) 9.4 mg/dL    8.3-10.5    

               

 

             Alk Phos (test code = Alk Phos) 215 U/L             H        

     

 

             Bilirubin Total (test code = Bilirubin Total) 0.4 mg/dL    0.1-0.9 

                   

 

             Albumin Level (test code = Albumin Level) 3.5 g/dL     3.5-5.2     

               

 

             Protein Total (test code = Protein Total) 8.2 g/dL     6.4-8.3     

               

 

             ALT (test code = ALT) 15 U/L       1-41                       

 

             AST (test code = AST) 21 U/L       1-40                       

 

             Globulin (test code = Globulin) 4.7 g/dL     2.9-3.1      H        

     

 

             A/G Ratio (test code = A/G Ratio) 0.7 ratio                 N      

       





Comprehensive Metabolic [id number] 21:46:03* 



             Test Item    Value        Reference Range Interpretation Comments

 

             Sodium Level (test code = Sodium Level) 135.0 mmol/L 135.0-145.0   

             

 

             Potassium Level (test code = Potassium Level) 3.7 mmol/L   3.5-5.1 

                   

 

             Chloride Level (test code = Chloride Level) 96 mmol/L        

   L             

 

             CO2 (test code = CO2) 20 mmol/L    22-29        L             

 

             Anion Gap (test code = Anion Gap) 19 mmol/L    7-16         H      

       

 

             BUN (test code = BUN) 13.00 mg/dL  6.00-20.00                 

 

             Creatinine Level (test code = Creatinine Level) 0.80 mg/dL   0.70-1

.20                  

 

             BUN/Creat Ratio (test code = BUN/Creat Ratio) 16                   

     N             

 

             Glucose Level (test code = Glucose Level) 252 mg/dL          

 H             

 

             Calcium Level (test code = Calcium Level) 9.4 mg/dL    8.3-10.5    

               

 

             Alk Phos (test code = Alk Phos) 215 U/L             H        

     

 

             Bilirubin Total (test code = Bilirubin Total) 0.4 mg/dL    0.1-0.9 

                   

 

             Albumin Level (test code = Albumin Level) 3.5 g/dL     3.5-5.2     

               

 

             Protein Total (test code = Protein Total) 8.2 g/dL     6.4-8.3     

               

 

             ALT (test code = ALT) 15 U/L       1-41                       

 

             AST (test code = AST) 21 U/L       1-40                       

 

             Globulin (test code = Globulin) 4.7 g/dL     2.9-3.1      H        

     

 

             A/G Ratio (test code = A/G Ratio) 0.7 ratio                 N      

       

 

             eGFR AA (test code = eGFR AA) >60 mL/min/1.73 m2              N    

        eGFR (estimated Glomerular 

Filtration Rate) is an estimated value, calculated from the patient's serum 
creatinine using the MDRD equation. It is NOT the patient's actual GFR. The eGFR
 provides a more clinically useful measure of kidney disease than serum 
creatinine alone.***This calculation takes sex and race into account, if the 
information is provided. If the race is not provided, and the patient is 
-American, multiply by 1.212. If sex is not provided, and the patient is 
female, multiply by 0.742. Results for patients <18 years of age have not been 
validated by the MDRD study and should be interpreted with caution. eGFR Result 
Interpretation:eGFR > or = 60 is in the Normal RangeeGFR < 60 may mean kidney 
diseaseeGFR < 15 may mean kidney failure*** Ranges recommended by the National 
Kidney Foundation, http://nkdep.nih.gov





Alcohol [id number] 21:46:03* 



             Test Item    Value        Reference Range Interpretation Comments

 

             Ethanol Level (test code = Ethanol Level) <0.00 g/dL   0.00-0.01   

              Intoxicated 

0.080 g/dL or more

 

             Ethanol Inst (test code = Ethanol Inst) <0                        N

             





Comprehensive Metabolic [id number] 21:46:03* 



             Test Item    Value        Reference Range Interpretation Comments

 

             Sodium Level (test code = Sodium Level) 135.0 mmol/L 135.0-145.0   

             

 

             Potassium Level (test code = Potassium Level) 3.7 mmol/L   3.5-5.1 

                   

 

             Chloride Level (test code = Chloride Level) 96 mmol/L        

   L             

 

             CO2 (test code = CO2) 20 mmol/L    22-29        L             

 

             Anion Gap (test code = Anion Gap) 19 mmol/L    7-16         H      

       

 

             BUN (test code = BUN) 13.00 mg/dL  6.00-20.00                 

 

             Creatinine Level (test code = Creatinine Level) 0.80 mg/dL   0.70-1

.20                  

 

             BUN/Creat Ratio (test code = BUN/Creat Ratio) 16                   

     N             

 

             Glucose Level (test code = Glucose Level) 252 mg/dL          

 H             

 

             Calcium Level (test code = Calcium Level) 9.4 mg/dL    8.3-10.5    

               

 

             Alk Phos (test code = Alk Phos) 215 U/L             H        

     

 

             Bilirubin Total (test code = Bilirubin Total) 0.4 mg/dL    0.1-0.9 

                   

 

             Albumin Level (test code = Albumin Level) 3.5 g/dL     3.5-5.2     

               

 

             Protein Total (test code = Protein Total) 8.2 g/dL     6.4-8.3     

               

 

             ALT (test code = ALT) 15 U/L       1-41                       

 

             AST (test code = AST) 21 U/L       1-40                       

 

             Globulin (test code = Globulin) 4.7 g/dL     2.9-3.1      H        

     

 

             A/G Ratio (test code = A/G Ratio) 0.7 ratio                 N      

       

 

             eGFR AA (test code = eGFR AA) >60 mL/min/1.73 m2              N    

        eGFR (estimated Glomerular 

Filtration Rate) is an estimated value, calculated from the patient's serum 
creatinine using the MDRD equation. It is NOT the patient's actual GFR. The eGFR
 provides a more clinically useful measure of kidney disease than serum 
creatinine alone.***This calculation takes sex and race into account, if the 
information is provided. If the race is not provided, and the patient is 
-American, multiply by 1.212. If sex is not provided, and the patient is 
female, multiply by 0.742. Results for patients <18 years of age have not been 
validated by the MDRD study and should be interpreted with caution. eGFR Result 
Interpretation:eGFR > or = 60 is in the Normal RangeeGFR < 60 may mean kidney 
diseaseeGFR < 15 may mean kidney failure*** Ranges recommended by the National 
Kidney Foundation, http://nkdep.nih.gov

 

             eGFR Non-AA (test code = eGFR Non-AA) >60.00 mL/min/1.73 m2        

      N            eGFR (estimated 

Glomerular Filtration Rate) is an estimated value, calculated from the patient's
 serum creatinine using the MDRD equation. It is NOT the patient's actual GFR. 
The eGFR provides a more clinically useful measure of kidney disease than serum 
creatinine alone.***This calculation takes sex and race into account, if the 
information is provided. If the race is not provided, and the patient is 
-American, multiply by 1.212. If sex is not provided, and the patient is 
female, multiply by 0.742. Results for patients <18 years of age have not been 
validated by the MDRD study and should be interpreted with caution. eGFR Result 
Interpretation:eGFR > or = 60 is in the Normal RangeeGFR < 60 may mean kidney 
diseaseeGFR < 15 may mean kidney failure*** Ranges recommended by the National 
Kidney Foundation, http://nkdep.nih.gov





IG [id number] 21:19:52* 



             Test Item    Value        Reference Range Interpretation Comments

 

             IG (test code = IG) 0.6 %        0.0-5.0                    

 

             IG Abs (test code = IG Abs) 0 x10                     N            

 





Complete Blood Count with [id number] 21:19:51* 



             Test Item    Value        Reference Range Interpretation Comments

 

             WBC (test code = WBC) 16.0 x10     4.4-10.5     H             

 

             RBC (test code = RBC) 3.86 x10     4.10-5.70    L             

 

             Hgb (test code = Hgb) 9.4 g/dL     13.4-17.4    L             

 

             Hct (test code = Hct) 31.2 %       38.7-52.0    L             

 

             MCV (test code = MCV) 80.80 fL     80..00               

 

             MCHC (test code = MCHC) 30.10 g/dL   32.00-37.50  L             

 

             RDW CV (test code = RDW CV) 16.7 %       11.5-14.5    H            

 

 

             MCH (test code = MCH) 24.4 pg      27.0-32.5    L             

 

             Platelets (test code = Platelets) 772.0 x10    140.0-440.0  H      

       

 

             MPV (test code = MPV) 8.3 fL                    N             

 

             Slide Review (test code = Slide Review) Auto         Auto          

            Result created by 

[email or web address]

 

             nRBC (test code = nRBC) 0                         N             

 

             NRBC Abs (test code = NRBC Abs) 0.00 x10                  N        

     

 

             IPF (test code = IPF) 0 %                       N             





Automated [id number] 21:19:51* 



             Test Item    Value        Reference Range Interpretation Comments

 

             Neutro Auto (test code = Neutro Auto) 82.6 %       36.0-70.0    H  

           

 

             Lymph Auto (test code = Lymph Auto) 8.7 %        12.0-44.0    L    

         

 

             Mono Auto (test code = Mono Auto) 7.7 %        0.0-11.0            

       

 

             Eos, Auto (test code = Eos, Auto) 0.1 %        0.0-7.0             

       

 

             Basophil Auto (test code = Basophil Auto) 0.3 %        0.0-2.0     

               

 

             Neutro Absolute (test code = Neutro Absolute) 13.2 x10     1.6-7.4 

     H             

 

             Lymph Absolute (test code = Lymph Absolute) 1.39 x10     .50-4.60  

                 

 

             Mono Absolute (test code = Mono Absolute) 1.23 x10     .00-1.20    

 H             

 

             Eos Absolute (test code = Eos Absolute) 0.02 x10     0.00-0.74     

             

 

             Baso Absolute (test code = Baso Absolute) 0.05 x10     0.00-0.21   

               





POC [id number] 11:31:08* 



             Test Item    Value        Reference Range Interpretation Comments

 

             Glucose POC (test code = Glucose POC) 167 mg/dL           H  

          If you consider your 

patient critically ill, the Roche-Accu Check Infrom II meter should not be used 
for Glucose determination. Draw a venous Glucose and send to the main Lab for 
analysis.





POC [id number] 07:36:11* 



             Test Item    Value        Reference Range Interpretation Comments

 

             Glucose POC (test code = Glucose POC) 221 mg/dL           H  

          Notify RN or MDIf you 

consider your patient critically ill, the Roche-Accu Check Infrom II meter 
should not be used for Glucose determination. Draw a venous Glucose and send to 
the main Lab for analysis.





POC [id number] 20:26:07* 



             Test Item    Value        Reference Range Interpretation Comments

 

             Glucose POC (test code = Glucose POC) 176 mg/dL           H  

          Notify RN or MDIf you 

consider your patient critically ill, the Roche-Accu Check Infrom II meter 
should not be used for Glucose determination. Draw a venous Glucose and send to 
the main Lab for analysis.





POC [id number] 16:51:07* 



             Test Item    Value        Reference Range Interpretation Comments

 

             Glucose POC (test code = Glucose POC) 167 mg/dL           H  

          If you consider your 

patient critically ill, the Roche-Accu Check Infrom II meter should not be used 
for Glucose determination. Draw a venous Glucose and send to the main Lab for 
analysis.





POC [id number] 11:13:15* 



             Test Item    Value        Reference Range Interpretation Comments

 

             Glucose POC (test code = Glucose POC) 224 mg/dL           H  

          Notify RN or MDIf you 

consider your patient critically ill, the Roche-Accu Check Infrom II meter 
should not be used for Glucose determination. Draw a venous Glucose and send to 
the main Lab for analysis.





POC [id number] 07:31:38* 



             Test Item    Value        Reference Range Interpretation Comments

 

             Glucose POC (test code = Glucose POC) 263 mg/dL           H  

          Notify RN or MDIf you 

consider your patient critically ill, the Roche-Accu Check Infrom II meter 
should not be used for Glucose determination. Draw a venous Glucose and send to 
the main Lab for analysis.





Basic Metabolic [id number] 06:51:27* 



             Test Item    Value        Reference Range Interpretation Comments

 

             Sodium Level (test code = Sodium Level) 138.0 mmol/L 135.0-145.0   

             

 

             Potassium Level (test code = Potassium Level) 4.7 mmol/L   3.5-5.1 

                   

 

             Chloride Level (test code = Chloride Level) 100 mmol/L       

                 

 

             CO2 (test code = CO2) 29 mmol/L    22-29                      

 

             Anion Gap (test code = Anion Gap) 9 mmol/L     7-16                

       

 

             BUN (test code = BUN) 16.20 mg/dL  6.00-20.00                 

 

             Creatinine Level (test code = Creatinine Level) 0.70 mg/dL   0.70-1

.20                  

 

             BUN/Creat Ratio (test code = BUN/Creat Ratio) 23                   

     N             

 

             Glucose Level (test code = Glucose Level) 263 mg/dL          

 H             

 

             Calcium Level (test code = Calcium Level) 8.8 mg/dL    8.3-10.5    

               





Basic Metabolic [id number] 06:51:27* 



             Test Item    Value        Reference Range Interpretation Comments

 

             Sodium Level (test code = Sodium Level) 138.0 mmol/L 135.0-145.0   

             

 

             Potassium Level (test code = Potassium Level) 4.7 mmol/L   3.5-5.1 

                   

 

             Chloride Level (test code = Chloride Level) 100 mmol/L       

                 

 

             CO2 (test code = CO2) 29 mmol/L    22-29                      

 

             Anion Gap (test code = Anion Gap) 9 mmol/L     7-16                

       

 

             BUN (test code = BUN) 16.20 mg/dL  6.00-20.00                 

 

             Creatinine Level (test code = Creatinine Level) 0.70 mg/dL   0.70-1

.20                  

 

             BUN/Creat Ratio (test code = BUN/Creat Ratio) 23                   

     N             

 

             Glucose Level (test code = Glucose Level) 263 mg/dL          

 H             

 

             Calcium Level (test code = Calcium Level) 8.8 mg/dL    8.3-10.5    

               

 

             eGFR AA (test code = eGFR AA) >60 mL/min/1.73 m2              N    

        eGFR (estimated Glomerular 

Filtration Rate) is an estimated value, calculated from the patient's serum 
creatinine using the MDRD equation. It is NOT the patient's actual GFR. The eGFR
 provides a more clinically useful measure of kidney disease than serum 
creatinine alone.***This calculation takes sex and race into account, if the 
information is provided. If the race is not provided, and the patient is 
-American, multiply by 1.212. If sex is not provided, and the patient is 
female, multiply by 0.742. Results for patients <18 years of age have not been 
validated by the MDRD study and should be interpreted with caution. eGFR Result 
Interpretation:eGFR > or = 60 is in the Normal RangeeGFR < 60 may mean kidney 
diseaseeGFR < 15 may mean kidney failure*** Ranges recommended by the National 
Kidney Foundation, http://nkdep.nih.gov





Basic Metabolic [id number] 06:51:27* 



             Test Item    Value        Reference Range Interpretation Comments

 

             Sodium Level (test code = Sodium Level) 138.0 mmol/L 135.0-145.0   

             

 

             Potassium Level (test code = Potassium Level) 4.7 mmol/L   3.5-5.1 

                   

 

             Chloride Level (test code = Chloride Level) 100 mmol/L       

                 

 

             CO2 (test code = CO2) 29 mmol/L    22-29                      

 

             Anion Gap (test code = Anion Gap) 9 mmol/L     7-16                

       

 

             BUN (test code = BUN) 16.20 mg/dL  6.00-20.00                 

 

             Creatinine Level (test code = Creatinine Level) 0.70 mg/dL   0.70-1

.20                  

 

             BUN/Creat Ratio (test code = BUN/Creat Ratio) 23                   

     N             

 

             Glucose Level (test code = Glucose Level) 263 mg/dL          

 H             

 

             Calcium Level (test code = Calcium Level) 8.8 mg/dL    8.3-10.5    

               

 

             eGFR AA (test code = eGFR AA) >60 mL/min/1.73 m2              N    

        eGFR (estimated Glomerular 

Filtration Rate) is an estimated value, calculated from the patient's serum 
creatinine using the MDRD equation. It is NOT the patient's actual GFR. The eGFR
 provides a more clinically useful measure of kidney disease than serum 
creatinine alone.***This calculation takes sex and race into account, if the 
information is provided. If the race is not provided, and the patient is 
-American, multiply by 1.212. If sex is not provided, and the patient is 
female, multiply by 0.742. Results for patients <18 years of age have not been 
validated by the MDRD study and should be interpreted with caution. eGFR Result 
Interpretation:eGFR > or = 60 is in the Normal RangeeGFR < 60 may mean kidney 
diseaseeGFR < 15 may mean kidney failure*** Ranges recommended by the National 
Kidney Foundation, http://nkdep.nih.gov

 

             eGFR Non-AA (test code = eGFR Non-AA) >60.00 mL/min/1.73 m2        

      N            eGFR (estimated 

Glomerular Filtration Rate) is an estimated value, calculated from the patient's
 serum creatinine using the MDRD equation. It is NOT the patient's actual GFR. 
The eGFR provides a more clinically useful measure of kidney disease than serum 
creatinine alone.***This calculation takes sex and race into account, if the 
information is provided. If the race is not provided, and the patient is 
-American, multiply by 1.212. If sex is not provided, and the patient is 
female, multiply by 0.742. Results for patients <18 years of age have not been 
validated by the MDRD study and should be interpreted with caution. eGFR Result 
Interpretation:eGFR > or = 60 is in the Normal RangeeGFR < 60 may mean kidney 
diseaseeGFR < 15 may mean kidney failure*** Ranges recommended by the National 
Kidney Foundation, http://nkdep.nih.gov





Automated [id number] 06:46:59* 



             Test Item    Value        Reference Range Interpretation Comments

 

             Neutro Auto (test code = Neutro Auto) 53.5 %       36.0-70.0       

           

 

             Lymph Auto (test code = Lymph Auto) 34.2 %       12.0-44.0         

         

 

             Mono Auto (test code = Mono Auto) 7.9 %        0.0-11.0            

       

 

             Eos, Auto (test code = Eos, Auto) 3.1 %        0.0-7.0             

       

 

             Basophil Auto (test code = Basophil Auto) 0.4 %        0.0-2.0     

               

 

             Neutro Absolute (test code = Neutro Absolute) 6.1 x10      1.6-7.4 

                   

 

             Lymph Absolute (test code = Lymph Absolute) 3.92 x10     .50-4.60  

                 

 

             Mono Absolute (test code = Mono Absolute) .91 x10      .00-1.20    

               

 

             Eos Absolute (test code = Eos Absolute) 0.35 x10     0.00-0.74     

             

 

             Baso Absolute (test code = Baso Absolute) 0.05 x10     0.00-0.21   

               





IG [id number] 06:46:59* 



             Test Item    Value        Reference Range Interpretation Comments

 

             IG (test code = IG) 0.9 %        0.0-5.0                    

 

             IG Abs (test code = IG Abs) 0 x10                     N            

 





Complete Blood Count with [id number] 06:46:58* 



             Test Item    Value        Reference Range Interpretation Comments

 

             WBC (test code = WBC) 11.5 x10     4.4-10.5     H             

 

             RBC (test code = RBC) 3.58 x10     4.10-5.70    L             

 

             Hgb (test code = Hgb) 8.9 g/dL     13.4-17.4    L             

 

             Hct (test code = Hct) 29.5 %       38.7-52.0    L             

 

             MCV (test code = MCV) 82.40 fL     80..00               

 

             MCHC (test code = MCHC) 30.20 g/dL   32.00-37.50  L             

 

             RDW CV (test code = RDW CV) 16.0 %       11.5-14.5    H            

 

 

             MCH (test code = MCH) 24.9 pg      27.0-32.5    L             

 

             Platelets (test code = Platelets) 777.0 x10    140.0-440.0  H      

       

 

             MPV (test code = MPV) 8.3 fL                    N             

 

             Slide Review (test code = Slide Review) Auto         Auto          

            Result created by 

[email or web address]

 

             nRBC (test code = nRBC) 0                         N             

 

             NRBC Abs (test code = NRBC Abs) 0.00 x10                  N        

     

 

             IPF (test code = IPF) 0 %                       N             





Prothrombin Time and [id number] 06:46:58* 



             Test Item    Value        Reference Range Interpretation Comments

 

             Prothrombin Time (test code = Prothrombin Time) 12.5 seconds 9.8-13

.4                   

 

             INR (test code = INR) 1.1 ratio    0.6-1.2                    





POC [id number] 20:30:45* 



             Test Item    Value        Reference Range Interpretation Comments

 

             Glucose POC (test code = Glucose POC) 170 mg/dL           H  

          If you consider your 

patient critically ill, the Roche-Accu Check Infrom II meter should not be used 
for Glucose determination. Draw a venous Glucose and send to the main Lab for 
analysis.





POC [id number] 16:43:12* 



             Test Item    Value        Reference Range Interpretation Comments

 

             Glucose POC (test code = Glucose POC) 165 mg/dL           H  

          If you consider your 

patient critically ill, the Roche-Accu Check Infrom II meter should not be used 
for Glucose determination. Draw a venous Glucose and send to the main Lab for 
analysis.





Blood [id number] 12:02:02No growth at 5 days.POC [id number] 
11:15:34* 



             Test Item    Value        Reference Range Interpretation Comments

 

             Glucose POC (test code = Glucose POC) 302 mg/dL           H  

          Notify RN or MDIf you 

consider your patient critically ill, the Roche-Accu Check Infrom II meter 
should not be used for Glucose determination. Draw a venous Glucose and send to 
the main Lab for analysis.





POC [id number] 07:49:14* 



             Test Item    Value        Reference Range Interpretation Comments

 

             Glucose POC (test code = Glucose POC) 272 mg/dL           H  

          If you consider your 

patient critically ill, the Roche-Accu Check Infrom II meter should not be used 
for Glucose determination. Draw a venous Glucose and send to the main Lab for 
analysis.





POC [id number] 20:59:08* 



             Test Item    Value        Reference Range Interpretation Comments

 

             Glucose POC (test code = Glucose POC) 306 mg/dL           H  

          Notify RN or MDIf you 

consider your patient critically ill, the Roche-Accu Check Infrom II meter 
should not be used for Glucose determination. Draw a venous Glucose and send to 
the main Lab for analysis.





POC [id number] 16:28:39* 



             Test Item    Value        Reference Range Interpretation Comments

 

             Glucose POC (test code = Glucose POC) 167 mg/dL           H  

          Notify RN or MDIf you 

consider your patient critically ill, the Roche-Accu Check Infrom II meter 
should not be used for Glucose determination. Draw a venous Glucose and send to 
the main Lab for analysis.





POC [id number] 11:51:31* 



             Test Item    Value        Reference Range Interpretation Comments

 

             Glucose POC (test code = Glucose POC) 275 mg/dL           H  

          Notify RN or MDIf you 

consider your patient critically ill, the Roche-Accu Check Infrom II meter 
should not be used for Glucose determination. Draw a venous Glucose and send to 
the main Lab for analysis.





POC [id number] 07:48:38* 



             Test Item    Value        Reference Range Interpretation Comments

 

             Glucose POC (test code = Glucose POC) 187 mg/dL           H  

          Notify RN or MDIf you 

consider your patient critically ill, the Roche-Accu Check Infrom II meter 
should not be used for Glucose determination. Draw a venous Glucose and send to 
the main Lab for analysis.





Basic Metabolic [id number] 05:25:11* 



             Test Item    Value        Reference Range Interpretation Comments

 

             Sodium Level (test code = Sodium Level) 138.0 mmol/L 135.0-145.0   

             

 

             Potassium Level (test code = Potassium Level) 4.2 mmol/L   3.5-5.1 

                   

 

             Chloride Level (test code = Chloride Level) 102 mmol/L       

                 

 

             CO2 (test code = CO2) 27 mmol/L    22-29                      

 

             Anion Gap (test code = Anion Gap) 9 mmol/L     7-16                

       

 

             BUN (test code = BUN) 7.80 mg/dL   6.00-20.00                 

 

             Creatinine Level (test code = Creatinine Level) 0.50 mg/dL   0.70-1

.20    L             

 

             BUN/Creat Ratio (test code = BUN/Creat Ratio) 16                   

     N             

 

             Glucose Level (test code = Glucose Level) 172 mg/dL          

 H             

 

             Calcium Level (test code = Calcium Level) 8.5 mg/dL    8.3-10.5    

               





Basic Metabolic [id number] 05:25:11* 



             Test Item    Value        Reference Range Interpretation Comments

 

             Sodium Level (test code = Sodium Level) 138.0 mmol/L 135.0-145.0   

             

 

             Potassium Level (test code = Potassium Level) 4.2 mmol/L   3.5-5.1 

                   

 

             Chloride Level (test code = Chloride Level) 102 mmol/L       

                 

 

             CO2 (test code = CO2) 27 mmol/L    22-29                      

 

             Anion Gap (test code = Anion Gap) 9 mmol/L     7-16                

       

 

             BUN (test code = BUN) 7.80 mg/dL   6.00-20.00                 

 

             Creatinine Level (test code = Creatinine Level) 0.50 mg/dL   0.70-1

.20    L             

 

             BUN/Creat Ratio (test code = BUN/Creat Ratio) 16                   

     N             

 

             Glucose Level (test code = Glucose Level) 172 mg/dL          

 H             

 

             Calcium Level (test code = Calcium Level) 8.5 mg/dL    8.3-10.5    

               

 

             eGFR AA (test code = eGFR AA) >60 mL/min/1.73 m2              N    

        eGFR (estimated Glomerular 

Filtration Rate) is an estimated value, calculated from the patient's serum 
creatinine using the MDRD equation. It is NOT the patient's actual GFR. The eGFR
 provides a more clinically useful measure of kidney disease than serum 
creatinine alone.***This calculation takes sex and race into account, if the 
information is provided. If the race is not provided, and the patient is 
-American, multiply by 1.212. If sex is not provided, and the patient is 
female, multiply by 0.742. Results for patients <18 years of age have not been 
validated by the MDRD study and should be interpreted with caution. eGFR Result 
Interpretation:eGFR > or = 60 is in the Normal RangeeGFR < 60 may mean kidney 
diseaseeGFR < 15 may mean kidney failure*** Ranges recommended by the National 
Kidney Foundation, http://nkdep.nih.gov





Basic Metabolic [id number] 05:25:11* 



             Test Item    Value        Reference Range Interpretation Comments

 

             Sodium Level (test code = Sodium Level) 138.0 mmol/L 135.0-145.0   

             

 

             Potassium Level (test code = Potassium Level) 4.2 mmol/L   3.5-5.1 

                   

 

             Chloride Level (test code = Chloride Level) 102 mmol/L       

                 

 

             CO2 (test code = CO2) 27 mmol/L    22-29                      

 

             Anion Gap (test code = Anion Gap) 9 mmol/L     7-16                

       

 

             BUN (test code = BUN) 7.80 mg/dL   6.00-20.00                 

 

             Creatinine Level (test code = Creatinine Level) 0.50 mg/dL   0.70-1

.20    L             

 

             BUN/Creat Ratio (test code = BUN/Creat Ratio) 16                   

     N             

 

             Glucose Level (test code = Glucose Level) 172 mg/dL          

 H             

 

             Calcium Level (test code = Calcium Level) 8.5 mg/dL    8.3-10.5    

               

 

             eGFR AA (test code = eGFR AA) >60 mL/min/1.73 m2              N    

        eGFR (estimated Glomerular 

Filtration Rate) is an estimated value, calculated from the patient's serum 
creatinine using the MDRD equation. It is NOT the patient's actual GFR. The eGFR
 provides a more clinically useful measure of kidney disease than serum 
creatinine alone.***This calculation takes sex and race into account, if the 
information is provided. If the race is not provided, and the patient is 
-American, multiply by 1.212. If sex is not provided, and the patient is 
female, multiply by 0.742. Results for patients <18 years of age have not been 
validated by the MDRD study and should be interpreted with caution. eGFR Result 
Interpretation:eGFR > or = 60 is in the Normal RangeeGFR < 60 may mean kidney 
diseaseeGFR < 15 may mean kidney failure*** Ranges recommended by the National 
Kidney Foundation, http://nkdep.nih.gov

 

             eGFR Non-AA (test code = eGFR Non-AA) >60.00 mL/min/1.73 m2        

      N            eGFR (estimated 

Glomerular Filtration Rate) is an estimated value, calculated from the patient's
 serum creatinine using the MDRD equation. It is NOT the patient's actual GFR. 
The eGFR provides a more clinically useful measure of kidney disease than serum 
creatinine alone.***This calculation takes sex and race into account, if the 
information is provided. If the race is not provided, and the patient is 
-American, multiply by 1.212. If sex is not provided, and the patient is 
female, multiply by 0.742. Results for patients <18 years of age have not been 
validated by the MDRD study and should be interpreted with caution. eGFR Result 
Interpretation:eGFR > or = 60 is in the Normal RangeeGFR < 60 may mean kidney 
diseaseeGFR < 15 may mean kidney failure*** Ranges recommended by the National 
Kidney Foundation, http://nkdep.nih.gov





Urinalysis with Culture, if [id number] 05:12:29* 



             Test Item    Value        Reference Range Interpretation Comments

 

             UA Color (test code = UA Color) YELLO        Yellow                

     

 

             UA Appear (test code = UA Appear) CLEAR        Clear               

       

 

             UA pH (test code = UA pH) 5                         N             

 

             UA Spec Grav (test code = UA Spec Grav) 1.009        1.001-1.035   

             

 

             UA Glucose (test code = UA Glucose) NEG          Negative          

         

 

             UA Bili (test code = UA Bili) NEG          Negative                

   

 

             UA Ketones (test code = UA Ketones) NEG          Negative          

         

 

             UA Blood (test code = UA Blood) NEG          Negative              

     

 

             UA Protein (test code = UA Protein) NEG          Negative          

         

 

             UA Urobilinogen (test code = UA Urobilinogen) 0.2 mg/dL            

     N             

 

             UA Nitrite (test code = UA Nitrite) NEG          Negative          

         

 

             UA Leuk Est (test code = UA Leuk Est) NEG          Negative        

           

 

             UA Micro Ind? (test code = UA Micro Ind?) Not Indicated Not Indicat

ed              Result 

created by rule GL_SJM_UA_MICRO_IND





Complete Blood Count with [id number] 05:06:41* 



             Test Item    Value        Reference Range Interpretation Comments

 

             WBC (test code = WBC) 11.4 x10     4.4-10.5     H             

 

             RBC (test code = RBC) 3.25 x10     4.10-5.70    L             

 

             Hgb (test code = Hgb) 8.1 g/dL     13.4-17.4    L             

 

             MCV (test code = MCV) 82.50 fL     80..00               

 

             Hct (test code = Hct) 26.8 %       38.7-52.0    L             

 

             MCHC (test code = MCHC) 30.20 g/dL   32.00-37.50  L             

 

             RDW CV (test code = RDW CV) 15.8 %       11.5-14.5    H            

 

 

             MCH (test code = MCH) 24.9 pg      27.0-32.5    L             

 

             Platelets (test code = Platelets) 709.0 x10    140.0-440.0  H      

       

 

             MPV (test code = MPV) 8.2 fL                    N             

 

             Slide Review (test code = Slide Review) Auto         Auto          

            Result created by 

GL_SJM_SLIDE_REV_AUTO

 

             nRBC (test code = nRBC) 0                         N             

 

             NRBC Abs (test code = NRBC Abs) 0.00 x10                  N        

     

 

             IPF (test code = IPF) 0 %                       N             





Automated [id number] 05:06:41* 



             Test Item    Value        Reference Range Interpretation Comments

 

             Neutro Auto (test code = Neutro Auto) 52.8 %       36.0-70.0       

           

 

             Lymph Auto (test code = Lymph Auto) 34.4 %       12.0-44.0         

         

 

             Mono Auto (test code = Mono Auto) 9.2 %        0.0-11.0            

       

 

             Eos, Auto (test code = Eos, Auto) 2.5 %        0.0-7.0             

       

 

             Basophil Auto (test code = Basophil Auto) 0.4 %        0.0-2.0     

               

 

             Neutro Absolute (test code = Neutro Absolute) 6.0 x10      1.6-7.4 

                   

 

             Lymph Absolute (test code = Lymph Absolute) 3.92 x10     .50-4.60  

                 

 

             Mono Absolute (test code = Mono Absolute) 1.05 x10     .00-1.20    

               

 

             Eos Absolute (test code = Eos Absolute) 0.28 x10     0.00-0.74     

             

 

             Baso Absolute (test code = Baso Absolute) 0.04 x10     0.00-0.21   

               





IG [id number] 05:06:41* 



             Test Item    Value        Reference Range Interpretation Comments

 

             IG (test code = IG) 0.7 %        0.0-5.0                    

 

             IG Abs (test code = IG Abs) 0 x10                     N            

 





POC [id number] 21:04:42* 



             Test Item    Value        Reference Range Interpretation Comments

 

             Glucose POC (test code = Glucose POC) 211 mg/dL           H  

          If you consider your 

patient critically ill, the Roche-Accu Check Infrom II meter should not be used 
for Glucose determination. Draw a venous Glucose and send to the main Lab for 
analysis.





POC [id number] 16:59:08* 



             Test Item    Value        Reference Range Interpretation Comments

 

             Glucose POC (test code = Glucose POC) 231 mg/dL           H  

          Notify RN or MDIf you 

consider your patient critically ill, the Roche-Accu Check Infrom II meter 
should not be used for Glucose determination. Draw a venous Glucose and send to 
the main Lab for analysis.





POC [id number] 11:36:36* 



             Test Item    Value        Reference Range Interpretation Comments

 

             Glucose POC (test code = Glucose POC) 360 mg/dL           H  

          Notify RN or MDIf you 

consider your patient critically ill, the Roche-Accu Check Infrom II meter 
should not be used for Glucose determination. Draw a venous Glucose and send to 
the main Lab for analysis.





POC [id number] 07:40:35* 



             Test Item    Value        Reference Range Interpretation Comments

 

             Glucose POC (test code = Glucose POC) 241 mg/dL           H  

          Notify RN or MDIf you 

consider your patient critically ill, the Roche-Accu Check Infrom II meter 
should not be used for Glucose determination. Draw a venous Glucose and send to 
the main Lab for analysis.





POC [id number] 20:25:31* 



             Test Item    Value        Reference Range Interpretation Comments

 

             Glucose POC (test code = Glucose POC) 306 mg/dL           H  

          If you consider your 

patient critically ill, the Roche-Accu Check Infrom II meter should not be used 
for Glucose determination. Draw a venous Glucose and send to the main Lab for 
analysis.





POC [id number] 17:11:02* 



             Test Item    Value        Reference Range Interpretation Comments

 

             Glucose POC (test code = Glucose POC) 191 mg/dL           H  

          Notify RN or MDIf you 

consider your patient critically ill, the Roche-Accu Check Infrom II meter 
should not be used for Glucose determination. Draw a venous Glucose and send to 
the main Lab for analysis.





XR Chest 1 View [id number] 16:29:35Patient:   BALJEET JESSICA II        
                      MRN:    [id number] Date/Time2019 16:22 CDTReason 
for ExamLine placementReportCHEST 1 VIEWCLINICAL INFORMATION:  Line 
placementCOMPARISON: 2019FINDINGS:The tip of the left arm PICC is 
located at the superior cavoatrial junction.  The lungs are well-expanded.  
There is stable blunting of the right lateral costophrenic angle.  The heart 
size is normal.  The bones are unchanged.IMPRESSION:The tip of the left arm PICC
 projects over the superior cavoatrial junction.LOCATION: R16***** Final 
*****Dictated by:    MD Gant Adam FDictated DT/TM: 2019 4:28 pmSigned
 by:  MD Gant Adam FSigned (Electronic Signature):  2019 4:29 pmPOC 
[id number] 14:07:01* 



             Test Item    Value        Reference Range Interpretation Comments

 

             Glucose POC (test code = Glucose POC) 276 mg/dL           H  

          If you consider your 

patient critically ill, the Roche-Accu Check Infrom II meter should not be used 
for Glucose determination. Draw a venous Glucose and send to the main Lab for 
analysis.





POC [id number] 11:12:33* 



             Test Item    Value        Reference Range Interpretation Comments

 

             Glucose POC (test code = Glucose POC) 292 mg/dL           H  

          If you consider your 

patient critically ill, the Roche-Accu Check Infrom II meter should not be used 
for Glucose determination. Draw a venous Glucose and send to the main Lab for 
analysis.





Urine [id number] 09:34:39* 



             Test Item    Value        Reference Range Interpretation Comments

 

             ORGANISM (test code = ORGANISM) Escherichia coli                   

         

 

             Amikacin (test code = Amik)                           S            

 

 

             Ampicillin (test code = Amp)                           R           

  

 

             Ampicillin/Sulbactam (test code = Amp/Sul)                         

  R             

 

             Cefazolin (test code = Cefaz)                           I          

   

 

             Cefepime (test code = Cefep)                           S           

  

 

             Cefotaxime (test code = Cefo)                           S          

   

 

             Ceftazidime (test code = Ceftaz)                           S       

      

 

             Ceftriaxone (test code = Ceftri)                           S       

      

 

             Cefuroxime (test code = Cefur)                           S         

    

 

             Ciprofloxacin (test code = Cipro)                           R      

       

 

             Gentamicin (test code = Gent)                           S          

   

 

             Imipenem (test code = Imi)                           S             

 

             Levofloxacin (test code = Levo)                           R        

     

 

             Meropenem (test code = Navi)                           S           

  

 

             Nitrofurantoin (test code = Nitro)                           S     

        

 

             Piperacillin/Tazobactam (test code = Pip/Derek)                      

     R             

 

             Tobramycin (test code = Tobra)                           S         

    

 

             Trimethoprim/Sulfa (test code = SXT)                           R   

          

 

             ORGANISM (test code = ORGANISM2) Escherichia coli                  

          

 

             Amikacin (test code = Amik)                           S            

 

 

             Ampicillin (test code = Amp)                           R           

  

 

             Ampicillin/Sulbactam (test code = Amp/Sul)                         

  R             

 

             Cefazolin (test code = Cefaz)                           S          

   

 

             Cefepime (test code = Cefep)                           S           

  

 

             Cefotaxime (test code = Cefo)                           S          

   

 

             Ceftazidime (test code = Ceftaz)                           S       

      

 

             Ceftriaxone (test code = Ceftri)                           S       

      

 

             Cefuroxime (test code = Cefur)                           S         

    

 

             Ciprofloxacin (test code = Cipro)                           R      

       

 

             Gentamicin (test code = Gent)                           S          

   

 

             Imipenem (test code = Imi)                           S             

 

             Levofloxacin (test code = Levo)                           R        

     

 

             Meropenem (test code = Navi)                           S           

  

 

             Nitrofurantoin (test code = Nitro)                           S     

        

 

             Piperacillin/Tazobactam (test code = Pip/Derek)                      

     S             

 

             Tobramycin (test code = Tobra)                           S         

    

 

             Trimethoprim/Sulfa (test code = SXT)                           R   

          

 

                          Final Report (test code = Final Report) >=100,000 cfu/

ml Escherichia coli 

>=100,000 cfu/ml Escherichia coli #2                                          





 C Urine Added by GL_SJM_UA_CUL_INDHemoglobin [id number] 07:40:43* 



             Test Item    Value        Reference Range Interpretation Comments

 

             Hemoglobin A1c (test code = Hemoglobin A1c) 10.5 %       4.8-5.9   

   H            Non Diabetic 4.8-

5.9%Diabetic <7.0%





POC [id number] 07:23:37* 



             Test Item    Value        Reference Range Interpretation Comments

 

             Glucose POC (test code = Glucose POC) 308 mg/dL           H  

          If you consider your 

patient critically ill, the Roche-Accu Check Infrom II meter should not be used 
for Glucose determination. Draw a venous Glucose and send to the main Lab for 
analysis.





3C ABSC [id number] 05:58:53* 



             Test Item    Value        Reference Range Interpretation Comments

 

             SC1 IS (test code = SC1 IS) NT                                     

 

 

             SC2 IS (test code = SC2 IS) NT                                     

 

 

             SC3 IS (test code = SC3 IS) NT                                     

 

 

             SC1 37 (test code = SC1 37) 0                                      

 

 

             SC2 37 (test code = SC2 37) 0                                      

 

 

             SC3 37 (test code = SC3 37) 0                                      

 

 

             SC1 AHG (test code = SC1 AHG) 0                                    

   

 

             SC2 AHG (test code = SC2 AHG) 0                                    

   

 

             SC3 AHG (test code = SC3 AHG) 0                                    

   

 

             SC1 CC (test code = SC1 CC) 2+                                     

 

 

             SC2 CC (test code = SC2 CC) 2+                                     

 

 

             SC3 CC (test code = SC3 CC) 2+                                     

 

 

             Antibody Screen (3C) (test code = Antibody Screen (3C)) Negative AB

SC                            





[id number] 05:58:52* 



             Test Item    Value        Reference Range Interpretation Comments

 

             Previous History (test code = Previous History) Yes Prev History   

                         

 

             BBID (test code = BBID) [id number]                                

 

             Methodology (test code = Methodology) Test-Tube(TT)                

            

 

             Anti-A (test code = Anti-A) 0                                      

 

 

             Anti-B (test code = Anti-B) 0                                      

 

 

             Anti-D (test code = Anti-D) 4+                                     

 

 

             DCon (test code = DCon) NT                                      

 

             A1 (test code = A1) 4+                                      

 

             B cells (test code = B cells) 4+                                   

   

 

             ABORh (test code = ABORh) O POS                                   





Protein [id number] 05:39:06* 



             Test Item    Value        Reference Range Interpretation Comments

 

             Protein Total (test code = Protein Total) 7.0 g/dL     6.4-8.3     

               





Albumin [id number] 05:39:06* 



             Test Item    Value        Reference Range Interpretation Comments

 

             Albumin Level (test code = Albumin Level) 3.0 g/dL     3.5-5.2     

 L             





[id number] 05:39:06* 



             Test Item    Value        Reference Range Interpretation Comments

 

             Prealbumin (test code = Prealbumin) 10 mg/dL     20-40        L    

         





Basic Metabolic [id number] 05:39:05* 



             Test Item    Value        Reference Range Interpretation Comments

 

             Sodium Level (test code = Sodium Level) 135.0 mmol/L 135.0-145.0   

             

 

             Potassium Level (test code = Potassium Level) 4.5 mmol/L   3.5-5.1 

                   

 

             Chloride Level (test code = Chloride Level) 98 mmol/L        

                 

 

             CO2 (test code = CO2) 25 mmol/L    22-29                      

 

             Anion Gap (test code = Anion Gap) 12 mmol/L    7-16                

       

 

             BUN (test code = BUN) 9.20 mg/dL   6.00-20.00                 

 

             Creatinine Level (test code = Creatinine Level) 0.60 mg/dL   0.70-1

.20    L             

 

             BUN/Creat Ratio (test code = BUN/Creat Ratio) 15                   

     N             

 

             Glucose Level (test code = Glucose Level) 299 mg/dL          

 H             

 

             Calcium Level (test code = Calcium Level) 8.5 mg/dL    8.3-10.5    

               





Basic Metabolic [id number] 05:39:05* 



             Test Item    Value        Reference Range Interpretation Comments

 

             Sodium Level (test code = Sodium Level) 135.0 mmol/L 135.0-145.0   

             

 

             Potassium Level (test code = Potassium Level) 4.5 mmol/L   3.5-5.1 

                   

 

             Chloride Level (test code = Chloride Level) 98 mmol/L        

                 

 

             CO2 (test code = CO2) 25 mmol/L    22-29                      

 

             Anion Gap (test code = Anion Gap) 12 mmol/L    7-16                

       

 

             BUN (test code = BUN) 9.20 mg/dL   6.00-20.00                 

 

             Creatinine Level (test code = Creatinine Level) 0.60 mg/dL   0.70-1

.20    L             

 

             BUN/Creat Ratio (test code = BUN/Creat Ratio) 15                   

     N             

 

             Glucose Level (test code = Glucose Level) 299 mg/dL          

 H             

 

             Calcium Level (test code = Calcium Level) 8.5 mg/dL    8.3-10.5    

               

 

             eGFR AA (test code = eGFR AA) >60 mL/min/1.73 m2              N    

        eGFR (estimated Glomerular 

Filtration Rate) is an estimated value, calculated from the patient's serum 
creatinine using the MDRD equation. It is NOT the patient's actual GFR. The eGFR
 provides a more clinically useful measure of kidney disease than serum 
creatinine alone.***This calculation takes sex and race into account, if the 
information is provided. If the race is not provided, and the patient is 
-American, multiply by 1.212. If sex is not provided, and the patient is 
female, multiply by 0.742. Results for patients <18 years of age have not been 
validated by the MDRD study and should be interpreted with caution. eGFR Result 
Interpretation:eGFR > or = 60 is in the Normal RangeeGFR < 60 may mean kidney 
diseaseeGFR < 15 may mean kidney failure*** Ranges recommended by the National 
Kidney Foundation, http://nkdep.nih.gov





Basic Metabolic [id number] 05:39:05* 



             Test Item    Value        Reference Range Interpretation Comments

 

             Sodium Level (test code = Sodium Level) 135.0 mmol/L 135.0-145.0   

             

 

             Potassium Level (test code = Potassium Level) 4.5 mmol/L   3.5-5.1 

                   

 

             Chloride Level (test code = Chloride Level) 98 mmol/L        

                 

 

             CO2 (test code = CO2) 25 mmol/L    22-29                      

 

             Anion Gap (test code = Anion Gap) 12 mmol/L    7-16                

       

 

             BUN (test code = BUN) 9.20 mg/dL   6.00-20.00                 

 

             Creatinine Level (test code = Creatinine Level) 0.60 mg/dL   0.70-1

.20    L             

 

             BUN/Creat Ratio (test code = BUN/Creat Ratio) 15                   

     N             

 

             Glucose Level (test code = Glucose Level) 299 mg/dL          

 H             

 

             Calcium Level (test code = Calcium Level) 8.5 mg/dL    8.3-10.5    

               

 

             eGFR AA (test code = eGFR AA) >60 mL/min/1.73 m2              N    

        eGFR (estimated Glomerular 

Filtration Rate) is an estimated value, calculated from the patient's serum 
creatinine using the MDRD equation. It is NOT the patient's actual GFR. The eGFR
 provides a more clinically useful measure of kidney disease than serum 
creatinine alone.***This calculation takes sex and race into account, if the 
information is provided. If the race is not provided, and the patient is 
-American, multiply by 1.212. If sex is not provided, and the patient is 
female, multiply by 0.742. Results for patients <18 years of age have not been 
validated by the MDRD study and should be interpreted with caution. eGFR Result 
Interpretation:eGFR > or = 60 is in the Normal RangeeGFR < 60 may mean kidney 
diseaseeGFR < 15 may mean kidney failure*** Ranges recommended by the National 
Kidney Foundation, http://nkdep.nih.gov

 

             eGFR Non-AA (test code = eGFR Non-AA) >60.00 mL/min/1.73 m2        

      N            eGFR (estimated 

Glomerular Filtration Rate) is an estimated value, calculated from the patient's
 serum creatinine using the MDRD equation. It is NOT the patient's actual GFR. 
The eGFR provides a more clinically useful measure of kidney disease than serum 
creatinine alone.***This calculation takes sex and race into account, if the 
information is provided. If the race is not provided, and the patient is 
-American, multiply by 1.212. If sex is not provided, and the patient is 
female, multiply by 0.742. Results for patients <18 years of age have not been 
validated by the MDRD study and should be interpreted with caution. eGFR Result 
Interpretation:eGFR > or = 60 is in the Normal RangeeGFR < 60 may mean kidney 
diseaseeGFR < 15 may mean kidney failure*** Ranges recommended by the National 
Kidney Foundation, http://nkdep.nih.gov





Complete Blood Count without [id number] 05:07:31* 



             Test Item    Value        Reference Range Interpretation Comments

 

             WBC (test code = WBC) 15.1 x10     4.4-10.5     H             

 

             RBC (test code = RBC) 3.63 x10     4.10-5.70    L             

 

             Hgb (test code = Hgb) 9.1 g/dL     13.4-17.4    L             

 

             Hct (test code = Hct) 30.0 %       38.7-52.0    L             

 

             MCV (test code = MCV) 82.60 fL     80..00               

 

             MCH (test code = MCH) 25.1 pg      27.0-32.5    L             

 

             MCHC (test code = MCHC) 30.30 g/dL   32.00-37.50  L             

 

             RDW CV (test code = RDW CV) 16.2 %       11.5-14.5    H            

 

 

             Platelets (test code = Platelets) 685.0 x10    140.0-440.0  H      

       

 

             MPV (test code = MPV) 9.2 fL                    N             

 

             nRBC (test code = nRBC) 0                         N             

 

             NRBC Abs (test code = NRBC Abs) 0.00 x10                  N        

     

 

             IPF (test code = IPF) 0 %                       N             





Drugs of Abuse Urine [id number] 01:09:36* 



             Test Item    Value        Reference Range Interpretation Comments

 

             Amphetamine Screen Ur (test code = Amphetamine Screen Ur) Negative 

    Negative                  

For diagnostic purposes only. Positive results should always be assessed in 
conjunction with a patient's medical history.

 

             Barbiturate Screen Ur (test code = Barbiturate Screen Ur) Negative 

    Negative                   

 

             Benzodiazepines Ur (test code = Benzodiazepines Ur) Negative     Ne

gative                   

 

             Cocaine Screen Ur (test code = Cocaine Screen Ur) Negative     Nega

tive                   

 

             U Methadone (test code = U Methadone) Negative     Negative        

           

 

             Opiate Screen Ur (test code = Opiate Screen Ur) POSITIVE     Negati

ve     A             

 

             U PCP Scrn (test code = U PCP Scrn) Negative     Negative          

         

 

             U Propoxyphene (test code = U Propoxyphene) Negative     Negative  

                 

 

             Cannabinoid Screen Ur (test code = Cannabinoid Screen Ur) POSITIVE 

    Negative     A             





POC [id number] 21:19:06* 



             Test Item    Value        Reference Range Interpretation Comments

 

             Glucose POC (test code = Glucose POC) 376 mg/dL           H  

          If you consider your 

patient critically ill, the Roche-Accu Check Infrom II meter should not be used 
for Glucose determination. Draw a venous Glucose and send to the main Lab for 
analysis.





Red Blood Cells [id number] 19:22:25* 



             Test Item    Value        Reference Range Interpretation Comments

 

             # of Units (test code = # of Units) 2                         N    

         

 

             RBC Trn Reason (test code = RBC Trn Reason) Surgery                

   N             

 

             RBC Product Ready (test code = RBC Product Ready) RBC Ready        

                       





3C ABSC [id number] 19:14:26* 



             Test Item    Value        Reference Range Interpretation Comments

 

             SC1 IS (test code = SC1 IS) NT                                     

 

 

             SC2 IS (test code = SC2 IS) NT                                     

 

 

             SC3 IS (test code = SC3 IS) NT                                     

 

 

             SC1 37 (test code = SC1 37) 0                                      

 

 

             SC2 37 (test code = SC2 37) 0                                      

 

 

             SC3 37 (test code = SC3 37) 0                                      

 

 

             SC1 AHG (test code = SC1 AHG) 0                                    

   

 

             SC2 AHG (test code = SC2 AHG) 0                                    

   

 

             SC3 AHG (test code = SC3 AHG) 0                                    

   

 

             SC1 CC (test code = SC1 CC) 2+                                     

 

 

             SC2 CC (test code = SC2 CC) 2+                                     

 

 

             SC3 CC (test code = SC3 CC) 2+                                     

 

 

             Antibody Screen (3C) (test code = Antibody Screen (3C)) Negative AB

SC                            





Hx [id number] 18:37:56O [id number] 18:36:35* 



             Test Item    Value        Reference Range Interpretation Comments

 

             Previous History (test code = Previous History) Yes Prev History   

                         

 

             BBID (test code = BBID) [id number]                                

 

             Methodology (test code = Methodology) Test-Tube(TT)                

            

 

             Anti-A (test code = Anti-A) 0                                      

 

 

             Anti-B (test code = Anti-B) 0                                      

 

 

             Anti-D (test code = Anti-D) 4+                                     

 

 

             DCon (test code = DCon) NT                                      

 

             A1 (test code = A1) 4+                                      

 

             B cells (test code = B cells) 4+                                   

   

 

             ABORh (test code = ABORh) O POS                                   





POC [id number] 16:22:41* 



             Test Item    Value        Reference Range Interpretation Comments

 

             Glucose POC (test code = Glucose POC) 327 mg/dL           H  

          Notify RN or MDIf you 

consider your patient critically ill, the Roche-Accu Check Infrom II meter 
should not be used for Glucose determination. Draw a venous Glucose and send to 
the main Lab for analysis.





POC [id number] 11:16:36* 



             Test Item    Value        Reference Range Interpretation Comments

 

             Glucose POC (test code = Glucose POC) 351 mg/dL           H  

          Notify RN or MDIf you 

consider your patient critically ill, the Roche-Accu Check Infrom II meter 
should not be used for Glucose determination. Draw a venous Glucose and send to 
the main Lab for analysis.





Erythrocyte Sedimentation Rate [id number] 08:15:41* 



             Test Item    Value        Reference Range Interpretation Comments

 

             ESR STAT (test code = ESR STAT) 129 mm/hr    0-9          H        

     





POC [id number] 07:43:40* 



             Test Item    Value        Reference Range Interpretation Comments

 

             Glucose POC (test code = Glucose POC) 338 mg/dL           H  

          Notify RN or MDIf you 

consider your patient critically ill, the Roche-Accu Check Infrom II meter 
should not be used for Glucose determination. Draw a venous Glucose and send to 
the main Lab for analysis.





Complete Blood Count without [id number] 07:10:35* 



             Test Item    Value        Reference Range Interpretation Comments

 

             WBC (test code = WBC) 12.8 x10     4.4-10.5     H             

 

             RBC (test code = RBC) 3.55 x10     4.10-5.70    L             

 

             Hgb (test code = Hgb) 8.9 g/dL     13.4-17.4    L             

 

             Hct (test code = Hct) 29.1 %       38.7-52.0    L             

 

             MCV (test code = MCV) 82.00 fL     80..00               

 

             MCH (test code = MCH) 25.1 pg      27.0-32.5    L             

 

             MCHC (test code = MCHC) 30.60 g/dL   32.00-37.50  L             

 

             RDW CV (test code = RDW CV) 16.1 %       11.5-14.5    H            

 

 

             Platelets (test code = Platelets) 690.0 x10    140.0-440.0  H      

       

 

             MPV (test code = MPV) 9.0 fL                    N             

 

             nRBC (test code = nRBC) 0                         N             

 

             NRBC Abs (test code = NRBC Abs) 0.00 x10                  N        

     

 

             IPF (test code = IPF) 0 %                       N             





Basic Metabolic [id number] 07:07:06* 



             Test Item    Value        Reference Range Interpretation Comments

 

             Sodium Level (test code = Sodium Level) 134.0 mmol/L 135.0-145.0  L

             

 

             Potassium Level (test code = Potassium Level) 4.8 mmol/L   3.5-5.1 

                   

 

             Chloride Level (test code = Chloride Level) 99 mmol/L        

                 

 

             CO2 (test code = CO2) 22 mmol/L    22-29                      

 

             Anion Gap (test code = Anion Gap) 13 mmol/L    7-16                

       

 

             BUN (test code = BUN) 12.30 mg/dL  6.00-20.00                 

 

             Creatinine Level (test code = Creatinine Level) 0.70 mg/dL   0.70-1

.20                  

 

             BUN/Creat Ratio (test code = BUN/Creat Ratio) 18                   

     N             

 

             Glucose Level (test code = Glucose Level) 329 mg/dL          

 H             

 

             Calcium Level (test code = Calcium Level) 8.7 mg/dL    8.3-10.5    

               





C Reactive [id number] 07:07:06* 



             Test Item    Value        Reference Range Interpretation Comments

 

             CRP (test code = CRP) 187.3 mg/L   0.0-5.0      H             





Basic Metabolic [id number] 07:07:06* 



             Test Item    Value        Reference Range Interpretation Comments

 

             Sodium Level (test code = Sodium Level) 134.0 mmol/L 135.0-145.0  L

             

 

             Potassium Level (test code = Potassium Level) 4.8 mmol/L   3.5-5.1 

                   

 

             Chloride Level (test code = Chloride Level) 99 mmol/L        

                 

 

             CO2 (test code = CO2) 22 mmol/L    22-29                      

 

             Anion Gap (test code = Anion Gap) 13 mmol/L    7-16                

       

 

             BUN (test code = BUN) 12.30 mg/dL  6.00-20.00                 

 

             Creatinine Level (test code = Creatinine Level) 0.70 mg/dL   0.70-1

.20                  

 

             BUN/Creat Ratio (test code = BUN/Creat Ratio) 18                   

     N             

 

             Glucose Level (test code = Glucose Level) 329 mg/dL          

 H             

 

             Calcium Level (test code = Calcium Level) 8.7 mg/dL    8.3-10.5    

               

 

             eGFR AA (test code = eGFR AA) >60 mL/min/1.73 m2              N    

        eGFR (estimated Glomerular 

Filtration Rate) is an estimated value, calculated from the patient's serum 
creatinine using the MDRD equation. It is NOT the patient's actual GFR. The eGFR
 provides a more clinically useful measure of kidney disease than serum 
creatinine alone.***This calculation takes sex and race into account, if the 
information is provided. If the race is not provided, and the patient is 
-American, multiply by 1.212. If sex is not provided, and the patient is 
female, multiply by 0.742. Results for patients <18 years of age have not been 
validated by the MDRD study and should be interpreted with caution. eGFR Result 
Interpretation:eGFR > or = 60 is in the Normal RangeeGFR < 60 may mean kidney 
diseaseeGFR < 15 may mean kidney failure*** Ranges recommended by the National 
[employer]ney Foundation, http://nkdep.nih.gov





Basic Metabolic [id number] 07:07:06* 



             Test Item    Value        Reference Range Interpretation Comments

 

             Sodium Level (test code = Sodium Level) 134.0 mmol/L 135.0-145.0  L

             

 

             Potassium Level (test code = Potassium Level) 4.8 mmol/L   3.5-5.1 

                   

 

             Chloride Level (test code = Chloride Level) 99 mmol/L        

                 

 

             CO2 (test code = CO2) 22 mmol/L    22-29                      

 

             Anion Gap (test code = Anion Gap) 13 mmol/L    7-16                

       

 

             BUN (test code = BUN) 12.30 mg/dL  6.00-20.00                 

 

             Creatinine Level (test code = Creatinine Level) 0.70 mg/dL   0.70-1

.20                  

 

             BUN/Creat Ratio (test code = BUN/Creat Ratio) 18                   

     N             

 

             Glucose Level (test code = Glucose Level) 329 mg/dL          

 H             

 

             Calcium Level (test code = Calcium Level) 8.7 mg/dL    8.3-10.5    

               

 

             eGFR AA (test code = eGFR AA) >60 mL/min/1.73 m2              N    

        eGFR (estimated Glomerular 

Filtration Rate) is an estimated value, calculated from the patient's serum 
creatinine using the MDRD equation. It is NOT the patient's actual GFR. The eGFR
 provides a more clinically useful measure of kidney disease than serum 
creatinine alone.***This calculation takes sex and race into account, if the 
information is provided. If the race is not provided, and the patient is 
-American, multiply by 1.212. If sex is not provided, and the patient is 
female, multiply by 0.742. Results for patients <18 years of age have not been 
validated by the MDRD study and should be interpreted with caution. eGFR Result 
Interpretation:eGFR > or = 60 is in the Normal RangeeGFR < 60 may mean kidney 
diseaseeGFR < 15 may mean kidney failure*** Ranges recommended by the National 
Kidney Foundation, http://nkdep.nih.gov

 

             eGFR Non-AA (test code = eGFR Non-AA) >60.00 mL/min/1.73 m2        

      N            eGFR (estimated 

Glomerular Filtration Rate) is an estimated value, calculated from the patient's
 serum creatinine using the MDRD equation. It is NOT the patient's actual GFR. 
The eGFR provides a more clinically useful measure of kidney disease than serum 
creatinine alone.***This calculation takes sex and race into account, if the 
information is provided. If the race is not provided, and the patient is 
-American, multiply by 1.212. If sex is not provided, and the patient is 
female, multiply by 0.742. Results for patients <18 years of age have not been 
validated by the MDRD study and should be interpreted with caution. eGFR Result 
Interpretation:eGFR > or = 60 is in the Normal RangeeGFR < 60 may mean kidney 
diseaseeGFR < 15 may mean kidney failure*** Ranges recommended by the National 
Kidney Foundation, http://nkdep.nih.gov





POC [id number] 21:54:36* 



             Test Item    Value        Reference Range Interpretation Comments

 

             Glucose POC (test code = Glucose POC) 281 mg/dL           H  

          Notify RN or MDIf you 

consider your patient critically ill, the Roche-Accu Check Infrom II meter 
should not be used for Glucose determination. Draw a venous Glucose and send to 
the main Lab for analysis.





POC [id number] 16:45:59* 



             Test Item    Value        Reference Range Interpretation Comments

 

             Glucose POC (test code = Glucose POC) 246 mg/dL           H  

          Notify RN or MDIf you 

consider your patient critically ill, the Roche-Accu Check Infrom II meter 
should not be used for Glucose determination. Draw a venous Glucose and send to 
the main Lab for analysis.





Blood [id number] 15:01:47No growth at 5 days.Urinalysis Microscopic
2019 12:52:27* 



             Test Item    Value        Reference Range Interpretation Comments

 

             UA WBC (test code = UA WBC) 20-29        0-5          A            

 

 

             UA RBC (test code = UA RBC) 0-5          0-5                       

 

 

             UA Bacteria (test code = UA Bacteria) Profuse                   A  

           

 

             UA Squam Epithelial (test code = UA Squam Epithelial) 0-5          

                           





Urinalysis with Culture, if [id number] 12:37:15* 



             Test Item    Value        Reference Range Interpretation Comments

 

             UA Color (test code = UA Color) YELLO        Yellow                

     

 

             UA Appear (test code = UA Appear) CLEAR        Clear               

       

 

             UA pH (test code = UA pH) 6.5                                     

 

             UA Spec Grav (test code = UA Spec Grav) 1.032        1.001-1.035   

             

 

             UA Glucose (test code = UA Glucose) 1000 mg/dL   Negative     A    

         

 

             UA Bili (test code = UA Bili) NEG          Negative                

   

 

             UA Ketones (test code = UA Ketones) 5 mg/dL      Negative          

         

 

             UA Blood (test code = UA Blood) 25 cells/mcL Negative     A        

     

 

             UA Protein (test code = UA Protein) NEG          Negative          

         

 

             UA Urobilinogen (test code = UA Urobilinogen) 1 mg/dL      >0.2    

     A             

 

             UA Nitrite (test code = UA Nitrite) POS          Negative     A    

         

 

             UA Leuk Est (test code = UA Leuk Est) NEG          Negative        

           

 

             UA Micro Ind? (test code = UA Micro Ind?) Indicated    Not Indicate

d A            Result 

created by rule GL_SJM_UA_MICRO_IND





Comprehensive Metabolic [id number] 11:57:22* 



             Test Item    Value        Reference Range Interpretation Comments

 

             Sodium Level (test code = Sodium Level) 135.0 mmol/L 135.0-145.0   

             

 

             Potassium Level (test code = Potassium Level) 4.5 mmol/L   3.5-5.1 

                   

 

             Chloride Level (test code = Chloride Level) 96 mmol/L        

   L             

 

             CO2 (test code = CO2) 28 mmol/L    22-29                      

 

             Anion Gap (test code = Anion Gap) 11 mmol/L    7-16                

       

 

             BUN (test code = BUN) 8.70 mg/dL   6.00-20.00                 

 

             Creatinine Level (test code = Creatinine Level) 0.60 mg/dL   0.70-1

.20    L             

 

             BUN/Creat Ratio (test code = BUN/Creat Ratio) 14                   

     N             

 

             Glucose Level (test code = Glucose Level) 478 mg/dL          

              Critical results 

called to KATHERINE HAINES RN at 2019 11:55:00 CDT by .  Read back and 
verified? YES

 

             Calcium Level (test code = Calcium Level) 8.7 mg/dL    8.3-10.5    

               

 

             Alk Phos (test code = Alk Phos) 331 U/L             H        

     

 

             Bilirubin Total (test code = Bilirubin Total) 0.3 mg/dL    0.1-0.9 

                   

 

             Albumin Level (test code = Albumin Level) 3.1 g/dL     3.5-5.2     

 L             

 

             Protein Total (test code = Protein Total) 7.6 g/dL     6.4-8.3     

               

 

             ALT (test code = ALT) 20 U/L       1-41                       

 

             AST (test code = AST) 12 U/L       1-40                       

 

             Globulin (test code = Globulin) 4.5 g/dL     2.9-3.1      H        

     

 

             A/G Ratio (test code = A/G Ratio) 0.7 ratio                 N      

       

 

             eGFR AA (test code = eGFR AA) >60 mL/min/1.73 m2              N    

        eGFR (estimated Glomerular 

Filtration Rate) is an estimated value, calculated from the patient's serum 
creatinine using the MDRD equation. It is NOT the patient's actual GFR. The eGFR
 provides a more clinically useful measure of kidney disease than serum 
creatinine alone.***This calculation takes sex and race into account, if the 
information is provided. If the race is not provided, and the patient is 
-American, multiply by 1.212. If sex is not provided, and the patient is 
female, multiply by 0.742. Results for patients <18 years of age have not been 
validated by the MDRD study and should be interpreted with caution. eGFR Result 
Interpretation:eGFR > or = 60 is in the Normal RangeeGFR < 60 may mean kidney 
diseaseeGFR < 15 may mean kidney failure*** Ranges recommended by the National 
Kidney Foundation, http://nkdep.nih.gov





Comprehensive Metabolic [id number] 11:57:22* 



             Test Item    Value        Reference Range Interpretation Comments

 

             Sodium Level (test code = Sodium Level) 135.0 mmol/L 135.0-145.0   

             

 

             Potassium Level (test code = Potassium Level) 4.5 mmol/L   3.5-5.1 

                   

 

             Chloride Level (test code = Chloride Level) 96 mmol/L        

   L             

 

             CO2 (test code = CO2) 28 mmol/L    22-29                      

 

             Anion Gap (test code = Anion Gap) 11 mmol/L    7-16                

       

 

             BUN (test code = BUN) 8.70 mg/dL   6.00-20.00                 

 

             Creatinine Level (test code = Creatinine Level) 0.60 mg/dL   0.70-1

.20    L             

 

             BUN/Creat Ratio (test code = BUN/Creat Ratio) 14                   

     N             

 

             Glucose Level (test code = Glucose Level) 478 mg/dL          

              Critical results 

called to KATHERINE HAINES RN at 2019 11:55:00 CDT by .  Read back and 
verified? YES

 

             Calcium Level (test code = Calcium Level) 8.7 mg/dL    8.3-10.5    

               

 

             Alk Phos (test code = Alk Phos) 331 U/L             H        

     

 

             Bilirubin Total (test code = Bilirubin Total) 0.3 mg/dL    0.1-0.9 

                   

 

             Albumin Level (test code = Albumin Level) 3.1 g/dL     3.5-5.2     

 L             

 

             Protein Total (test code = Protein Total) 7.6 g/dL     6.4-8.3     

               

 

             ALT (test code = ALT) 20 U/L       1-41                       

 

             AST (test code = AST) 12 U/L       1-40                       

 

             Globulin (test code = Globulin) 4.5 g/dL     2.9-3.1      H        

     

 

             A/G Ratio (test code = A/G Ratio) 0.7 ratio                 N      

       

 

             eGFR AA (test code = eGFR AA) >60 mL/min/1.73 m2              N    

        eGFR (estimated Glomerular 

Filtration Rate) is an estimated value, calculated from the patient's serum 
creatinine using the MDRD equation. It is NOT the patient's actual GFR. The eGFR
 provides a more clinically useful measure of kidney disease than serum 
creatinine alone.***This calculation takes sex and race into account, if the 
information is provided. If the race is not provided, and the patient is 
-American, multiply by 1.212. If sex is not provided, and the patient is 
female, multiply by 0.742. Results for patients <18 years of age have not been 
validated by the MDRD study and should be interpreted with caution. eGFR Result 
Interpretation:eGFR > or = 60 is in the Normal RangeeGFR < 60 may mean kidney 
diseaseeGFR < 15 may mean kidney failure*** Ranges recommended by the National 
K[employer]ney Foundation, http://nkdep.nih.gov





Comprehensive Metabolic [id number] 11:57:22* 



             Test Item    Value        Reference Range Interpretation Comments

 

             Sodium Level (test code = Sodium Level) 135.0 mmol/L 135.0-145.0   

             

 

             Potassium Level (test code = Potassium Level) 4.5 mmol/L   3.5-5.1 

                   

 

             Chloride Level (test code = Chloride Level) 96 mmol/L        

   L             

 

             CO2 (test code = CO2) 28 mmol/L    22-29                      

 

             Anion Gap (test code = Anion Gap) 11 mmol/L    7-16                

       

 

             BUN (test code = BUN) 8.70 mg/dL   6.00-20.00                 

 

             Creatinine Level (test code = Creatinine Level) 0.60 mg/dL   0.70-1

.20    L             

 

             BUN/Creat Ratio (test code = BUN/Creat Ratio) 14                   

     N             

 

             Glucose Level (test code = Glucose Level) 478 mg/dL          

              Critical results 

called to KATHERINE HAINES RN at 2019 11:55:00 CDT by .  Read back and 
verified? YES

 

             Calcium Level (test code = Calcium Level) 8.7 mg/dL    8.3-10.5    

               

 

             Alk Phos (test code = Alk Phos) 331 U/L             H        

     

 

             Bilirubin Total (test code = Bilirubin Total) 0.3 mg/dL    0.1-0.9 

                   

 

             Albumin Level (test code = Albumin Level) 3.1 g/dL     3.5-5.2     

 L             

 

             Protein Total (test code = Protein Total) 7.6 g/dL     6.4-8.3     

               

 

             ALT (test code = ALT) 20 U/L       1-41                       

 

             AST (test code = AST) 12 U/L       1-40                       

 

             Globulin (test code = Globulin) 4.5 g/dL     2.9-3.1      H        

     

 

             A/G Ratio (test code = A/G Ratio) 0.7 ratio                 N      

       

 

             eGFR AA (test code = eGFR AA) >60 mL/min/1.73 m2              N    

        eGFR (estimated Glomerular 

Filtration Rate) is an estimated value, calculated from the patient's serum 
creatinine using the MDRD equation. It is NOT the patient's actual GFR. The eGFR
 provides a more clinically useful measure of kidney disease than serum 
creatinine alone.***This calculation takes sex and race into account, if the 
information is provided. If the race is not provided, and the patient is 
-American, multiply by 1.212. If sex is not provided, and the patient is 
female, multiply by 0.742. Results for patients <18 years of age have not been 
validated by the MDRD study and should be interpreted with caution. eGFR Result 
Interpretation:eGFR > or = 60 is in the Normal RangeeGFR < 60 may mean kidney 
diseaseeGFR < 15 may mean kidney failure*** Ranges recommended by the National 
Kidney Foundation, http://nkdep.nih.gov

 

             eGFR Non-AA (test code = eGFR Non-AA) >60.00 mL/min/1.73 m2        

      N            eGFR (estimated 

Glomerular Filtration Rate) is an estimated value, calculated from the patient's
 serum creatinine using the MDRD equation. It is NOT the patient's actual GFR. 
The eGFR provides a more clinically useful measure of kidney disease than serum 
creatinine alone.***This calculation takes sex and race into account, if the 
information is provided. If the race is not provided, and the patient is 
-American, multiply by 1.212. If sex is not provided, and the patient is 
female, multiply by 0.742. Results for patients <18 years of age have not been 
validated by the MDRD study and should be interpreted with caution. eGFR Result 
Interpretation:eGFR > or = 60 is in the Normal RangeeGFR < 60 may mean kidney 
diseaseeGFR < 15 may mean kidney failure*** Ranges recommended by the National 
Kidney Foundation, http://nkdep.nih.gov





Erythrocyte Sedimentation Rate [id number] 11:41:50* 



             Test Item    Value        Reference Range Interpretation Comments

 

             ESR STAT (test code = ESR STAT) 113 mm/hr    0-9          H        

     





Lactic Acid, Plasma (Venous)2019 11:32:31* 



             Test Item    Value        Reference Range Interpretation Comments

 

                          Lactic Acid, Plasma (Venous) (test code = Lactic Acid,

 Plasma (Venous)) 1.6 

mmol/L              0.5-1.9                                  





Complete Blood Count with [id number] 11:22:31* 



             Test Item    Value        Reference Range Interpretation Comments

 

             WBC (test code = WBC) 14.1 x10     4.4-10.5     H             

 

             RBC (test code = RBC) 3.64 x10     4.10-5.70    L             

 

             Hgb (test code = Hgb) 9.1 g/dL     13.4-17.4    L             

 

             MCV (test code = MCV) 81.90 fL     80..00               

 

             Hct (test code = Hct) 29.8 %       38.7-52.0    L             

 

             MCHC (test code = MCHC) 30.50 g/dL   32.00-37.50  L             

 

             MCH (test code = MCH) 25.0 pg      27.0-32.5    L             

 

             RDW CV (test code = RDW CV) 15.9 %       11.5-14.5    H            

 

 

             Platelets (test code = Platelets) 575.0 x10    140.0-440.0  H      

       

 

             MPV (test code = MPV) 9.1 fL                    N             

 

             Slide Review (test code = Slide Review) Auto         Auto          

            Result created by 

[email or web address]

 

             nRBC (test code = nRBC) 0                         N             

 

             NRBC Abs (test code = NRBC Abs) 0.00 x10                  N        

     

 

             IPF (test code = IPF) 0 %                       N             





Automated [id number] 11:22:31* 



             Test Item    Value        Reference Range Interpretation Comments

 

             Neutro Auto (test code = Neutro Auto) 70.9 %       36.0-70.0    H  

           

 

             Lymph Auto (test code = Lymph Auto) 20.0 %       12.0-44.0         

         

 

             Mono Auto (test code = Mono Auto) 7.1 %        0.0-11.0            

       

 

             Eos, Auto (test code = Eos, Auto) 1.1 %        0.0-7.0             

       

 

             Basophil Auto (test code = Basophil Auto) 0.3 %        0.0-2.0     

               

 

             Neutro Absolute (test code = Neutro Absolute) 10.0 x10     1.6-7.4 

     H             

 

             Lymph Absolute (test code = Lymph Absolute) 2.83 x10     .50-4.60  

                 

 

             Mono Absolute (test code = Mono Absolute) 1.00 x10     .00-1.20    

               

 

             Eos Absolute (test code = Eos Absolute) 0.15 x10     0.00-0.74     

             

 

             Baso Absolute (test code = Baso Absolute) 0.04 x10     0.00-0.21   

               





IG [id number] 11:22:31* 



             Test Item    Value        Reference Range Interpretation Comments

 

             IG (test code = IG) 0.6 %        0.0-5.0                    

 

             IG Abs (test code = IG Abs) 0 x10                     N            

 





Wound Culture w/ Gram Stain2019-06-10 10:17:52* 



             Test Item    Value        Reference Range Interpretation Comments

 

             ORGANISM (test code = ORGANISM) Methicillin-Resistant Staphylococcu

s aureus                           

 

 

             Chloramphenicol (test code = Chlor)                           S    

         

 

             Ciprofloxacin (test code = Cipro)                           R      

       

 

             Clindamycin (test code = Clinda)                           R       

      

 

             Erythromycin (test code = Eryth)                           R       

      

 

             Gentamicin (test code = Gent)                           R          

   

 

             Levofloxacin (test code = Levo)                           R        

     

 

             Linezolid (test code = Linez)                           S          

   

 

             Oxacillin (test code = Ox)                           R             

 

             Rifampin (test code = Rif)                           S             

 

             Tetracycline (test code = Tetra)                           R       

      

 

             Trimethoprim/Sulfa (test code = SXT)                           S   

          

 

             Vancomycin (test code = Vanc)                           S          

   

 

                          Amended Final Report (test code = Amended Final Report

) Few Methicillin-

Resistant Staphylococcus aureus Contact isolation recommended Moderate 
Diphtheroids No Anaerobes Isolated at 3 Days                                    

      

 

                          Gram Stain Report (test code = Gram Stain Report) Rare

 White Blood Cells No 

organisms seen.                                              





Wound Culture w/ Gram [id number] 09:27:59* 



             Test Item    Value        Reference Range Interpretation Comments

 

             ORGANISM (test code = ORGANISM) Methicillin-Resistant Staphylococcu

s aureus                           

 

 

             Chloramphenicol (test code = Chlor)                           S    

         

 

             Ciprofloxacin (test code = Cipro)                           R      

       

 

             Clindamycin (test code = Clinda)                           R       

      

 

             Erythromycin (test code = Eryth)                           R       

      

 

             Gentamicin (test code = Gent)                           R          

   

 

             Levofloxacin (test code = Levo)                           R        

     

 

             Linezolid (test code = Linez)                           S          

   

 

             Oxacillin (test code = Ox)                           R             

 

             Rifampin (test code = Rif)                           S             

 

             Tetracycline (test code = Tetra)                           R       

      

 

             Trimethoprim/Sulfa (test code = SXT)                           S   

          

 

             Vancomycin (test code = Vanc)                           S          

   

 

                          Final Report (test code = Final Report) Few Methicilli

n-Resistant Staphylococcus

 aureus Moderate Diphtheroids No Anaerobes Isolated at 3 Days                   

                       

 

                          Gram Stain Report (test code = Gram Stain Report) Rare

 White Blood Cells No 

organisms seen.                                              





Blood [id number] 00:02:07No growth at 5 days.Blood [id number] 
00:01:26No growth at 5 days.CT Spine Thoracic w/o [id number] 23:05:50
Patient:   BALJEET JANG                                  MRN:    [id number]
am Date/Time2019 22:53 CDTReason for ExamBack painReportCT THORACIC SPINE WI
THOUT CONTRASTLocation: M75Otgxfyap history painTechnique:Helical axial CT scan 
was performed.  Coronal and sagittal reconstructions of the entire thoracic spin
e were obtained. Exam was performed on an up-to-date helical CT scanner.  Images
 were viewed in both bone and soft tissue windows. Evaluation of the intracanali
cular soft tissues is limited due to the lack of intrathecal contrast.This exam 
was performed according to our departmental dose -optimization program, which in
cludes automatic exposure control, adjustment of MA and/or KV according to patie
nt size, and or use of iterative reconstruction technique.DLP:   2090    mGy*cmF
INDINGS:There is no fracture, dislocation, listhesis, focal lytic or blastic les
ion. No evidence of central spinal canal or foraminal stenosis.The paravertebral
 soft tissues are normal without evidence of paravertebral hematoma or mass.Ther
e is hypertrophic facet arthropathy at the mid spine more prominent on the right
 at T9-T10.IMPRESSION:No acute abnormality. Mild DJD.***** Final *****Dictated b
y:    MD Rocha Maria VDictated DT/TM: 2019 11:04 pmSigned by:  MD Rocha Maria VSigned (Electronic Signature):  2019 11:05 pmCT Spine Lumbar 
w/o [id number] 23:03:48Patient:   BALJEET JANG                       
           MRN:    [id number] Date/Time2019 22:53 CDTReason for ExamBack
 painReportCT LUMBAR SPINE WITHOUT CONTRASTLocation: N48Isvnzftt history 
painTechnique:Helical axial CT scan was performed.  Coronal and sagittal 
reconstructions of the entire lumbar spine were obtained. Exam was performed on 
an up-to-date helical CT scanner.  Images were viewed in both bone and soft 
tissue windows. Evaluation of the intracanalicular soft tissues is limited due 
to the lack of intrathecal contrast.This exam was performed according to our 
departmental dose -optimization program, which includes automatic exposure 
control, adjustment of MA and/or KV according to patient size, and or use of 
iterative reconstruction technique.DLP:    2090   mGy*cmFINDINGS:There is no 
fracture, dislocation, listhesis, focal lytic or blastic lesion. No evidence of 
central spinal canal or foraminal stenosis.The paravertebral soft tissues are 
normal without evidence of paravertebral hematoma or mass.Mild hypertrophic 
facet arthropathy is present throughout the spine.IMPRESSION:1. No acute lumbar 
spine abnormality. Mild facet arthritis.2. DJD and heterotopic calcifications 
noted at the hips and pelvis more on the left. Clinical correlation krishna
mmended.***** Final *****Dictated by:    MD Rocha Maria VDictated DT/TM: 2019 11:00 pmSigned by:  MD Rocha Maria VSigned (Electronic Signature):   11:03 pmCT Spine Cervical w/o [id number] 22:44:01Patient:   
BALJEET JANG                                  MRN:    [id number] 
Date/Time2019 22:40 CDTReason for ExamBack painReportCT CERVICAL SPINELoc
ation: [id number] HISTORY: painTechnique:Helical axial CT scan of the cervical 
spine was performed.  Coronal and sagittal images were reconstructed. Exam was p
erformed on an up-to-date helical CT scanner.   Images were viewed in both soft 
tissue and bone windows.This exam was performed according to our departmental do
se -optimization program, which includes automatic exposure control, adjustment 
of mA and/or kV according to patient size, and/ or use of iterative reconstructi
on technique.DLP:     568 mGy*cmFINDINGS: There is no evidence of fracture, disl
ocation, subluxation.  Alignment is normal.  Soft tissues are normal.  There are
 no paraspinal hematomas.There are no degenerative changes demonstrated.IMPRESSI
ON:Normal exam.***** Final *****Dictated by:    MD Rocha Maria VDictated DT/T
M: 2019 10:42 pmSigned by:  MD Rocha Maria VSigned (Electronic Signatur
e):  2019 10:44 pmCT Pelvis w/o [id number] 18:22:47Patient:   
BALJEET JESSICA II                              MRN:    [id number] 
Date/Time2019 17:46 CDTReason for ExamSepsisReportCT SCAN OF THE PELVIS 
WITHOUT CONTRASTLocation: [id number] HISTORY: Sepsis. Pressure ulcersTECHNIQUE:
  Helical CT of the pelvis without IV contrast without complication. Exam perfor
med without IV contrast has limited sensitivity for detection of mass or inflamm
ation. Exam was performed on an up-to-date helical CT scanner.   Coronal and sag
ittal images were reconstructed.This exam was performed according to our [facility]
ental dose- optimization program, which includes automatic exposure control, adj
ustment of MA and/or KV according to patient size, and or use of iterative recon
struction technique.DLP:   965    mGy*cmComparison study CT left hip and femur J
an  and CT pelvis 2018FINDINGS:On the left, deep large 4 cm pre
ssure ulcers again noted lateral to the left femur greater trochanter and caudal
 to the left ischium. There is little interval change in the extensive heterotop
ic calcification lateral to the left greater and caudal to the left ischial tube
rosity. Soft tissue thickening of the lower aspect of the left greater gluteus a
nd upper hamstring muscles is unchanged. Several small bubbles of subcutaneous e
mphysema are seen within the ulcer best seen on axial images 98. On the right, e
xtensive heterotopic calcification measuring approximately 10 x 5 x 4.7 cm is ag
ain noted just caudal to the right ischial tuberosity. Scarring from prior press
ure ulcers in the soft tissues is present adjacent to the right initial tuberosi
ty and lateral to the right hip.This degree of periostitis and periosteal reacti
on from pressure ulcers has high correlation to osteomyelitis.Left lower quadran
t abdominal colostomy noted. Bladder, seminal vesicles, prostate bladder unremar
kable. Bilateral enlarged groin lymph nodes again noted.IMPRESSION:1. Overall li
ttle change in the deep pressure ulcers lateral to left femur greater trochanter
 and adjacent to the left ischial tuberosity, with extensive periosteal reaction
 and heterotopic bone formation.2. The findings have high correlation to osteomy
elitis. Recommend correlation to clinical history and biopsy results.***** Final
 *****Dictated by:    MD Avni, Soheila VDictandrew DT/TM: 2019 6:03 pmSigne
d by:  MD Avni, Soheila VSigned (Electronic Signature):  2019 6:22 pmXR 
Chest 1 View CVAD Insertion [id number] 16:02:33Patient:   BALJEET JESSICA II                              MRN:    [id number] Date/Time2019 
15:30 CSTReason for ExamLine placementReportEXAM: XR Chest 1 View CVAD Insertion
 Follow-upHISTORY: Line placementLocation code R 16COMPARISON: Chest radiograph 
from 2018FINDINGS:Right arm PICC tip is in the distal SVC.Pleural 
thickening/small effusion at right lung base persists. Left lung is clear. 
Cardiomediastinal silhouette is normal.Osseous structures are unremarkabl
e.IMPRESSION:Right arm PICC tip in distal SVC.***** Final *****Dictated by:    MANOLO Tate MD, Rama FDictated DT/TM: 2019 4:01 pmSigned by:  Jacquelin Connors MD, Rama FSigned (Electronic Signature):  2019 4:02 pmCT 
Femur w/o Contrast [id number] 23:52:32Patient:   BALJEET JESSICA II        
                      MRN:    [id number] Date/Time2019 23:38 CSTReason 
for ExamOsteomyelitisReportEXAM: CT LEFT LOWER EXTREMITY WITHOUT 
CONTRASTINDICATION: OsteomyelitisCOMPARISON: None availableTECHNIQUE: Routine 
axial CT images of the left femur without intravenous contrast. Coronal and 
sagittal reformatted images were submitted for review.IV contrast: None     DLP:
 1897.3  mGy-cmFINDINGS:There is a soft tissue overlying the left greater 
trochanter measuring approximately 3 x 4.4 cm in diameter and 4.2 cm in depth. 
There is soft tissue thickening and edema involving the adjacent soft tissues. 
Significant heterotopic ossification is noted which is new compared to the prior
 examination. There is also periosteal reaction involving the proximal left 
femur/greater trochanter.There is a second soft tissue ulceration overlying the 
left ischial tuberosity measuring approximately 7.9 x 1.5 cm for depth of 5.4 
cm. There is significant soft tissue thickening and intramuscular edema. There 
is heterotopic ossification and periosteal reaction surrounding the left ischium
 with osseous erosion consistent with osteomyelitis.There are multiple enlarged 
lymph nodes within the left groin with the largest measuring 2.4 x 1.9 cm which 
are likely reactive. There is an colostomy noted in the left lower quadrant ot
herwise the visualized small and large bowel is unremarkable. The urinary bladde
r is unremarkable.IMPRESSION:1.   Osseous erosion involving the ACL tuberosity c
onsistent with osteomyelitis. Significant periosteal reaction involving the left
 issue him as well as the left proximal femur/greater trochanter.2.   Large soft
 tissue ulcerations overlying the left greater trochanter and left issue and as 
detailed above.3.   Large left inguinal lymph node which is likely reactive.LOCA
TION: [id number] CT exam was performed according to our departmental dose optimizat
ion program, which includes automated exposure control, adjustment of the mA and
/or kV according to the patient size and/or use of iterative reconstructive tech
nique.***** Final *****Dictated by:    MD Catalan Melanie  CDictated DT/TM:  11:44 pmSigned by:  MD Catalan Melanie  CSigned (Electronic Signature):  
2019 11:52 pmBLOOD [id number] 19:01:00* 



             Test Item    Value        Reference Range Interpretation Comments

 

             CULTURE (BEAKER) (test code = 1095) No growth in 5 days            

                





BLOOD [id number] 19:01:00* 



             Test Item    Value        Reference Range Interpretation Comments

 

             CULTURE (BEAKER) (test code = 1095) No growth in 5 days            

                





BLOOD [id number] 07:01:00* 



             Test Item    Value        Reference Range Interpretation Comments

 

             CULTURE (BEAKER) (test code = 1095) No growth in 5 days            

                





BLOOD [id number] 07:01:00* 



             Test Item    Value        Reference Range Interpretation Comments

 

             CULTURE (BEAKER) (test code = 1095) No growth in 5 days            

                





POCT-GLUCOSE [id number] 11:48:00* 



             Test Item    Value        Reference Range Interpretation Comments

 

             POC-GLUCOSE METER (BEAKER) (test code = 1538) 151 mg/dL      

     H            TESTED AT [facility]

 [address] [facility] [address]





POCT-GLUCOSE [id number] 08:10:00* 



             Test Item    Value        Reference Range Interpretation Comments

 

             POC-GLUCOSE METER (BEAKER) (test code = 1538) 178 mg/dL      

     H            TESTED AT [facility]

 [address] [facility] [address]





[id number] 06:47:00* 



             Test Item    Value        Reference Range Interpretation Comments

 

             PHOSPHORUS (BEAKER) (test code = 604) 4.6 mg/dL    2.3-4.7         

           





[id number] 06:47:00* 



             Test Item    Value        Reference Range Interpretation Comments

 

             MAGNESIUM (BEAKER) (test code = 627) 2.1 mg/dL    1.6-2.6          

          





BASIC METABOLIC [id number] 06:47:00* 



             Test Item    Value        Reference Range Interpretation Comments

 

             SODIUM (BEAKER) (test code = 381) 139 meq/L    136-145             

       

 

             POTASSIUM (BEAKER) (test code = 379) 4.0 meq/L    3.5-5.1          

          

 

             CHLORIDE (BEAKER) (test code = 382) 104 meq/L                

         

 

             CO2 (BEAKER) (test code = 355) 27 meq/L     22-29                  

    

 

             BLOOD UREA NITROGEN (BEAKER) (test code = 354) 15 mg/dL     7-21   

                    

 

             CREATININE (BEAKER) (test code = 358) 0.88 mg/dL   0.57-1.25       

           

 

             GLUCOSE RANDOM (BEAKER) (test code = 652) 192 mg/dL          

 H             

 

             CALCIUM (BEAKER) (test code = 697) 9.4 mg/dL    8.4-10.2           

        

 

             EGFR (BEAKER) (test code = 1092) 98 mL/min/1.73 sq m               

            ESTIMATED GFR IS NOT AS

 ACCURATE AS CREATININE CLEARANCE IN PREDICTING GLOMERULAR FILTRATION RATE. 
ESTIMATED GFR IS NOT APPLICABLE FOR DIALYSIS PATIENTS.





HEPATIC FUNCTION [id number] 06:47:00* 



             Test Item    Value        Reference Range Interpretation Comments

 

             TOTAL PROTEIN (BEAKER) (test code = 770) 8.0 gm/dL    6.0-8.3      

              

 

             ALBUMIN (BEAKER) (test code = 1145) 3.5 g/dL     3.5-5.0           

         

 

             BILIRUBIN TOTAL (BEAKER) (test code = 377) 0.3 mg/dL    0.2-1.2    

                

 

             BILIRUBIN DIRECT (BEAKER) (test code = 706) 0.1 mg/dL    0.1-0.5   

                 

 

             ALKALINE PHOSPHATASE (BEAKER) (test code = 346) 176 U/L      

       H             

 

             AST (SGOT) (BEAKER) (test code = 353) 22 U/L       5-34            

           

 

             ALT (SGPT) (BEAKER) (test code = 347) 39 U/L       6-55            

           





CBC W/PLT COUNT & AUTO [id number] 06:09:00* 



             Test Item    Value        Reference Range Interpretation Comments

 

             WHITE BLOOD CELL COUNT (BEAKER) (test code = 775) 8.7 K/ L     3.5-

10.5                   

 

             RED BLOOD CELL COUNT (BEAKER) (test code = 761) 4.06 M/ L    4.63-6

.08    L             

 

             HEMOGLOBIN (BEAKER) (test code = 410) 10.8 GM/DL   13.7-17.5    L  

           

 

             HEMATOCRIT (BEAKER) (test code = 411) 34.5 %       40.1-51.0    L  

           

 

             MEAN CORPUSCULAR VOLUME (BEAKER) (test code = 753) 85.0 fL      79.

0-92.2                  

 

             MEAN CORPUSCULAR HEMOGLOBIN (BEAKER) (test code = 751) 26.6 pg     

 25.7-32.2                  

 

                    MEAN CORPUSCULAR HEMOGLOBIN CONC (BEAKER) (test code = 752) 

31.3 GM/DL          32.3-36.5

                          L                          

 

             RED CELL DISTRIBUTION WIDTH (BEAKER) (test code = 412) 15.1 %      

 11.6-14.4    H             

 

             PLATELET COUNT (BEAKER) (test code = 756) 490 K/CU MM  150-450     

 H             

 

             MEAN PLATELET VOLUME (BEAKER) (test code = 754) 8.9 fL       9.4-12

.4     L             

 

             NUCLEATED RED BLOOD CELLS (BEAKER) (test code = 413) 0 /100 WBC   0

-0                        

 

             NEUTROPHILS RELATIVE PERCENT (BEAKER) (test code = 429) 35 %       

                             

 

             LYMPHOCYTES RELATIVE PERCENT (BEAKER) (test code = 430) 52 %       

                             

 

             MONOCYTES RELATIVE PERCENT (BEAKER) (test code = 431) 9 %          

                           

 

             EOSINOPHILS RELATIVE PERCENT (BEAKER) (test code = 432) 4 %        

                             

 

             BASOPHILS RELATIVE PERCENT (BEAKER) (test code = 437) 1 %          

                           

 

             NEUTROPHILS ABSOLUTE COUNT (BEAKER) (test code = 670) 3.03 K/ L    

1.78-5.38                  

 

             LYMPHOCYTES ABSOLUTE COUNT (BEAKER) (test code = 414) 4.49 K/ L    

1.32-3.57    H             

 

             MONOCYTES ABSOLUTE COUNT (BEAKER) (test code = 415) 0.75 K/ L    0.

30-0.82                  

 

             EOSINOPHILS ABSOLUTE COUNT (BEAKER) (test code = 416) 0.30 K/ L    

0.04-0.54                  

 

             BASOPHILS ABSOLUTE COUNT (BEAKER) (test code = 417) 0.07 K/ L    0.

01-0.08                  

 

             IMMATURE GRANULOCYTES-RELATIVE PERCENT (BEAKER) (test code = 2801) 

0 %          0-1                        





POCT-GLUCOSE [id number] 21:21:00* 



             Test Item    Value        Reference Range Interpretation Comments

 

             POC-GLUCOSE METER (BEAKER) (test code = 1538) 139 mg/dL      

     H            TESTED AT [address] [address]





POCT-GLUCOSE [id number] 17:49:00* 



             Test Item    Value        Reference Range Interpretation Comments

 

             POC-GLUCOSE METER (BEAKER) (test code = 1538) 167 mg/dL      

     H            TESTED AT [address] [address]





POCT-GLUCOSE [id number] 11:34:00* 



             Test Item    Value        Reference Range Interpretation Comments

 

             POC-GLUCOSE METER (BEAKER) (test code = 1538) 88 mg/dL       

                  TESTED AT [address] [address]





POCT-GLUCOSE [id number] 08:20:00* 



             Test Item    Value        Reference Range Interpretation Comments

 

             POC-GLUCOSE METER (BEAKER) (test code = 1538) 149 mg/dL      

     H            TESTED AT [address] [address]





WOUND CULTURE + GRAM [id number] 07:29:00* 



             Test Item    Value        Reference Range Interpretation Comments

 

             CULTURE (BEAKER) (test code = 1095)                           A    

        4+ Same organism has been isolated 

from cultures(s) of the same body site and collection date. Repeat 
identification and susceptibility testing performed only after consultation with
 the clinical microbiology laboratory.Refer to previous culture ofMethicillin 
resistant Staphylococcus aureus

 

             GRAM STAIN RESULT (BEAKER) (test code = 1123) 4+ WBCs              

                   

 

                          GRAM STAIN RESULT (BEAKER) (test code = 965161) 4+ gra

m positive cocci in 

clusters                                                     





WOUND CULTURE + GRAM [id number] 07:26:00* 



             Test Item    Value        Reference Range Interpretation Comments

 

                    CULTURE (BEAKER) (test code = 1095) METHICILLIN RESISTANT ST

APHYLOCOCCUS AUREUS 

                          A                         4+ Methicillin resistant Sta

phylococcus aureus

 

             Clindamycin (test code = 10)                           S           

  

 

             Erythromycin (test code = 4)                           R           

  

 

             Linezolid (test code = 40)                           S             

 

             Nitrofurantoin (test code = 23)                           S        

     

 

             Oxacillin (test code = 14)                           R             

 

             Rifampin (test code = 43)                           S             

 

             Tetracycline (test code = 2)                           S           

  

 

             Trimethoprim + Sulfamethoxazole (test code = 47)                   

        S             

 

             Vancomycin (test code = 13)                           S            

 

 

             GRAM STAIN RESULT (BEAKER) (test code = 1123) <1+ WBCs             

                   

 

             GRAM STAIN RESULT (BEAKER) (test code = 613926) No organisms seen  

                          





CBC W/PLT COUNT & AUTO [id number] 05:47:00* 



             Test Item    Value        Reference Range Interpretation Comments

 

             WHITE BLOOD CELL COUNT (BEAKER) (test code = 775) 8.3 K/ L     3.5-

10.5                   

 

             RED BLOOD CELL COUNT (BEAKER) (test code = 761) 4.10 M/ L    4.63-6

.08    L             

 

             HEMOGLOBIN (BEAKER) (test code = 410) 10.9 GM/DL   13.7-17.5    L  

           

 

             HEMATOCRIT (BEAKER) (test code = 411) 35.2 %       40.1-51.0    L  

           

 

             MEAN CORPUSCULAR VOLUME (BEAKER) (test code = 753) 85.9 fL      79.

0-92.2                  

 

             MEAN CORPUSCULAR HEMOGLOBIN (BEAKER) (test code = 751) 26.6 pg     

 25.7-32.2                  

 

                    MEAN CORPUSCULAR HEMOGLOBIN CONC (BEAKER) (test code = 752) 

31.0 GM/DL          32.3-36.5

                          L                          

 

             RED CELL DISTRIBUTION WIDTH (BEAKER) (test code = 412) 15.0 %      

 11.6-14.4    H             

 

             PLATELET COUNT (BEAKER) (test code = 756) 447 K/CU MM  150-450     

               

 

             MEAN PLATELET VOLUME (BEAKER) (test code = 754) 9.0 fL       9.4-12

.4     L             

 

             NUCLEATED RED BLOOD CELLS (BEAKER) (test code = 413) 0 /100 WBC   0

-0                        

 

             NEUTROPHILS RELATIVE PERCENT (BEAKER) (test code = 429) 39 %       

                             

 

             LYMPHOCYTES RELATIVE PERCENT (BEAKER) (test code = 430) 47 %       

                             

 

             MONOCYTES RELATIVE PERCENT (BEAKER) (test code = 431) 8 %          

                           

 

             EOSINOPHILS RELATIVE PERCENT (BEAKER) (test code = 432) 6 %        

                             

 

             BASOPHILS RELATIVE PERCENT (BEAKER) (test code = 437) 1 %          

                           

 

             NEUTROPHILS ABSOLUTE COUNT (BEAKER) (test code = 670) 3.24 K/ L    

1.78-5.38                  

 

             LYMPHOCYTES ABSOLUTE COUNT (BEAKER) (test code = 414) 3.84 K/ L    

1.32-3.57    H             

 

             MONOCYTES ABSOLUTE COUNT (BEAKER) (test code = 415) 0.63 K/ L    0.

30-0.82                  

 

             EOSINOPHILS ABSOLUTE COUNT (BEAKER) (test code = 416) 0.48 K/ L    

0.04-0.54                  

 

             BASOPHILS ABSOLUTE COUNT (BEAKER) (test code = 417) 0.04 K/ L    0.

01-0.08                  

 

             IMMATURE GRANULOCYTES-RELATIVE PERCENT (BEAKER) (test code = 2801) 

0 %          0-1                        





[id number] 05:45:00* 



             Test Item    Value        Reference Range Interpretation Comments

 

             PHOSPHORUS (BEAKER) (test code = 604) 4.9 mg/dL    2.3-4.7      H  

           





[id number] 05:45:00* 



             Test Item    Value        Reference Range Interpretation Comments

 

             MAGNESIUM (BEAKER) (test code = 627) 2.1 mg/dL    1.6-2.6          

          





BASIC METABOLIC [id number] 05:45:00* 



             Test Item    Value        Reference Range Interpretation Comments

 

             SODIUM (BEAKER) (test code = 381) 141 meq/L    136-145             

       

 

             POTASSIUM (BEAKER) (test code = 379) 3.8 meq/L    3.5-5.1          

          

 

             CHLORIDE (BEAKER) (test code = 382) 104 meq/L                

         

 

             CO2 (BEAKER) (test code = 355) 29 meq/L     22-29                  

    

 

             BLOOD UREA NITROGEN (BEAKER) (test code = 354) 11 mg/dL     7-21   

                    

 

             CREATININE (BEAKER) (test code = 358) 0.66 mg/dL   0.57-1.25       

           

 

             GLUCOSE RANDOM (BEAKER) (test code = 652) 120 mg/dL          

 H             

 

             CALCIUM (BEAKER) (test code = 697) 9.6 mg/dL    8.4-10.2           

        

 

             EGFR (BEAKER) (test code = 1092) 137 mL/min/1.73 sq m              

             ESTIMATED GFR IS NOT 

AS ACCURATE AS CREATININE CLEARANCE IN PREDICTING GLOMERULAR FILTRATION RATE. 
ESTIMATED GFR IS NOT APPLICABLE FOR DIALYSIS PATIENTS.





HEPATIC FUNCTION [id number] 05:45:00* 



             Test Item    Value        Reference Range Interpretation Comments

 

             TOTAL PROTEIN (BEAKER) (test code = 770) 8.2 gm/dL    6.0-8.3      

              

 

             ALBUMIN (BEAKER) (test code = 1145) 3.6 g/dL     3.5-5.0           

         

 

             BILIRUBIN TOTAL (BEAKER) (test code = 377) 0.4 mg/dL    0.2-1.2    

                

 

             BILIRUBIN DIRECT (BEAKER) (test code = 706) 0.2 mg/dL    0.1-0.5   

                 

 

             ALKALINE PHOSPHATASE (BEAKER) (test code = 346) 187 U/L      

       H             

 

             AST (SGOT) (BEAKER) (test code = 353) 30 U/L       5-34            

           

 

             ALT (SGPT) (BEAKER) (test code = 347) 49 U/L       6-55            

           





POCT-GLUCOSE [id number] 21:59:00* 



             Test Item    Value        Reference Range Interpretation Comments

 

             POC-GLUCOSE METER (BEAKER) (test code = 1538) 120 mg/dL      

     H            TESTED AT [place] [id number] [facility] [address]





POCT-GLUCOSE [id number] 17:59:00* 



             Test Item    Value        Reference Range Interpretation Comments

 

             POC-GLUCOSE METER (BEAKER) (test code = 1538) 97 mg/dL       

                  TESTED AT [facility] 

[address] [facility] [address]





VANCOMYCIN LEVEL, [id number] 16:57:00* 



             Test Item    Value        Reference Range Interpretation Comments

 

             VANCOMYCIN TROUGH (BEAKER) (test code = 522) 7.8 ug/mL    10.0-20.0

    L             





POCT-GLUCOSE [id number] 13:33:00* 



             Test Item    Value        Reference Range Interpretation Comments

 

             POC-GLUCOSE METER (BEAKER) (test code = 1538) 95 mg/dL       

                  TESTED AT [place] [id number] [facility] [address]





[id number] 13:19:00* 



             Test Item    Value        Reference Range Interpretation Comments

 

             PHOSPHORUS (BEAKER) (test code = 604) 4.3 mg/dL    2.3-4.7         

           





[id number] 13:19:00* 



             Test Item    Value        Reference Range Interpretation Comments

 

             MAGNESIUM (BEAKER) (test code = 627) 2.1 mg/dL    1.6-2.6          

          





BASIC METABOLIC [id number] 13:19:00* 



             Test Item    Value        Reference Range Interpretation Comments

 

             SODIUM (BEAKER) (test code = 381) 138 meq/L    136-145             

       

 

             POTASSIUM (BEAKER) (test code = 379) 4.0 meq/L    3.5-5.1          

          

 

             CHLORIDE (BEAKER) (test code = 382) 103 meq/L                

         

 

             CO2 (BEAKER) (test code = 355) 27 meq/L     22-29                  

    

 

             BLOOD UREA NITROGEN (BEAKER) (test code = 354) 10 mg/dL     7-21   

                    

 

             CREATININE (BEAKER) (test code = 358) 0.63 mg/dL   0.57-1.25       

           

 

             GLUCOSE RANDOM (BEAKER) (test code = 652) 93 mg/dL           

               

 

             CALCIUM (BEAKER) (test code = 697) 9.0 mg/dL    8.4-10.2           

        

 

             EGFR (BEAKER) (test code = 1092) 144 mL/min/1.73 sq m              

             ESTIMATED GFR IS NOT 

AS ACCURATE AS CREATININE CLEARANCE IN PREDICTING GLOMERULAR FILTRATION RATE. 
ESTIMATED GFR IS NOT APPLICABLE FOR DIALYSIS PATIENTS.





HEPATIC FUNCTION [id number] 13:19:00* 



             Test Item    Value        Reference Range Interpretation Comments

 

             TOTAL PROTEIN (BEAKER) (test code = 770) 8.0 gm/dL    6.0-8.3      

              

 

             ALBUMIN (BEAKER) (test code = 1145) 3.5 g/dL     3.5-5.0           

         

 

             BILIRUBIN TOTAL (BEAKER) (test code = 377) 0.3 mg/dL    0.2-1.2    

                

 

             BILIRUBIN DIRECT (BEAKER) (test code = 706) 0.2 mg/dL    0.1-0.5   

                 

 

             ALKALINE PHOSPHATASE (BEAKER) (test code = 346) 192 U/L      

       H             

 

             AST (SGOT) (BEAKER) (test code = 353) 36 U/L       5-34         H  

           

 

             ALT (SGPT) (BEAKER) (test code = 347) 50 U/L       6-55            

           





CBC W/PLT COUNT & AUTO [id number] 13:07:00* 



             Test Item    Value        Reference Range Interpretation Comments

 

             WHITE BLOOD CELL COUNT (BEAKER) (test code = 775) 7.8 K/ L     3.5-

10.5                   

 

             RED BLOOD CELL COUNT (BEAKER) (test code = 761) 3.73 M/ L    4.63-6

.08    L             

 

             HEMOGLOBIN (BEAKER) (test code = 410) 10.1 GM/DL   13.7-17.5    L  

           

 

             HEMATOCRIT (BEAKER) (test code = 411) 32.1 %       40.1-51.0    L  

           

 

             MEAN CORPUSCULAR VOLUME (BEAKER) (test code = 753) 86.1 fL      79.

0-92.2                  

 

             MEAN CORPUSCULAR HEMOGLOBIN (BEAKER) (test code = 751) 27.1 pg     

 25.7-32.2                  

 

                    MEAN CORPUSCULAR HEMOGLOBIN CONC (BEAKER) (test code = 752) 

31.5 GM/DL          32.3-36.5

                          L                          

 

             RED CELL DISTRIBUTION WIDTH (BEAKER) (test code = 412) 15.4 %      

 11.6-14.4    H             

 

             PLATELET COUNT (BEAKER) (test code = 756) 412 K/CU MM  150-450     

               

 

             MEAN PLATELET VOLUME (BEAKER) (test code = 754) 9.0 fL       9.4-12

.4     L             

 

             NUCLEATED RED BLOOD CELLS (BEAKER) (test code = 413) 0 /100 WBC   0

-0                        

 

             NEUTROPHILS RELATIVE PERCENT (BEAKER) (test code = 429) 37 %       

                             

 

             LYMPHOCYTES RELATIVE PERCENT (BEAKER) (test code = 430) 47 %       

                             

 

             MONOCYTES RELATIVE PERCENT (BEAKER) (test code = 431) 9 %          

                           

 

             EOSINOPHILS RELATIVE PERCENT (BEAKER) (test code = 432) 7 %        

                             

 

             BASOPHILS RELATIVE PERCENT (BEAKER) (test code = 437) 1 %          

                           

 

             NEUTROPHILS ABSOLUTE COUNT (BEAKER) (test code = 670) 2.86 K/ L    

1.78-5.38                  

 

             LYMPHOCYTES ABSOLUTE COUNT (BEAKER) (test code = 414) 3.64 K/ L    

1.32-3.57    H             

 

             MONOCYTES ABSOLUTE COUNT (BEAKER) (test code = 415) 0.69 K/ L    0.

30-0.82                  

 

             EOSINOPHILS ABSOLUTE COUNT (BEAKER) (test code = 416) 0.52 K/ L    

0.04-0.54                  

 

             BASOPHILS ABSOLUTE COUNT (BEAKER) (test code = 417) 0.04 K/ L    0.

01-0.08                  

 

             IMMATURE GRANULOCYTES-RELATIVE PERCENT (BEAKER) (test code = 2801) 

0 %          0-1                        





POCT-GLUCOSE [id number] 11:31:00* 



             Test Item    Value        Reference Range Interpretation Comments

 

             POC-GLUCOSE METER (BEAKER) (test code = 1538) 162 mg/dL      

     H            TESTED AT [address] [address]





POCT-GLUCOSE [id number] 08:18:00* 



             Test Item    Value        Reference Range Interpretation Comments

 

             POC-GLUCOSE METER (BEAKER) (test code = 1538) 219 mg/dL      

     H            TESTED AT [address] [address]





POCT-GLUCOSE [id number] 21:40:00* 



             Test Item    Value        Reference Range Interpretation Comments

 

             POC-GLUCOSE METER (BEAKER) (test code = 1538) 130 mg/dL      

     H            TESTED AT [address] [address]





POCT-GLUCOSE [id number] 20:11:00* 



             Test Item    Value        Reference Range Interpretation Comments

 

             POC-GLUCOSE METER (BEAKER) (test code = 1538) 156 mg/dL      

     H            TESTED AT [address] [address]





HEMOGLOBIN [id number] 19:32:00* 



             Test Item    Value        Reference Range Interpretation Comments

 

             HEMOGLOBIN A1C (BEAKER) (test code = 368) 9.3 %        4.3-6.1     

 H             





VANCOMYCIN LEVEL, [id number] 17:43:00* 



             Test Item    Value        Reference Range Interpretation Comments

 

             VANCOMYCIN TROUGH (BEAKER) (test code = 522) 9.2 ug/mL    10.0-20.0

    L             





POCT-GLUCOSE [id number] 13:32:00* 



             Test Item    Value        Reference Range Interpretation Comments

 

             POC-GLUCOSE METER (BEAKER) (test code = 1538) 213 mg/dL      

     H            TESTED AT [address] [address]





POCT-GLUCOSE [id number] 11:53:00* 



             Test Item    Value        Reference Range Interpretation Comments

 

             POC-GLUCOSE METER (BEAKER) (test code = 1538) 283 mg/dL      

     H            TESTED AT [address] [address]





T4, [id number] 08:37:00* 



             Test Item    Value        Reference Range Interpretation Comments

 

             FREE T4 (BEAKER) (test code = 655) 0.92 ng/dL   0.70-1.48          

        





TSH/FREE T4 IF [id number] 07:35:00* 



             Test Item    Value        Reference Range Interpretation Comments

 

             THYROID STIMULATING HORMONE (BEAKER) (test code = 772) 8.07 uIU/mL 

 0.35-4.94    H             





C-REACTIVE [id number] 07:11:00* 



             Test Item    Value        Reference Range Interpretation Comments

 

             C-REACTIVE PROTEIN (BEAKER) (test code = 676) 17.40 mg/dL  0.00-0.5

0    H             





POCT-GLUCOSE [id number] 06:59:00* 



             Test Item    Value        Reference Range Interpretation Comments

 

             POC-GLUCOSE METER (BEAKER) (test code = 1538) 236 mg/dL      

     H            TESTED AT [address] [address]





POCT-LACTIC ACID, [id number] 06:37:00* 



             Test Item    Value        Reference Range Interpretation Comments

 

             POC-LACTIC ACID, VENOUS (BEAKER) (test code = 2805) 0.7 mmol/L   0.

9-1.7      L            TESTED 

AT [place] [address]





[id number] 05:12:00* 



             Test Item    Value        Reference Range Interpretation Comments

 

             PROCALCITONIN (BEAKER) (test code = 3036) 0.07 ng/mL   <0.05       

 H             





SEPSIS RISK (ng/mL)Low:          0.05-0.50Intermediate: 0.51-2.00High:         >
=2.01POCT-LACTIC ACID, [id number] 04:16:00* 



             Test Item    Value        Reference Range Interpretation Comments

 

             POC-LACTIC ACID, VENOUS (BEAKER) (test code = 2805) 0.8 mmol/L   0.

9-1.7      L            TESTED 

AT [address] [address]





POCT-GLUCOSE [id number] 04:14:00* 



             Test Item    Value        Reference Range Interpretation Comments

 

             POC-GLUCOSE METER (BEAKER) (test code = 1538) 280 mg/dL      

     H            TESTED AT [address] [address]





BASIC METABOLIC [id number] 01:38:00* 



             Test Item    Value        Reference Range Interpretation Comments

 

             SODIUM (BEAKER) (test code = 381) 135 meq/L    136-145      L      

       

 

             POTASSIUM (BEAKER) (test code = 379) 3.7 meq/L    3.5-5.1          

          

 

             CHLORIDE (BEAKER) (test code = 382) 103 meq/L                

         

 

             CO2 (BEAKER) (test code = 355) 22 meq/L     22-29                  

    

 

             BLOOD UREA NITROGEN (BEAKER) (test code = 354) 12 mg/dL     7-21   

                    

 

             CREATININE (BEAKER) (test code = 358) 0.82 mg/dL   0.57-1.25       

           

 

             GLUCOSE RANDOM (BEAKER) (test code = 652) 341 mg/dL          

 H             

 

             CALCIUM (BEAKER) (test code = 697) 9.0 mg/dL    8.4-10.2           

        

 

             EGFR (BEAKER) (test code = 1092) 106 mL/min/1.73 sq m              

             ESTIMATED GFR IS NOT 

AS ACCURATE AS CREATININE CLEARANCE IN PREDICTING GLOMERULAR FILTRATION RATE. 
ESTIMATED GFR IS NOT APPLICABLE FOR DIALYSIS PATIENTS.





CBC W/PLT COUNT & AUTO [id number] 01:17:00* 



             Test Item    Value        Reference Range Interpretation Comments

 

             WHITE BLOOD CELL COUNT (BEAKER) (test code = 775) 13.8 K/ L    3.5-

10.5     H             

 

             RED BLOOD CELL COUNT (BEAKER) (test code = 761) 3.85 M/ L    4.63-6

.08    L             

 

             HEMOGLOBIN (BEAKER) (test code = 410) 10.3 GM/DL   13.7-17.5    L  

           

 

             HEMATOCRIT (BEAKER) (test code = 411) 33.0 %       40.1-51.0    L  

           

 

             MEAN CORPUSCULAR VOLUME (BEAKER) (test code = 753) 85.7 fL      79.

0-92.2                  

 

             MEAN CORPUSCULAR HEMOGLOBIN (BEAKER) (test code = 751) 26.8 pg     

 25.7-32.2                  

 

                    MEAN CORPUSCULAR HEMOGLOBIN CONC (BEAKER) (test code = 752) 

31.2 GM/DL          32.3-36.5

                          L                          

 

             RED CELL DISTRIBUTION WIDTH (BEAKER) (test code = 412) 15.8 %      

 11.6-14.4    H             

 

             PLATELET COUNT (BEAKER) (test code = 756) 379 K/CU MM  150-450     

               

 

             MEAN PLATELET VOLUME (BEAKER) (test code = 754) 9.3 fL       9.4-12

.4     L             

 

             NUCLEATED RED BLOOD CELLS (BEAKER) (test code = 413) 0 /100 WBC   0

-0                        

 

             NEUTROPHILS RELATIVE PERCENT (BEAKER) (test code = 429) 56 %       

                             

 

             LYMPHOCYTES RELATIVE PERCENT (BEAKER) (test code = 430) 30 %       

                             

 

             MONOCYTES RELATIVE PERCENT (BEAKER) (test code = 431) 10 %         

                           

 

             EOSINOPHILS RELATIVE PERCENT (BEAKER) (test code = 432) 4 %        

                             

 

             BASOPHILS RELATIVE PERCENT (BEAKER) (test code = 437) 0 %          

                           

 

             NEUTROPHILS ABSOLUTE COUNT (BEAKER) (test code = 670) 7.74 K/ L    

1.78-5.38    H             

 

             LYMPHOCYTES ABSOLUTE COUNT (BEAKER) (test code = 414) 4.13 K/ L    

1.32-3.57    H             

 

             MONOCYTES ABSOLUTE COUNT (BEAKER) (test code = 415) 1.31 K/ L    0.

30-0.82    H             

 

             EOSINOPHILS ABSOLUTE COUNT (BEAKER) (test code = 416) 0.49 K/ L    

0.04-0.54                  

 

             BASOPHILS ABSOLUTE COUNT (BEAKER) (test code = 417) 0.04 K/ L    0.

01-0.08                  

 

             IMMATURE GRANULOCYTES-RELATIVE PERCENT (BEAKER) (test code = 2801) 

0 %          0-1                        





RAD, CHEST, 1 VIEW, NON [id number] 16:35:00Reason for exam:->check picc 
placementShould this be performed at the bedside?->YesFINAL REPORT PATIENT ID:  
 [id number] EXAM: Frontal chest radiograph, 2 images HISTORY PROVIDED: Check PICC 
placement COMPARISON: 2018 at 1214 IMPRESSION:Interval placement of a 
right upper extremity PICC line with the tip projecting over the proximal SVC. 
There is blunting of the right costophrenic angle with right basilar opacities 
compatible with a small pleural effusion and atelectasis or consolidation the 
right lung base without significant change since the previous examination. The 
left lung is clear. No discernible pneumothorax. Cardiomediastinal contours are 
stable. No acute osseous abnormality. Signed: Vishnu Cisneros 
Verified Date/Time:  2018 16:35:17 Reading Location: [facility] Reading 
Room      Electronically signed by: VISHNU CISNEROS on 2018 04:35 PM 
POCT-GLUCOSE [id number] 12:42:00* 



             Test Item    Value        Reference Range Interpretation Comments

 

             POC-GLUCOSE METER (BEAKER) (test code = 1538) 101 mg/dL      

                  TESTED AT [facility]

 [address] [place] [id number]





RAD, CHEST, 1 VIEW, NON [id number] 12:30:00Reason for exam:->VASCULAR ACCESS
 PROBLEMShould this be performed at the bedside?->YesFINAL REPORT PATIENT ID:   
[id number] INDICATION: VASCULAR ACCESS PROBLEM COMPARISON:2018 
TECHNIQUE: Chest radiograph, single view, portable technique. FINDINGS / 
IMPRESSION: Previously demonstrated right PICC line is removed. There is a right
 subpulmonic pleural effusion, versus elevated right hemidiaphragm. No pulmonary
 edema, consolidation, or pneumothorax is demonstrated. Cardiac and mediastinal 
contours are normal. Osseous structures unremarkable. Signed: Uriel Cooper 
MDReport Verified Date/Time:  2018 12:30:28 Reading Location: [facility] Radiology Reading Room Electronically signed by: URIEL COOPER M.D. on 2018 12:30 PM FUNGUS CULTURE +  [id number] 08:53:00* 



             Test Item    Value        Reference Range Interpretation Comments

 

             CULTURE (BEAKER) (test code = 1095) No fungus isolated in 28 days  

                          

 

             FUNGUS SMEAR (BEAKER) (test code = 1406) No fungi seen             

               





POCT-GLUCOSE [id number] 12:39:00* 



             Test Item    Value        Reference Range Interpretation Comments

 

             POC-GLUCOSE METER (BEAKER) (test code = 1538) 94 mg/dL       

                  TESTED AT [address] [address]





CBC (HEMOGRAM ONLY)2018 11:41:00* 



             Test Item    Value        Reference Range Interpretation Comments

 

             WHITE BLOOD CELL COUNT (BEAKER) (test code = 775) 11.1 K/ L    3.5-

10.5     H             

 

             RED BLOOD CELL COUNT (BEAKER) (test code = 761) 3.24 M/ L    4.63-6

.08    L             

 

             HEMOGLOBIN (BEAKER) (test code = 410) 8.8 GM/DL    13.7-17.5    L  

           

 

             HEMATOCRIT (BEAKER) (test code = 411) 28.9 %       40.1-51.0    L  

           

 

             MEAN CORPUSCULAR VOLUME (BEAKER) (test code = 753) 89.2 fL      79.

0-92.2                  

 

             MEAN CORPUSCULAR HEMOGLOBIN (BEAKER) (test code = 751) 27.2 pg     

 25.7-32.2                  

 

                    MEAN CORPUSCULAR HEMOGLOBIN CONC (BEAKER) (test code = 752) 

30.4 GM/DL          32.3-36.5

                          L                          

 

             RED CELL DISTRIBUTION WIDTH (BEAKER) (test code = 412) 15.6 %      

 11.6-14.4    H             

 

             PLATELET COUNT (BEAKER) (test code = 756) 657 K/CU MM  150-450     

 H             

 

             MEAN PLATELET VOLUME (BEAKER) (test code = 754) 8.1 fL       9.4-12

.4     L             

 

             NUCLEATED RED BLOOD CELLS (BEAKER) (test code = 413) 0 /100 WBC   0

-0                        





POCT-GLUCOSE [id number] 09:17:00* 



             Test Item    Value        Reference Range Interpretation Comments

 

             POC-GLUCOSE METER (BEAKER) (test code = 1538) 139 mg/dL      

     H            TESTED AT [address] [address]





POCT-GLUCOSE [id number] 21:23:00* 



             Test Item    Value        Reference Range Interpretation Comments

 

             POC-GLUCOSE METER (BEAKER) (test code = 1538) 144 mg/dL      

     H            TESTED AT [address] [address]





POCT-GLUCOSE [id number] 17:35:00* 



             Test Item    Value        Reference Range Interpretation Comments

 

             POC-GLUCOSE METER (BEAKER) (test code = 1538) 254 mg/dL      

     H            TESTED AT [address] [address]





POCT-GLUCOSE [id number] 11:41:00* 



             Test Item    Value        Reference Range Interpretation Comments

 

             POC-GLUCOSE METER (BEAKER) (test code = 1538) 190 mg/dL      

     H            TESTED AT [address] [address]





POCT-GLUCOSE [id number] 07:43:00* 



             Test Item    Value        Reference Range Interpretation Comments

 

             POC-GLUCOSE METER (BEAKER) (test code = 1538) 228 mg/dL      

     H            TESTED AT [address] [address]





POCT-GLUCOSE [id number] 21:02:00* 



             Test Item    Value        Reference Range Interpretation Comments

 

             POC-GLUCOSE METER (BEAKER) (test code = 1538) 182 mg/dL      

     H            TESTED AT [address] [address]





POCT-GLUCOSE [id number] 17:20:00* 



             Test Item    Value        Reference Range Interpretation Comments

 

             POC-GLUCOSE METER (BEAKER) (test code = 1538) 174 mg/dL      

     H            TESTED AT [address] [address]





POCT-GLUCOSE [id number] 12:04:00* 



             Test Item    Value        Reference Range Interpretation Comments

 

             POC-GLUCOSE METER (BEAKER) (test code = 1538) 233 mg/dL      

     H            TESTED AT [address] [address]





POCT-GLUCOSE [id number] 08:45:00* 



             Test Item    Value        Reference Range Interpretation Comments

 

             POC-GLUCOSE METER (BEAKER) (test code = 1538) 194 mg/dL      

     H            TESTED AT [address] [address]





POCT-GLUCOSE [id number] 21:00:00* 



             Test Item    Value        Reference Range Interpretation Comments

 

             POC-GLUCOSE METER (BEAKER) (test code = 1538) 315 mg/dL      

     H            TESTED AT [address] [address]





POCT-GLUCOSE [id number] 17:17:00* 



             Test Item    Value        Reference Range Interpretation Comments

 

             POC-GLUCOSE METER (BEAKER) (test code = 1538) 191 mg/dL      

     H            TESTED AT [address] [address]





POCT-GLUCOSE [id number] 11:56:00* 



             Test Item    Value        Reference Range Interpretation Comments

 

             POC-GLUCOSE METER (BEAKER) (test code = 1538) 138 mg/dL      

     H            TESTED AT [address] [address]





POCT-GLUCOSE [id number] 08:34:00* 



             Test Item    Value        Reference Range Interpretation Comments

 

             POC-GLUCOSE METER (BEAKER) (test code = 1538) 235 mg/dL      

     H            TESTED AT [address] [address]





BASIC METABOLIC [id number] 05:10:00* 



             Test Item    Value        Reference Range Interpretation Comments

 

             SODIUM (BEAKER) (test code = 381) 138 meq/L    136-145             

       

 

             POTASSIUM (BEAKER) (test code = 379) 4.2 meq/L    3.5-5.1          

          

 

             CHLORIDE (BEAKER) (test code = 382) 103 meq/L                

         

 

             CO2 (BEAKER) (test code = 355) 27 meq/L     22-29                  

    

 

             BLOOD UREA NITROGEN (BEAKER) (test code = 354) 17 mg/dL     7-21   

                    

 

             CREATININE (BEAKER) (test code = 358) 0.75 mg/dL   0.57-1.25       

           

 

             GLUCOSE RANDOM (BEAKER) (test code = 652) 188 mg/dL          

 H             

 

             CALCIUM (BEAKER) (test code = 697) 9.1 mg/dL    8.4-10.2           

        

 

             EGFR (BEAKER) (test code = 1092) 118 mL/min/1.73 sq m              

             ESTIMATED GFR IS NOT 

AS ACCURATE AS CREATININE CLEARANCE IN PREDICTING GLOMERULAR FILTRATION RATE. 
ESTIMATED GFR IS NOT APPLICABLE FOR DIALYSIS PATIENTS.





CBC (HEMOGRAM ONLY)2018 04:53:00* 



             Test Item    Value        Reference Range Interpretation Comments

 

             WHITE BLOOD CELL COUNT (BEAKER) (test code = 775) 14.1 K/ L    3.5-

10.5     H             

 

             RED BLOOD CELL COUNT (BEAKER) (test code = 761) 3.39 M/ L    4.63-6

.08    L             

 

             HEMOGLOBIN (BEAKER) (test code = 410) 8.8 GM/DL    13.7-17.5    L  

           

 

             HEMATOCRIT (BEAKER) (test code = 411) 30.6 %       40.1-51.0    L  

           

 

             MEAN CORPUSCULAR VOLUME (BEAKER) (test code = 753) 90.3 fL      79.

0-92.2                  

 

             MEAN CORPUSCULAR HEMOGLOBIN (BEAKER) (test code = 751) 26.0 pg     

 25.7-32.2                  

 

                    MEAN CORPUSCULAR HEMOGLOBIN CONC (BEAKER) (test code = 752) 

28.8 GM/DL          32.3-36.5

                          L                          

 

             RED CELL DISTRIBUTION WIDTH (BEAKER) (test code = 412) 15.7 %      

 11.6-14.4    H             

 

             PLATELET COUNT (BEAKER) (test code = 756) 848 K/CU MM  150-450     

 H             

 

             MEAN PLATELET VOLUME (BEAKER) (test code = 754) 8.0 fL       9.4-12

.4     L             

 

             NUCLEATED RED BLOOD CELLS (BEAKER) (test code = 413) 0 /100 WBC   0

-0                        





POCT-GLUCOSE [id number] 20:53:00* 



             Test Item    Value        Reference Range Interpretation Comments

 

             POC-GLUCOSE METER (BEAKER) (test code = 1538) 249 mg/dL      

     H            TESTED AT [facility]

 [address] [facility] [address]





POCT-GLUCOSE [id number] 19:02:00* 



             Test Item    Value        Reference Range Interpretation Comments

 

             POC-GLUCOSE METER (BEAKER) (test code = 1538) 295 mg/dL      

     H            TESTED AT [address] [address]





POCT-GLUCOSE [id number] 14:25:00* 



             Test Item    Value        Reference Range Interpretation Comments

 

             POC-GLUCOSE METER (BEAKER) (test code = 1538) 192 mg/dL      

     H            TESTED AT [address] [address]





POCT-GLUCOSE [id number] 14:25:00* 



             Test Item    Value        Reference Range Interpretation Comments

 

             POC-GLUCOSE METER (BEAKER) (test code = 1538) 214 mg/dL      

     H            TESTED AT [address] [address]





POCT-GLUCOSE [id number] 21:50:00* 



             Test Item    Value        Reference Range Interpretation Comments

 

             POC-GLUCOSE METER (BEAKER) (test code = 1538) 188 mg/dL      

     H            TESTED AT [address] [address]





POCT-GLUCOSE [id number] 18:25:00* 



             Test Item    Value        Reference Range Interpretation Comments

 

             POC-GLUCOSE METER (BEAKER) (test code = 1538) 205 mg/dL      

     H            TESTED AT [address] [address]





POCT-GLUCOSE [id number] 12:28:00* 



             Test Item    Value        Reference Range Interpretation Comments

 

             POC-GLUCOSE METER (BEAKER) (test code = 1538) 259 mg/dL      

     H            TESTED AT [address] [address]





POCT-GLUCOSE [id number] 07:50:00* 



             Test Item    Value        Reference Range Interpretation Comments

 

             POC-GLUCOSE METER (BEAKER) (test code = 1538) 220 mg/dL      

     H            TESTED AT [address] [address]





POCT-GLUCOSE [id number] 22:03:00* 



             Test Item    Value        Reference Range Interpretation Comments

 

             POC-GLUCOSE METER (BEAKER) (test code = 1538) 208 mg/dL      

     H            TESTED AT [address] [address]





POCT-GLUCOSE [id number] 17:25:00* 



             Test Item    Value        Reference Range Interpretation Comments

 

             POC-GLUCOSE METER (BEAKER) (test code = 1538) 124 mg/dL      

     H            TESTED AT [address] [address]





POCT-GLUCOSE [id number] 11:52:00* 



             Test Item    Value        Reference Range Interpretation Comments

 

             POC-GLUCOSE METER (BEAKER) (test code = 1538) 143 mg/dL      

     H            TESTED AT [address] [address]





POCT-GLUCOSE [id number] 07:52:00* 



             Test Item    Value        Reference Range Interpretation Comments

 

             POC-GLUCOSE METER (BEAKER) (test code = 1538) 209 mg/dL      

     H            TESTED AT [address] [address]





POCT-GLUCOSE [id number] 20:58:00* 



             Test Item    Value        Reference Range Interpretation Comments

 

             POC-GLUCOSE METER (BEAKER) (test code = 1538) 183 mg/dL      

     H            TESTED AT [address] [address]





POCT-GLUCOSE [id number] 17:25:00* 



             Test Item    Value        Reference Range Interpretation Comments

 

             POC-GLUCOSE METER (BEAKER) (test code = 1538) 173 mg/dL      

     H            TESTED AT [address] [address]





POCT-GLUCOSE [id number] 12:29:00* 



             Test Item    Value        Reference Range Interpretation Comments

 

             POC-GLUCOSE METER (BEAKER) (test code = 1538) 285 mg/dL      

     H            TESTED AT [address] [address]





POCT-GLUCOSE [id number] 07:53:00* 



             Test Item    Value        Reference Range Interpretation Comments

 

             POC-GLUCOSE METER (BEAKER) (test code = 1538) 224 mg/dL      

     H            TESTED AT [address] [address]





CBC W/PLT COUNT & AUTO [id number] 07:11:00* 



             Test Item    Value        Reference Range Interpretation Comments

 

             WHITE BLOOD CELL COUNT (BEAKER) (test code = 775) 12.2 K/ L    3.5-

10.5     H             

 

             RED BLOOD CELL COUNT (BEAKER) (test code = 761) 2.91 M/ L    4.63-6

.08    L             

 

             HEMOGLOBIN (BEAKER) (test code = 410) 7.8 GM/DL    13.7-17.5    L  

           

 

             HEMATOCRIT (BEAKER) (test code = 411) 26.8 %       40.1-51.0    L  

           

 

             MEAN CORPUSCULAR VOLUME (BEAKER) (test code = 753) 92.1 fL      79.

0-92.2                  

 

             MEAN CORPUSCULAR HEMOGLOBIN (BEAKER) (test code = 751) 26.8 pg     

 25.7-32.2                  

 

                    MEAN CORPUSCULAR HEMOGLOBIN CONC (BEAKER) (test code = 752) 

29.1 GM/DL          32.3-36.5

                          L                          

 

             RED CELL DISTRIBUTION WIDTH (BEAKER) (test code = 412) 15.4 %      

 11.6-14.4    H             

 

             PLATELET COUNT (BEAKER) (test code = 756) 700 K/CU MM  150-450     

 H             

 

             MEAN PLATELET VOLUME (BEAKER) (test code = 754) 9.1 fL       9.4-12

.4     L             

 

             NUCLEATED RED BLOOD CELLS (BEAKER) (test code = 413) 0 /100 WBC   0

-0                        

 

             NEUTROPHILS RELATIVE PERCENT (BEAKER) (test code = 429) 56 %       

                             

 

             LYMPHOCYTES RELATIVE PERCENT (BEAKER) (test code = 430) 34 %       

                             

 

             MONOCYTES RELATIVE PERCENT (BEAKER) (test code = 431) 7 %          

                           

 

             EOSINOPHILS RELATIVE PERCENT (BEAKER) (test code = 432) 3 %        

                             

 

             BASOPHILS RELATIVE PERCENT (BEAKER) (test code = 437) 0 %          

                           

 

             NEUTROPHILS ABSOLUTE COUNT (BEAKER) (test code = 670) 6.78 K/ L    

1.78-5.38    H             

 

             LYMPHOCYTES ABSOLUTE COUNT (BEAKER) (test code = 414) 4.09 K/ L    

1.32-3.57    H             

 

             MONOCYTES ABSOLUTE COUNT (BEAKER) (test code = 415) 0.79 K/ L    0.

30-0.82                  

 

             EOSINOPHILS ABSOLUTE COUNT (BEAKER) (test code = 416) 0.36 K/ L    

0.04-0.54                  

 

             BASOPHILS ABSOLUTE COUNT (BEAKER) (test code = 417) 0.03 K/ L    0.

01-0.08                  

 

             IMMATURE GRANULOCYTES-RELATIVE PERCENT (BEAKER) (test code = 2801) 

1 %          0-1                        





[id number] 07:10:00* 



             Test Item    Value        Reference Range Interpretation Comments

 

             PREALBUMIN (BEAKER) (test code = 586) 13 mg/dL     14-45        L  

           





[id number] 06:58:00* 



             Test Item    Value        Reference Range Interpretation Comments

 

             MAGNESIUM (BEAKER) (test code = 627) 2.0 mg/dL    1.6-2.6          

          





BASIC METABOLIC [id number] 06:58:00* 



             Test Item    Value        Reference Range Interpretation Comments

 

             SODIUM (BEAKER) (test code = 381) 137 meq/L    136-145             

       

 

             POTASSIUM (BEAKER) (test code = 379) 4.1 meq/L    3.5-5.1          

          

 

             CHLORIDE (BEAKER) (test code = 382) 105 meq/L                

         

 

             CO2 (BEAKER) (test code = 355) 27 meq/L     22-29                  

    

 

             BLOOD UREA NITROGEN (BEAKER) (test code = 354) 16 mg/dL     7-21   

                    

 

             CREATININE (BEAKER) (test code = 358) 0.70 mg/dL   0.57-1.25       

           

 

             GLUCOSE RANDOM (BEAKER) (test code = 652) 162 mg/dL          

 H             

 

             CALCIUM (BEAKER) (test code = 697) 8.7 mg/dL    8.4-10.2           

        

 

             EGFR (BEAKER) (test code = 1092) 128 mL/min/1.73 sq m              

             ESTIMATED GFR IS NOT 

AS ACCURATE AS CREATININE CLEARANCE IN PREDICTING GLOMERULAR FILTRATION RATE. 
ESTIMATED GFR IS NOT APPLICABLE FOR DIALYSIS PATIENTS.





[id number] 06:58:00* 



             Test Item    Value        Reference Range Interpretation Comments

 

             ALBUMIN (BEAKER) (test code = 1145) 2.6 g/dL     3.5-5.0      L    

         





POCT-GLUCOSE [id number] 21:31:00* 



             Test Item    Value        Reference Range Interpretation Comments

 

             POC-GLUCOSE METER (BEAKER) (test code = 1538) 167 mg/dL      

     H            TESTED AT [address] [address]





POCT-GLUCOSE [id number] 18:19:00* 



             Test Item    Value        Reference Range Interpretation Comments

 

             POC-GLUCOSE METER (BEAKER) (test code = 1538) 210 mg/dL      

     H            TESTED AT [address] [address]





POCT-GLUCOSE [id number] 12:24:00* 



             Test Item    Value        Reference Range Interpretation Comments

 

             POC-GLUCOSE METER (BEAKER) (test code = 1538) 124 mg/dL      

     H            TESTED AT [address] [address]





POCT-GLUCOSE [id number] 08:23:00* 



             Test Item    Value        Reference Range Interpretation Comments

 

             POC-GLUCOSE METER (BEAKER) (test code = 1538) 179 mg/dL      

     H            TESTED AT [facility]

 [address] [facility] [address]





[id number] 04:50:00* 



             Test Item    Value        Reference Range Interpretation Comments

 

             MAGNESIUM (BEAKER) (test code = 627) 2.0 mg/dL    1.6-2.6          

          





BASIC METABOLIC [id number] 04:50:00* 



             Test Item    Value        Reference Range Interpretation Comments

 

             SODIUM (BEAKER) (test code = 381) 134 meq/L    136-145      L      

       

 

             POTASSIUM (BEAKER) (test code = 379) 3.9 meq/L    3.5-5.1          

          

 

             CHLORIDE (BEAKER) (test code = 382) 104 meq/L                

         

 

             CO2 (BEAKER) (test code = 355) 22 meq/L     22-29                  

    

 

             BLOOD UREA NITROGEN (BEAKER) (test code = 354) 10 mg/dL     7-21   

                    

 

             CREATININE (BEAKER) (test code = 358) 0.66 mg/dL   0.57-1.25       

           

 

             GLUCOSE RANDOM (BEAKER) (test code = 652) 230 mg/dL          

 H             

 

             CALCIUM (BEAKER) (test code = 697) 8.0 mg/dL    8.4-10.2     L     

        

 

             EGFR (BEAKER) (test code = 1092) 137 mL/min/1.73 sq m              

             ESTIMATED GFR IS NOT 

AS ACCURATE AS CREATININE CLEARANCE IN PREDICTING GLOMERULAR FILTRATION RATE. 
ESTIMATED GFR IS NOT APPLICABLE FOR DIALYSIS PATIENTS.





CBC W/PLT COUNT & AUTO [id number] 04:38:00* 



             Test Item    Value        Reference Range Interpretation Comments

 

             WHITE BLOOD CELL COUNT (BEAKER) (test code = 775) 13.3 K/ L    3.5-

10.5     H             

 

             RED BLOOD CELL COUNT (BEAKER) (test code = 761) 2.69 M/ L    4.63-6

.08    L             

 

             HEMOGLOBIN (BEAKER) (test code = 410) 7.3 GM/DL    13.7-17.5    L  

           

 

             HEMATOCRIT (BEAKER) (test code = 411) 24.9 %       40.1-51.0    L  

           

 

             MEAN CORPUSCULAR VOLUME (BEAKER) (test code = 753) 92.6 fL      79.

0-92.2    H             

 

             MEAN CORPUSCULAR HEMOGLOBIN (BEAKER) (test code = 751) 27.1 pg     

 25.7-32.2                  

 

                    MEAN CORPUSCULAR HEMOGLOBIN CONC (BEAKER) (test code = 752) 

29.3 GM/DL          32.3-36.5

                          L                          

 

             RED CELL DISTRIBUTION WIDTH (BEAKER) (test code = 412) 15.3 %      

 11.6-14.4    H             

 

             PLATELET COUNT (BEAKER) (test code = 756) 735 K/CU MM  150-450     

 H             

 

             MEAN PLATELET VOLUME (BEAKER) (test code = 754) 8.1 fL       9.4-12

.4     L             

 

             NUCLEATED RED BLOOD CELLS (BEAKER) (test code = 413) 0 /100 WBC   0

-0                        

 

             NEUTROPHILS RELATIVE PERCENT (BEAKER) (test code = 429) 57 %       

                             

 

             LYMPHOCYTES RELATIVE PERCENT (BEAKER) (test code = 430) 32 %       

                             

 

             MONOCYTES RELATIVE PERCENT (BEAKER) (test code = 431) 8 %          

                           

 

             EOSINOPHILS RELATIVE PERCENT (BEAKER) (test code = 432) 2 %        

                             

 

             BASOPHILS RELATIVE PERCENT (BEAKER) (test code = 437) 0 %          

                           

 

             NEUTROPHILS ABSOLUTE COUNT (BEAKER) (test code = 670) 7.56 K/ L    

1.78-5.38    H             

 

             LYMPHOCYTES ABSOLUTE COUNT (BEAKER) (test code = 414) 4.19 K/ L    

1.32-3.57    H             

 

             MONOCYTES ABSOLUTE COUNT (BEAKER) (test code = 415) 1.02 K/ L    0.

30-0.82    H             

 

             EOSINOPHILS ABSOLUTE COUNT (BEAKER) (test code = 416) 0.28 K/ L    

0.04-0.54                  

 

             BASOPHILS ABSOLUTE COUNT (BEAKER) (test code = 417) 0.05 K/ L    0.

01-0.08                  

 

             IMMATURE GRANULOCYTES-RELATIVE PERCENT (BEAKER) (test code = 2801) 

2 %          0-1          H             





ANAEROBIC [id number] 04:16:00* 



             Test Item    Value        Reference Range Interpretation Comments

 

             CULTURE (BEAKER) (test code = 1095) No anaerobes isolated          

                  





VANCOMYCIN LEVEL, [id number] 03:30:00* 



             Test Item    Value        Reference Range Interpretation Comments

 

             VANCOMYCIN TROUGH (BEAKER) (test code = 522) 11.6 ug/mL   10.0-20.0

                  





POCT-GLUCOSE [id number] 22:51:00* 



             Test Item    Value        Reference Range Interpretation Comments

 

             POC-GLUCOSE METER (BEAKER) (test code = 1538) 312 mg/dL      

     H            Notified RN 

MD/TESTED AT [address] [address]





POCT-GLUCOSE [id number] 17:22:00* 



             Test Item    Value        Reference Range Interpretation Comments

 

             POC-GLUCOSE METER (BEAKER) (test code = 1538) 112 mg/dL      

     H            TESTED AT [address] [address]





POCT-GLUCOSE [id number] 12:43:00* 



             Test Item    Value        Reference Range Interpretation Comments

 

             POC-GLUCOSE METER (BEAKER) (test code = 1538) 113 mg/dL      

     H            TESTED AT [address] [address]





POCT-GLUCOSE [id number] 08:20:00* 



             Test Item    Value        Reference Range Interpretation Comments

 

             POC-GLUCOSE METER (BEAKER) (test code = 1538) 122 mg/dL      

     H            TESTED AT [address] [address]





BASIC METABOLIC [id number] 06:37:00* 



             Test Item    Value        Reference Range Interpretation Comments

 

             SODIUM (BEAKER) (test code = 381) 138 meq/L    136-145             

       

 

             POTASSIUM (BEAKER) (test code = 379) 3.7 meq/L    3.5-5.1          

          

 

             CHLORIDE (BEAKER) (test code = 382) 105 meq/L                

         

 

             CO2 (BEAKER) (test code = 355) 27 meq/L     22-29                  

    

 

             BLOOD UREA NITROGEN (BEAKER) (test code = 354) 7 mg/dL      7-21   

                    

 

             CREATININE (BEAKER) (test code = 358) 0.51 mg/dL   0.57-1.25    L  

           

 

             GLUCOSE RANDOM (BEAKER) (test code = 652) 95 mg/dL           

               

 

             CALCIUM (BEAKER) (test code = 697) 7.9 mg/dL    8.4-10.2     L     

        

 

             EGFR (BEAKER) (test code = 1092) 184 mL/min/1.73 sq m              

             ESTIMATED GFR IS NOT 

AS ACCURATE AS CREATININE CLEARANCE IN PREDICTING GLOMERULAR FILTRATION RATE. 
ESTIMATED GFR IS NOT APPLICABLE FOR DIALYSIS PATIENTS.





[id number] 06:32:00* 



             Test Item    Value        Reference Range Interpretation Comments

 

             MAGNESIUM (BEAKER) (test code = 627) 1.9 mg/dL    1.6-2.6          

          





CBC W/PLT COUNT & AUTO [id number] 06:03:00* 



             Test Item    Value        Reference Range Interpretation Comments

 

             WHITE BLOOD CELL COUNT (BEAKER) (test code = 775) 15.4 K/ L    3.5-

10.5     H             

 

             RED BLOOD CELL COUNT (BEAKER) (test code = 761) 2.68 M/ L    4.63-6

.08    L             

 

             HEMOGLOBIN (BEAKER) (test code = 410) 7.1 GM/DL    13.7-17.5    L  

           

 

             HEMATOCRIT (BEAKER) (test code = 411) 24.3 %       40.1-51.0    L  

           

 

             MEAN CORPUSCULAR VOLUME (BEAKER) (test code = 753) 90.7 fL      79.

0-92.2                  

 

             MEAN CORPUSCULAR HEMOGLOBIN (BEAKER) (test code = 751) 26.5 pg     

 25.7-32.2                  

 

                    MEAN CORPUSCULAR HEMOGLOBIN CONC (BEAKER) (test code = 752) 

29.2 GM/DL          32.3-36.5

                          L                          

 

             RED CELL DISTRIBUTION WIDTH (BEAKER) (test code = 412) 14.8 %      

 11.6-14.4    H             

 

             PLATELET COUNT (BEAKER) (test code = 756) 738 K/CU MM  150-450     

 H             

 

             MEAN PLATELET VOLUME (BEAKER) (test code = 754) 8.3 fL       9.4-12

.4     L             

 

             NUCLEATED RED BLOOD CELLS (BEAKER) (test code = 413) 0 /100 WBC   0

-0                        

 

             NEUTROPHILS RELATIVE PERCENT (BEAKER) (test code = 429) 63 %       

                             

 

             LYMPHOCYTES RELATIVE PERCENT (BEAKER) (test code = 430) 26 %       

                             

 

             MONOCYTES RELATIVE PERCENT (BEAKER) (test code = 431) 8 %          

                           

 

             EOSINOPHILS RELATIVE PERCENT (BEAKER) (test code = 432) 2 %        

                             

 

             BASOPHILS RELATIVE PERCENT (BEAKER) (test code = 437) 0 %          

                           

 

             NEUTROPHILS ABSOLUTE COUNT (BEAKER) (test code = 670) 9.70 K/ L    

1.78-5.38    H             

 

             LYMPHOCYTES ABSOLUTE COUNT (BEAKER) (test code = 414) 3.98 K/ L    

1.32-3.57    H             

 

             MONOCYTES ABSOLUTE COUNT (BEAKER) (test code = 415) 1.17 K/ L    0.

30-0.82    H             

 

             EOSINOPHILS ABSOLUTE COUNT (BEAKER) (test code = 416) 0.26 K/ L    

0.04-0.54                  

 

             BASOPHILS ABSOLUTE COUNT (BEAKER) (test code = 417) 0.05 K/ L    0.

01-0.08                  

 

             IMMATURE GRANULOCYTES-RELATIVE PERCENT (BEAKER) (test code = 2801) 

2 %          0-1          H             





POCT-GLUCOSE [id number] 02:08:00* 



             Test Item    Value        Reference Range Interpretation Comments

 

             POC-GLUCOSE METER (BEAKER) (test code = 1538) 193 mg/dL      

     H            TESTED AT [address] [address]





POCT-GLUCOSE [id number] 23:26:00* 



             Test Item    Value        Reference Range Interpretation Comments

 

             POC-GLUCOSE METER (BEAKER) (test code = 1538) 450 mg/dL      

     HH           Notified RN 

MD/TESTED AT [address] [address]





POCT-GLUCOSE [id number] 16:58:00* 



             Test Item    Value        Reference Range Interpretation Comments

 

             POC-GLUCOSE METER (BEAKER) (test code = 1538) 258 mg/dL      

     H            TESTED AT [address] [address]





POCT-GLUCOSE [id number] 15:07:00* 



             Test Item    Value        Reference Range Interpretation Comments

 

             POC-GLUCOSE METER (BEAKER) (test code = 1538) 184 mg/dL      

     H            TESTED AT [address] [address]





SURGICALLY OBTAINED CULTURE + GRAM [id number] 12:14:00* 



             Test Item    Value        Reference Range Interpretation Comments

 

             CULTURE (BEAKER) (test code = 1095) See comment                    

         

 

             GRAM STAIN RESULT (BEAKER) (test code = 1123) 4+ WBCs              

                   

 

             GRAM STAIN RESULT (BEAKER) (test code = 65337) No organisms seen   

                         





1+ Skin floraPOCT-GLUCOSE [id number] 08:16:00* 



             Test Item    Value        Reference Range Interpretation Comments

 

             POC-GLUCOSE METER (BEAKER) (test code = 1538) 201 mg/dL      

     H            TESTED AT [address] [address]





[id number] 05:37:00* 



             Test Item    Value        Reference Range Interpretation Comments

 

             MAGNESIUM (BEAKER) (test code = 627) 2.0 mg/dL    1.6-2.6          

          





BASIC METABOLIC [id number] 05:37:00* 



             Test Item    Value        Reference Range Interpretation Comments

 

             SODIUM (BEAKER) (test code = 381) 137 meq/L    136-145             

       

 

             POTASSIUM (BEAKER) (test code = 379) 4.3 meq/L    3.5-5.1          

          

 

             CHLORIDE (BEAKER) (test code = 382) 102 meq/L                

         

 

             CO2 (BEAKER) (test code = 355) 28 meq/L     22-29                  

    

 

             BLOOD UREA NITROGEN (BEAKER) (test code = 354) 12 mg/dL     7-21   

                    

 

             CREATININE (BEAKER) (test code = 358) 0.63 mg/dL   0.57-1.25       

           

 

             GLUCOSE RANDOM (BEAKER) (test code = 652) 158 mg/dL          

 H             

 

             CALCIUM (BEAKER) (test code = 697) 8.5 mg/dL    8.4-10.2           

        

 

             EGFR (BEAKER) (test code = 1092) 144 mL/min/1.73 sq m              

             ESTIMATED GFR IS NOT 

AS ACCURATE AS CREATININE CLEARANCE IN PREDICTING GLOMERULAR FILTRATION RATE. 
ESTIMATED GFR IS NOT APPLICABLE FOR DIALYSIS PATIENTS.





CBC W/PLT COUNT & AUTO [id number] 05:06:00* 



             Test Item    Value        Reference Range Interpretation Comments

 

             WHITE BLOOD CELL COUNT (BEAKER) (test code = 775) 16.6 K/ L    3.5-

10.5     H             

 

             RED BLOOD CELL COUNT (BEAKER) (test code = 761) 3.05 M/ L    4.63-6

.08    L             

 

             HEMOGLOBIN (BEAKER) (test code = 410) 8.0 GM/DL    13.7-17.5    L  

           

 

             HEMATOCRIT (BEAKER) (test code = 411) 27.6 %       40.1-51.0    L  

           

 

             MEAN CORPUSCULAR VOLUME (BEAKER) (test code = 753) 90.5 fL      79.

0-92.2                  

 

             MEAN CORPUSCULAR HEMOGLOBIN (BEAKER) (test code = 751) 26.2 pg     

 25.7-32.2                  

 

                    MEAN CORPUSCULAR HEMOGLOBIN CONC (BEAKER) (test code = 752) 

29.0 GM/DL          32.3-36.5

                          L                          

 

             RED CELL DISTRIBUTION WIDTH (BEAKER) (test code = 412) 14.6 %      

 11.6-14.4    H             

 

             PLATELET COUNT (BEAKER) (test code = 756) 831 K/CU MM  150-450     

 H             

 

             MEAN PLATELET VOLUME (BEAKER) (test code = 754) 8.1 fL       9.4-12

.4     L             

 

             NUCLEATED RED BLOOD CELLS (BEAKER) (test code = 413) 0 /100 WBC   0

-0                        

 

             NEUTROPHILS RELATIVE PERCENT (BEAKER) (test code = 429) 61 %       

                             

 

             LYMPHOCYTES RELATIVE PERCENT (BEAKER) (test code = 430) 28 %       

                             

 

             MONOCYTES RELATIVE PERCENT (BEAKER) (test code = 431) 7 %          

                           

 

             EOSINOPHILS RELATIVE PERCENT (BEAKER) (test code = 432) 2 %        

                             

 

             BASOPHILS RELATIVE PERCENT (BEAKER) (test code = 437) 0 %          

                           

 

             NEUTROPHILS ABSOLUTE COUNT (BEAKER) (test code = 670) 10.04 K/ L   

1.78-5.38    H             

 

             LYMPHOCYTES ABSOLUTE COUNT (BEAKER) (test code = 414) 4.60 K/ L    

1.32-3.57    H             

 

             MONOCYTES ABSOLUTE COUNT (BEAKER) (test code = 415) 1.14 K/ L    0.

30-0.82    H             

 

             EOSINOPHILS ABSOLUTE COUNT (BEAKER) (test code = 416) 0.31 K/ L    

0.04-0.54                  

 

             BASOPHILS ABSOLUTE COUNT (BEAKER) (test code = 417) 0.05 K/ L    0.

01-0.08                  

 

             IMMATURE GRANULOCYTES-RELATIVE PERCENT (BEAKER) (test code = 2801) 

3 %          0-1          H             





POCT-GLUCOSE METER2018-11-15 20:56:00* 



             Test Item    Value        Reference Range Interpretation Comments

 

             POC-GLUCOSE METER (BEAKER) (test code = 1538) 324 mg/dL      

     H            TESTED AT [place] [id number]





POCT-GLUCOSE METER2018-11-15 17:32:00* 



             Test Item    Value        Reference Range Interpretation Comments

 

             POC-GLUCOSE METER (BEAKER) (test code = 1538) 129 mg/dL      

     H            TESTED AT [address] [address]





BLOOD CULTURE2018-11-15 13:20:00* 



             Test Item    Value        Reference Range Interpretation Comments

 

             CULTURE (BEAKER) (test code = 1095) No growth in 5 days            

                





BLOOD CULTURE2018-11-15 13:04:00* 



             Test Item    Value        Reference Range Interpretation Comments

 

             CULTURE (BEAKER) (test code = 1095) No growth in 5 days            

                





POCT-GLUCOSE METER2018-11-15 11:46:00* 



             Test Item    Value        Reference Range Interpretation Comments

 

             POC-GLUCOSE METER (BEAKER) (test code = 1538) 201 mg/dL      

     H            TESTED AT [place] [id number] [facility] [address]





POCT-GLUCOSE METER2018-11-15 08:10:00* 



             Test Item    Value        Reference Range Interpretation Comments

 

             POC-GLUCOSE METER (BEAKER) (test code = 1538) 234 mg/dL      

     H            TESTED AT [facility]

 [address] [facility] [address]





CBC W/PLT COUNT & AUTO DIFFERENTIAL2018-11-15 06:47:00* 



             Test Item    Value        Reference Range Interpretation Comments

 

             WHITE BLOOD CELL COUNT (BEAKER) (test code = 775) 12.0 K/ L    3.5-

10.5     H             

 

             RED BLOOD CELL COUNT (BEAKER) (test code = 761) 2.85 M/ L    4.63-6

.08    L             

 

             HEMOGLOBIN (BEAKER) (test code = 410) 7.7 GM/DL    13.7-17.5    L  

           

 

             HEMATOCRIT (BEAKER) (test code = 411) 26.2 %       40.1-51.0    L  

           

 

             MEAN CORPUSCULAR VOLUME (BEAKER) (test code = 753) 91.9 fL      79.

0-92.2                  

 

             MEAN CORPUSCULAR HEMOGLOBIN (BEAKER) (test code = 751) 27.0 pg     

 25.7-32.2                  

 

                    MEAN CORPUSCULAR HEMOGLOBIN CONC (BEAKER) (test code = 752) 

29.4 GM/DL          32.3-36.5

                          L                          

 

             RED CELL DISTRIBUTION WIDTH (BEAKER) (test code = 412) 14.6 %      

 11.6-14.4    H             

 

             PLATELET COUNT (BEAKER) (test code = 756) 716 K/CU MM  150-450     

 H             

 

             MEAN PLATELET VOLUME (BEAKER) (test code = 754) 8.6 fL       9.4-12

.4     L             

 

             NUCLEATED RED BLOOD CELLS (BEAKER) (test code = 413) 0 /100 WBC   0

-0                        

 

             NEUTROPHILS RELATIVE PERCENT (BEAKER) (test code = 429) 54 %       

                             

 

             LYMPHOCYTES RELATIVE PERCENT (BEAKER) (test code = 430) 32 %       

                             

 

             MONOCYTES RELATIVE PERCENT (BEAKER) (test code = 431) 9 %          

                           

 

             EOSINOPHILS RELATIVE PERCENT (BEAKER) (test code = 432) 2 %        

                             

 

             BASOPHILS RELATIVE PERCENT (BEAKER) (test code = 437) 0 %          

                           

 

             NEUTROPHILS ABSOLUTE COUNT (BEAKER) (test code = 670) 6.46 K/ L    

1.78-5.38    H             

 

             LYMPHOCYTES ABSOLUTE COUNT (BEAKER) (test code = 414) 3.84 K/ L    

1.32-3.57    H             

 

             MONOCYTES ABSOLUTE COUNT (BEAKER) (test code = 415) 1.05 K/ L    0.

30-0.82    H             

 

             EOSINOPHILS ABSOLUTE COUNT (BEAKER) (test code = 416) 0.29 K/ L    

0.04-0.54                  

 

             BASOPHILS ABSOLUTE COUNT (BEAKER) (test code = 417) 0.05 K/ L    0.

01-0.08                  

 

             IMMATURE GRANULOCYTES-RELATIVE PERCENT (BEAKER) (test code = 2801) 

2 %          0-1          H             





MAGNESIUM2018-11-15 06:42:00* 



             Test Item    Value        Reference Range Interpretation Comments

 

             MAGNESIUM (BEAKER) (test code = 627) 1.9 mg/dL    1.6-2.6          

          





BASIC METABOLIC PANEL2018-11-15 06:42:00* 



             Test Item    Value        Reference Range Interpretation Comments

 

             SODIUM (BEAKER) (test code = 381) 137 meq/L    136-145             

       

 

             POTASSIUM (BEAKER) (test code = 379) 4.4 meq/L    3.5-5.1          

          

 

             CHLORIDE (BEAKER) (test code = 382) 104 meq/L                

         

 

             CO2 (BEAKER) (test code = 355) 27 meq/L     22-29                  

    

 

             BLOOD UREA NITROGEN (BEAKER) (test code = 354) 11 mg/dL     7-21   

                    

 

             CREATININE (BEAKER) (test code = 358) 0.72 mg/dL   0.57-1.25       

           

 

             GLUCOSE RANDOM (BEAKER) (test code = 652) 290 mg/dL          

 H             

 

             CALCIUM (BEAKER) (test code = 697) 8.2 mg/dL    8.4-10.2     L     

        

 

             EGFR (BEAKER) (test code = 1092) 124 mL/min/1.73 sq m              

             ESTIMATED GFR IS NOT 

AS ACCURATE AS CREATININE CLEARANCE IN PREDICTING GLOMERULAR FILTRATION RATE. 
ESTIMATED GFR IS NOT APPLICABLE FOR DIALYSIS PATIENTS.





POCT-GLUCOSE [id number] 21:59:00* 



             Test Item    Value        Reference Range Interpretation Comments

 

             POC-GLUCOSE METER (BEAKER) (test code = 1538) 325 mg/dL      

     H            TESTED AT [facility]

 [address] [facility] [address]





POCT-GLUCOSE [id number] 17:49:00* 



             Test Item    Value        Reference Range Interpretation Comments

 

             POC-GLUCOSE METER (BEAKER) (test code = 1538) 97 mg/dL       

                  TESTED AT [facility] 

[address] [facility] [address]





VANCOMYCIN LEVEL, [id number] 16:10:00* 



             Test Item    Value        Reference Range Interpretation Comments

 

             VANCOMYCIN TROUGH (BEAKER) (test code = 522) 6.1 ug/mL    10.0-20.0

    L             





POCT-GLUCOSE [id number] 11:33:00* 



             Test Item    Value        Reference Range Interpretation Comments

 

             POC-GLUCOSE METER (BEAKER) (test code = 1538) 165 mg/dL      

     H            TESTED AT [facility]

 [address] [facility] [address]





CBC W/PLT COUNT & AUTO [id number] 10:17:00* 



             Test Item    Value        Reference Range Interpretation Comments

 

             WHITE BLOOD CELL COUNT (BEAKER) (test code = 775) 14.8 K/ L    3.5-

10.5     H             

 

             RED BLOOD CELL COUNT (BEAKER) (test code = 761) 2.84 M/ L    4.63-6

.08    L             

 

             HEMOGLOBIN (BEAKER) (test code = 410) 7.5 GM/DL    13.7-17.5    L  

           

 

             HEMATOCRIT (BEAKER) (test code = 411) 25.2 %       40.1-51.0    L  

           

 

             MEAN CORPUSCULAR VOLUME (BEAKER) (test code = 753) 88.7 fL      79.

0-92.2                  

 

             MEAN CORPUSCULAR HEMOGLOBIN (BEAKER) (test code = 751) 26.4 pg     

 25.7-32.2                  

 

                    MEAN CORPUSCULAR HEMOGLOBIN CONC (BEAKER) (test code = 752) 

29.8 GM/DL          32.3-36.5

                          L                          

 

             RED CELL DISTRIBUTION WIDTH (BEAKER) (test code = 412) 14.1 %      

 11.6-14.4                  

 

             PLATELET COUNT (BEAKER) (test code = 756) 768 K/CU MM  150-450     

 H             

 

             MEAN PLATELET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 GM/DL          32.3-36.5

                          L                          

 

             RED CELL DISTRIBUTION WIDTH (BEAKER) (test code = 412) 14.0 %      

 11.6-14.4                  

 

             PLATELET COUNT (BEAKER) (test code = 756) 599 K/CU MM  150-450     

 H             

 

             MEAN PLATELET VOLUME (BEAKER) (test code = 754) 9.0 fL       9.4-12

.4     L             

 

             NUCLEATED RED BLOOD CELLS (BEAKER) (test code = 413) 0 /100 WBC   0

-0                        





(CELLAVISION MANUAL DIFF)2018-11-10 10:07:00* 



             Test Item    Value        Reference Range Interpretation Comments

 

             NEUTROPHILS - REL (CELLAVISION)(BEAKER) (test code = 2816) 31 %    

                                

 

             LYMPHOCYTES - REL (CELLAVISION)(BEAKER) (test code = 2817) 29 %    

                                

 

             MONOCYTES - REL (CELLAVISION)(BEAKER) (test code = 2818) 12 %      

                              

 

             BASOPHILS - REL (CELLAVISION)(BEAKER) (test code = 2820) 1 %       

                              

 

             BANDS - REL (CELLAVISION)(BEAKER) (test code = 2826) 26 %         0

-10         H             

 

             ATYPICAL LYMPHOCYTES - REL (CELLAVISION)(BEAKER) (test code = 2829)

 1 %          0-0          H             

 

                NEUTROPHILS - ABS (CELLAVISION)(BEAKER) (test code = 2830) 3.41 

K/ul       1.78-5.38        

                                         

 

                LYMPHOCYTES - ABS (CELLAVISION)(BEAKER) (test code = 2831) 3.19 

K/ul       1.32-3.57        

                                         

 

             MONOCYTES - ABS (CELLAVISION)(BEAKER) (test code = 2832) 1.32 K/uL 

   0.30-0.82    H             

 

             BASOPHILS - ABS (CELLAVISION)(BEAKER) (test code = 2835) 0.11 K/uL 

   0.01-0.08    H             

 

             BANDS - ABS (CELLAVISION)(BEAKER) (test code = 2840) 2.86 K/uL    0

.00-0.80    H             

 

                    ATYPICAL LYMPHOCYTES - ABS (CELLAVISION)(BEAKER) (test code 

= 2858) 0.11 K/uL           

0.00-0.00                 H                          

 

             TOTAL COUNTED (BEAKER) (test code = 1351) 100                      

               

 

             WBC MORPHOLOGY (BEAKER) (test code = 487) Normal                   

               

 

             PLT MORPHOLOGY (BEAKER) (test code = 486) Normal                   

               

 

             POIKILOCYTES (BEAKER) (test code = 966) 1+ few                     

             

 

             DANUTA CELLS (BEAKER) (test code = 474) 2+ moderate                  

           

 

             ARTIFACT (CELLAVISION)(BEAKER) (test code = 3432) Present          

                       

 

             PLATELET CONCENTRATION (CELLAVISION)(BEAKER) (test code = 3438) Inc

reased                               





Received comment: User comments: Slide comments: HEMOGLOBIN A1C2018-11-10 
09:58:00* 



             Test Item    Value        Reference Range Interpretation Comments

 

             HEMOGLOBIN A1C (BEAKER) (test code = 368) 12.8 %       4.3-6.1     

 H             





BASIC METABOLIC PANEL2018-11-10 08:48:00* 



             Test Item    Value        Reference Range Interpretation Comments

 

             SODIUM (BEAKER) (test code = 381) 133 meq/L    136-145      L      

       

 

             POTASSIUM (BEAKER) (test code = 379) 4.2 meq/L    3.5-5.1          

          

 

             CHLORIDE (BEAKER) (test code = 382) 102 meq/L                

         

 

             CO2 (BEAKER) (test code = 355) 19 meq/L     22-29        L         

    

 

             BLOOD UREA NITROGEN (BEAKER) (test code = 354) 11 mg/dL     7-21   

                    

 

             CREATININE (BEAKER) (test code = 358) 0.71 mg/dL   0.57-1.25       

           

 

             GLUCOSE RANDOM (BEAKER) (test code = 652) 281 mg/dL          

 H             

 

             CALCIUM (BEAKER) (test code = 697) 8.8 mg/dL    8.4-10.2           

        

 

             EGFR (BEAKER) (test code = 1092) 126 mL/min/1.73 sq m              

             ESTIMATED GFR IS NOT 

AS ACCURATE AS CREATININE CLEARANCE IN PREDICTING GLOMERULAR FILTRATION RATE. 
ESTIMATED GFR IS NOT APPLICABLE FOR DIALYSIS PATIENTS.





POCT-GLUCOSE METER2018-11-10 06:27:00* 



             Test Item    Value        Reference Range Interpretation Comments

 

             POC-GLUCOSE METER (BEAKER) (test code = 1538) 305 mg/dL      

     H            TESTED AT [address] [address]





RETICULOCYTE COUNT2018-11-10 06:12:00* 



             Test Item    Value        Reference Range Interpretation Comments

 

             RETICULOCYTE COUNT PCT (BEAKER) (test code = 575) 1.6 %        0.5-

1.8                    





FERRITIN2018-11-10 05:56:00* 



             Test Item    Value        Reference Range Interpretation Comments

 

             FERRITIN (BEAKER) (test code = 361) 226 ng/mL    5-275             

         





POCT-GLUCOSE METER2018-11-10 04:08:00* 



             Test Item    Value        Reference Range Interpretation Comments

 

             POC-GLUCOSE METER (BEAKER) (test code = 1538) 381 mg/dL      

     H            TESTED AT [place] [id number] [facility] [address]





URINALYSIS W/ MICROSCOPIC2018-11-10 03:00:00* 



             Test Item    Value        Reference Range Interpretation Comments

 

             COLOR (BEAKER) (test code = 470) Yellow                            

      

 

             CLARITY (BEAKER) (test code = 469) Clear                           

        

 

             SPECIFIC GRAVITY UA (BEAKER) (test code = 468) 1.033        1.001-1

.035                

 

             PH UA (BEAKER) (test code = 467) 6.0          5.0-8.0              

      

 

             PROTEIN UA (BEAKER) (test code = 464) Negative     Negative        

           

 

             GLUCOSE UA (BEAKER) (test code = 365) >1000 mg/dL  Negative     A  

           

 

             KETONES UA (BEAKER) (test code = 371) Negative     Negative        

           

 

             BILIRUBIN UA (BEAKER) (test code = 462) Negative     Negative      

             

 

             BLOOD UA (BEAKER) (test code = 461) Small        Negative     A    

         

 

             NITRITE UA (BEAKER) (test code = 465) Negative     Negative        

           

 

             LEUKOCYTE ESTERASE UA (BEAKER) (test code = 466) Negative     Negat

yobani                   

 

             UROBILINOGEN UA (BEAKER) (test code = 463) 2.0 mg/dL    0.2-1.0    

  H             

 

             RBC UA (BEAKER) (test code = 519) 3 /HPF                           

       

 

             WBC UA (BEAKER) (test code = 520) 1 /HPF                           

       

 

             SQUAMOUS EPITHELIAL (BEAKER) (test code = 516) < /HPF              

                    

 

             AMORPHOUS CRYSTALS (BEAKER) (test code = 1584) Rare                

                    

 

             SOURCE(BEAKER) (test code = 2795) Urine, Straight Catheter         

                   





URINALYSIS W/ REFLEX URINE CULTURE2018-11-10 02:29:00* 



             Test Item    Value        Reference Range Interpretation Comments

 

             COLOR (BEAKER) (test code = 470) Yellow                            

      

 

             CLARITY (BEAKER) (test code = 469) Clear                           

        

 

             SPECIFIC GRAVITY UA (BEAKER) (test code = 468) 1.035        1.001-1

.035                

 

             PH UA (BEAKER) (test code = 467) 6.0          5.0-8.0              

      

 

             PROTEIN UA (BEAKER) (test code = 464) 10 mg/dL     Negative     A  

           

 

             GLUCOSE UA (BEAKER) (test code = 365) >1000 mg/dL  Negative     A  

           

 

             KETONES UA (BEAKER) (test code = 371) Negative     Negative        

           

 

             BILIRUBIN UA (BEAKER) (test code = 462) Negative     Negative      

             

 

             BLOOD UA (BEAKER) (test code = 461) Small        Negative     A    

         

 

             NITRITE UA (BEAKER) (test code = 465) Negative     Negative        

           

 

             LEUKOCYTE ESTERASE UA (BEAKER) (test code = 466) Negative     Negat

yobani                   

 

             UROBILINOGEN UA (BEAKER) (test code = 463) 3.0 mg/dL    0.2-1.0    

  H             

 

             RBC UA (BEAKER) (test code = 519) 8 /HPF                           

       

 

             WBC UA (BEAKER) (test code = 520) 2 /HPF                           

       

 

             SQUAMOUS EPITHELIAL (BEAKER) (test code = 516) 2 /HPF              

                    

 

             SOURCE(BEAKER) (test code = 2795)                                  

       





KETONE, [id number] 02:26:00* 



             Test Item    Value        Reference Range Interpretation Comments

 

             KETONES, BLOOD (BEAKER) (test code = 1103) 0.0 mmol/L   <0.4       

                





BLOOD GAS, VENOUS2018-11-10 01:16:00* 



             Test Item    Value        Reference Range Interpretation Comments

 

             PH VENOUS (BEAKER) (test code = 701) 7.51         7.32-7.42    H   

          

 

             PCO2 VENOUS (BEAKER) (test code = 755) 37 mmHg      41-51        L 

            

 

             PO2 VENOUS (BEAKER) (test code = 702) 69 mmHg      25-40        H  

           

 

             O2 SATURATION VENOUS (BEAKER) (test code = 703) 95.4 %       40.0-7

0.0    H             

 

             HCO3 VENOUS (BEAKER) (test code = 705) 29 mmol/L    21-29          

            

 

             BASE EXCESS VENOUS (BEAKER) (test code = 704) 5.2 mmol/L   -2.0-3.0

     H             

 

             PATIENT TEMPERATURE (BEAKER) (test code = 1818) 37.0 C             

                     

 

             FIO2 (BEAKER) (test code = 1819) 21.0 %                            

      





(CELLAVISION MANUAL DIFF)2018-11-10 00:27:00* 



             Test Item    Value        Reference Range Interpretation Comments

 

             NEUTROPHILS - REL (CELLAVISION)(BEAKER) (test code = 2816) 58 %    

                                

 

             LYMPHOCYTES - REL (CELLAVISION)(BEAKER) (test code = 2817) 23 %    

                                

 

             MONOCYTES - REL (CELLAVISION)(BEAKER) (test code = 2818) 9 %       

                              

 

             BANDS - REL (CELLAVISION)(BEAKER) (test code = 2826) 10 %         0

-10                       

 

                NEUTROPHILS - ABS (CELLAVISION)(BEAKER) (test code = 2830) 5.34 

K/ul       1.78-5.38        

                                         

 

                LYMPHOCYTES - ABS (CELLAVISION)(BEAKER) (test code = 2831) 2.12 

K/ul       1.32-3.57        

                                         

 

             MONOCYTES - ABS (CELLAVISION)(BEAKER) (test code = 2832) 0.83 K/uL 

   0.30-0.82    H             

 

             BANDS - ABS (CELLAVISION)(BEAKER) (test code = 2840) 0.92 K/uL    0

.00-0.80    H             

 

             TOTAL COUNTED (BEAKER) (test code = 1351) 100                      

               

 

             SMUDGE CELLS (BEAKER) (test code = 1371) Present                   

              

 

             GIANT PLATELETS (BEAKER) (test code = 313) Present                 

                

 

             OVALOCYTES (BEAKER) (test code = 477) 1+ few                       

           

 

             TEAR DROP CELLS (BEAKER) (test code = 481) 1+ few                  

                

 

             PLATELET CONCENTRATION (CELLAVISION)(BEAKER) (test code = 3438) Inc

reased                               





Received comment: User comments: Slide comments: BASIC METABOLIC PANEL2018-11-10
 00:27:00* 



             Test Item    Value        Reference Range Interpretation Comments

 

             SODIUM (BEAKER) (test code = 381) 129 meq/L    136-145      L      

       

 

             POTASSIUM (BEAKER) (test code = 379) 4.2 meq/L    3.5-5.1          

          

 

             CHLORIDE (BEAKER) (test code = 382) 93 meq/L            L    

         

 

             CO2 (BEAKER) (test code = 355) 27 meq/L     22-29                  

    

 

             BLOOD UREA NITROGEN (BEAKER) (test code = 354) 13 mg/dL     7-21   

                    

 

             CREATININE (BEAKER) (test code = 358) 1.11 mg/dL   0.57-1.25       

           

 

             GLUCOSE RANDOM (BEAKER) (test code = 652) 578 mg/dL          

 HH            

 

             CALCIUM (BEAKER) (test code = 697) 8.6 mg/dL    8.4-10.2           

        

 

             EGFR (BEAKER) (test code = 1092) 75 mL/min/1.73 sq m               

            ESTIMATED GFR IS NOT AS

 ACCURATE AS CREATININE CLEARANCE IN PREDICTING GLOMERULAR FILTRATION RATE. 
ESTIMATED GFR IS NOT APPLICABLE FOR DIALYSIS PATIENTS.





CBC W/PLT COUNT & AUTO DIFFERENTIAL2018-11-10 00:26:00* 



             Test Item    Value        Reference Range Interpretation Comments

 

             WHITE BLOOD CELL COUNT (BEAKER) (test code = 775) 9.2 K/ L     3.5-

10.5                   

 

             RED BLOOD CELL COUNT (BEAKER) (test code = 761) 3.13 M/ L    4.63-6

.08    L             

 

             HEMOGLOBIN (BEAKER) (test code = 410) 8.3 GM/DL    13.7-17.5    L  

           

 

             HEMATOCRIT (BEAKER) (test code = 411) 27.4 %       40.1-51.0    L  

           

 

             MEAN CORPUSCULAR VOLUME (BEAKER) (test code = 753) 87.5 fL      79.

0-92.2                  

 

             MEAN CORPUSCULAR HEMOGLOBIN (BEAKER) (test code = 751) 26.5 pg     

 25.7-32.2                  

 

                    MEAN CORPUSCULAR HEMOGLOBIN CONC (BEAKER) (test code = 752) 

30.3 GM/DL          32.3-36.5

                          L                          

 

             RED CELL DISTRIBUTION WIDTH (BEAKER) (test code = 412) 13.8 %      

 11.6-14.4                  

 

             PLATELET COUNT (BEAKER) (test code = 756) 637 K/CU MM  150-450     

 H             

 

             MEAN PLATELET VOLUME (BEAKER) (test code = 754) 8.8 fL       9.4-12

.4     L             

 

             NUCLEATED RED BLOOD CELLS (BEAKER) (test code = 413) 0 /100 WBC   0

-0                        





LACTIC ACID, VENOUS, WHOLE BLOOD2018-11-10 00:07:00* 



             Test Item    Value        Reference Range Interpretation Comments

 

             LACTATE BLOOD VENOUS (2) (BEAKER) (test code = 2872) 3.0 mmol/L   0

.5-2.2      H             





POCT-GLUCOSE [id number] 21:29:00* 



             Test Item    Value        Reference Range Interpretation Comments

 

             POC-GLUCOSE METER (BEAKER) (test code = 1538) 496 mg/dL      

     HH           TESTED AT 

[facility] [address] [facility] [address]





AFB Culture and [id number] 13:01:00Specimen/Source: Bone/left hipCollected:
 04/15/2018 08:45 Status: Final      Last Updated: 2018 13:01          
AFB-Stain-Fluorochrome (Final) (Final)    18 No acid fast bacill seen on 
direct smear   Culture Result (Final) (Final)    18 No growth of AFB at six
 (6) weeks  AFB Culture and [id number] 13:01:00Specimen/Source: Hip/lft 
superficialCollected: 04/15/2018 08:30 Status: Final      Last Updated: 
2018 13:01          AFB-Stain-Fluorochrome (Final) (Final)    18 No 
acid fast bacill seen on direct smear   Culture Result (Final) (Final)    
18 No growth of AFB at six (6) weeks  AFB Culture and [id number] 
13:00:00Specimen/Source: Bone/left footCollected: 04/15/2018 09:20 Status: Final
      Last Updated: 2018 13:00          AFB-Stain-Fluorochrome (Final) 
(Final)    18 No acid fast bacill seen on direct smear   Culture Result 
(Final) (Final)    18 No growth of AFB at six (6) weeks  AFB Culture and 
[id number] 13:00:00Specimen/Source: Hip/left deepCollected: 04/15/2018 
08:45 Status: Final      Last Updated: 2018 13:00          
AFB-Stain-Fluorochrome (Final) (Final)    18 No acid fast bacill seen on 
direct smear   Culture Result (Final) (Final)    18 No growth of AFB at six
 (6) weeks  AFB Culture and [id number] 12:59:00Specimen/Source: 
Foot/LeftCollected: 04/15/2018 09:20 Status: Final      Last Updated: 2018
 12:59          AFB-Stain-Fluorochrome (Final) (Final)    18 No acid fast 
bacill seen on direct smear   Culture Result (Final) (Final)    18 No 
growth of AFB at six (6) weeks  AFB Culture and [id number] 12:58:00
Specimen/Source: Wound/RT. ISCHIUMCollected: 2018 12:50 Status: Final     
 Last Updated: 2018 12:58          AFB-Stain-Fluorochrome (Final) (Final) 
   18 No acid fast bacill seen on direct smear   Culture Result (Final) (Fi
nal)    18 No growth of AFB at six (6) weeks  Fungus Culture with Stain
2018 10:01:00Specimen/Source: Wound/RT. ISCHIUMCollected: 2018 12:50
 Status: Final      Last Updated: 2018 10:01          Fungal Smear Result 
(Final) (Final)    No yeast or hyphae seen   Culture Result (Final) (Final)    
18  No fungus isolated at 6 weeks  Fungus Culture with [id number] 
11:00:00Specimen/Source: Hip/left deepCollected: 04/15/2018 08:45 Status: Final 
     Last Updated: 2018 11:00          Fungal Smear Result (Final) (Final)
    4/15/18 No yeast or hyphae seen   Culture Result (Final) (Final)    5/28/18 
No fungus isolated at 6 weeks  Fungus Culture with [id number] 11:00:00
Specimen/Source: Bone/left hipCollected: 04/15/2018 08:45 Status: Final      Las
t Updated: 2018 11:00          Fungal Smear Result (Final) (Final)    4/15
/18 No yeast or hyphae seen   Culture Result (Final) (Final)    18 No fungu
s isolated at 6 weeks  Fungus Culture with [id number] 10:59:00
Specimen/Source: Foot/LeftCollected: 04/15/2018 09:20 Status: Final      Last Up
dated: 2018 10:59          Fungal Smear Result (Final) (Final)    4/15/18 
No yeast or hyphae seen   Culture Result (Final) (Final)    18 No fungus is
olated at 6 weeks  Fungus Culture with [id number] 10:59:00Specimen/Source: 
Hip/lft superficialCollected: 04/15/2018 08:30 Status: Final      Last Updated: 
2018 10:59          Fungal Smear Result (Final) (Final)    4/15/18 No 
yeast or hyphae seen   Culture Result (Final) (Final)    18 No fungus 
isolated at 6 weeks  Fungus Culture with [id number] 10:58:00
Specimen/Source: Bone/left footCollected: 04/15/2018 09:20 Status: Final      La
st Updated: 2018 10:58          Fungal Smear Result (Final) (Final)     No yeast or hyphae seen   Culture Result (Final) (Final)    18 No helder
us isolated at 6 weeks  US Upper Ext Venous Duplex [id number] 12:50:17
Patient:   BALJEET JESSICA II                              MRN:    [id number]
am Date/Time2018 12:40 CDTReason for Examr/o thrombus;Pain (please specify)R
eportLEFT UPPER EXTREMITY VENOUS DOPPLER:HISTORY: Upper left arm and shoulder pa
in.Grayscale, color-flow and Doppler spectral waveform analysis of the venous sy
stem of the left upper extremity was performed.FINDINGS:The left internal jugula
r vein, subclavian vein and axillary vein are patent and compressible without ev
idence of thrombus.The basilic and cephalic veins are patent and compressible wi
thout thrombus.The visualized portions of the brachial veins are patent and comp
ressible without thrombus.  A left arm PICC is in place.The ulnar and radial vei
ns are patent and compressible without evidence of thrombus.IMPRESSION:No eviden
ce of upper extremity DVT.LOCATION: [facility]16***** Final *****Dictated by:    MD Gant Adam FDictated DT/TM: 2018 12:48 pmSigned by:  MD Gant Adam FSig
bethany (Electronic Signature):  2018 12:50 pmPOC [id number] 08:01:17* 



             Test Item    Value        Reference Range Interpretation Comments

 

             Glucose POC (test code = Glucose POC) 154 mg/dL           H  

          If you consider your 

patient critically ill, the Roche-Accu Check Infrom II meter should not be used 
for Glucose determination. Draw a venous Glucose and send to the main Lab for 
analysis.





POC [id number] 19:43:16* 



             Test Item    Value        Reference Range Interpretation Comments

 

             Glucose POC (test code = Glucose POC) 237 mg/dL           H  

          If you consider your 

patient critically ill, the Roche-Accu Check Infrom II meter should not be used 
for Glucose determination. Draw a venous Glucose and send to the main Lab for 
analysis.





POC [id number] 16:14:13* 



             Test Item    Value        Reference Range Interpretation Comments

 

             Glucose POC (test code = Glucose POC) 146 mg/dL           H  

          Notify RN or MDIf you 

consider your patient critically ill, the Roche-Accu Check Infrom II meter 
should not be used for Glucose determination. Draw a venous Glucose and send to 
the main Lab for analysis.





POC [id number] 11:56:48* 



             Test Item    Value        Reference Range Interpretation Comments

 

             Glucose POC (test code = Glucose POC) 167 mg/dL           H  

          Notify RN or MDIf you 

consider your patient critically ill, the Roche-Accu Check Infrom II meter 
should not be used for Glucose determination. Draw a venous Glucose and send to 
the main Lab for analysis.





POC [id number] 07:34:44* 



             Test Item    Value        Reference Range Interpretation Comments

 

             Glucose POC (test code = Glucose POC) 186 mg/dL           H  

          Notify RN or MDIf you 

consider your patient critically ill, the Roche-Accu Check Infrom II meter 
should not be used for Glucose determination. Draw a venous Glucose and send to 
the main Lab for analysis.





POC [id number] 21:30:39* 



             Test Item    Value        Reference Range Interpretation Comments

 

             Glucose POC (test code = Glucose POC) 206 mg/dL           H  

          If you consider your 

patient critically ill, the Roche-Accu Check Infrom II meter should not be used 
for Glucose determination. Draw a venous Glucose and send to the main Lab for 
analysis.





POC [id number] 16:21:39* 



             Test Item    Value        Reference Range Interpretation Comments

 

             Glucose POC (test code = Glucose POC) 178 mg/dL           H  

          If you consider your 

patient critically ill, the Roche-Accu Check Infrom II meter should not be used 
for Glucose determination. Draw a venous Glucose and send to the main Lab for 
analysis.





POC [id number] 11:42:11* 



             Test Item    Value        Reference Range Interpretation Comments

 

             Glucose POC (test code = Glucose POC) 70 mg/dL               

          If you consider your 

patient critically ill, the Roche-Accu Check Infrom II meter should not be used 
for Glucose determination. Draw a venous Glucose and send to the main Lab for 
analysis.





POC Glucose, [id number] 20:25:00* 



             Test Item    Value        Reference Range Interpretation Comments

 

             POC Glucose (test code = POCGLUC) 140 mg/dL           H      

      If you consider your 

patient critically ill, the Roche Accu-Chek InformII metershould not be used for
 Glucose determinations.Draw a venous Glucose and send to the Main Lab for 
Analysis.





POC Glucose, [id number] 16:51:00* 



             Test Item    Value        Reference Range Interpretation Comments

 

             POC Glucose (test code = POCGLUC) 133 mg/dL           H      

      If you consider your 

patient critically ill, the Roche Accu-Chek InformII metershould not be used for
 Glucose determinations.Draw a venous Glucose and send to the Main Lab for 
Analysis.





POC Glucose, [id number] 11:56:00* 



             Test Item    Value        Reference Range Interpretation Comments

 

             POC Glucose (test code = POCGLUC) 180 mg/dL           H      

      If you consider your 

patient critically ill, the Roche Accu-Chek InformII metershould not be used for
 Glucose determinations.Draw a venous Glucose and send to the Main Lab for 
Analysis.





POC Glucose, [id number] 08:28:00* 



             Test Item    Value        Reference Range Interpretation Comments

 

             POC Glucose (test code = POCGLUC) 138 mg/dL           H      

      If you consider your 

patient critically ill, the Roche Accu-Chek InformII metershould not be used for
 Glucose determinations.Draw a venous Glucose and send to the Main Lab for 
Analysis.





POC Glucose, [id number] 20:29:00* 



             Test Item    Value        Reference Range Interpretation Comments

 

             POC Glucose (test code = POCGLUC) 259 mg/dL           H      

      If you consider your 

patient critically ill, the Roche Accu-Chek InformII metershould not be used for
 Glucose determinations.Draw a venous Glucose and send to the Main Lab for 
Analysis.





POC Glucose, [id number] 17:00:00* 



             Test Item    Value        Reference Range Interpretation Comments

 

             POC Glucose (test code = POCGLUC) 103 mg/dL           N      

      If you consider your 

patient critically ill, the Roche Accu-Chek InformII metershould not be used for
 Glucose determinations.Draw a venous Glucose and send to the Main Lab for 
Analysis.





POC Glucose, [id number] 19:50:00* 



             Test Item    Value        Reference Range Interpretation Comments

 

             POC Glucose (test code = POCGLUC) 225 mg/dL           H      

      Notify RN or MDIf you 

consider your patient critically ill, the Roche Accu-Chek InformII metershould 
not be used for Glucose determinations.Draw a venous Glucose and send to the 
Main Lab for Analysis.





POC Glucose, [id number] 16:12:00* 



             Test Item    Value        Reference Range Interpretation Comments

 

             POC Glucose (test code = POCGLUC) 267 mg/dL           H      

      If you consider your 

patient critically ill, the Roche Accu-Chek InformII metershould not be used for
 Glucose determinations.Draw a venous Glucose and send to the Main Lab for 
Analysis.





POC Glucose,  11:11:00* 



             Test Item    Value        Reference Range Interpretation Comments

 

             POC Glucose (test code = POCGLUC) 118 mg/dL           H      

      If you consider your 

patient critically ill, the Roche Accu-Chek InformII metershould not be used for
 Glucose determinations.Draw a venous Glucose and send to the Main Lab for 
Analysis.





POC Glucose, [id number] 07:39:00* 



             Test Item    Value        Reference Range Interpretation Comments

 

             POC Glucose (test code = POCGLUC) 157 mg/dL           H      

      If you consider your 

patient critically ill, the Roche Accu-Chek InformII metershould not be used for
 Glucose determinations.Draw a venous Glucose and send to the Main Lab for 
Analysis.





RBC, Crossmatch  01:56:00* 



             Test Item    Value        Reference Range Interpretation Comments

 

             Product 1 Code (test code = PRODCODE1)                        

            

 

             Unit 1 ID (test code = UNITID1) [id number]                    

        

 

             Unit 1 ABO (test code = UNITABO1) O                                

       

 

             Unit 1 Rh (test code = UNITRH1) POS                                

     

 

             Unit 1 Interp (test code = UNITINTERP1) Compatible                 

             

 

             Unit 1 Status (test code = UNITSTAT1) RE                           

           

 

             Product 2 Code (test code = PRODCODE2)                        

            

 

             Unit 2 ID (test code = UNITID2) [id number]                    

        

 

             Unit 2 ABO (test code = UNITABO2) O                                

       

 

             Unit 2 Rh (test code = UNITRH2) POS                                

     

 

             Unit 2 Interp (test code = UNITINTERP2) Compatible                 

             

 

             Unit 2 Status (test code = UNITSTAT2) RE                           

           





POC Glucose, [id number] 20:15:00* 



             Test Item    Value        Reference Range Interpretation Comments

 

             POC Glucose (test code = POCGLUC) 166 mg/dL           H      

      If you consider your 

patient critically ill, the Roche Accu-Chek InformII metershould not be used for
 Glucose determinations.Draw a venous Glucose and send to the Main Lab for 
Analysis.





POC Glucose, [id number] 16:09:00* 



             Test Item    Value        Reference Range Interpretation Comments

 

             POC Glucose (test code = POCGLUC) 127 mg/dL           H      

      If you consider your 

patient critically ill, the Roche Accu-Chek InformII metershould not be used for
 Glucose determinations.Draw a venous Glucose and send to the Main Lab for 
Analysis.





POC Glucose, [id number] 12:48:00* 



             Test Item    Value        Reference Range Interpretation Comments

 

             POC Glucose (test code = POCGLUC) 151 mg/dL           H      

      If you consider your 

patient critically ill, the Roche Accu-Chek InformII metershould not be used for
 Glucose determinations.Draw a venous Glucose and send to the Main Lab for 
Analysis.





POC Glucose, [id number] 07:43:00* 



             Test Item    Value        Reference Range Interpretation Comments

 

             POC Glucose (test code = POCGLUC) 148 mg/dL           H      

      Notify RN or MDIf you 

consider your patient critically ill, the Roche Accu-Chek InformII metershould 
not be used for Glucose determinations.Draw a venous Glucose and send to the 
Main Lab for Analysis.





RBC, Crossmatch  06:00:00* 



             Test Item    Value        Reference Range Interpretation Comments

 

             Product 1 Code (test code = PRODCODE1)                        

            

 

             Unit 1 ID (test code = UNITID1) [id number]                    

        

 

             Unit 1 ABO (test code = UNITABO1) O                                

       

 

             Unit 1 Rh (test code = UNITRH1) POS                                

     

 

             Unit 1 Interp (test code = UNITINTERP1) Compatible                 

             

 

             Unit 1 Status (test code = UNITSTAT1) PT                           

           

 

             Product 2 Code (test code = PRODCODE2)                        

            

 

             Unit 2 ID (test code = UNITID2) [id number]                    

        

 

             Unit 2 ABO (test code = UNITABO2) O                                

       

 

             Unit 2 Rh (test code = UNITRH2) POS                                

     

 

             Unit 2 Interp (test code = UNITINTERP2) Compatible                 

             

 

             Unit 2 Status (test code = UNITSTAT2) PT                           

           





POC Glucose, [id number] 20:44:00* 



             Test Item    Value        Reference Range Interpretation Comments

 

             POC Glucose (test code = POCGLUC) 261 mg/dL           H      

      If you consider your 

patient critically ill, the Roche Accu-Chek InformII metershould not be used for
 Glucose determinations.Draw a venous Glucose and send to the Main Lab for 
Analysis.





POC Glucose, [id number] 16:08:00* 



             Test Item    Value        Reference Range Interpretation Comments

 

             POC Glucose (test code = POCGLUC) 140 mg/dL           H      

      Notify RN or MDIf you 

consider your patient critically ill, the Roche Accu-Chek InformII metershould 
not be used for Glucose determinations.Draw a venous Glucose and send to the 
Main Lab for Analysis.





[id number] 12:03:00* 



             Test Item    Value        Reference Range Interpretation Comments

 

             Osmolality [Serum] (test code = CMOSMOS) 286 mOsm/Kg  270-295      

N             





POC Glucose, [id number] 11:29:00* 



             Test Item    Value        Reference Range Interpretation Comments

 

             POC Glucose (test code = POCGLUC) 190 mg/dL           H      

      Notify RN or MDIf you 

consider your patient critically ill, the Roche Accu-Chek InformII metershould 
not be used for Glucose determinations.Draw a venous Glucose and send to the 
Main Lab for Analysis.





CBC with [id number] 09:44:00* 



             Test Item    Value        Reference Range Interpretation Comments

 

             WBC (test code = WBC) 12.4 K/cumm  4.4-10.5     H             

 

             RBC (test code = RBC) 3.73 M/cumm  4.10-5.70    L             

 

             Hemoglobin (test code = HGB) 9.8 gm/dL    13.4-17.4    L           

 READ BACK LAB VALUESVERIFIED 

BY REPEAT TESTINGrechecked & called to roddy,rn @ 0218, post txn/mdj

 

             Hematocrit (test code = HCT) 33.0 %       38.7-52.0    L           

  

 

             MCV (test code = MCV) 88.5 fL             N             

 

             MCH (test code = MCH) 26.1 pg      27.0-32.5    L             

 

             MCHC (test code = MCHC) 29.5 g/dL    32.0-37.5    L             

 

             RDW (test code = RDW) 16.6 %       11.5-14.5    H             

 

             Platelet Count (test code = PLTCT) 729 K/cumm   140-440      H     

        

 

             MPV (test code = MPV) 9.6 fL                                  

 

             Diff Method (test code = DIFFM) Auto                               

     

 

             Neutrophil (test code = NEUT) 52.1 %       36-70        N          

   

 

             Lymphocyte (test code = LYMPH) 39.5 %       12-44        N         

    

 

             Monocyte (test code = MONO) 5.9 %        0-11         N            

 

 

             Eosinophil (test code = EOS) 2.1 %        0-7          N           

  

 

             Basophil (test code = BASO) 0.5 %        0-2          N            

 

 

             Neutro Abs (test code = ANEUT) 6.5 K/cumm   1.6-7.4      N         

    

 

             Lymph Abs (test code = ALYMPH) 4.9 K/cumm   0.5-4.6      H         

    

 

             Mono Abs (test code = AMONO) 0.7 K/cumm   0.0-1.2      N           

  

 

             Eos Abs (test code = AEOS) 0.26 K/cumm  0.00-0.74    N             

 

             Baso Abs (test code = ABASO) 0.1 K/cumm   0.00-0.21    N           

  

 

             Hypochromic (test code = HYPO) Slight                              

    





[id number] 07:53:00* 



             Test Item    Value        Reference Range Interpretation Comments

 

             Phosphorus (test code = PO4) 5.0 mg/dL    2.70-4.50    H           

  





Magnesium, [id number] 07:53:00* 



             Test Item    Value        Reference Range Interpretation Comments

 

             Magnesium (test code = MG) 2.1 mg/dL    1.7-2.5      N             





Basic Metabolic [id number] 07:53:00* 



             Test Item    Value        Reference Range Interpretation Comments

 

             Sodium (test code = NA) 140 mmol/L   135-145      N             

 

             Potassium (test code = K) 4.6 mmol/L   3.5-5.1      N             

 

             Chloride (test code = CL) 99 mmol/L           N             

 

             Carbon Dioxide (test code = CO2) 29 mmol/L    22-29        N       

      

 

             Glucose (test code = GLU) 102 mg/dL           N             

 

             Blood Urea Nitrogen (test code = BUN) 10 mg/dL     6-20         N  

           

 

             Creatinine (test code = CREAT) 0.6 mg/dL    0.7-1.2      L         

    

 

             Calcium (test code = CA) 8.5 mg/dL    8.3-10.5     N             

 

             BUN/Creatinine Ratio (test code = BCRATIO) 16.7                    

                

 

             Anion Gap (test code = AGAP) 12 mmol/L    7-16         N           

  

 

             Estimated GFR (test code = GFR) >60 mL/min/1.73m2                  

         eGFR (estimated Glomerular

 Filtration Rate) is an estimated value,calculated from the patient's serum 
creatinine using the MDRD equation.It is NOT the patient's actual GFR. The eGFR 
provides a more clinicallyuseful measure of kidney disease than serum creatinine
 alone.***This calculation takes sex and race into account, if the informationis
 provided. If the race is not provided, and the patient isAfrican-American, 
multiply by 1.212. If sex is not provided, and thepatient is female, multiply by
 0.742. Results for patients <18 years ofage have not been validated by the MDRD
 study and should be interpretedwith caution.eGFR Result Interpretation:eGFR > 
or = 60 is in the Normal RangeeGFR < 60 may mean kidney diseaseeGFR < 15 may 
mean kidney failure***Ranges recommended by the National [employer]
ation,http://nkdep.nih.gov





POC Glucose, [id number] 07:01:00* 



             Test Item    Value        Reference Range Interpretation Comments

 

             POC Glucose (test code = POCGLUC) 109 mg/dL           N      

      Notify RN or MDIf you 

consider your patient critically ill, the Roche Accu-Chek InformII metershould 
not be used for Glucose determinations.Draw a venous Glucose and send to the 
Main Lab for Analysis.





POC Glucose, [id number] 20:23:00* 



             Test Item    Value        Reference Range Interpretation Comments

 

             POC Glucose (test code = POCGLUC) 234 mg/dL           H      

      Notify RN or MDIf you 

consider your patient critically ill, the Roche Accu-Chek InformII metershould 
not be used for Glucose determinations.Draw a venous Glucose and send to the 
Main Lab for Analysis.





POC Glucose, [id number] 16:47:00* 



             Test Item    Value        Reference Range Interpretation Comments

 

             POC Glucose (test code = POCGLUC) 154 mg/dL           H      

      If you consider your 

patient critically ill, the Roche Accu-Chek InformII metershould not be used for
 Glucose determinations.Draw a venous Glucose and send to the Main Lab for 
Analysis.





POC Glucose, [id number] 12:13:00* 



             Test Item    Value        Reference Range Interpretation Comments

 

             POC Glucose (test code = POCGLUC) 266 mg/dL           H      

      Notify RN or MDIf you 

consider your patient critically ill, the Roche Accu-Chek InformII metershould 
not be used for Glucose determinations.Draw a venous Glucose and send to the 
Main Lab for Analysis.





Comprehensive Metabolic [id number] 11:21:00* 



             Test Item    Value        Reference Range Interpretation Comments

 

             Sodium (test code = NA) 154 mmol/L   135-145      H             

 

             Potassium (test code = K) 3.1 mmol/L   3.5-5.1      L             

 

             Chloride (test code = CL) 112 mmol/L          H             

 

             Carbon Dioxide (test code = CO2) 21 mmol/L    22-29        L       

      

 

             Glucose (test code = GLU) 149 mg/dL           H             

 

             Blood Urea Nitrogen (test code = BUN) 11 mg/dL     6-20         N  

           

 

             Creatinine (test code = CREAT) 0.6 mg/dL    0.7-1.2      L         

    

 

             Calcium (test code = CA) 5.3 mg/dL    8.3-10.5     LL            

 

             Prot Total (test code = TP) 4.7 g/dL     6.4-8.3      L            

 

 

             Albumin (test code = ALB) 1.8 g/dL     3.5-5.2      L             

 

             A/G Ratio (test code = AGRATIO) 0.6 Ratio                          

     

 

             Globulin (test code = GLOB) 2.9          2.9-3.1      N            

 

 

             Bili Total (test code = TBIL) 0.2 mg/dL    0.1-0.9      N          

   

 

             Alk Phos (test code = APHOS) 121 U/L             N           

  

 

             AST (test code = AST) 15 U/L       1-40         N             

 

             ALT (test code = ALT) 20 U/L       1-41         N             

 

             BUN/Creatinine Ratio (test code = BCRATIO) 18.3                    

                

 

             Anion Gap (test code = AGAP) 21 mmol/L    7-16         H           

  

 

             Estimated GFR (test code = GFR) >60 mL/min/1.73m2                  

         eGFR (estimated Glomerular

 Filtration Rate) is an estimated value,calculated from the patient's serum 
creatinine using the MDRD equation.It is NOT the patient's actual GFR. The eGFR 
provides a more clinicallyuseful measure of kidney disease than serum creatinine
 alone.***This calculation takes sex and race into account, if the informationis
 provided. If the race is not provided, and the patient isAfrican-American, 
multiply by 1.212. If sex is not provided, and thepatient is female, multiply by
 0.742. Results for patients <18 years ofage have not been validated by the MDRD
 study and should be interpretedwith caution.eGFR Result Interpretation:eGFR > 
or = 60 is in the Normal RangeeGFR < 60 may mean kidney diseaseeGFR < 15 may 
mean kidney failure***Ranges recommended by the National [employer]
ation,http://nkdep.nih.gov





POC Glucose, [id number] 08:23:00* 



             Test Item    Value        Reference Range Interpretation Comments

 

             POC Glucose (test code = POCGLUC) 161 mg/dL           H      

      Notify RN or MDIf you 

consider your patient critically ill, the Roche Accu-Chek InformII metershould 
not be used for Glucose determinations.Draw a venous Glucose and send to the 
Main Lab for Analysis.





CBC with [id number] 08:17:00* 



             Test Item    Value        Reference Range Interpretation Comments

 

             WBC (test code = WBC) 11.7 K/cumm  4.4-10.5     H             

 

             RBC (test code = RBC) 2.89 M/cumm  4.10-5.70    L             

 

             Hemoglobin (test code = HGB) 7.6 gm/dL    13.4-17.4    L           

 READ BACK LAB VALUESVERIFIED 

BY REPEAT TESTINGrechecked & called to roddy,rn @ 08, no info given/gaby

 

             Hematocrit (test code = HCT) 25.4 %       38.7-52.0    L           

  

 

             MCV (test code = MCV) 87.9 fL             N             

 

             MCH (test code = MCH) 26.4 pg      27.0-32.5    L             

 

             MCHC (test code = MCHC) 30.0 g/dL    32.0-37.5    L             

 

             RDW (test code = RDW) 16.6 %       11.5-14.5    H             

 

             Platelet Count (test code = PLTCT) 766 K/cumm   140-440      H     

        

 

             MPV (test code = MPV) 8.9 fL                                  

 

             Diff Method (test code = DIFFM) Auto                               

     

 

             Neutrophil (test code = NEUT) 57.2 %       36-70        N          

   

 

             Lymphocyte (test code = LYMPH) 35.2 %       12-44        N         

    

 

             Monocyte (test code = MONO) 5.5 %        0-11         N            

 

 

             Eosinophil (test code = EOS) 1.7 %        0-7          N           

  

 

             Basophil (test code = BASO) 0.4 %        0-2          N            

 

 

             Neutro Abs (test code = ANEUT) 6.7 K/cumm   1.6-7.4      N         

    

 

             Lymph Abs (test code = ALYMPH) 4.1 K/cumm   0.5-4.6      N         

    

 

             Mono Abs (test code = AMONO) 0.7 K/cumm   0.0-1.2      N           

  

 

             Eos Abs (test code = AEOS) 0.20 K/cumm  0.00-0.74    N             

 

             Baso Abs (test code = ABASO) 0.0 K/cumm   0.00-0.21    N           

  

 

             Hypochromic (test code = HYPO) Slight                              

    





POC Glucose, [id number] 23:42:00* 



             Test Item    Value        Reference Range Interpretation Comments

 

             POC Glucose (test code = POCGLUC) 331 mg/dL           H      

      Notify RN or MDIf you 

consider your patient critically ill, the Roche Accu-Chek InformII metershould 
not be used for Glucose determinations.Draw a venous Glucose and send to the 
Main Lab for Analysis.





POC Glucose, [id number] 16:22:00* 



             Test Item    Value        Reference Range Interpretation Comments

 

             POC Glucose (test code = POCGLUC) 201 mg/dL           H      

      If you consider your 

patient critically ill, the Roche Accu-Chek InformII metershould not be used for
 Glucose determinations.Draw a venous Glucose and send to the Main Lab for 
Analysis.





POC Glucose, [id number] 15:13:00* 



             Test Item    Value        Reference Range Interpretation Comments

 

             POC Glucose (test code = POCGLUC) 196 mg/dL           H      

      If you consider your 

patient critically ill, the Roche Accu-Chek InformII metershould not be used for
 Glucose determinations.Draw a venous Glucose and send to the Main Lab for 
Analysis.





Antibody Screen - [id number] 14:13:00* 



             Test Item    Value        Reference Range Interpretation Comments

 

             Antibody Screen (test code = ABSCR) Negative                       

         





Blood Type and [id number] 13:06:00* 



             Test Item    Value        Reference Range Interpretation Comments

 

             ABO type (test code = ABO) O                                       

 

             Rh Type (test code = RH) Positive                                





POC Glucose, [id number] 20:35:00* 



             Test Item    Value        Reference Range Interpretation Comments

 

             POC Glucose (test code = POCGLUC) 198 mg/dL           H      

      If you consider your 

patient critically ill, the Roche Accu-Chek InformII metershould not be used for
 Glucose determinations.Draw a venous Glucose and send to the Main Lab for 
Analysis.





POC Glucose, [id number] 17:03:00* 



             Test Item    Value        Reference Range Interpretation Comments

 

             POC Glucose (test code = POCGLUC) 170 mg/dL           H      

      If you consider your 

patient critically ill, the Roche Accu-Chek InformII metershould not be used for
 Glucose determinations.Draw a venous Glucose and send to the Main Lab for 
Analysis.





POC Glucose, [id number] 11:36:00* 



             Test Item    Value        Reference Range Interpretation Comments

 

             POC Glucose (test code = POCGLUC) 269 mg/dL           H      

      If you consider your 

patient critically ill, the Roche Accu-Chek InformII metershould not be used for
 Glucose determinations.Draw a venous Glucose and send to the Main Lab for 
Analysis.





Comprehensive Metabolic [id number] 09:15:00* 



             Test Item    Value        Reference Range Interpretation Comments

 

             Sodium (test code = NA) 136 mmol/L   135-145      N             

 

             Potassium (test code = K) 4.7 mmol/L   3.5-5.1      N             

 

             Chloride (test code = CL) 95 mmol/L           L             

 

             Carbon Dioxide (test code = CO2) 26 mmol/L    22-29        N       

      

 

             Glucose (test code = GLU) 137 mg/dL           H             

 

             Blood Urea Nitrogen (test code = BUN) 16 mg/dL     6-20         N  

           

 

             Creatinine (test code = CREAT) 0.7 mg/dL    0.7-1.2      N         

    

 

             Calcium (test code = CA) 9.0 mg/dL    8.3-10.5     N             

 

             Prot Total (test code = TP) 8.2 g/dL     6.4-8.3      N            

 

 

             Albumin (test code = ALB) 3.3 g/dL     3.5-5.2      L             

 

             A/G Ratio (test code = AGRATIO) 0.7 Ratio                          

     

 

             Globulin (test code = GLOB) 4.9          2.9-3.1      H            

 

 

             Bili Total (test code = TBIL) 0.3 mg/dL    0.1-0.9      N          

   

 

             Alk Phos (test code = APHOS) 251 U/L             H           

  

 

             AST (test code = AST) 35 U/L       1-40         N             

 

             ALT (test code = ALT) 45 U/L       1-41         H             

 

             BUN/Creatinine Ratio (test code = BCRATIO) 22.9                    

                

 

             Anion Gap (test code = AGAP) 15 mmol/L    7-16         N           

  

 

             Estimated GFR (test code = GFR) >60 mL/min/1.73m2                  

         eGFR (estimated Glomerular

 Filtration Rate) is an estimated value,calculated from the patient's serum 
creatinine using the MDRD equation.It is NOT the patient's actual GFR. The eGFR 
provides a more clinicallyuseful measure of kidney disease than serum creatinine
 alone.***This calculation takes sex and race into account, if the informationis
 provided. If the race is not provided, and the patient isAfrican-American, 
multiply by 1.212. If sex is not provided, and thepatient is female, multiply by
 0.742. Results for patients <18 years ofage have not been validated by the MDRD
 study and should be interpretedwith caution.eGFR Result Interpretation:eGFR > 
or = 60 is in the Normal RangeeGFR < 60 may mean kidney diseaseeGFR < 15 may 
mean kidney failure***Ranges recommended by the National [employer]
ation,http://nkdep.nih.gov





POC Glucose, [id number] 07:57:00* 



             Test Item    Value        Reference Range Interpretation Comments

 

             POC Glucose (test code = POCGLUC) 140 mg/dL           H      

      If you consider your 

patient critically ill, the Roche Accu-Chek InformII metershould not be used for
 Glucose determinations.Draw a venous Glucose and send to the Main Lab for 
Analysis.





CBC with [id number] 07:51:00* 



             Test Item    Value        Reference Range Interpretation Comments

 

             WBC (test code = WBC) 14.3 K/cumm  4.4-10.5     H             

 

             RBC (test code = RBC) 3.80 M/cumm  4.10-5.70    L             

 

             Hemoglobin (test code = HGB) 10.2 gm/dL   13.4-17.4    L           

  

 

             Hematocrit (test code = HCT) 32.7 %       38.7-52.0    L           

  

 

             MCV (test code = MCV) 85.9 fL             N             

 

             MCH (test code = MCH) 26.7 pg      27.0-32.5    L             

 

             MCHC (test code = MCHC) 31.1 g/dL    32.0-37.5    L             

 

             RDW (test code = RDW) 16.3 %       11.5-14.5    H             

 

             Platelet Count (test code = PLTCT) 916 K/cumm   140-440      H     

        

 

             MPV (test code = MPV) 6.3 fL                                  

 

             Diff Method (test code = DIFFM) Auto                               

     

 

             Neutrophil (test code = NEUT) 56.3 %       36-70        N          

   

 

             Lymphocyte (test code = LYMPH) 35.6 %       12-44        N         

    

 

             Monocyte (test code = MONO) 5.5 %        0-11         N            

 

 

             Eosinophil (test code = EOS) 2.4 %        0-7          N           

  

 

             Basophil (test code = BASO) 0.4 %        0-2          N            

 

 

             Neutro Abs (test code = ANEUT) 8.1 K/cumm   1.6-7.4      H         

    

 

             Lymph Abs (test code = ALYMPH) 5.1 K/cumm   0.5-4.6      H         

    

 

             Mono Abs (test code = AMONO) 0.8 K/cumm   0.0-1.2      N           

  

 

             Eos Abs (test code = AEOS) 0.34 K/cumm  0.00-0.74    N             

 

             Baso Abs (test code = ABASO) 0.1 K/cumm   0.00-0.21    N           

  

 

             Hypochromic (test code = HYPO) Slight                              

    





POC Glucose, [id number] 20:52:00* 



             Test Item    Value        Reference Range Interpretation Comments

 

             POC Glucose (test code = POCGLUC) 225 mg/dL           H      

      If you consider your 

patient critically ill, the Roche Accu-Chek InformII metershould not be used for
 Glucose determinations.Draw a venous Glucose and send to the Main Lab for 
Analysis.





POC Glucose, [id number] 16:50:00* 



             Test Item    Value        Reference Range Interpretation Comments

 

             POC Glucose (test code = POCGLUC) 216 mg/dL           H      

      If you consider your 

patient critically ill, the Roche Accu-Chek InformII metershould not be used for
 Glucose determinations.Draw a venous Glucose and send to the Main Lab for 
Analysis.





POC Glucose, [id number] 12:46:00* 



             Test Item    Value        Reference Range Interpretation Comments

 

             POC Glucose (test code = POCGLUC) 145 mg/dL           H      

      If you consider your 

patient critically ill, the Roche Accu-Chek InformII metershould not be used for
 Glucose determinations.Draw a venous Glucose and send to the Main Lab for 
Analysis.





POC Glucose, [id number] 08:05:00* 



             Test Item    Value        Reference Range Interpretation Comments

 

             POC Glucose (test code = POCGLUC) 154 mg/dL           H      

      If you consider your 

patient critically ill, the Roche Accu-Chek InformII metershould not be used for
 Glucose determinations.Draw a venous Glucose and send to the Main Lab for 
Analysis.





POC Glucose, [id number] 19:52:00* 



             Test Item    Value        Reference Range Interpretation Comments

 

             POC Glucose (test code = POCGLUC) 254 mg/dL           H      

      Notify RN or MDIf you 

consider your patient critically ill, the Roche Accu-Chek InformII metershould 
not be used for Glucose determinations.Draw a venous Glucose and send to the 
Main Lab for Analysis.





POC Glucose, [id number] 17:27:00* 



             Test Item    Value        Reference Range Interpretation Comments

 

             POC Glucose (test code = POCGLUC) 208 mg/dL           H      

      If you consider your 

patient critically ill, the Roche Accu-Chek InformII metershould not be used for
 Glucose determinations.Draw a venous Glucose and send to the Main Lab for 
Analysis.





POC Glucose, [id number] 12:36:00* 



             Test Item    Value        Reference Range Interpretation Comments

 

             POC Glucose (test code = POCGLUC) 166 mg/dL           H      

      If you consider your 

patient critically ill, the Roche Accu-Chek InformII metershould not be used for
 Glucose determinations.Draw a venous Glucose and send to the Main Lab for 
Analysis.





Culture, Blood [id number] 09:10:00Specimen: BloodCollected: 04/15/2018 
23:50 Status: Final      Last Updated: 2018 09:10          Culture Result 
(Final) (Final)    No Growth After 5 Days  Culture, Blood [id number] 
09:10:00Specimen: BloodCollected: 04/15/2018 23:50 Status: Final      Last 
Updated: 2018 09:10          Culture Result (Final) (Final)    No Growth 
After 5 Days  POC Glucose, [id number] 08:13:00* 



             Test Item    Value        Reference Range Interpretation Comments

 

             POC Glucose (test code = POCGLUC) 173 mg/dL           H      

      If you consider your 

patient critically ill, the Roche Accu-Chek InformII metershould not be used for
 Glucose determinations.Draw a venous Glucose and send to the Main Lab for 
Analysis.





POC Glucose, [id number] 20:27:00* 



             Test Item    Value        Reference Range Interpretation Comments

 

             POC Glucose (test code = POCGLUC) 223 mg/dL           H      

      Notify RN or MDIf you 

consider your patient critically ill, the Roche Accu-Chek InformII metershould 
not be used for Glucose determinations.Draw a venous Glucose and send to the 
Main Lab for Analysis.





POC Glucose, [id number] 15:46:00* 



             Test Item    Value        Reference Range Interpretation Comments

 

             POC Glucose (test code = POCGLUC) 205 mg/dL           H      

      If you consider your 

patient critically ill, the Roche Accu-Chek InformII metershould not be used for
 Glucose determinations.Draw a venous Glucose and send to the Main Lab for 
Analysis.





Culture, Wound [id number] 13:42:00Specimen/Source: Wound/RT. 
ISCHIUMCollected: 2018 12:50 Status: Final      Last Updated: 2018 
13:41          Culture Result (Final) (Final)    18  Smear from broth Gram 
positive cocci in chains    18 Anaerobic culture:No anaerobes isolated at 3
 days   Isolate (Final) (Final)    18 From broth    Group D Enterococcus   
                        Isolate                          Group D Enterococcus   
                       -----------------------  FREEDOM (mcg/ml)    Ampicillin (AM) 
      <=2     Susceptible    Gentamicin Syn (HLG)  >500    Resistant    
Linezolid (LNZ)       2       Susceptible    Penicillin (P)        8       
Susceptible    Streptomycin Syn (HLS)>1000   Resistant    Vancomycin (VA)       
2       Susceptible  Culture, Blood [id number] 13:34:00Specimen: 
BloodCollected: 2018 14:25 Status: Final      Last Updated: 2018 
13:34       (1) ER Bed 15     Culture Result (Final) (Final)    18 Evidence
 of growth Gram positive coccobacilli  (anaerobicbottle)    18 Called to ARTURO Starks at 2:42pm YA    Read Back Lab Value    18 Anaerobic Diphtheroids 
 Culture, Wound [id number] 11:54:00Specimen/Source: Bone/left 
footCollected: 04/15/2018 09:20 Status: Final      Last Updated: 2018 
11:54          Gram Stain (Final) (Final)    4/15/18 No organisms seen, Few 
WBC's   Culture Result (Final) (Final)    18 Growth too young to evaluate 
at 24 hours-reincubated    18 Few Diphtheroids    18 Anaerobic 
culture:No anaerobes isolated at 3 days   Isolate (Final) (Final)    18 
Rare    Klebsiella pneumoniae    18 Confirmed ESBL       Amikacin  
          <=16   Susceptible      Ampicillin          >16    Resistant      
Ampicillin/Sulb     >16/8  Resistant      Cefazolin           >16    Resistant  
    Cefepime            >16    Resistant      Cefotaxime          >32    
Resistant      Ceftazidime         8      Resistant      Ceftriaxone         >32
    Resistant      Cefuroxime          >16    Resistant      Ciprofloxacin      
 2      Intermediate      Gentamicin          <=4    Susceptible      Imipenem  
          <=1    Susceptible      Levofloxacin        <=2    Susceptible      
Meropenem           <=1    Susceptible      Piperacillin/Tazo   <=16   
Susceptible      Tobramycin          <=4    Susceptible      Trimethoprim/Sulfa 
 >2/38  Resistant   Isolate (Final) (Final)     Rare    Beta Hemolytic 
Streptococcus    Streptococcus agalactiae   Isolate (Final) (Final)    18 
Rare    Enterobacter cloacae      Amikacin            <=16   Susceptible      
Ampicillin          >16    Resistant      Ampicillin/Sulb     >16/8  Resistant  
    Cefazolin           >16    Resistant      Cefepime            <=4    
Susceptible      Cefotaxime          <=2    Susceptible      Ceftazidime        
 <=1    Susceptible      Ceftriaxone         <=1    Susceptible      Cefuroxime 
         >16    Resistant      Ciprofloxacin       <=1    Susceptible      
Gentamicin          <=4    Susceptible      Imipenem            <=1    
Susceptible      Levofloxacin        <=2    Susceptible      Meropenem          
 <=1    Susceptible      Piperacillin/Tazo   <=16   Susceptible      Tobramycin 
         <=4    Susceptible      Trimethoprim/Sulfa  >2/38  Resistant  Culture, 
[id number] 09:41:00Specimen: UrineCollected: 2018 05:30 Status: Final
      Last Updated: 2018 09:41          Culture Result (Final) (Final)    
18 <10,000 CFU/mL Yeast  POC Glucose, [id number] 07:53:00* 



             Test Item    Value        Reference Range Interpretation Comments

 

             POC Glucose (test code = POCGLUC) 190 mg/dL           H      

      If you consider your 

patient critically ill, the Roche Accu-Chek InformII metershould not be used for
 Glucose determinations.Draw a venous Glucose and send to the Main Lab for 
Analysis.





POC Glucose, [id number] 20:37:00* 



             Test Item    Value        Reference Range Interpretation Comments

 

             POC Glucose (test code = POCGLUC) 213 mg/dL           H      

      Notify RN or MDIf you 

consider your patient critically ill, the Roche Accu-Chek InformII metershould 
not be used for Glucose determinations.Draw a venous Glucose and send to the 
Main Lab for Analysis.





POC Glucose, [id number] 17:19:00* 



             Test Item    Value        Reference Range Interpretation Comments

 

             POC Glucose (test code = POCGLUC) 144 mg/dL           H      

      If you consider your 

patient critically ill, the Roche Accu-Chek InformII metershould not be used for
 Glucose determinations.Draw a venous Glucose and send to the Main Lab for 
Analysis.





POC Glucose, [id number] 12:29:00* 



             Test Item    Value        Reference Range Interpretation Comments

 

             POC Glucose (test code = POCGLUC) 136 mg/dL           H      

      If you consider your 

patient critically ill, the Roche Accu-Chek InformII metershould not be used for
 Glucose determinations.Draw a venous Glucose and send to the Main Lab for 
Analysis.





POC Glucose, [id number] 08:30:00* 



             Test Item    Value        Reference Range Interpretation Comments

 

             POC Glucose (test code = POCGLUC) 174 mg/dL           H      

      If you consider your 

patient critically ill, the Roche Accu-Chek InformII metershould not be used for
 Glucose determinations.Draw a venous Glucose and send to the Main Lab for 
Analysis.





Culture, Wound [id number] 06:55:00Specimen/Source: Foot/LeftCollected: 
04/15/2018 09:20 Status: Final      Last Updated: 2018 06:55          Gram
 Stain (Final) (Final)    4/15/18 No organsims seen, No WBC's seen   Culture 
Result (Final) (Final)    18 Growth too young to evaluate at 24 hours-
reincubated    18 Moderate Diphtheroids    18 Moderate Alpha 
streptococcus (not Group D or Enterococcus)    18 Anaerobic culture:No 
anaerobes isolated at 3 days   Isolate (Final) (Final)    18 Rare    
Pseudomonas aeruginosa      Amikacin           <=16  Susceptible      Cefepime  
         <=4   Susceptible      Ceftazidime        4     Susceptible      
Ciprofloxacin      <=1   Susceptible      Gentamicin         <=4   Susceptible  
    Imipenem           <=1   Susceptible      Levofloxacin       <=2   
Susceptible      Meropenem          <=1   Susceptible      Piperacillin/Tazo  <
=16  Susceptible      Tobramycin         <=4   Susceptible   Isolate (Final) 
(Final)    18 Rare    Enterobacter cloacae      Amikacin            <=16   
Susceptible      Ampicillin          >16    Resistant      Ampicillin/Sulb     >
16/8  Resistant      Cefazolin           >16    Resistant      Cefepime         
   <=4    Susceptible      Cefotaxime          <=2    Susceptible      
Ceftazidime         <=1    Susceptible      Ceftriaxone         <=1    
Susceptible      Cefuroxime          16     Resistant      Ciprofloxacin       <
=1    Susceptible      Gentamicin          <=4    Susceptible      Imipenem     
       <=1    Susceptible      Levofloxacin        <=2    Susceptible      
Meropenem           <=1    Susceptible      Piperacillin/Tazo   <=16   
Susceptible      Tobramycin          <=4    Susceptible      Trimethoprim/Sulfa 
 >2/38  Resistant  Culture, Wound [id number] 06:53:00Specimen: 
HipCollected: 2018 21:50 Status: Final      Last Updated: 2018 06:53
          Gram Stain (Final) (Final)    4/15/18 Many WBC'S  , Few Gram Positive 
Cocci In Pairs  , Few    Diphtheroids   Culture Result (Final) (Final)    
18 Moderate Alpha streptococcus (not Group D or Enterococcus)    18 
Anaerobic culture:No anaerobes isolated at 3 days   Isolate (Final) (Final)    
18 Few    Beta Hemolytic Streptococcus    Streptococcus agalactiae  
Culture, Wound [id number] 06:51:00Specimen/Source: Bone/left 
hipCollected: 04/15/2018 08:45 Status: Final      Last Updated: 2018 06:51
          Gram Stain (Final) (Final)    4/15/18 Rare  WBC'S  , Few Gram Positive
 Cocci In Pairs   Culture Result (Final) (Final)    18 Moderate Alpha 
streptococcus (not Group D or Enterococcus)    18 Anaerobic culture:No 
anaerobes isolated at 3 days   Isolate (Final) (Final)    18 Moderate    
Beta Hemolytic Streptococcus    Streptococcus agalactiae  Culture, Wound 
[id number] 06:47:00Specimen/Source: Hip/left deepCollected: 04/15/2018 
08:45 Status: Final      Last Updated: 2018 06:47          Gram Stain 
(Final) (Final)    4/15/18 Many WBC'S  , Moderate Gram Positive Cocci In Pairs  
 Culture Result (Final) (Final)    18 Moderate Alpha streptococcus (not 
Group D or Enterococcus)    18 Anaerobic culture:No anaerobes isolated at 3
 days   Isolate (Final) (Final)    18 Few    Beta Hemolytic Streptococcus  
  Streptococcus agalactiae  Culture, Wound [id number] 06:47:00
Specimen/Source: Hip/lft superficialCollected: 04/15/2018 08:30 Status: Final   
   Last Updated: 2018 06:47          Gram Stain (Final) (Final)    4/15/18
 Few WBC'S  , Rare Gram Positive Cocci In Pairs   Culture Result (Final) (Final)
    18 Moderate Alpha streptococcus (not Group D or Enterococcus)    
8 Anaerobic culture:No anaerobes isolated at 3 days   Isolate (Final) (Final)   
 18 Few    Beta Hemolytic Streptococcus    Streptococcus agalactiae  CBC 
with [id number] 06:09:00* 



             Test Item    Value        Reference Range Interpretation Comments

 

             WBC (test code = WBC) 13.3 K/cumm  4.4-10.5     H             

 

             RBC (test code = RBC) 3.25 M/cumm  4.10-5.70    L             

 

             Hemoglobin (test code = HGB) 8.4 gm/dL    13.4-17.4    L           

 READ BACK LAB VALUESVERIFIED 

BY REPEAT TESTINGcalled to SOILA Severino RN at 0605 2018. transfusion. DD

 

             Hematocrit (test code = HCT) 28.4 %       38.7-52.0    L           

  

 

             MCV (test code = MCV) 87.4 fL             N             

 

             MCH (test code = MCH) 25.9 pg      27.0-32.5    L             

 

             MCHC (test code = MCHC) 29.7 g/dL    32.0-37.5    L             

 

             RDW (test code = RDW) 15.9 %       11.5-14.5    H             

 

             Platelet Count (test code = PLTCT) 948 K/cumm   140-440      H     

        

 

             MPV (test code = MPV) 9.3 fL                                  

 

             Diff Method (test code = DIFFM) Auto                               

     

 

             Neutrophil (test code = NEUT) 54.1 %       36-70        N          

   

 

             Lymphocyte (test code = LYMPH) 35.4 %       12-44        N         

    

 

             Monocyte (test code = MONO) 5.0 %        0-11         N            

 

 

             Eosinophil (test code = EOS) 5.1 %        0-7          N           

  

 

             Basophil (test code = BASO) 0.4 %        0-2          N            

 

 

             Neutro Abs (test code = ANEUT) 7.2 K/cumm   1.6-7.4      N         

    

 

             Lymph Abs (test code = ALYMPH) 4.7 K/cumm   0.5-4.6      H         

    

 

             Mono Abs (test code = AMONO) 0.7 K/cumm   0.0-1.2      N           

  

 

             Eos Abs (test code = AEOS) 0.67 K/cumm  0.00-0.74    N             

 

             Baso Abs (test code = ABASO) 0.1 K/cumm   0.00-0.21    N           

  

 

             Hypochromic (test code = HYPO) Slight                              

    





[id number] 06:08:00* 



             Test Item    Value        Reference Range Interpretation Comments

 

             Phosphorus (test code = PO4) 4.7 mg/dL    2.70-4.50    H           

  





Magnesium, [id number] 06:08:00* 



             Test Item    Value        Reference Range Interpretation Comments

 

             Magnesium (test code = MG) 2.3 mg/dL    1.7-2.5      N             





Basic Metabolic [id number] 06:08:00* 



             Test Item    Value        Reference Range Interpretation Comments

 

             Sodium (test code = NA) 137 mmol/L   135-145      N             

 

             Potassium (test code = K) 4.7 mmol/L   3.5-5.1      N             

 

             Chloride (test code = CL) 99 mmol/L           N             

 

             Carbon Dioxide (test code = CO2) 28 mmol/L    22-29        N       

      

 

             Glucose (test code = GLU) 162 mg/dL           H             

 

             Blood Urea Nitrogen (test code = BUN) 12 mg/dL     6-20         N  

           

 

             Creatinine (test code = CREAT) 0.6 mg/dL    0.7-1.2      L         

    

 

             Calcium (test code = CA) 8.1 mg/dL    8.3-10.5     L             

 

             BUN/Creatinine Ratio (test code = BCRATIO) 20.0                    

                

 

             Anion Gap (test code = AGAP) 10 mmol/L    7-16         N           

  

 

             Estimated GFR (test code = GFR) >60 mL/min/1.73m2                  

         eGFR (estimated Glomerular

 Filtration Rate) is an estimated value,calculated from the patient's serum 
creatinine using the MDRD equation.It is NOT the patient's actual GFR. The eGFR 
provides a more clinicallyuseful measure of kidney disease than serum creatinine
 alone.***This calculation takes sex and race into account, if the informationis
 provided. If the race is not provided, and the patient isAfrican-American, 
multiply by 1.212. If sex is not provided, and thepatient is female, multiply by
 0.742. Results for patients <18 years ofage have not been validated by the MDRD
 study and should be interpretedwith caution.eGFR Result Interpretation:eGFR > 
or = 60 is in the Normal RangeeGFR < 60 may mean kidney diseaseeGFR < 15 may 
mean kidney failure***Ranges recommended by the National [employer]
ation,http://nkdep.nih.gov





POC Glucose, [id number] 21:59:00* 



             Test Item    Value        Reference Range Interpretation Comments

 

             POC Glucose (test code = POCGLUC) 292 mg/dL           H      

      If you consider your 

patient critically ill, the Roche Accu-Chek InformII metershould not be used for
 Glucose determinations.Draw a venous Glucose and send to the Main Lab for 
Analysis.





Culture, Blood [id number] 20:17:00Specimen: BloodCollected: 2018 
14:25 Status: Final      Last Updated: 2018 20:16       (1) ER Bed 15     
Culture Result (Final) (Final)    No Growth After 5 Days  POC Glucose, Blood
2018 17:30:00* 



             Test Item    Value        Reference Range Interpretation Comments

 

             POC Glucose (test code = POCGLUC) 256 mg/dL           H      

      If you consider your 

patient critically ill, the Roche Accu-Chek InformII metershould not be used for
 Glucose determinations.Draw a venous Glucose and send to the Main Lab for 
Analysis.





Culture, Wound [id number] 13:37:00Specimen: WoundCollected: 
2018 17:30 Status: Final      Last Updated: 2018 13:37       (1) ER 
Bed 15     Gram Stain (Final) (Final)    18 Few WBC'S  , Few Gram Positive 
Cocci In Pairs   Culture Result (Final) (Final)    4/15/18 Many Streptococcus 
agalactiae(Group B)    18 Moderate Alpha streptococcus (not Group D or 
Enterococcus)    18 Few Peptostreptococcus    18 Moderate Anaerobic 
Gram variable coccobacilli    18 Multiple organisms present, no further 
workup in progress  POC Glucose, [id number] 12:24:00* 



             Test Item    Value        Reference Range Interpretation Comments

 

             POC Glucose (test code = POCGLUC) 190 mg/dL           H      

      If you consider your 

patient critically ill, the Roche Accu-Chek InformII metershould not be used for
 Glucose determinations.Draw a venous Glucose and send to the Main Lab for 
Analysis.





POC Glucose, [id number] 08:14:00* 



             Test Item    Value        Reference Range Interpretation Comments

 

             POC Glucose (test code = POCGLUC) 145 mg/dL           H      

      If you consider your 

patient critically ill, the Roche Accu-Chek InformII metershould not be used for
 Glucose determinations.Draw a venous Glucose and send to the Main Lab for 
Analysis.





RBC, Crossmatch  06:00:00* 



             Test Item    Value        Reference Range Interpretation Comments

 

             Product 1 Code (test code = PRODCODE1)                        

            

 

             Unit 1 ID (test code = UNITID1) [id number]                    

        

 

             Unit 1 ABO (test code = UNITABO1) O                                

       

 

             Unit 1 Rh (test code = UNITRH1) POS                                

     

 

             Unit 1 Interp (test code = UNITINTERP1) Compatible                 

             

 

             Unit 1 Status (test code = UNITSTAT1) PT                           

           

 

             Product 2 Code (test code = PRODCODE2)                        

            

 

             Unit 2 ID (test code = UNITID2) [id number]                    

        

 

             Unit 2 ABO (test code = UNITABO2) O                                

       

 

             Unit 2 Rh (test code = UNITRH2) POS                                

     

 

             Unit 2 Interp (test code = UNITINTERP2) Compatible                 

             

 

             Unit 2 Status (test code = UNITSTAT2) PT                           

           





POC Glucose, [id number] 19:38:00* 



             Test Item    Value        Reference Range Interpretation Comments

 

             POC Glucose (test code = POCGLUC) 241 mg/dL           H      

      If you consider your 

patient critically ill, the Roche Accu-Chek InformII metershould not be used for
 Glucose determinations.Draw a venous Glucose and send to the Main Lab for 
Analysis.





POC Glucose, [id number] 17:18:00* 



             Test Item    Value        Reference Range Interpretation Comments

 

             POC Glucose (test code = POCGLUC) 163 mg/dL           H      

      If you consider your 

patient critically ill, the Roche Accu-Chek InformII metershould not be used for
 Glucose determinations.Draw a venous Glucose and send to the Main Lab for 
Analysis.





CBC with [id number] 11:05:00* 



             Test Item    Value        Reference Range Interpretation Comments

 

             WBC (test code = WBC) 14.0 K/cumm  4.4-10.5     H             

 

             RBC (test code = RBC) 1.48 M/cumm  4.10-5.70    L             

 

             Hemoglobin (test code = HGB) 3.6 gm/dL    13.4-17.4    LL          

  

 

             Hematocrit (test code = HCT) 12.9 %       38.7-52.0    L           

  

 

             MCV (test code = MCV) 87.0 fL             N             

 

             MCH (test code = MCH) 24.5 pg      27.0-32.5    L             

 

             MCHC (test code = MCHC) 28.2 g/dL    32.0-37.5    L             

 

             RDW (test code = RDW) 15.5 %       11.5-14.5    H             

 

             Platelet Count (test code = PLTCT) 1206 K/cumm  140-440      HH    

        

 

             MPV (test code = MPV) 10.1 fL                                 

 

             Diff Method (test code = DIFFM) Auto                               

     

 

             Neutrophil (test code = NEUT) 58.0 %       36-70        N          

   

 

             Lymphocyte (test code = LYMPH) 32.9 %       12-44        N         

    

 

             Monocyte (test code = MONO) 5.3 %        0-11         N            

 

 

             Eosinophil (test code = EOS) 3.5 %        0-7          N           

  

 

             Basophil (test code = BASO) 0.3 %        0-2          N            

 

 

             Neutro Abs (test code = ANEUT) 8.1 K/cumm   1.6-7.4      H         

    

 

             Lymph Abs (test code = ALYMPH) 4.6 K/cumm   0.5-4.6      N         

    

 

             Mono Abs (test code = AMONO) 0.7 K/cumm   0.0-1.2      N           

  

 

             Eos Abs (test code = AEOS) 0.48 K/cumm  0.00-0.74    N             

 

             Baso Abs (test code = ABASO) 0.0 K/cumm   0.00-0.21    N           

  

 

                    RBC Morphology (test code = RBCMRPH) Slight Anisocytosis ; M

oderate Hypochromia 

                                                     

 

             Hypochromic (test code = HYPO) Moderate                            

    

 

             Platelet Est (test code = PLTEST) Increased Platelet on Smear      

                      





Culture, [id number] 10:25:00Specimen: Urine, CC-MidstreamCollected: 
2018 15:25 Status: Final      Last Updated: 2018 10:25       (1) ER 
Bed 15     Culture Result (Final) (Final)    4/15/18 40,000 CFU/mL Yeast   
Isolate (Final) (Final)    18 30,000 CFU/mL    Klebsiella pneumoniae    
18 Multi Drug Resistant Organism - Contact IsolationRecommended    18 
Called to BETTYE Mckinney at 8:38am YA    Read Back Lab Value   +18 Colistin = 
14mm No interpretation (by disc diffusion method)      Amikacin            <=16 
  Susceptible      Ampicillin          >16    Resistant      Ampicillin/Sulb    
 >16/8  Resistant      Cefazolin           >16    Resistant      Cefepime       
     >16    Resistant      Cefotaxime          >32    Resistant      Ceftazidime
         16     Resistant      Ceftriaxone         >32    Resistant      
Cefuroxime          >16    Resistant      Ciprofloxacin       >2     Resistant  
    Gentamicin          >8     Resistant      Imipenem            <=1    
Susceptible      Levofloxacin        <=2    Susceptible      Meropenem          
 <=1    Susceptible      Nitrofurantoin      64     Intermediate      
Piperacillin/Tazo   <=16   Susceptible      Tobramycin          >8     Resistant
      Trimethoprim/Sulfa  >2/38  Resistant Result added after release.   Isolate
 (Final) (Final)    18 30,000 CFU/mL    Klebsiella pneumoniae    18 
Multi Drug Resistant Organism - Contact IsolationRecommended    18 Called 
to BETTYE Mckinney at 8:38am YA    Read Back Lab Value      Amikacin            <=16 
  Susceptible      Ampicillin          >16    Resistant      Ampicillin/Sulb    
 >16/8  Resistant      Cefazolin           >16    Resistant      Cefepime       
     >16    Resistant      Cefotaxime          >32    Resistant      Ceftazidime
         16     Resistant      Ceftriaxone         >32    Resistant      
Cefuroxime          >16    Resistant      Ciprofloxacin       >2     Resistant  
    Gentamicin          >8     Resistant      Imipenem            <=1    
Susceptible      Levofloxacin        <=2    Susceptible      Meropenem          
 <=1    Susceptible      Nitrofurantoin      64     Intermediate      
Piperacillin/Tazo   <=16   Susceptible      Tobramycin          >8     Resistant
      Trimethoprim/Sulfa  >2/38  Resistant  Basic Metabolic [id number] 
09:09:00* 



             Test Item    Value        Reference Range Interpretation Comments

 

             Sodium (test code = NA) 139 mmol/L   135-145      N             

 

             Potassium (test code = K) 4.5 mmol/L   3.5-5.1      N             

 

             Chloride (test code = CL) 98 mmol/L           N             

 

             Carbon Dioxide (test code = CO2) 30 mmol/L    22-29        H       

      

 

             Glucose (test code = GLU) 188 mg/dL           H             

 

             Blood Urea Nitrogen (test code = BUN) 8 mg/dL      6-20         N  

           

 

             Creatinine (test code = CREAT) 0.6 mg/dL    0.7-1.2      L         

    

 

             Calcium (test code = CA) 8.1 mg/dL    8.3-10.5     L             

 

             BUN/Creatinine Ratio (test code = BCRATIO) 13.3                    

                

 

             Anion Gap (test code = AGAP) 11 mmol/L    7-16         N           

  

 

             Estimated GFR (test code = GFR) >60 mL/min/1.73m2                  

         eGFR (estimated Glomerular

 Filtration Rate) is an estimated value,calculated from the patient's serum 
creatinine using the MDRD equation.It is NOT the patient's actual GFR. The eGFR 
provides a more clinicallyuseful measure of kidney disease than serum creatinine
 alone.***This calculation takes sex and race into account, if the informationis
 provided. If the race is not provided, and the patient isAfrican-American, 
multiply by 1.212. If sex is not provided, and thepatient is female, multiply by
 0.742. Results for patients <18 years ofage have not been validated by the MDRD
 study and should be interpretedwith caution.eGFR Result Interpretation:eGFR > 
or = 60 is in the Normal RangeeGFR < 60 may mean kidney diseaseeGFR < 15 may 
mean kidney failure***Ranges recommended by the National Kidney Found
ation,http://nkdep.nih.gov





POC Glucose, [id number] 07:47:00* 



             Test Item    Value        Reference Range Interpretation Comments

 

             POC Glucose (test code = POCGLUC) 205 mg/dL           H      

      Notify RN or MDIf you 

consider your patient critically ill, the Roche Accu-Chek InformII metershould 
not be used for Glucose determinations.Draw a venous Glucose and send to the 
Main Lab for Analysis.





[id number] 07:44:00* 



             Test Item    Value        Reference Range Interpretation Comments

 

             Hemoglobin (test code = HGB) 6.2 gm/dL    13.4-17.4    LL          

  





Prothrombin [id number] 06:48:00* 



             Test Item    Value        Reference Range Interpretation Comments

 

             PT (test code = PT) 15.20 seconds 9.78-13.35   H             

 

             INR (test code = INR) 1.35 Ratio   0.6-1.2      H             





Partial Thromboplastin [id number] 06:48:00* 



             Test Item    Value        Reference Range Interpretation Comments

 

             aPTT (test code = PTT) 33.60 seconds 24.39-37.25  N             





US DUPLX LWR EXT ART/BPG, [id number] 22:44:29LOCATION: [id number]: 
38-year-old male with a left foot wound. Clinical concern isperipheral arterial 
vascular disease.COMMENT:Sonographic imaging of the arterial anatomy in both 
legs obtained.Grayscale, color-flow, and Doppler waveform imaging modalities 
utilized.Common femoral arteries, superficial femoral arteries, 
poplitealarteries, posterior tibial arteries, anterior tibial arteries, and inth
e case of the right lower extremity the dorsalis pedis artery wereimaged. Left d
orsalis pedis artery was obscured by a bandage.Triphasic wave form patterns with
 appropriate peak systolic flowvelocity is seen throughout the majority of the b
oth lower extremitiesextending from the common femoral arteries distally to the 
anteriortibial arteries. In the right leg dorsalis pedis artery exhibits lowvelo
city monophasic waveform pattern. Systolic flow velocity was 59 cm/sat the site.
Grayscale examination demonstrates no evidence of atheroscleroticchanges in the 
vascular anatomy.IMPRESSION:No suspicious arterial waveform abnormalities are se
en in this patient'slower extremities in this arterial Doppler ultrasound study.
 There is adampened waveform pattern of monophasic waveform in the right dorsali
spedis artery as outlined above.POC Glucose, [id number] 17:03:00* 



             Test Item    Value        Reference Range Interpretation Comments

 

             POC Glucose (test code = POCGLUC) 225 mg/dL           H      

      Notify RN or MDIf you 

consider your patient critically ill, the Roche Accu-Chek InformII metershould 
not be used for Glucose determinations.Draw a venous Glucose and send to the 
Main Lab for Analysis.





XR CHEST 1 [id number] 12:50:15CHEST 1 VIEWCLINICAL INFORMATION:  
FEVERCOMPARISON: 2018FINDINGS:Patchy opacities in the right lung base 
are stable to the comparisonstudy.  There is a small right pleural effusion.  
The left lung isclear.  The heart size is normal.  A left arm PICC terminates in
 thesuperior vena cava.IMPRESSION:Small right pleural effusion with associated 
lower lobe consolidation.LOCATION: R16POC Glucose, [id number] 12:04:00* 



             Test Item    Value        Reference Range Interpretation Comments

 

             POC Glucose (test code = POCGLUC) 239 mg/dL           H      

      Notify RN or MDIf you 

consider your patient critically ill, the Roche Accu-Chek InformII metershould 
not be used for Glucose determinations.Draw a venous Glucose and send to the 
Main Lab for Analysis.





POC Glucose, [id number] 07:33:00* 



             Test Item    Value        Reference Range Interpretation Comments

 

             POC Glucose (test code = POCGLUC) 236 mg/dL           H      

      Notify RN or MDIf you 

consider your patient critically ill, the Roche Accu-Chek InformII metershould 
not be used for Glucose determinations.Draw a venous Glucose and send to the 
Main Lab for Analysis.





Basic Metabolic [id number] 06:59:00* 



             Test Item    Value        Reference Range Interpretation Comments

 

             Sodium (test code = NA) 136 mmol/L   135-145      N             

 

             Potassium (test code = K) 4.4 mmol/L   3.5-5.1      N             

 

             Chloride (test code = CL) 99 mmol/L           N             

 

             Carbon Dioxide (test code = CO2) 28 mmol/L    22-29        N       

      

 

             Glucose (test code = GLU) 192 mg/dL           H             

 

             Blood Urea Nitrogen (test code = BUN) 6 mg/dL      6-20         N  

           

 

             Creatinine (test code = CREAT) 0.5 mg/dL    0.7-1.2      L         

    

 

             Calcium (test code = CA) 7.4 mg/dL    8.3-10.5     L             

 

             BUN/Creatinine Ratio (test code = BCRATIO) 12.0                    

                

 

             Anion Gap (test code = AGAP) 9 mmol/L     7-16         N           

  

 

             Estimated GFR (test code = GFR) >60 mL/min/1.73m2                  

         eGFR (estimated Glomerular

 Filtration Rate) is an estimated value,calculated from the patient's serum 
creatinine using the MDRD equation.It is NOT the patient's actual GFR. The eGFR 
provides a more clinicallyuseful measure of kidney disease than serum creatinine
 alone.***This calculation takes sex and race into account, if the informationis
 provided. If the race is not provided, and the patient isAfrican-American, 
multiply by 1.212. If sex is not provided, and thepatient is female, multiply by
 0.742. Results for patients <18 years ofage have not been validated by the MDRD
 study and should be interpretedwith caution.eGFR Result Interpretation:eGFR > 
or = 60 is in the Normal RangeeGFR < 60 may mean kidney diseaseeGFR < 15 may 
mean kidney failure***Ranges recommended by the National Kidney Found
ation,http://nkdep.nih.gov





[id number] 06:58:00* 



             Test Item    Value        Reference Range Interpretation Comments

 

             Hemoglobin (test code = HGB) 7.2 gm/dL    13.4-17.4    L           

  





RBC, Crossmatch  06:00:00* 



             Test Item    Value        Reference Range Interpretation Comments

 

             Product 1 Code (test code = PRODCODE1)                        

            

 

             Unit 1 ID (test code = UNITID1) [id number]                    

        

 

             Unit 1 ABO (test code = UNITABO1) O                                

       

 

             Unit 1 Rh (test code = UNITRH1) POS                                

     

 

             Unit 1 Interp (test code = UNITINTERP1) Compatible                 

             

 

             Unit 1 Status (test code = UNITSTAT1) PT                           

           





Urinalysis [id number] 01:58:00* 



             Test Item    Value        Reference Range Interpretation Comments

 

             Color (test code = COLOR) Yellow       Yellow,Straw,Pl yellow N    

         

 

             Clarity (test code = CLAR) Clear        Clear        N             

 

             Specific Gravity (test code = SPGR) 1.013        1.001-1.035  N    

         

 

             pH (test code = PH) 7.0          5.0-9.0      N             

 

             Ketone (test code = KET) Negative mg/dL Negative     N             

 

             Glucose (test code = GLUCUR) Negative mg/dL Negative     N         

    

 

             Protein (test code = PROT) Negative mg/dL Negative     N           

  

 

             Bilirubin (test code = BILI) Negative mg/dL Negative     N         

    

 

             Occult Blood (test code = UDOB) Negative     Negative     N        

     

 

             Urobilinogen (test code = UROB) 0.2 mg/dL    0.2-1.0      N        

     

 

             Nitrite (test code = NIT) Negative     Negative     N             

 

             Leuk Esterase (test code = LEUK) Small        Negative     A       

      

 

             Micros Exam (test code = MEXAM) Indicated                          

     

 

             Epithelial Cells (test code = EPI) None Seen /LPF 0-30         A   

          

 

             WBC, Urine (test code = UWBC) 0-1 /HPF     0-5          A          

   

 

             RBC, Urine (test code = URBC) None Seen /HPF 0-5          A        

     

 

             Bacteria (test code = BACT) None /HPF                              

 

 

             Yeast (test code = YEAST) Few /HPF                                





POC Glucose, Blood2018-04-15 20:19:00* 



             Test Item    Value        Reference Range Interpretation Comments

 

             POC Glucose (test code = POCGLUC) 328 mg/dL           H      

      Notify RN or MDIf you 

consider your patient critically ill, the Roche Accu-Chek InformII metershould 
not be used for Glucose determinations.Draw a venous Glucose and send to the 
Main Lab for Analysis.





Vancomycin, Trough2018-04-15 12:52:00* 



             Test Item    Value        Reference Range Interpretation Comments

 

             RyanAsael jeff (test code = VANTR) 10.7 ug/mL   10.0-20.0    N        

     





POC Glucose, Blood2018-04-15 12:16:00* 



             Test Item    Value        Reference Range Interpretation Comments

 

             POC Glucose (test code = POCGLUC) 208 mg/dL           H      

      Notify RN or MDIf you 

consider your patient critically ill, the Roche Accu-Chek InformII metershould 
not be used for Glucose determinations.Draw a venous Glucose and send to the 
Main Lab for Analysis.





Vitamin B122018-04-15 07:24:00* 



             Test Item    Value        Reference Range Interpretation Comments

 

             Vitamin B 12 (test code = VITB12) 727 pg/mL    211-946      N      

       





Ferritin, Serum2018-04-15 07:24:00* 



             Test Item    Value        Reference Range Interpretation Comments

 

             Ferritin (test code = FERR) 147 ng/mL           N            

 





TIBC and Iron2018-04-15 07:09:00* 



             Test Item    Value        Reference Range Interpretation Comments

 

             Iron (test code = FE) 28 ug/dL            L             

 

             UIBC (test code = UIBC) 162 ug/dL    112-346      N             

 

             TIBC (test code = TIBC) 190 ug/dL    171-504      N             

 

             % Saturation (test code = PSAT) 15 %         20-50        L        

     





Phosphorus2018-04-15 07:07:00* 



             Test Item    Value        Reference Range Interpretation Comments

 

             Phosphorus (test code = PO4) 4.5 mg/dL    2.70-4.50    N           

  





Magnesium, Serum2018-04-15 07:07:00* 



             Test Item    Value        Reference Range Interpretation Comments

 

             Magnesium (test code = MG) 2.1 mg/dL    1.7-2.5      N             





Basic Metabolic Panel2018-04-15 07:07:00* 



             Test Item    Value        Reference Range Interpretation Comments

 

             Sodium (test code = NA) 137 mmol/L   135-145      N             

 

             Potassium (test code = K) 4.4 mmol/L   3.5-5.1      N             

 

             Chloride (test code = CL) 96 mmol/L           L             

 

             Carbon Dioxide (test code = CO2) 29 mmol/L    22-29        N       

      

 

             Glucose (test code = GLU) 197 mg/dL           H             

 

             Blood Urea Nitrogen (test code = BUN) 10 mg/dL     6-20         N  

           

 

             Creatinine (test code = CREAT) 0.6 mg/dL    0.7-1.2      L         

    

 

             Calcium (test code = CA) 8.1 mg/dL    8.3-10.5     L             

 

             BUN/Creatinine Ratio (test code = BCRATIO) 16.7                    

                

 

             Anion Gap (test code = AGAP) 12 mmol/L    7-16         N           

  

 

             Estimated GFR (test code = GFR) >60 mL/min/1.73m2                  

         eGFR (estimated Glomerular

 Filtration Rate) is an estimated value,calculated from the patient's serum 
creatinine using the MDRD equation.It is NOT the patient's actual GFR. The eGFR 
provides a more clinicallyuseful measure of kidney disease than serum creatinine
 alone.***This calculation takes sex and race into account, if the informationis
 provided. If the race is not provided, and the patient isAfrican-American, 
multiply by 1.212. If sex is not provided, and thepatient is female, multiply by
 0.742. Results for patients <18 years ofage have not been validated by the MDRD
 study and should be interpretedwith caution.eGFR Result Interpretation:eGFR > 
or = 60 is in the Normal RangeeGFR < 60 may mean kidney diseaseeGFR < 15 may 
mean kidney failure***Ranges recommended by the National Kidney Found
ation,http://nkdep.nih.gov





CBC with Differential2018-04-15 06:42:00* 



             Test Item    Value        Reference Range Interpretation Comments

 

             WBC (test code = WBC) 10.0 K/cumm  4.4-10.5     N             

 

             RBC (test code = RBC) 2.92 M/cumm  4.10-5.70    L             

 

             Hemoglobin (test code = HGB) 7.2 gm/dL    13.4-17.4    L           

  

 

             Hematocrit (test code = HCT) 25.2 %       38.7-52.0    L           

  

 

             MCV (test code = MCV) 86.4 fL             N             

 

             MCH (test code = MCH) 24.6 pg      27.0-32.5    L             

 

             MCHC (test code = MCHC) 28.5 g/dL    32.0-37.5    L             

 

             RDW (test code = RDW) 14.6 %       11.5-14.5    H             

 

             Platelet Count (test code = PLTCT) 953 K/cumm   140-440      H     

        

 

             MPV (test code = MPV) 9.7 fL                                  

 

             Diff Method (test code = DIFFM) Auto                               

     

 

             Neutrophil (test code = NEUT) 51.7 %       36-70        N          

   

 

             Lymphocyte (test code = LYMPH) 38.6 %       12-44        N         

    

 

             Monocyte (test code = MONO) 7.1 %        0-11         N            

 

 

             Eosinophil (test code = EOS) 2.1 %        0-7          N           

  

 

             Basophil (test code = BASO) 0.5 %        0-2          N            

 

 

             Neutro Abs (test code = ANEUT) 5.2 K/cumm   1.6-7.4      N         

    

 

             Lymph Abs (test code = ALYMPH) 3.9 K/cumm   0.5-4.6      N         

    

 

             Mono Abs (test code = AMONO) 0.7 K/cumm   0.0-1.2      N           

  

 

             Eos Abs (test code = AEOS) 0.21 K/cumm  0.00-0.74    N             

 

             Baso Abs (test code = ABASO) 0.1 K/cumm   0.00-0.21    N           

  

 

             Hypochromic (test code = HYPO) Slight                              

    





POC Glucose, [id number] 20:59:00* 



             Test Item    Value        Reference Range Interpretation Comments

 

             POC Glucose (test code = POCGLUC) 267 mg/dL           H      

      Notify RN or MDIf you 

consider your patient critically ill, the Roche Accu-Chek InformII metershould 
not be used for Glucose determinations.Draw a venous Glucose and send to the 
Main Lab for Analysis.





Antibody Screen - [id number] 18:56:00* 



             Test Item    Value        Reference Range Interpretation Comments

 

             Antibody Screen (test code = ABSCR) Negative                       

         





Blood Type and [id number] 18:55:00* 



             Test Item    Value        Reference Range Interpretation Comments

 

             ABO type (test code = ABO) O                                       

 

             Rh Type (test code = RH) Positive                                





XR CHEST 1 [id number] 17:18:53LOCATION: [id number]: 38-year-old male, status
 post PICC line placement.COMMENT:A frontal chest radiograph was obtained at 
4:43 p.m., and compared to astudy of 2018.The left arm PICC line tip 
is in the superior vena cava.Peripheral scarring seen laterally in the right 
lung base is unchanged.The chest otherwise is unremarkable.IMPRESSION:This 
patient's left arm PICC line tip is in the superior vena cava.POC Glucose, Blood
2018 16:03:00* 



             Test Item    Value        Reference Range Interpretation Comments

 

             POC Glucose (test code = POCGLUC) 238 mg/dL           H      

      If you consider your 

patient critically ill, the Roche Accu-Chek InformII metershould not be used for
 Glucose determinations.Draw a venous Glucose and send to the Main Lab for 
Analysis.





CT PELVIS LTD W/O [id number] 13:58:03LOCATION: [id number]:  38-year-old 
male presents with a left hip abscess.COMMENT:Axial CT imaging of this patient's
 pelvis was obtained from the iliaccrests to the pelvic floor without IV 
contrast. Coronal and sagittalsoft tissue reconstructions were included. 
Radiographs of the left hip obtained yesterday are available forcomparison.One 
or more of the following dose reduction techniques were used:Automated exposure 
control, adjustment of the mA and/or kV according thepatient size, and/or 
utilization of iterative reconstruction technique.DLP: 1017.9 mGy-cmCONTRAST: 
NoneFINDINGS:A large collection of gas is seen in the patient's left hip. The 
gascomes in close proximity to the greater trochanter proximal left femur.The 
images demonstrate findings suggestive of cortical discontinuityinvolving the 
greater trochanter raising concern for osteomyelitis inthis area.No fluid or gas
 is seen in the left hip joint space.No acute injury is seen elsewhere in the 
bony pelvis. Findings ofprobable myositis of significance is seen along the 
inferior aspect ofthe right hemipelvis with a collection of gas seen nearby the 
inferiorpubic ramus. No destructive lesion is seen specific to the inferiorpubic
 ramus clinical correlation is suggested regarding possibility ofosteomyelitis i
n this location as well.The pelvic viscera exhibits no acute findings. Ostomy is
 seen in theleft lower quadrant. Urinary bladder is decompressed by Lang cathet
er.Correlation with a MRI examination of the pelvis is suggested.IMPRESSION:In t
he patient's left hip there is a cortical discontinuity seeninvolving the greate
r trochanter of the proximal femur is concern forosteomyelitis. Gas is seen in t
he soft tissues, in close proximity tothis area.A sacral decubitus ulceration is
 seen in the medial aspect of the rightbuttock coming in proximity to the right 
inferior pubic ramus. 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 
suggestive of myositisossificans. Osteoarthritis is not excluded in this area. BLAIR perez comments for follow-up imaging suggestions.POC Glucose, Blood
2018 11:45:00* 



             Test Item    Value        Reference Range Interpretation Comments

 

             POC Glucose (test code = POCGLUC) 349 mg/dL           H      

      Notify RN or MDIf you 

consider your patient critically ill, the Roche Accu-Chek InformII metershould 
not be used for Glucose determinations.Draw a venous Glucose and send to the 
Main Lab for Analysis.





Glycosylated [id number] 08:17:00* 



             Test Item    Value        Reference Range Interpretation Comments

 

             HBA1c (test code = HBA1C) 11.2 %       4.8-5.9      H             





POC Glucose, [id number] 07:54:00* 



             Test Item    Value        Reference Range Interpretation Comments

 

             POC Glucose (test code = POCGLUC) 326 mg/dL           H      

      Notify RN or MDIf you 

consider your patient critically ill, the Roche Accu-Chek InformII metershould 
not be used for Glucose determinations.Draw a venous Glucose and send to the 
Main Lab for Analysis.





CBC with [id number] 07:28:00* 



             Test Item    Value        Reference Range Interpretation Comments

 

             WBC (test code = WBC) 10.6 K/cumm  4.4-10.5     H             

 

             RBC (test code = RBC) 2.72 M/cumm  4.10-5.70    L             

 

             Hemoglobin (test code = HGB) 6.8 gm/dL    13.4-17.4    LL          

  

 

             Hematocrit (test code = HCT) 23.4 %       38.7-52.0    L           

  

 

             MCV (test code = MCV) 86.1 fL             N             

 

             MCH (test code = MCH) 25.1 pg      27.0-32.5    L             

 

             MCHC (test code = MCHC) 29.1 g/dL    32.0-37.5    L             

 

             RDW (test code = RDW) 14.6 %       11.5-14.5    H             

 

             Platelet Count (test code = PLTCT) 1089 K/cumm  140-440      HH    

        

 

             MPV (test code = MPV) 9.8 fL                                  

 

             Diff Method (test code = DIFFM) Manual                             

     

 

             Neutrophil (test code = NEUT) 44.0 %       36-70        N          

   

 

             Bands (test code = BAND) 7.0 %        0-6          H             

 

             Lymphocyte (test code = LYMPH) 38.0 %       12-44        N         

    

 

             Monocyte (test code = MONO) 7.0 %        0-11         N            

 

 

             Eosinophil (test code = EOS) 2.0 %        0-7          N           

  

 

             Basophil (test code = BASO) 2.0 %        0-2          N            

 

 

             Neutro Abs (test code = ANEUT) 5.4 K/cumm   1.6-7.4      N         

    

 

             Lymph Abs (test code = ALYMPH) 4.0 K/cumm   0.5-4.6      N         

    

 

             Mono Abs (test code = AMONO) 0.7 K/cumm   0.0-1.2      N           

  

 

             Eos Abs (test code = AEOS) 0.21 K/cumm  0.00-0.74    N             

 

             Baso Abs (test code = ABASO) 0.2 K/cumm   0.00-0.21    N           

  

 

             RBC Morphology (test code = RBCMRPH) Moderate Hypochromia          

                  

 

             Platelet Est (test code = PLTEST) Increased Platelet on Smear      

                      





Thyroid Stimulating Hormone (TSH)2018 06:26:00* 



             Test Item    Value        Reference Range Interpretation Comments

 

             TSH (test code = TSH) 5.31 mIU/mL  0.270-4.200  H             





Lipid [id number] 06:21:00* 



             Test Item    Value        Reference Range Interpretation Comments

 

             Cholesterol (test code = CHOL) 103 mg/dL    0-200        N         

    

 

             Triglycerides (test code = TRIG) 199 mg/dL    9-200        N       

      

 

             HDL (test code = HDL) 14 mg/dL     40-60        L             

 

             Chol/HDL (test code = CHOLPHDL) 7.4 Ratio    0.0-5.0      H        

     

 

             LDL, Calculated (test code = LDLC) 49           0-130        N     

       (NOTE)RISK OF HEART 

DISEASEPublished by American Heart AssociationAnalyte                 Optimal   
         Boderline            Increased RiskCHOL                  <200          
       200-239                   >240TRIG                    <150               
  150-199                   >200HDL Male:           >60                         
                        <40HDL Female:       &gt;60                             
                      <50LDL                     <100                130-159    
                >160LDL                      NEAR OPTIMAL -129

 

             VLDL (test code = VLDL) 40 mg/dL     5-40         N             

 

             LDL/HDL (test code = LDLPHDL) 4                                    

   





[id number] 06:21:00* 



             Test Item    Value        Reference Range Interpretation Comments

 

             Phosphorus (test code = PO4) 4.0 mg/dL    2.70-4.50    N           

  





Magnesium, [id number] 06:21:00* 



             Test Item    Value        Reference Range Interpretation Comments

 

             Magnesium (test code = MG) 2.0 mg/dL    1.7-2.5      N             





Basic Metabolic [id number] 06:21:00* 



             Test Item    Value        Reference Range Interpretation Comments

 

             Sodium (test code = NA) 136 mmol/L   135-145      N             

 

             Potassium (test code = K) 4.4 mmol/L   3.5-5.1      N             

 

             Chloride (test code = CL) 99 mmol/L           N             

 

             Carbon Dioxide (test code = CO2) 25 mmol/L    22-29        N       

      

 

             Glucose (test code = GLU) 277 mg/dL           H             

 

             Blood Urea Nitrogen (test code = BUN) 8 mg/dL      6-20         N  

           

 

             Creatinine (test code = CREAT) 0.6 mg/dL    0.7-1.2      L         

    

 

             Calcium (test code = CA) 7.8 mg/dL    8.3-10.5     L             

 

             BUN/Creatinine Ratio (test code = BCRATIO) 13.3                    

                

 

             Anion Gap (test code = AGAP) 12 mmol/L    7-16         N           

  

 

             Estimated GFR (test code = GFR) >60 mL/min/1.73m2                  

         eGFR (estimated Glomerular

 Filtration Rate) is an estimated value,calculated from the patient's serum 
creatinine using the MDRD equation.It is NOT the patient's actual GFR. The eGFR 
provides a more clinicallyuseful measure of kidney disease than serum creatinine
 alone.***This calculation takes sex and race into account, if the informationis
 provided. If the race is not provided, and the patient isAfrican-American, 
multiply by 1.212. If sex is not provided, and thepatient is female, multiply by
 0.742. Results for patients <18 years ofage have not been validated by the MDRD
 study and should be interpretedwith caution.eGFR Result Interpretation:eGFR > 
or = 60 is in the Normal RangeeGFR < 60 may mean kidney diseaseeGFR < 15 may 
mean kidney failure***Ranges recommended by the National Kidney Found
ation,http://nkdep.nih.gov





Lactic Acid [id number] 06:16:00* 



             Test Item    Value        Reference Range Interpretation Comments

 

             Lactic Acid, Bld (test code = LAC) 0.9 mmol/L   0.5-1.9      N     

        





Prothrombin [id number] 06:10:00* 



             Test Item    Value        Reference Range Interpretation Comments

 

             PT (test code = PT) 15.20 seconds 9.78-13.35   H             

 

             INR (test code = INR) 1.34 Ratio   0.6-1.2      H             





Partial Thromboplastin [id number] 06:10:00* 



             Test Item    Value        Reference Range Interpretation Comments

 

             aPTT (test code = PTT) 32.20 seconds 24.39-37.25  N             





POC Glucose, [id number] 20:27:00* 



             Test Item    Value        Reference Range Interpretation Comments

 

             POC Glucose (test code = POCGLUC) 329 mg/dL           H      

      If you consider your 

patient critically ill, the Roche Accu-Chek InformII metershould not be used for
 Glucose determinations.Draw a venous Glucose and send to the Main Lab for 
Analysis.





Urinalysis [id number] 16:06:00* 



             Test Item    Value        Reference Range Interpretation Comments

 

             Color (test code = COLOR) Yellow       Yellow,Straw,Pl yellow N    

         

 

             Clarity (test code = CLAR) Clear        Clear        N             

 

             Specific Gravity (test code = SPGR) 1.028        1.001-1.035  N    

         

 

             pH (test code = PH) 7.0          5.0-9.0      N             

 

             Ketone (test code = KET) 5 mg/dL      Negative     A             

 

             Glucose (test code = GLUCUR) 1000 mg/dL   Negative     A           

  

 

             Protein (test code = PROT) 75 mg/dL     Negative     A             

 

             Bilirubin (test code = BILI) See IctoTest mg/dL Negative     A     

        

 

             Occult Blood (test code = UDOB) Small        Negative     A        

     

 

             Urobilinogen (test code = UROB) 8.0 mg/dL    0.2-1.0      H        

     

 

             Nitrite (test code = NIT) Positive     Negative     A             

 

             Leuk Esterase (test code = LEUK) Small        Negative     A       

      

 

                Ictotest (test code = ICTOTEST) Confirmed Negative Negative,Conf

irmed Negative N

                                         

 

             Micros Exam (test code = MEXAM) Indicated                          

     

 

             Epithelial Cells (test code = EPI) 3-5 /LPF     0-30         A     

        

 

             WBC, Urine (test code = UWBC) 10-14 /HPF   0-5          A          

   

 

             RBC, Urine (test code = URBC) 0-2 /HPF     0-5          A          

   

 

             Bacteria (test code = BACT) Many /HPF                              

 

 

             Yeast (test code = YEAST) Few /HPF                               bu

dding yeast presentFS





CBC with [id number] 15:56:00* 



             Test Item    Value        Reference Range Interpretation Comments

 

             WBC (test code = WBC) 12.4 K/cumm  4.4-10.5     H             

 

             RBC (test code = RBC) 3.22 M/cumm  4.10-5.70    L             

 

             Hemoglobin (test code = HGB) 8.2 gm/dL    13.4-17.4    L           

  

 

             Hematocrit (test code = HCT) 27.0 %       38.7-52.0    L           

  

 

             MCV (test code = MCV) 83.7 fL             N             

 

             MCH (test code = MCH) 25.5 pg      27.0-32.5    L             

 

             MCHC (test code = MCHC) 30.5 g/dL    32.0-37.5    L             

 

             RDW (test code = RDW) 14.6 %       11.5-14.5    H             

 

             Platelet Count (test code = PLTCT) 1012 K/cumm  140-440      HH    

        

 

             MPV (test code = MPV) 7.0 fL                                  

 

             Diff Method (test code = DIFFM) Manual                             

     

 

             Neutrophil (test code = NEUT) 41.0 %       36-70        N          

   

 

             Bands (test code = BAND) 12.0 %       0-6          H             

 

             Lymphocyte (test code = LYMPH) 30.0 %       12-44        N         

    

 

             Monocyte (test code = MONO) 16.0 %       0-11         H            

 

 

             Eosinophil (test code = EOS) 1.0 %        0-7          N           

  

 

             Neutro Abs (test code = ANEUT) 6.6 K/cumm   1.6-7.4      N         

    

 

             Lymph Abs (test code = ALYMPH) 3.7 K/cumm   0.5-4.6      N         

    

 

             Mono Abs (test code = AMONO) 2.0 K/cumm   0.0-1.2      H           

  

 

             Eos Abs (test code = AEOS) 0.12 K/cumm  0.00-0.74    N             

 

                    RBC Morphology (test code = RBCMRPH) Slight Anisocytosis ; S

light Polychromasia 

                                                     

 

             Platelet Est (test code = PLTEST) Increased Platelet on Smear      

                      





Lactic Acid [id number] 15:12:00* 



             Test Item    Value        Reference Range Interpretation Comments

 

             Lactic Acid, Bld (test code = LAC) 2.6 mmol/L   0.5-1.9      HH    

        





Comprehensive Metabolic [id number] 15:08:00* 



             Test Item    Value        Reference Range Interpretation Comments

 

             Sodium (test code = NA) 135 mmol/L   135-145      N             

 

             Potassium (test code = K) 5.0 mmol/L   3.5-5.1      N            SL

IGHT HEMOLYSIS

 

             Chloride (test code = CL) 96 mmol/L           L             

 

             Carbon Dioxide (test code = CO2) 24 mmol/L    22-29        N       

      

 

             Glucose (test code = GLU) 279 mg/dL           H             

 

             Blood Urea Nitrogen (test code = BUN) 7 mg/dL      6-20         N  

           

 

             Creatinine (test code = CREAT) 0.7 mg/dL    0.7-1.2      N         

    

 

             Calcium (test code = CA) 8.2 mg/dL    8.3-10.5     L             

 

             Prot Total (test code = TP) 7.5 g/dL     6.4-8.3      N            

 

 

             Albumin (test code = ALB) 2.7 g/dL     3.5-5.2      L             

 

             A/G Ratio (test code = AGRATIO) 0.6 Ratio                          

     

 

             Globulin (test code = GLOB) 4.8          2.9-3.1      H            

 

 

             Bili Total (test code = TBIL) 0.3 mg/dL    0.1-0.9      N          

   

 

             Alk Phos (test code = APHOS) 266 U/L             H           

  

 

             AST (test code = AST) 30 U/L       1-40         N             

 

             ALT (test code = ALT) 23 U/L       1-41         N             

 

             BUN/Creatinine Ratio (test code = BCRATIO) 10.0                    

                

 

             Anion Gap (test code = AGAP) 15 mmol/L    7-16         N           

  

 

             Estimated GFR (test code = GFR) >60 mL/min/1.73m2                  

         eGFR (estimated Glomerular

 Filtration Rate) is an estimated value,calculated from the patient's serum 
creatinine using the MDRD equation.It is NOT the patient's actual GFR. The eGFR 
provides a more clinicallyuseful measure of kidney disease than serum creatinine
 alone.***This calculation takes sex and race into account, if the informationis
 provided. If the race is not provided, and the patient isAfrican-American, 
multiply by 1.212. If sex is not provided, and thepatient is female, multiply by
 0.742. Results for patients <18 years ofage have not been validated by the MDRD
 study and should be interpretedwith caution.eGFR Result Interpretation:eGFR > 
or = 60 is in the Normal RangeeGFR < 60 may mean kidney diseaseeGFR < 15 may 
mean kidney failure***Ranges recommended by the National Kidney Found
ation,http://nkdep.nih.gov





XR CHEST 1 [id number] 14:29:19CHEST 1 VIEWCLINICAL INFORMATION:  
FeverCOMPARISON: 2016FINDINGS:Linear scarring and/or atelectasis is 
seen in the right lower lobe. There is elevation of the right hemidiaphragm.  
The left lung is clear. The heart size is normal.  The bones are 
intact.IMPRESSION:Atelectasis and/or scarring in the right lower lobe.  
Superimposedpneumonia cannot be excluded.LOCATION: R16XR HIP 2+V, UNI.-LEFT
2018 14:27:17A46BHAJ: XR HIP 2+V, UNI.-LEFTHISTORY: Pain-
erythemaCOMPARISON: NoneFINDINGS:No acute fracture or dislocation. The joint 
spaces are preserved.Apparent sclerosis in the right inferior pubic ramus; 
however, thereappears to be overlying bowel loops. No soft tissue 
abnormality.IMPRESSION:Findings suggestive of right inguinal hernia. Possible 
sclerotic lesionin the right inferior pubic ramus; however, this may be 
secondary tosuperimposed bowel loops. If pain persists, consider further 
evaluationwith CT.POCT-GLUCOSE [id number] 00:42:00* 



             Test Item    Value        Reference Range Interpretation Comments

 

             POC-GLUCOSE METER (BEAKER) (test code = 1538) 315 mg/dL      

     H            TESTED AT 

[address] [address]





COMPREHENSIVE METABOLIC [id number] 21:43:00* 



             Test Item    Value        Reference Range Interpretation Comments

 

             TOTAL PROTEIN (BEAKER) (test code = 770) 6.9 gm/dL    6.0-8.5      

              

 

             ALBUMIN (BEAKER) (test code = 1145) 2.9 g/dL     3.5-5.0      L    

         

 

             ALKALINE PHOSPHATASE (BEAKER) (test code = 346) 372 U/L      

       H             

 

             BILIRUBIN TOTAL (BEAKER) (test code = 377) 0.4 mg/dL    0.1-1.2    

                

 

             SODIUM (BEAKER) (test code = 381) 135 meq/L    135-148             

       

 

             POTASSIUM (BEAKER) (test code = 379) 3.9 meq/L    3.6-5.5          

          

 

             CHLORIDE (BEAKER) (test code = 382) 95 meq/L            L    

         

 

             CO2 (BEAKER) (test code = 355) 29 meq/L     24-32                  

    

 

             BLOOD UREA NITROGEN (BEAKER) (test code = 354) 9 mg/dL      10-26  

      L             

 

             CREATININE (BEAKER) (test code = 358) 0.64 mg/dL   0.50-1.20       

           

 

             GLUCOSE RANDOM (BEAKER) (test code = 652) 434 mg/dL          

 HH            

 

             CALCIUM (BEAKER) (test code = 697) 8.4 mg/dL    8.5-10.5     L     

        

 

             AST (SGOT) (BEAKER) (test code = 353) 51 U/L       5-40         H  

           

 

             ALT (SGPT) (BEAKER) (test code = 347) 47 U/L       5-50            

           

 

             EGFR (BEAKER) (test code = 1092)  mL/min/1.73 sq m                 

          INSUFFICIENT CLINICAL 

DATA TO CALCULATE ESTIMATED GFR.





CBC W/PLT COUNT & AUTO [id number] 21:29:00* 



             Test Item    Value        Reference Range Interpretation Comments

 

             WHITE BLOOD CELL COUNT (BEAKER) (test code = 775) 11.6 10e3/ L 4.0-

10.0     H             

 

             RED BLOOD CELL COUNT (BEAKER) (test code = 761) 3.40 10e6/ L 4.20-5

.80    L             

 

             HEMOGLOBIN (BEAKER) (test code = 410) 9.2 g/dL     13.0-16.8    L  

           

 

             HEMATOCRIT (BEAKER) (test code = 411) 27.9 %       40.0-50.0    L  

           

 

             MEAN CORPUSCULAR VOLUME (BEAKER) (test code = 753) 81.9 fL      82.

0-98.0    L             

 

             MEAN CORPUSCULAR HEMOGLOBIN (BEAKER) (test code = 751) 27.1 pg     

 27.0-33.0                  

 

                MEAN CORPUSCULAR HEMOGLOBIN CONC (BEAKER) (test code = 752) 33.1

 g/dL       32.0-36.0       

                                         

 

             RED CELL DISTRIBUTION WIDTH (BEAKER) (test code = 412) 13.8 %      

 10.3-14.2                  

 

             PLATELET COUNT (BEAKER) (test code = 756) 894 10e3/ L  150-430     

 H             

 

             MEAN PLATELET VOLUME (BEAKER) (test code = 754) 6.3 fL       6.5-10

.5     L             

 

             NEUTROPHILS RELATIVE PERCENT (BEAKER) (test code = 429) 51 %       

                             

 

             LYMPHOCYTES RELATIVE PERCENT (BEAKER) (test code = 430) 34 %       

                             

 

             MONOCYTES RELATIVE PERCENT (BEAKER) (test code = 431) 13 %         

                           

 

             EOSINOPHILS RELATIVE PERCENT (BEAKER) (test code = 432) 2 %        

                             

 

             BASOPHILS RELATIVE PERCENT (BEAKER) (test code = 437) 1 %          

                           

 

             NEUTROPHILS ABSOLUTE COUNT (BEAKER) (test code = 670) 5.93 10e3/ L 

1.80-8.00                  

 

             LYMPHOCYTES ABSOLUTE COUNT (BEAKER) (test code = 414) 3.94 10e3/ L 

1.48-4.50                  

 

             MONOCYTES ABSOLUTE COUNT (BEAKER) (test code = 415) 1.46 10e3/ L 0.

00-1.30    H             

 

             EOSINOPHILS ABSOLUTE COUNT (BEAKER) (test code = 416) 0.17 10e3/ L 

0.00-0.50                  

 

             BASOPHILS ABSOLUTE COUNT (BEAKER) (test code = 417) 0.10 10e3/ L 0.

00-0.20                  





KETONE, [id number] 21:23:00* 



             Test Item    Value        Reference Range Interpretation Comments

 

             KETONES, BLOOD (BEAKER) (test code = 1103) 0.1 mmol/L   <0.4

## 2020-09-01 NOTE — DIAGNOSTIC IMAGING REPORT
EXAM: Right Lower Extremity Venous Duplex Ultrasound

INDICATION:        

^swelling

^[id number]

^2121  

COMPARISON:  None 

TECHNIQUE: Gray scale, color Doppler and spectral waveform analysis of the

right lower extremity deep venous system was performed. 



FINDINGS: 



Very limited study.



Common Femoral: 

     Fully compressible with normal spontaneous waveforms. 



Proximal Greater Saphenous:

     Fully compressible.

 

Femoral: 

     Fully compressible with normal spontaneous waveforms.  Normal response to

augmentation.



Proximal Deep Femoral:

     Normal spontaneous waveforms. 



Popliteal:     

     Fully compressible with normal spontaneous waveforms.  



IMPRESSION:     

No definite evidence of deep venous thrombosis above the right calf. 



Signed by: Dr. Umair Thomas MD on 9/1/2020 9:58 PM

## 2020-09-01 NOTE — XMS REPORT
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  

 

                                        



Results for this procedure are in the results section.



                     POCT-GLUCOSE METER  Routine             2020  



                                         7:57 AM CDT  

 

                                        



Results for this procedure are in the results section.



                     (CELLAVISION MANUAL DIFF)  Routine             2020  



                                         5:13 AM CDT  

 

                                        



Results for this procedure are in the results section.



                     CBC W/PLT COUNT & AUTO  Routine             2020  



                           DIFFERENTIAL              5:13 AM CDT  

 

                                        



Results for this procedure are in the results section.



                     CBC W/PLT COUNT & AUTO  Routine             2020  



                           DIFFERENTIAL              5:13 AM CDT  

 

                                        



Results for this procedure are in the results section.



                     MAGNESIUM           Routine             2020  



                                         5:13 AM CDT  

 

                                        



Results for this procedure are in the results section.



                     HEPATIC FUNCTION PANEL  Routine             2020  



                                         5:13 AM CDT  

 

                                        



Results for this procedure are in the results section.



                     BASIC METABOLIC PANEL (7)  Routine             2020  



                                         5:13 AM CDT  

 

                                        



Results for this procedure are in the results section.



                     POCT-GLUCOSE METER  Routine             2020  



                                         9:51 PM CDT  

 

                                        



Results for this procedure are in the results section.



                     CT PELVIS WITH IV   STAT                2020  



                           CONTRAST                  5:25 PM CDT  

 

                                        



Results for this procedure are in the results section.



                     POCT-GLUCOSE METER  Routine             2020  



                                         4:49 PM CDT  

 

                                        



Results for this procedure are in the results section.



                     POCT-GLUCOSE METER  Routine             2020  



                                         12:28 PM CDT  

 

                                        



Results for this procedure are in the results section.



                     POCT-GLUCOSE METER  Routine             2020  



                                         7:32 AM CDT  

 

                                        



Results for this procedure are in the results section.



                     CBC W/PLT COUNT & AUTO  Routine             2020  



                           DIFFERENTIAL              4:50 AM CDT  

 

                                        



Results for this procedure are in the results section.



                     CBC W/PLT COUNT & AUTO  Routine             2020  



                           DIFFERENTIAL              4:50 AM CDT  

 

                                        



Results for this procedure are in the results section.



                     MAGNESIUM           Routine             2020  



                                         4:50 AM CDT  

 

                                        



Results for this procedure are in the results section.



                     HEPATIC FUNCTION PANEL  Routine             2020  



                                         4:50 AM CDT  

 

                                        



Results for this procedure are in the results section.



                     BASIC METABOLIC PANEL (7)  Routine             2020  



                                         4:50 AM CDT  

 

                                        



Results for this procedure are in the results section.



                     POCT-GLUCOSE METER  Routine             2020  



                                         11:15 PM CDT  

 

                                        



Results for this procedure are in the results section.



                     POCT-GLUCOSE METER  Routine             2020  



                                         4:47 PM CDT  

 

                                        



Results for this procedure are in the results section.



                     PROCALCITONIN       Routine             2020  



                                         2:20 PM CDT  

 

                                        



Results for this procedure are in the results section.



                     FERRITIN            Routine             2020  



                                         2:20 PM CDT  

 

                                        



Results for this procedure are in the results section.



                     POCT-GLUCOSE METER  Routine             2020  



                                         11:39 AM CDT  

 

                                        



Results for this procedure are in the results section.



                     LACTIC ACID, VENOUS  STAT                2020  



                                         6:31 AM CDT  

 

                                        



Results for this procedure are in the results section.



                     XR PELVIS 1 OR 2 VIEWS  STAT                2020  



                                         3:50 AM CDT  

 

                                        



Results for this procedure are in the results section.



                     URINALYSIS W/ REFLEX  STAT                2020  



                           URINE CULTURE             2:41 AM CDT  

 

                                         



                     ECG 12-LEAD         Routine             2020  



                                         2:39 AM CDT  

 

  



                                         Procedure



                                         Note -



                                         Interface,



                                         External



                                         Ris In -



                                         2020



                                         7:10 PM



                                         CDT



                                         Ventricula



                                         r Rate 90



                                         BPM



                                         Atrial



                                         Rate 90



                                         BPM



                                         P-R



                                         Interval



                                         140 ms



                                         QRS



                                         Duration



                                         84 ms



                                         Q-T



                                         Interval



                                         384 ms



                                         QTC



                                         Calculatio



                                         n(Bazett)



                                         469 ms



                                         P Axis 53



                                         degrees



                                         R Axis 18



                                         degrees



                                         T Axis 4



                                         degrees





                                         Normal



                                         sinus



                                         rhythm



                                         Nonspecifi



                                         c ST



                                         abnormalit



                                         y



                                         Abnormal



                                         ECG



                                         No



                                         previous



                                         ECGs



                                         available

 

                                        



Results for this procedure are in the results section.



                     ECG 12-LEAD         STAT                2020  



                                         2:39 AM CDT  

 

                                        



Results for this procedure are in the results section.



                     SARS-COV2/RT-PCR ([facility] &  STAT                2020  



                           REF LABS)                 2:11 AM CDT  

 

                                        



Results for this procedure are in the results section.



                     BLOOD CULTURE       STAT                2020  



                                         2:02 AM CDT  

 

                                        



Results for this procedure are in the results section.



                     CBC W/PLT COUNT & AUTO  STAT                2020  



                           DIFFERENTIAL              1:56 AM CDT  

 

                                        



Results for this procedure are in the results section.



                     LACTATE DEHYDROGENASE  Add-On              2020  



                           (LDH)                     1:56 AM CDT  

 

                                        



Results for this procedure are in the results section.



                     PHOSPHORUS          STAT                2020  



                                         1:56 AM CDT  

 

                                        



Results for this procedure are in the results section.



                     MAGNESIUM           STAT                2020  



                                         1:56 AM CDT  

 

                                        



Results for this procedure are in the results section.



                     C-REACTIVE PROTEIN  STAT                2020  



                                         1:56 AM CDT  

 

                                        



Results for this procedure are in the results section.



                     APTT                STAT                2020  



                                         1:56 AM CDT  

 

                                        



Results for this procedure are in the results section.



                     PROTHROMBIN TIME/INR  STAT                2020  



                                         1:56 AM CDT  

 

                                        



Results for this procedure are in the results section.



                     COMPREHENSIVE METABOLIC  STAT                2020  



                           PANEL                     1:56 AM CDT  

 

                                        



Results for this procedure are in the results section.



                     LACTIC ACID, VENOUS  STAT                2020  



                                         1:56 AM CDT  

 

                                        



Results for this procedure are in the results section.



                     CBC W/PLT COUNT & AUTO  STAT                2020  



                           DIFFERENTIAL              1:56 AM CDT  

 

                                        



Results for this procedure are in the results section.



                     BLOOD CULTURE       STAT                2020  



                                         1:56 AM CDT  

 

                                        



Results for this procedure are in the results section.



                     XR CHEST 1 VIEW     STAT                2020  



                           PORTABLE/BEDSIDE          12:25 AM CDT  

 

                                        



Results for this procedure are in the results section.



                     ED ECG INTERPRETATION  Routine             2020  



                                         11:44 PM CDT  

 

                                        



Results for this procedure are in the results section.



                     POCT-GLUCOSE METER  Routine             2020  



                                         1:59 PM CDT  

 

                                        



Results for this procedure are in the results section.



                     SURGICALLY OBTAINED  Routine             2020  



                           CULTURE + GRAM STAIN      12:42 PM CDT  

 

                                        



Results for this procedure are in the results section.



                     FUNGUS CULTURE +  SMEAR  Routine             2020  



                                         11:20 AM CDT  

 

                                        



Results for this procedure are in the results section.



                     ANAEROBIC CULTURE   Routine             2020  



                                         11:20 AM CDT  

 

                                        



Results for this procedure are in the results section.



                     AFB CULTURE + SMEAR  Routine             2020  



                           (NON-SPUTUM)              11:20 AM CDT  

 

                                         



                     BIOPSY/EXCISION,SOFT   2020          Pressure injury 

of skin, 



                     TISSUE TORSO POSTERIOR   10:00 AM CDT        unspecified in

jury stage, 



                                         unspecified location 

 

  



                                         Special



                                         Needs



                                         WOUND VAC,



                                         PULSE



                                         LAVAGE,



                                         CURETTES,S



                                         PONGESREQ:



                                         (FIRST



                                         AVAILABLE



                                         CASE)

 

                                         



                     DEBRIDEMENT/I&D,WOUND   2020          Pressure injury

 of skin, 



                     TRUNK POSTERIOR     10:00 AM CDT        unspecified injury 

stage, 



                                         unspecified location 

 

  



                                         Special



                                         Needs



                                         WOUND VAC,



                                         PULSE



                                         LAVAGE,



                                         CURETTES,S



                                         PONGESREQ:



                                         (FIRST



                                         AVAILABLE



                                         CASE)

 

                                        



Results for this procedure are in the results section.



                     POCT-GLUCOSE METER  Routine             2020  



                                         7:01 AM CDT  

 

                                        



Results for this procedure are in the results section.



                     CBC W/PLT COUNT & AUTO  Routine             2020  



                           DIFFERENTIAL              6:43 AM CDT  

 

                                        



Results for this procedure are in the results section.



                     CBC W/PLT COUNT & AUTO  Routine             2020  



                           DIFFERENTIAL              6:43 AM CDT  

 

                                        



Results for this procedure are in the results section.



                     BASIC METABOLIC PANEL (7)  Routine             2020  



                                         6:43 AM CDT  

 

                                        



Results for this procedure are in the results section.



                     POCT-GLUCOSE METER  Routine             2020  



                                         8:59 PM CDT  

 

                                        



Results for this procedure are in the results section.



                     POCT-GLUCOSE METER  Routine             2020  



                                         4:45 PM CDT  

 

                                        



Results for this procedure are in the results section.



                     BLOOD CULTURE       Routine             2020  



                                         3:40 PM CDT  

 

                                        



Results for this procedure are in the results section.



                     SURGICALLY OBTAINED  Routine             2020  



                           CULTURE + GRAM STAIN      3:36 PM CDT  

 

                                        



Results for this procedure are in the results section.



                     BLOOD CULTURE       Routine             2020  



                                         1:27 PM CDT  

 

                                        



Results for this procedure are in the results section.



                     POCT-GLUCOSE METER  Routine             2020  



                                         11:41 AM CDT  

 

                                        



Results for this procedure are in the results section.



                     CBC W/PLT COUNT & AUTO  Routine             2020  



                           DIFFERENTIAL              9:22 AM CDT  

 

                                        



Results for this procedure are in the results section.



                     CBC W/PLT COUNT & AUTO  Routine             2020  



                           DIFFERENTIAL              9:22 AM CDT  

 

                                        



Results for this procedure are in the results section.



                     POCT-GLUCOSE METER  Routine             2020  



                                         7:51 AM CDT  

 

                                        



Results for this procedure are in the results section.



                     BASIC METABOLIC PANEL (7)  Routine             2020  



                                         5:24 AM CDT  

 

                                        



Results for this procedure are in the results section.



                     POCT-GLUCOSE METER  Routine             2020  



                                         8:47 PM CDT  

 

                                        



Results for this procedure are in the results section.



                     POCT-GLUCOSE METER  Routine             2020  



                                         5:10 PM CDT  

 

                                        



Results for this procedure are in the results section.



                     POCT-GLUCOSE METER  Routine             2020  



                                         12:59 PM CDT  

 

                                        



Results for this procedure are in the results section.



                     POCT-GLUCOSE METER  Routine             2020  



                                         11:39 AM CDT  

 

                                        



Results for this procedure are in the results section.



                     POCT-GLUCOSE METER  Routine             2020  



                                         10:09 AM CDT  

 

                                        



Results for this procedure are in the results section.



                     BASIC METABOLIC PANEL (7)  Routine             2020  



                                         10:01 AM CDT  

 

                                        



Results for this procedure are in the results section.



                     POCT-GLUCOSE METER  Routine             2020  



                                         7:38 AM CDT  

 

                                        



Results for this procedure are in the results section.



                     POCT-GLUCOSE METER  Routine             2020  



                                         8:25 PM CDT  

 

                                        



Results for this procedure are in the results section.



                     POCT-GLUCOSE METER  Routine             2020  



                                         4:46 PM CDT  

 

                                        



Results for this procedure are in the results section.



                     WOUND CULTURE + GRAM  Routine             2020  



                           STAIN                     3:27 PM CDT  

 

                                        



Results for this procedure are in the results section.



                     POCT-GLUCOSE METER  Routine             2020  



                                         12:09 PM CDT  

 

                                        



Results for this procedure are in the results section.



                     POCT-GLUCOSE METER  Routine             2020  



                                         8:17 AM CDT  

 

                                        



Results for this procedure are in the results section.



                     HEMOGLOBIN A1C      Routine             2020  



                                         4:21 AM CDT  

 

                                        



Results for this procedure are in the results section.



                     WOUND CULTURE + GRAM  Routine             2020  



                           STAIN                     10:28 PM CDT  

 

                                        



Results for this procedure are in the results section.



                     WOUND CULTURE + GRAM  Routine             2020  



                           STAIN                     10:28 PM CDT  

 

                                        



Results for this procedure are in the results section.



                     POCT-GLUCOSE METER  Routine             2020  



                                         9:56 PM CDT  

 

                                        



Results for this procedure are in the results section.



                     POCT-GLUCOSE METER  Routine             2020  



                                         6:18 PM CDT  

 

                                        



Results for this procedure are in the results section.



                     BLOOD CULTURE       STAT                2020  



                                         3:22 PM CDT  

 

                                        



Results for this procedure are in the results section.



                     SARS-COV2/RT-PCR ([facility] &  STAT                2020  



                           REF LABS)                 3:22 PM CDT  

 

                                        



Results for this procedure are in the results section.



                     BLOOD CULTURE       Routine             2020  



                           IDENTIFICATION PANEL      3:21 PM CDT  

 

                                        



Results for this procedure are in the results section.



                     BLOOD CULTURE       STAT                2020  



                                         3:21 PM CDT  

 

                                        



Results for this procedure are in the results section.



                     URINALYSIS W/ REFLEX  STAT                2020  



                           URINE CULTURE             1:29 PM CDT  

 

                                        



Results for this procedure are in the results section.



                     URINE CULTURE       STAT                2020  



                                         1:29 PM CDT  

 

                                        



Results for this procedure are in the results section.



                     XR CHEST 1 VIEW     STAT                2020  



                           PORTABLE/BEDSIDE          1:19 PM CDT  

 

                                        



Results for this procedure are in the results section.



                     BLOOD GAS, VENOUS   STAT                2020  



                                         1:11 PM CDT  

 

                                        



Results for this procedure are in the results section.



                     LACTIC ACID, VENOUS  STAT                2020  



                                         1:11 PM CDT  

 

                                        



Results for this procedure are in the results section.



                     CBC W/PLT COUNT & AUTO  STAT                2020  



                           DIFFERENTIAL              1:10 PM CDT  

 

                                        



Results for this procedure are in the results section.



                     KETONE, BLOOD       STAT                2020  



                                         1:10 PM CDT  

 

                                        



Results for this procedure are in the results section.



                     HEPATIC FUNCTION PANEL  STAT                2020  



                                         1:10 PM CDT  

 

                                        



Results for this procedure are in the results section.



                     BASIC METABOLIC PANEL (7)  STAT                2020  



                                         1:10 PM CDT  

 

                                        



Results for this procedure are in the results section.



                     CBC W/PLT COUNT & AUTO  STAT                2020  



                           DIFFERENTIAL              1:10 PM CDT  

 

                                        



Results for this procedure are in the results section.



                     POCT-GLUCOSE METER  Routine             04/15/2020  



                                         8:12 AM CDT  

 

                                        



Results for this procedure are in the results section.



                     CBC W/PLT COUNT & AUTO  Routine             04/15/2020  



                           DIFFERENTIAL              5:26 AM CDT  

 

                                        



Results for this procedure are in the results section.



                     CBC W/PLT COUNT & AUTO  Routine             04/15/2020  



                           DIFFERENTIAL              5:26 AM CDT  

 

                                        



Results for this procedure are in the results section.



                     BASIC METABOLIC PANEL (7)  Routine             04/15/2020  



                                         2:35 AM CDT  

 

                                        



Results for this procedure are in the results section.



                     POCT-GLUCOSE METER  Routine             2020  



                                         9:50 PM CDT  

 

                                        



Results for this procedure are in the results section.



                     POCT-GLUCOSE METER  Routine             2020  



                                         5:08 PM CDT  

 

                                        



Results for this procedure are in the results section.



                     POCT-GLUCOSE METER  Routine             2020  



                                         11:57 AM CDT  

 

                                        



Results for this procedure are in the results section.



                     POCT-GLUCOSE METER  Routine             2020  



                                         8:22 AM CDT  

 

                                        



Results for this procedure are in the results section.



                     POCT-GLUCOSE METER  Routine             2020  



                                         9:21 PM CDT  

 

                                        



Results for this procedure are in the results section.



                     POCT-GLUCOSE METER  Routine             2020  



                                         4:50 PM CDT  

 

                                        



Results for this procedure are in the results section.



                     POCT-GLUCOSE METER  Routine             2020  



                                         12:01 PM CDT  

 

                                        



Results for this procedure are in the results section.



                     POCT-GLUCOSE METER  Routine             2020  



                                         8:17 AM CDT  

 

                                        



Results for this procedure are in the results section.



                     POCT-GLUCOSE METER  Routine             2020  



                                         5:18 PM CDT  

 

                                        



Results for this procedure are in the results section.



                     POCT-GLUCOSE METER  Routine             2020  



                                         12:07 PM CDT  

 

                                        



Results for this procedure are in the results section.



                     POCT-GLUCOSE METER  Routine             2020  



                                         8:12 AM CDT  

 

                                        



Results for this procedure are in the results section.



                     CBC W/PLT COUNT & AUTO  Routine             2020  



                           DIFFERENTIAL              4:16 AM CDT  

 

                                        



Results for this procedure are in the results section.



                     CBC W/PLT COUNT & AUTO  Routine             2020  



                           DIFFERENTIAL              4:16 AM CDT  

 

                                        



Results for this procedure are in the results section.



                     BASIC METABOLIC PANEL (7)  Routine             2020  



                                         4:16 AM CDT  

 

                                        



Results for this procedure are in the results section.



                     POCT-GLUCOSE METER  Routine             2020  



                                         9:13 PM CDT  

 

                                        



Results for this procedure are in the results section.



                     POCT-GLUCOSE METER  Routine             2020  



                                         5:29 PM CDT  

 

                                        



Results for this procedure are in the results section.



                     POCT-GLUCOSE METER  Routine             2020  



                                         12:49 PM CDT  

 

                                        



Results for this procedure are in the results section.



                     POCT-GLUCOSE METER  Routine             2020  



                                         8:11 AM CDT  

 

                                        



Results for this procedure are in the results section.



                     POCT-GLUCOSE METER  Routine             04/10/2020  



                                         9:26 PM CDT  

 

                                        



Results for this procedure are in the results section.



                     POCT-GLUCOSE METER  Routine             04/10/2020  



                                         5:27 PM CDT  

 

                                        



Results for this procedure are in the results section.



                     POCT-GLUCOSE METER  Routine             04/10/2020  



                                         11:41 AM CDT  

 

                                        



Results for this procedure are in the results section.



                     POCT-GLUCOSE METER  Routine             04/10/2020  



                                         7:31 AM CDT  

 

                                        



Results for this procedure are in the results section.



                     POCT-GLUCOSE METER  Routine             2020  



                                         10:19 PM CDT  

 

                                        



Results for this procedure are in the results section.



                     VANCOMYCIN LEVEL, TROUGH  Timed               2020  



                                         9:26 PM CDT  

 

                                        



Results for this procedure are in the results section.



                     POCT-GLUCOSE METER  Routine             2020  



                                         5:16 PM CDT  

 

                                        



Results for this procedure are in the results section.



                     POCT-GLUCOSE METER  Routine             2020  



                                         12:12 PM CDT  

 

                                        



Results for this procedure are in the results section.



                     POCT-GLUCOSE METER  Routine             2020  



                                         8:21 AM CDT  

 

                                        



Results for this procedure are in the results section.



                     CBC W/PLT COUNT & AUTO  Routine             2020  



                           DIFFERENTIAL              6:08 AM CDT  

 

                                        



Results for this procedure are in the results section.



                     CBC W/PLT COUNT & AUTO  Routine             2020  



                           DIFFERENTIAL              6:08 AM CDT  

 

                                        



Results for this procedure are in the results section.



                     BASIC METABOLIC PANEL (7)  Routine             2020  



                                         4:42 AM CDT  

 

                                        



Results for this procedure are in the results section.



                     POCT-GLUCOSE METER  Routine             2020  



                                         10:09 PM CDT  

 

                                        



Results for this procedure are in the results section.



                     POCT-GLUCOSE METER  Routine             2020  



                                         5:20 PM CDT  

 

                                        



Results for this procedure are in the results section.



                     POCT-GLUCOSE METER  Routine             2020  



                                         12:07 PM CDT  

 

                                        



Results for this procedure are in the results section.



                     POCT-GLUCOSE METER  Routine             2020  



                                         8:09 AM CDT  

 

                                        



Results for this procedure are in the results section.



                     POCT-GLUCOSE METER  Routine             2020  



                                         8:57 PM CDT  

 

                                        



Results for this procedure are in the results section.



                     POCT-GLUCOSE METER  Routine             2020  



                                         5:26 PM CDT  

 

                                        



Results for this procedure are in the results section.



                     POCT-GLUCOSE METER  Routine             2020  



                                         12:59 PM CDT  

 

                                        



Results for this procedure are in the results section.



                     POCT-GLUCOSE METER  Routine             2020  



                                         7:59 AM CDT  

 

                                        



Results for this procedure are in the results section.



                     POCT-GLUCOSE METER  Routine             2020  



                                         9:00 PM CDT  

 

                                        



Results for this procedure are in the results section.



                     POCT-GLUCOSE METER  Routine             2020  



                                         4:33 PM CDT  

 

                                        



Results for this procedure are in the results section.



                     POCT-GLUCOSE METER  Routine             2020  



                                         11:46 AM CDT  

 

                                        



Results for this procedure are in the results section.



                     POCT-GLUCOSE METER  Routine             2020  



                                         7:41 AM CDT  

 

                                        



Results for this procedure are in the results section.



                     (CELLAVISION MANUAL DIFF)  Routine             2020  



                                         5:29 AM CDT  

 

                                        



Results for this procedure are in the results section.



                     CBC W/PLT COUNT & AUTO  Routine             2020  



                           DIFFERENTIAL              5:29 AM CDT  

 

                                        



Results for this procedure are in the results section.



                     VANCOMYCIN LEVEL, TROUGH  Timed               2020  



                                         5:29 AM CDT  

 

                                        



Results for this procedure are in the results section.



                     BASIC METABOLIC PANEL (7)  Routine             2020  



                                         5:29 AM CDT  

 

                                        



Results for this procedure are in the results section.



                     CBC W/PLT COUNT & AUTO  Routine             2020  



                           DIFFERENTIAL              5:29 AM CDT  

 

                                        



Results for this procedure are in the results section.



                     POCT-GLUCOSE METER  Routine             2020  



                                         9:21 PM CDT  

 

                                        



Results for this procedure are in the results section.



                     POCT-GLUCOSE METER  Routine             2020  



                                         5:28 PM CDT  

 

                                        



Results for this procedure are in the results section.



                     POCT-GLUCOSE METER  Routine             2020  



                                         12:33 PM CDT  

 

                                        



Results for this procedure are in the results section.



                     POCT-GLUCOSE METER  Routine             2020  



                                         8:35 AM CDT  

 

                                        



Results for this procedure are in the results section.



                     POCT-GLUCOSE METER  Routine             2020  



                                         9:34 PM CDT  

 

                                        



Results for this procedure are in the results section.



                     POCT-GLUCOSE METER  Routine             2020  



                                         5:45 PM CDT  

 

                                        



Results for this procedure are in the results section.



                     POCT-GLUCOSE METER  Routine             2020  



                                         12:29 PM CDT  

 

                                        



Results for this procedure are in the results section.



                     POCT-GLUCOSE METER  Routine             2020  



                                         8:55 AM CDT  

 

                                        



Results for this procedure are in the results section.



                     POCT-GLUCOSE METER  Routine             2020  



                                         9:52 PM CDT  

 

                                        



Results for this procedure are in the results section.



                     POCT-GLUCOSE METER  Routine             2020  



                                         5:31 PM CDT  

 

                                        



Results for this procedure are in the results section.



                     CBC (HEMOGRAM ONLY)  Routine             2020  



                                         2:55 PM CDT  

 

                                        



Results for this procedure are in the results section.



                     BASIC METABOLIC PANEL (7)  Routine             2020  



                                         2:55 PM CDT  

 

                                        



Results for this procedure are in the results section.



                     VANCOMYCIN LEVEL, TROUGH  Timed               2020  



                                         2:55 PM CDT  

 

                                        



Results for this procedure are in the results section.



                     POCT-GLUCOSE METER  Routine             2020  



                                         12:25 PM CDT  

 

                                        



Results for this procedure are in the results section.



                     POCT-GLUCOSE METER  Routine             2020  



                                         8:30 AM CDT  

 

                                        



Results for this procedure are in the results section.



                     POCT-GLUCOSE METER  Routine             2020  



                                         10:58 PM CDT  

 

                                        



Results for this procedure are in the results section.



                     POCT-GLUCOSE METER  Routine             2020  



                                         5:11 PM CDT  

 

                                        



Results for this procedure are in the results section.



                     POCT-GLUCOSE METER  Routine             2020  



                                         12:02 PM CDT  

 

                                        



Results for this procedure are in the results section.



                     POCT-GLUCOSE METER  Routine             2020  



                                         8:03 AM CDT  

 

                                        



Results for this procedure are in the results section.



                     POCT-GLUCOSE METER  Routine             2020  



                                         1:24 AM CDT  

 

                                        



Results for this procedure are in the results section.



                     POCT-GLUCOSE METER  Routine             2020  



                                         8:43 PM CDT  

 

                                        



Results for this procedure are in the results section.



                     POCT-GLUCOSE METER  Routine             2020  



                                         6:19 PM CDT  

 

                                        



Results for this procedure are in the results section.



                     VANCOMYCIN LEVEL, TROUGH  Timed               2020  



                                         1:38 PM CDT  

 

                                        



Results for this procedure are in the results section.



                     BASIC METABOLIC PANEL (7)  Routine             2020  



                                         1:38 PM CDT  

 

                                        



Results for this procedure are in the results section.



                     POCT-GLUCOSE METER  Routine             2020  



                                         12:06 PM CDT  

 

                                        



Results for this procedure are in the results section.



                     POCT-GLUCOSE METER  Routine             2020  



                                         8:04 AM CDT  

 

                                        



Results for this procedure are in the results section.



                     POCT-GLUCOSE METER  Routine             2020  



                                         10:37 PM CDT  

 

                                        



Results for this procedure are in the results section.



                     POCT-GLUCOSE METER  Routine             2020  



                                         6:04 PM CDT  

 

                                        



Results for this procedure are in the results section.



                     POCT-GLUCOSE METER  Routine             2020  



                                         12:54 PM CDT  

 

                                        



Results for this procedure are in the results section.



                     POCT-GLUCOSE METER  Routine             2020  



                                         7:40 AM CDT  

 

                                        



Results for this procedure are in the results section.



                     BASIC METABOLIC PANEL (7)  Routine             2020  



                                         4:24 AM CDT  

 

                                        



Results for this procedure are in the results section.



                     CBC (HEMOGRAM ONLY)  Routine             2020  



                                         4:24 AM CDT  

 

                                        



Results for this procedure are in the results section.



                     VANCOMYCIN LEVEL, TROUGH  Timed               2020  



                                         4:24 AM CDT  

 

                                        



Results for this procedure are in the results section.



                     POCT-GLUCOSE METER  Routine             2020  



                                         9:46 PM CDT  

 

                                        



Results for this procedure are in the results section.



                     POCT-GLUCOSE METER  Routine             2020  



                                         6:18 PM CDT  

 

                                        



Results for this procedure are in the results section.



                     BUN AND CREATININE  STAT                2020  



                           W/RATIO                   1:22 PM CDT  

 

                                        



Results for this procedure are in the results section.



                     POCT-GLUCOSE METER  Routine             2020  



                                         11:39 AM CDT  

 

                                        



Results for this procedure are in the results section.



                     POCT-GLUCOSE METER  Routine             2020  



                                         7:46 AM CDT  

 

                                        



Results for this procedure are in the results section.



                     POCT-GLUCOSE METER  Routine             2020  



                                         2:15 AM CDT  

 

                                        



Results for this procedure are in the results section.



                     POCT-GLUCOSE METER  Routine             2020  



                                         8:57 PM CDT  

 

                                        



Results for this procedure are in the results section.



                     POCT-GLUCOSE METER  Routine             2020  



                                         5:19 PM CDT  

 

                                        



Results for this procedure are in the results section.



                     VANCOMYCIN LEVEL, TROUGH  Timed               2020  



                                         1:54 PM CDT  

 

                                        



Results for this procedure are in the results section.



                     POCT-GLUCOSE METER  Routine             2020  



                                         1:05 PM CDT  

 

                                        



Results for this procedure are in the results section.



                     POCT-GLUCOSE METER  Routine             2020  



                                         12:16 PM CDT  

 

                                        



Results for this procedure are in the results section.



                     POCT-GLUCOSE METER  Routine             2020  



                                         7:39 AM CDT  

 

                                        



Results for this procedure are in the results section.



                     POCT-GLUCOSE METER  Routine             2020  



                                         9:13 PM CDT  

 

                                        



Results for this procedure are in the results section.



                     POCT-GLUCOSE METER  Routine             2020  



                                         5:37 PM CDT  

 

                                        



Results for this procedure are in the results section.



                     POCT-GLUCOSE METER  Routine             2020  



                                         11:52 AM CDT  

 

                                        



Results for this procedure are in the results section.



                     POCT-GLUCOSE METER  Routine             2020  



                                         7:54 AM CDT  

 

                                        



Results for this procedure are in the results section.



                     POCT-GLUCOSE METER  Routine             2020  



                                         10:47 PM CDT  

 

                                        



Results for this procedure are in the results section.



                     POCT-GLUCOSE METER  Routine             2020  



                                         8:39 PM CDT  

 

                                        



Results for this procedure are in the results section.



                     POCT-GLUCOSE METER  Routine             2020  



                                         6:00 PM CDT  

 

                                        



Results for this procedure are in the results section.



                     POCT-GLUCOSE METER  Routine             2020  



                                         11:35 AM CDT  

 

                                        



Results for this procedure are in the results section.



                     POCT-GLUCOSE METER  Routine             2020  



                                         7:37 AM CDT  

 

                                        



Results for this procedure are in the results section.



                     POCT-GLUCOSE METER  Routine             2020  



                                         8:34 PM CDT  

 

                                        



Results for this procedure are in the results section.



                     POCT-GLUCOSE METER  Routine             2020  



                                         4:05 PM CDT  

 

                                        



Results for this procedure are in the results section.



                     POCT-GLUCOSE METER  Routine             2020  



                                         12:15 PM CDT  

 

                                        



Results for this procedure are in the results section.



                     POCT-GLUCOSE METER  Routine             2020  



                                         8:07 AM CDT  

 

                                        



Results for this procedure are in the results section.



                     POCT-GLUCOSE METER  Routine             2020  



                                         8:57 PM CDT  

 

                                        



Results for this procedure are in the results section.



                     POCT-GLUCOSE METER  Routine             2020  



                                         6:46 PM CDT  

 

                                        



Results for this procedure are in the results section.



                     POCT-GLUCOSE METER  Routine             2020  



                                         12:54 PM CDT  

 

                                        



Results for this procedure are in the results section.



                     POCT-GLUCOSE METER  Routine             2020  



                                         7:20 AM CDT  

 

                                        



Results for this procedure are in the results section.



                     (CELLAVISION MANUAL DIFF)  Routine             2020  



                                         3:30 AM CDT  

 

                                        



Results for this procedure are in the results section.



                     CBC W/PLT COUNT & AUTO  Routine             2020  



                           DIFFERENTIAL              3:30 AM CDT  

 

                                        



Results for this procedure are in the results section.



                     CBC W/PLT COUNT & AUTO  Routine             2020  



                           DIFFERENTIAL              3:30 AM CDT  

 

                                        



Results for this procedure are in the results section.



                     POCT-GLUCOSE METER  Routine             2020  



                                         9:19 PM CDT  

 

                                        



Results for this procedure are in the results section.



                     POCT-GLUCOSE METER  Routine             2020  



                                         7:51 PM CDT  

 

                                        



Results for this procedure are in the results section.



                     POCT-GLUCOSE METER  Routine             2020  



                                         11:44 AM CDT  

 

                                        



Results for this procedure are in the results section.



                     POCT-GLUCOSE METER  Routine             2020  



                                         7:41 AM CDT  

 

                                        



Results for this procedure are in the results section.



                     POCT-GLUCOSE METER  Routine             2020  



                                         9:02 PM CDT  

 

                                        



Results for this procedure are in the results section.



                     POCT-GLUCOSE METER  Routine             2020  



                                         5:34 PM CDT  

 

                                        



Results for this procedure are in the results section.



                     URINALYSIS W/ REFLEX  Routine             2020  



                           URINE CULTURE             2:37 PM CDT  

 

                                        



Results for this procedure are in the results section.



                     POCT-GLUCOSE METER  Routine             2020  



                                         12:30 PM CDT  

 

                                        



Results for this procedure are in the results section.



                     POCT-GLUCOSE METER  Routine             2020  



                                         7:56 AM CDT  

 

                                        



Results for this procedure are in the results section.



                     CBC W/PLT COUNT & AUTO  Routine             2020  



                           DIFFERENTIAL              6:11 AM CDT  

 

                                        



Results for this procedure are in the results section.



                     BASIC METABOLIC PANEL (7)  Routine             2020  



                                         6:11 AM CDT  

 

                                        



Results for this procedure are in the results section.



                     CBC W/PLT COUNT & AUTO  Routine             2020  



                           DIFFERENTIAL              6:11 AM CDT  

 

                                        



Results for this procedure are in the results section.



                     POCT-GLUCOSE METER  Routine             2020  



                                         10:02 PM CDT  

 

                                        



Results for this procedure are in the results section.



                     POCT-GLUCOSE METER  Routine             2020  



                                         5:47 PM CDT  

 

                                        



Results for this procedure are in the results section.



                     POCT-GLUCOSE METER  Routine             2020  



                                         12:36 PM CDT  

 

                                        



Results for this procedure are in the results section.



                     POCT-GLUCOSE METER  Routine             2020  



                                         7:46 AM CDT  

 

                                        



Results for this procedure are in the results section.



                     POCT-GLUCOSE METER  Routine             2020  



                                         5:29 PM CDT  

 

                                        



Results for this procedure are in the results section.



                     POCT-GLUCOSE METER  Routine             2020  



                                         12:01 PM CDT  

 

                                        



Results for this procedure are in the results section.



                     POCT-GLUCOSE METER  Routine             2020  



                                         7:48 AM CDT  

 

                                        



Results for this procedure are in the results section.



                     POCT-GLUCOSE METER  Routine             2020  



                                         10:39 PM CDT  

 

                                        



Results for this procedure are in the results section.



                     POCT-GLUCOSE METER  Routine             2020  



                                         5:47 PM CDT  

 

                                        



Results for this procedure are in the results section.



                     POCT-GLUCOSE METER  Routine             2020  



                                         1:33 PM CDT  

 

                                        



Results for this procedure are in the results section.



                     POCT-GLUCOSE METER  Routine             2020  



                                         7:28 AM CDT  

 

                                        



Results for this procedure are in the results section.



                     POCT-GLUCOSE METER  Routine             2020  



                                         9:51 PM CDT  

 

                                        



Results for this procedure are in the results section.



                     POCT-GLUCOSE METER  Routine             2020  



                                         6:32 PM CDT  

 

                                        



Results for this procedure are in the results section.



                     POCT-GLUCOSE METER  Routine             2020  



                                         1:03 PM CDT  

 

                                        



Results for this procedure are in the results section.



                     POCT-GLUCOSE METER  Routine             2020  



                                         7:39 AM CDT  

 

                                        



Results for this procedure are in the results section.



                     POCT-GLUCOSE METER  Routine             2020  



                                         9:34 PM CDT  

 

                                        



Results for this procedure are in the results section.



                     POCT-GLUCOSE METER  Routine             2020  



                                         6:34 PM CDT  

 

                                        



Results for this procedure are in the results section.



                     POCT-GLUCOSE METER  Routine             2020  



                                         12:35 PM CDT  

 

                                        



Results for this procedure are in the results section.



                     POCT-GLUCOSE METER  Routine             2020  



                                         9:51 AM CDT  

 

                                        



Results for this procedure are in the results section.



                     POCT-GLUCOSE METER  Routine             2020  



                                         8:21 AM CDT  

 

                                        



Results for this procedure are in the results section.



                     POCT-GLUCOSE METER  Routine             2020  



                                         9:38 PM CDT  

 

                                        



Results for this procedure are in the results section.



                     POCT-GLUCOSE METER  Routine             2020  



                                         5:00 PM CDT  

 

                                        



Results for this procedure are in the results section.



                     POCT-GLUCOSE METER  Routine             2020  



                                         10:18 AM CDT  

 

                                        



Results for this procedure are in the results section.



                     POCT-GLUCOSE METER  Routine             2020  



                                         7:33 AM CDT  

 

                                        



Results for this procedure are in the results section.



                     (CELLAVISION MANUAL DIFF)  Routine             2020  



                                         3:04 AM CDT  

 

                                        



Results for this procedure are in the results section.



                     CBC W/PLT COUNT & AUTO  Routine             2020  



                           DIFFERENTIAL              3:04 AM CDT  

 

                                        



Results for this procedure are in the results section.



                     BASIC METABOLIC PANEL (7)  Routine             2020  



                                         3:04 AM CDT  

 

                                        



Results for this procedure are in the results section.



                     MAGNESIUM           Routine             2020  



                                         3:04 AM CDT  

 

                                        



Results for this procedure are in the results section.



                     CBC W/PLT COUNT & AUTO  Routine             2020  



                           DIFFERENTIAL              3:04 AM CDT  

 

                                        



Results for this procedure are in the results section.



                     POCT-GLUCOSE METER  Routine             2020  



                                         10:42 PM CDT  

 

                                        



Results for this procedure are in the results section.



                     POCT-GLUCOSE METER  Routine             2020  



                                         10:00 PM CDT  

 

                                        



Results for this procedure are in the results section.



                     POCT-GLUCOSE METER  Routine             2020  



                                         5:24 PM CDT  

 

                                        



Results for this procedure are in the results section.



                     POCT-GLUCOSE METER  Routine             2020  



                                         11:01 AM CDT  

 

                                        



Results for this procedure are in the results section.



                     POCT-GLUCOSE METER  Routine             2020  



                                         7:52 AM CDT  

 

                                        



Results for this procedure are in the results section.



                     (CELLAVISION MANUAL DIFF)  Routine             2020  



                                         4:19 AM CDT  

 

                                        



Results for this procedure are in the results section.



                     CBC W/PLT COUNT & AUTO  Routine             2020  



                           DIFFERENTIAL              4:19 AM CDT  

 

                                        



Results for this procedure are in the results section.



                     HEPATIC FUNCTION PANEL  Routine             2020  



                                         4:19 AM CDT  

 

                                        



Results for this procedure are in the results section.



                     BASIC METABOLIC PANEL (7)  Routine             2020  



                                         4:19 AM CDT  

 

                                        



Results for this procedure are in the results section.



                     MAGNESIUM           Routine             2020  



                                         4:19 AM CDT  

 

                                        



Results for this procedure are in the results section.



                     CBC W/PLT COUNT & AUTO  Routine             2020  



                           DIFFERENTIAL              4:19 AM CDT  

 

                                        



Results for this procedure are in the results section.



                     POCT-GLUCOSE METER  Routine             03/15/2020  



                                         9:49 PM CDT  

 

                                        



Results for this procedure are in the results section.



                     POCT-GLUCOSE METER  Routine             03/15/2020  



                                         6:17 PM CDT  

 

                                        



Results for this procedure are in the results section.



                     POCT-GLUCOSE METER  Routine             03/15/2020  



                                         12:23 PM CDT  

 

                                        



Results for this procedure are in the results section.



                     XR CHEST 1 VIEW     STAT                03/15/2020  



                           PORTABLE/BEDSIDE          10:52 AM CDT  

 

                                        



Results for this procedure are in the results section.



                     VANCOMYCIN LEVEL, TROUGH  Timed               03/15/2020  



                                         8:24 AM CDT  

 

                                        



Results for this procedure are in the results section.



                     POCT-GLUCOSE METER  Routine             03/15/2020  



                                         7:51 AM CDT  

 

                                        



Results for this procedure are in the results section.



                     CBC W/PLT COUNT & AUTO  Routine             03/15/2020  



                           DIFFERENTIAL              6:01 AM CDT  

 

                                        



Results for this procedure are in the results section.



                     BASIC METABOLIC PANEL (7)  Routine             03/15/2020  



                                         6:01 AM CDT  

 

                                        



Results for this procedure are in the results section.



                     MAGNESIUM           Routine             03/15/2020  



                                         6:01 AM CDT  

 

                                        



Results for this procedure are in the results section.



                     CBC W/PLT COUNT & AUTO  Routine             03/15/2020  



                           DIFFERENTIAL              6:01 AM CDT  

 

                                        



Results for this procedure are in the results section.



                     POCT-GLUCOSE METER  Routine             2020  



                                         11:26 PM CDT  

 

                                        



Results for this procedure are in the results section.



                     POCT-GLUCOSE METER  Routine             2020  



                                         5:45 PM CDT  

 

                                        



Results for this procedure are in the results section.



                     POCT-GLUCOSE METER  Routine             2020  



                                         12:44 PM CDT  

 

                                        



Results for this procedure are in the results section.



                     POCT-GLUCOSE METER  Routine             2020  



                                         8:04 AM CDT  

 

                                        



Results for this procedure are in the results section.



                     (CELLAVISION MANUAL DIFF)  Routine             2020  



                                         6:25 AM CDT  

 

                                        



Results for this procedure are in the results section.



                     CBC W/PLT COUNT & AUTO  Routine             2020  



                           DIFFERENTIAL              6:25 AM CDT  

 

                                        



Results for this procedure are in the results section.



                     BASIC METABOLIC PANEL (7)  Routine             2020  



                                         6:25 AM CDT  

 

                                        



Results for this procedure are in the results section.



                     MAGNESIUM           Routine             2020  



                                         6:25 AM CDT  

 

                                        



Results for this procedure are in the results section.



                     CBC W/PLT COUNT & AUTO  Routine             2020  



                           DIFFERENTIAL              6:25 AM CDT  

 

                                        



Results for this procedure are in the results section.



                     POCT-GLUCOSE METER  Routine             2020  



                                         8:42 PM CDT  

 

                                        



Results for this procedure are in the results section.



                     POCT-GLUCOSE METER  Routine             2020  



                                         5:27 PM CDT  

 

                                        



Results for this procedure are in the results section.



                     POCT-GLUCOSE METER  Routine             2020  



                                         12:19 PM CDT  

 

                                        



Results for this procedure are in the results section.



                     (CELLAVISION MANUAL DIFF)  Routine             2020  



                                         9:59 AM CDT  

 

                                        



Results for this procedure are in the results section.



                     CBC W/PLT COUNT & AUTO  Routine             2020  



                           DIFFERENTIAL              9:59 AM CDT  

 

                                        



Results for this procedure are in the results section.



                     TSH/FREE T4 IF INDICATED  Routine             2020  



                                         9:59 AM CDT  

 

                                        



Results for this procedure are in the results section.



                     C-REACTIVE PROTEIN  Routine             2020  



                                         9:59 AM CDT  

 

                                        



Results for this procedure are in the results section.



                     PREALBUMIN          Routine             2020  



                                         9:59 AM CDT  

 

                                        



Results for this procedure are in the results section.



                     BASIC METABOLIC PANEL (7)  Routine             2020  



                                         9:59 AM CDT  

 

                                        



Results for this procedure are in the results section.



                     MAGNESIUM           Routine             2020  



                                         9:59 AM CDT  

 

                                        



Results for this procedure are in the results section.



                     CBC W/PLT COUNT & AUTO  Routine             2020  



                           DIFFERENTIAL              9:59 AM CDT  

 

                                        



Results for this procedure are in the results section.



                     POCT-GLUCOSE METER  Routine             2020  



                                         8:10 AM CDT  

 

                                        



Results for this procedure are in the results section.



                     POCT-GLUCOSE METER  Routine             2020  



                                         9:53 PM CDT  

 

                                        



Results for this procedure are in the results section.



                     POCT-GLUCOSE METER  Routine             2020  



                                         6:35 PM CDT  

 

                                        



Results for this procedure are in the results section.



                     POCT-GLUCOSE METER  Routine             2020  



                                         2:06 PM CDT  

 

                                        



Results for this procedure are in the results section.



                     CBC W/PLT COUNT & AUTO  Routine             2020  



                           DIFFERENTIAL              7:25 AM CDT  

 

                                        



Results for this procedure are in the results section.



                     POCT-GLUCOSE METER  Routine             2020  



                                         7:25 AM CDT  

 

                                        



Results for this procedure are in the results section.



                     HEPATIC FUNCTION PANEL  Routine             2020  



                                         7:25 AM CDT  

 

                                        



Results for this procedure are in the results section.



                     BASIC METABOLIC PANEL (7)  Routine             2020  



                                         7:25 AM CDT  

 

                                        



Results for this procedure are in the results section.



                     MAGNESIUM           Routine             2020  



                                         7:25 AM CDT  

 

                                        



Results for this procedure are in the results section.



                     CBC W/PLT COUNT & AUTO  Routine             2020  



                           DIFFERENTIAL              7:25 AM CDT  

 

                                        



Results for this procedure are in the results section.



                     HEMOGLOBIN A1C      Routine             2020  



                                         7:25 AM CDT  

 

                                        



Results for this procedure are in the results section.



                     POCT-GLUCOSE METER  Routine             2020  



                                         9:49 PM CDT  

 

                                        



Results for this procedure are in the results section.



                     POCT-GLUCOSE METER  Routine             2020  



                                         7:44 PM CDT  

 

                                        



Results for this procedure are in the results section.



                     POCT-GLUCOSE METER  Routine             2020  



                                         6:35 PM CDT  

 

                                        



Results for this procedure are in the results section.



                     XR CHEST 1 VIEW     STAT                2020  



                           PORTABLE/BEDSIDE          10:34 AM CDT  

 

                                        



Results for this procedure are in the results section.



                     CBC W/PLT COUNT & AUTO  STAT                2020  



                           DIFFERENTIAL              10:26 AM CDT  

 

                                        



Results for this procedure are in the results section.



                     APTT                STAT                2020  



                                         10:26 AM CDT  

 

                                        



Results for this procedure are in the results section.



                     PROTHROMBIN TIME/INR  STAT                2020  



                                         10:26 AM CDT  

 

                                        



Results for this procedure are in the results section.



                     COMPREHENSIVE METABOLIC  STAT                2020  



                           PANEL                     10:26 AM CDT  

 

                                        



Results for this procedure are in the results section.



                     LACTIC ACID, VENOUS  STAT                2020  



                                         10:26 AM CDT  

 

                                        



Results for this procedure are in the results section.



                     CBC W/PLT COUNT & AUTO  STAT                2020  



                           DIFFERENTIAL              10:26 AM CDT  

 

                                        



Results for this procedure are in the results section.



                     BLOOD CULTURE       STAT                2020  



                                         10:26 AM CDT  

 

                                        



Results for this procedure are in the results section.



                     BLOOD CULTURE       STAT                2020  



                                         10:26 AM CDT  

 

                                        



Results for this procedure are in the results section.



                     WOUND CULTURE + GRAM  STAT                2020  



                           STAIN                     10:25 AM CDT  

 

                                        



Results for this procedure are in the results section.



                     WOUND CULTURE + GRAM  STAT                2020  



                           STAIN                     10:25 AM CDT  

 

                                        



Results for this procedure are in the results section.



                     US GUIDE, VASCULAR ACCESS  Routine             2020  



                                         9:32 AM CDT  

 

                                        



Results for this procedure are in the results section.



                     INSERT NON-TUNNEL CV CATH  Routine             2020  



                                         9:32 AM CDT  



after 2019



Results

* RHYTHM STRIP - SCAN (2020  8:42 AM CDT)



 



                           Narrative                 Performed At

 

 



                                         This result has an attachment that is n

ot available. 





* POC-Glucose meter (2020 11:51 AM CDT)



Only the most recent of 171 results within the time period is included.



   



                 Component       Value           Ref Range       Performed At

 

   



                 POC-Glucose Meter  198 (H)Comment: : TESTED AT  70 - 110 mg/dL 

 [facility] [address] [place] [address]



                                         [id number]: /Technician ID  



                                         = [id number] for LEE BIRD  











                                         Specimen

 





                                         Blood







   



                 Performing Organization  Address         City/State/Zipcode  Ph

one Number

 

   



                 [address] [address]

[phone]  [phone]



                                         [facility]ICAL CENTER   





* Manual Differential (2020  5:13 AM CDT)



Only the most recent of 7 results within the time period is included.



   



                 Component       Value           Ref Range       Performed At

 

   



                 % Neutros       55              %               [facility]

 

   



                 % Lymphs        39              %               [facility]

 

   



                 % Monos         3               %               [facility]

 

   



                 % Eos           1               %               [facility]

 

   



                 % Baso          2               %               [facility]

 

   



                 # Neutros       4.73            1.78 - 5.38 K/ul  [facility]

 

   



                 # Lymphs        3.35            1.32 - 3.57 K/ul  [facility]

 

   



                 # Monos         0.26 (L)        0.30 - 0.82 K/uL  [facility]

 

   



                 # Eos           0.09            0.04 - 0.54 K/uL  [facility]

 

   



                 # Baso          0.17 (H)        0.01 - 0.08 K/uL  [facility]

 

   



                     Total Counted       100                 [facility]

 

   



                     WBC Morphology      Normal              [facility]

 

   



                     Platelet Morphology  Normal              [facility]

 

   



                     Polychromasia       1+ few              [facility]

 

   



                     Anisocytosis        1+ few              [facility]

 

   



                     Microcytes          1+ few              [facility]

 

   



                     Spherocytes         1+ few              [facility]

 

   



                     Artifact            Present             [facility]

 

   



                     Platelet Conc       Increased           [facility]











                                         Specimen

 





                                         Blood







 



                           Narrative                 Performed At

 

 



                            ID - Eva Wilkerson  [facility]



                           User comments:            [facility]



                                         Slide comments: 







   



                 Performing Organization  Address         City/[facility]/Zipcode  Ph

one Number

 

   



                 [facility]  [address], [place] [address]

[phone]  [phone]



                                         [facility]   





* CBC with platelet count + automated diff (2020  5:13 AM CDT)



Only the most recent of 19 results within the time period is included.



   



                 Component       Value           Ref Range       Performed At

 

   



                 WBC             8.6             3.5 - 10.5 K/L  [facility]

 

   



                 RBC             3.86 (L)        4.63 - 6.08 M/L  [facility]

 

   



                 Hemoglobin      8.9 (L)         13.7 - 17.5 GM/DL  [facility]

 

   



                 Hematocrit      30.4 (L)        40.1 - 51.0 %   [facility]

 

   



                 MCV             78.8 (L)        79.0 - 92.2 fL  [facility]

 

   



                 MCH             23.1 (L)        25.7 - 32.2 pg  [facility]

 

   



                 MCHC            29.3 (L)        32.3 - 36.5 GM/DL  [facility]

 

   



                 RDW             18.0 (H)        11.6 - 14.4 %   [facility]

 

   



                 Platelets       664 (H)         150 - 450 K/CU MM  [facility]

 

   



                 MPV             8.4 (L)         9.4 - 12.4 fL   [facility]

 

   



                 nRBC            0               0 - 0 /100 WBC  [facility]











                                         Specimen

 





                                         Blood







   



                 Performing Organization  Address         City/[facility]  Ph

one Number

 

   



                 [address]

[phone]  [phone]



                                         MEDICAL CENTER   





* Magnesium (2020  5:13 AM CDT)



Only the most recent of 9 results within the time period is included.



   



                 Component       Value           Ref Range       Performed At

 

   



                 Magnesium       2.0             1.6 - 2.6 mg/dL  [facility]











                                         Specimen

 





                                         Blood







 



                           Narrative                 Performed At

 

 



                            ID - [facility]







   



                 Performing Organization  Address         City/[facility]/Critical access hospital

one Number

 

   



                 [address] [address]

[phone]  [phone]



                                         MEDICAL CENTER   





* Hepatic function panel (2020  5:13 AM CDT)



Only the most recent of 5 results within the time period is included.



   



                 Component       Value           Ref Range       Performed At

 

   



                 Protein, Total  8.0             6.0 - 8.3 gm/dL  [facility]

 

   



                 Albumin         3.5             3.5 - 5.0 g/dL  [facility]

 

   



                 Total Bilirubin  0.3             0.2 - 1.2 mg/dL  [facility]

 

   



                 Bilirubin, Direct  0.2             0.1 - 0.5 mg/dL  [facility]

 

   



                 Alkaline Phosphatase  291 (H)         40 - 150 U/L    [facility]

 

   



                 AST             18              5 - 34 U/L      [facility]

 

   



                 ALT             25              6 - 55 U/L      [facility]











                                         Specimen

 





                                         Blood







 



                           Narrative                 Performed At

 

 



                            ID - [facility]







   



                 Performing Organization  Address         City/[facility]/Critical access hospital

one Number

 

   



                 [address]

[phone]  [phone]



                                         MEDICAL CENTER   





* Basic metabolic panel (2020  5:13 AM CDT)



Only the most recent of 20 results within the time period is included.



   



                 Component       Value           Ref Range       Performed At

 

   



                 Sodium          136             136 - 145 meq/L  [facility]

 

   



                 Potassium       3.6             3.5 - 5.1 meq/L  [facility]

 

   



                 Chloride        104             98 - 107 meq/L  [facility]

 

   



                 CO2             24              22 - 29 meq/L   [facility]

 

   



                 BUN             6 (L)           7 - 21 mg/dL    [facility]

 

   



                 Creatinine      0.68            0.57 - 1.25 mg/dL  [facility]

 

   



                 Glucose         192 (H)         70 - 105 mg/dL  [facility]

 

   



                 Calcium         9.0             8.4 - 10.2 mg/dL  [facility]

 

   



                 EGFR            131Comment: ESTIMATED GFR IS  mL/min/1.73 sq m 

 [facility]



                           NOT AS ACCURATE AS CREATININE   [facility]



                                         CLEARANCE IN PREDICTING  



                                         GLOMERULAR FILTRATION RATE.  



                                         ESTIMATED GFR IS NOT  



                                         APPLICABLE FOR DIALYSIS  



                                         PATIENTS.  











                                         Specimen

 





                                         Blood







 



                           Narrative                 Performed At

 

 



                            ID - [email or web address]       [facility]







   



                 Performing Organization  Address         City/State/Zipcode  Ph

one Number

 

   



                 [place] [address]

[phone]  [phone]



                                         MEDICAL CENTER   





* CT pelvis with IV contrast (2020  5:25 PM CDT)







                                         Specimen

 









 



                           Narrative                 Performed At

 

 



                           FINAL REPORT              [facility]



                                         PATIENT ID: [id number] 



                                         TECHNIQUE: CT of the pelvis WITH intrav

enous contrast and WITHOUT 



                                         oral contrast. Dose modulation, iterati

ve reconstruction, and/or 



                                         weight-based adjustment of the mA/kV wa

s utilized to reduce the 



                                         radiation dose to as low as reasonably 

achievable. 



                                         INDICATION: Pelvic infection. 



                                         COMPARISON: 2018. 



                                         FINDINGS: 



                                         PELVIC ORGANS/BLADDER: Nonspecific circ

umferential wall thickening of 



                                         the urinary bladder which may relate to

 incomplete distention. 



                                         PERITONEUM/RETROPERITONEUM: Trace free 

pelvic fluid. No free 



                                         intraperitoneal air.. 



                                         LYMPH NODES: No lymphadenopathy. 



                                         VESSELS: Unremarkable. 



                                         GI TRACT: There is a left lower quadran

t and colostomy. There is a 



                                         blind-ending rectosigmoid stump. Bowel 

loops are normal in caliber 



                                         without evidence of obstruction. The ap

pendix is normal.. Appendix is 



                                         normal. 



                                         BONES AND SOFT TISSUES: Prominent bilat

eral decubitus ulcers which 



                                         extend to the level of the initial tube

rosities there is increased 



                                         sclerosis of the bilateral facial tuber

osities with associated 



                                         extensive heterotopic ossification. The

re are lateral soft tissue 



                                         defects overlying the bilateral femora 

with increase sclerosis of the 



                                         greater trochanter with adjacent hetero

topic ossification. Mild 



                                         degenerative changes are noted in the b

ilateral hips. No dislocation. 



                                         IMPRESSION: 



                                         Bilateral large decubitus ulcers extend

ing to the initial 



                                         tuberosities with findings suggestive o

f chronic osteomyelitis of the 



                                         bilateral ischial tuberosities. 



                                         Large soft tissue defects over the bila

teral proximal femora with 



                                         findings suggestive of chronic osteomye

litis in the regions of the 



                                         greater trochanter. 



                                         No focal fluid collection to suggest ab

scess.. 



                                         Signed: Jasmin Rushing MD 



                                         Report Verified Date/Time:

0 17:49:33 



                                         Reading Location: [address] Radiolo

gy Reading Room 



                                          Electronically signed by: MINI RUSHING MD on 2020 05:49 PM









                                        Procedure Note

 

                                        



Interface, External Ris In - 2020  5:51 PM CDT



FINAL REPORT

 

PATIENT ID:   [id number]

 

TECHNIQUE: CT of the pelvis WITH intravenous contrast and WITHOUT 

oral contrast. Dose modulation, iterative reconstruction, and/or 

weight-based adjustment of the mA/kV was utilized to reduce the 

radiation dose to as low as reasonably achievable.

 

INDICATION: Pelvic infection.

 

COMPARISON: 2018.

 

 

FINDINGS:

 

PELVIC ORGANS/BLADDER: Nonspecific circumferential wall thickening of 

the urinary bladder which may relate to incomplete distention.

 

PERITONEUM/RETROPERITONEUM: Trace free pelvic fluid. No free 

intraperitoneal air..

LYMPH NODES: No lymphadenopathy.

VESSELS: Unremarkable.

 

GI TRACT: There is a left lower quadrant and colostomy. There is a 

blind-ending rectosigmoid stump. Bowel loops are normal in caliber 

without evidence of obstruction. The appendix is normal.. Appendix is 

normal.

 

BONES AND SOFT TISSUES: Prominent bilateral decubitus ulcers which 

extend to the level of the initial tuberosities there is increased 

sclerosis of the bilateral facial tuberosities with associated 

extensive heterotopic ossification. There are lateral soft tissue 

defects overlying the bilateral femora with increase sclerosis of the 

greater trochanter with adjacent heterotopic ossification. Mild 

degenerative changes are noted in the bilateral hips. No dislocation.

 

 

IMPRESSION:

Bilateral large decubitus ulcers extending to the initial 

tuberosities with findings suggestive of chronic osteomyelitis of the 

bilateral ischial tuberosities.

 

Large soft tissue defects over the bilateral proximal femora with 

findings suggestive of chronic osteomyelitis in the regions of the 

greater trochanter.

 

No focal fluid collection to suggest abscess..

 

Signed: Jasmin Rushing MD

Report Verified Date/Time:  2020 17:49:33

 

Reading Location: [address] Radiology Reading Room

   Electronically signed by: JASMIN RUSHING MD on 2020 05:49 PM

 







   



                 Performing Organization  Address         City/[facility]  Ph

one Number

 

   



                                         [facility]   





* Procalcitonin (2020  2:20 PM CDT)



   



                 Component       Value           Ref Range       Performed At

 

   



                 Procalcitonin   0.05 (H)        <0.05 ng/mL     [facility]











                                         Specimen

 





                                         Blood







 



                           Narrative                 Performed At

 

 



                           SEPSIS RISK (ng/mL)       [facility]



                           Low:0.05-0.50  [facility]



                                         Intermediate: 0.51-2.00 



                                         High: >=2.01 







   



                 Performing Organization  Address         City/[facility]/Critical access hospital

one [address]  [phone] [place] [address]   





* Ferritin (2020  2:20 PM CDT)



   



                 Component       Value           Ref Range       Performed At

 

   



                 Ferritin        131.18          5.00 - 275.00 ng/mL  [facility]











                                         Specimen

 





                                         Blood







 



                           Narrative                 Performed At

 

 



                            ID - NTP         [facility]







   



                 Performing Organization  Address         City/[facility]/Critical access hospital

one Number

 

   



                 [address]

[phone]  [phone] [place] [address]   





* Lactic acid, venous TIMED (2020  6:31 AM CDT)



Only the most recent of 4 results within the time period is included.



   



                 Component       Value           Ref Range       Performed At

 

   



                 Lactate, Venous  1.03            0.50 - 2.20 mmol/L  [facility]











                                         Specimen

 





                                         Blood







 



                           Narrative                 Performed At

 

 



                            ID - NTP         [facility]







   



                 Performing Organization  Address         City/[facility]/Critical access hospital

one [address]

0  [phone] [place] [address]   





* XR pelvis 1 or 2 views (2020  3:50 AM CDT)







                                         Specimen

 









 



                           Narrative                 Performed At

 

 



                           FINAL REPORT              [facility]



                                         PATIENT ID: [id number] 



                                         RAD, PELVIS, 1 OR 2 VIEWS 



                                         CLINICAL HISTORY:FEVER 



                                         decub ulcers 



                                         TECHNIQUE: RAD, PELVIS, 1 OR 2 VIEWS 



                                         COMPARISON: CT pelvis on 2018 



                                         IMPRESSION: 



                                         Extensive heterotopic ossification over

lies the inferior pubic rami 



                                         which demonstrate diffuse sclerosis. Sc

lerosis of the left proximal 



                                         femur may alternatively represent super

imposition artifact from 



                                         heterotopic ossification. Lucency overl

kendy the right hip and gluteal 



                                         region concerning for subcutaneous gas/

abscesses or deep ulcerations. 



                                         There is soft tissue irregularity tanmay

rning for decubitus 



                                         ulcerations overlying the ischial tuber

osities. No definite bone 



                                         erosion but osteomyelitis may be radiog

raphically occult and given 



                                         extent of bony abnormalities, cannot be

 reliably excluded on the 



                                         current exam. Correlation with CT pelvi

s with IV contrast should be 



                                         considered to exclude abscess formation

 and osseous erosion. No acute 



                                         hip dislocation. 



                                         Signed: Flako Prather MD 



                                         Report Verified Date/Time:

0 03:59:07 



                                         Electronically signed by: LOCO PRATHER MD on 2020 03:59 



                                         AM 







                                        Procedure Note

 

                                        



Interface, External Ris In - 2020  4:01 AM CDT



FINAL REPORT

 

PATIENT ID:   [id number]

 

RAD, PELVIS, 1 OR 2 VIEWS

 

CLINICAL HISTORY:FEVER

decub ulcers

 

TECHNIQUE: RAD, PELVIS, 1 OR 2 VIEWS

 

COMPARISON: CT pelvis on 2018

IMPRESSION:

Extensive heterotopic ossification overlies the inferior pubic rami 

which demonstrate diffuse sclerosis. Sclerosis of the left proximal 

femur may alternatively represent superimposition artifact from 

heterotopic ossification. Lucency overlying the right hip and gluteal 

region concerning for subcutaneous gas/abscesses or deep ulcerations. 

There is soft tissue irregularity concerning for decubitus 

ulcerations overlying the ischial tuberosities. No definite bone 

erosion but osteomyelitis may be radiographically occult and given 

extent of bony abnormalities, cannot be reliably excluded on the 

current exam. Correlation with CT pelvis with IV contrast should be 

considered to exclude abscess formation and osseous erosion. No acute 

hip dislocation.

 

Signed: Flako Prather MD

Report Verified Date/Time:  2020 03:59:07

 

    Electronically signed by: FLAKO PRATHER MD on 2020 03:59 AM

 







   



                 Performing Organization  Address         City/State/Zipcode  Ph

one Number

 

   



                                         [facility]   





* Urinalysis w/Microscopic + Reflex to Culture (2020  2:41 AM CDT)



Only the most recent of 3 results within the time period is included.



   



                 Component       Value           Ref Range       Performed At

 

   



                     Color, UA           Yellow              [facility]

 

   



                     Clarity, UA         Clear               [facility]

 

   



                 Specific Gravity, UA  1.036 (H)       1.001 - 1.035   [facility]

 

   



                 pH, UA          6.0             5.0 - 8.0       [facility]

 

   



                 Protein, UA     100 mg/dL (A)   Negative        [facility]

 

   



                 Glucose, UA     30 mg/dL (A)    Negative        [facility]

 

   



                 Ketones, UA     Negative        Negative        [facility]

 

   



                 Bilirubin, UA   Negative        Negative        [facility]

 

   



                 Blood, UA       Trace (A)       Negative        [facility]

 

   



                 Nitrite, UA     Negative        Negative        [facility]

 

   



                 Leukocytes, UA  Negative        Negative        [facility]

 

   



                 Urobilinogen, UA  4.0 (H)         0.2 - 1.0 mg/dL  [facility]

 

   



                 RBC, UA         1               /HPF            [facility]

 

   



                 WBC, UA         5               /HPF            [facility]

 

   



                     Mucus               Occasional          [facility]

 

   



                 Squam Epithel, UA  1               /HPF            [facility]

 

   



                           Specimen Source           [facility]











                                         Specimen

 





                                         Urine







 



                           Narrative                 Performed At

 

 



                            ID - [auto]      [facility]



                            ID - [facility]







   



                 Performing Organization  Address         City/State/Zipcode  Ph

one Number

 

   



                 [facility]  [address], [place] [address]

0  [phone]



                                         MEDICAL CENTER   





* ECG 12 lead (2020  2:39 AM CDT)







                                         Specimen

 









 



                           Narrative                 Performed At

 

 



                           Ventricular Rate 90 BPM   [facility]



                                         Atrial Rate 90 BPM 



                                         P-R Interval 140 ms 



                                         QRS Duration 84 ms 



                                         Q-T Interval 384 ms 



                                         QTC Calculation(Bazett) 469 ms 



                                         P Axis 53 degrees 



                                         R Axis 18 degrees 



                                         T Axis 4 degrees 



                                         Normal sinus rhythm 



                                         Nonspecific ST and T wave abnormality 



                                         Abnormal ECG 



                                         No previous ECGs available 



                                         Confirmed by KEVIN MORILLO MICHAEL ([id number]

[id number]) on 2020 6:46:01 AM 







                                        Procedure Note

 

                                        



Interface, External Ris In - 2020  6:46 AM CDT



Ventricular Rate 90 BPM

Atrial Rate 90 BPM

P-R Interval 140 ms

QRS Duration 84 ms

Q-T Interval 384 ms

QTC Calculation(Bazett) 469 ms

P Axis 53 degrees

R Axis 18 degrees

T Axis 4 degrees



Normal sinus rhythm

Nonspecific ST and T wave abnormality

Abnormal ECG

No previous ECGs available

Confirmed by KEVIN MORILLO MICHAEL ([id number]) on 2020 6:46:01 AM







   



                 Performing Organization  Address         City/State/Zipcode  Ph

one Number

 

   



                                         [facility]   





* SARS-CoV2/RT-PCR (Symptomatic ONLY) (2020  2:11 AM CDT)



Only the most recent of 2 results within the time period is included.



   



                 Component       Value           Ref Range       Performed At

 

   



                 SARS-COV2/RT-PCR  Positive (AA)   Not Detected, Negative,  [facility]



                           See external report for   [facility]



                                         linked test 

 

   



                     SARS-COV-2 PERFORMING LAB  [facility]











                                         Specimen

 





                                         Other







 



                           Narrative                 Performed At

 

 



                                         Results are for the detection of SARS-C

oV-2 RNA. The SARS-CoV-2 RNA is 

generally                                [facility]



                                         detectable in nasopharyngeal swab speci

mens during the acute phase of 

infection.                               [facility]



                                         Positive results are indicative of acti

ve infection with SARS-CoV-2; clinical 



                                         correlation with patient history and ot

her diagnostic information is necessary 



                                         to determine patient infection status. 

Positive results do not rule out 



                                         bacterial infection or co-infection wit

h other viruses. The agent detected may 



                                         not be the definite cause of disease. 



                                         The limit of detection for this assay i

s 250 copies/mL. 



                                         This SARS CoV-2 test is a rapid, real-t

nilo RT-PCR test intended for the 



                                         qualitative detection of nucleic acid f

rom SARS-CoV-2 in a nasopharyngeal swab 



                                         specimen collected from individuals rosalba

pected of COVID-19 by their healthcare 



                                         provider. 



                                         This test has not been Food and Drug Ad

ministration (FDA) cleared or approved 



                                         and has been authorized by FDA under an

 Emergency Use Authorization (EUA). This

 



                                         EUA will be effective until the declara

tion that circumstances exist justifying

 



                                         the authorization of the emergency use 

of in vitro diagnostic tests for 



                                         detection and/or diagnosis of COVID-19 

is terminated under Section 564(b)(2) of

 



                                         the Act or the EUA is revoked under Sec

tion 564(g) of the Act. 



                                         Fact Sheet for Healthcare Providers: 



                                         

https://www.advisorCONNECT/Documents/Xpert%20Xpress%20SARS%20CoV-2/Fact%20Sheets/30
 



                                         2-3802%67ZILP-[facility]-0%20HEALTHCARE%20PROV

IDERS%20FACT%20SHEET.pdf 



                                         Fact Sheet for Healthcare Patients: 



                                         

https://www.[employer]visor[employer]/Documents/Xpert%20Xpress%20SARS%20CoV-2/Fact%20Sheets/30
 



                                         2-3801%36SBAA-PUN-1%20PATIENT%20FACT%20

SHEET.pdf 



                                         Performing Laboratory: 



                                         [place] [address] 







   



                 Performing Organization  Address         City/[facility]  Ph

one Number

 

   



                 [place] [address]

[phone]  [phone] [place] [address]   





* Blood Culture #1 (2020  2:02 AM CDT)



Only the most recent of 8 results within the time period is included.



   



                 Component       Value           Ref Range       Performed At

 

   



                     Result              No growth in 5 days   [facility]











                                         Specimen

 





                                         Blood







   



                 Performing Organization  Address         City/[facility]  Ph

one Number

 

   



                 [place] [address]

[phone]  [phone] [place] [address]   





* C-Reactive Protein (2020  1:56 AM CDT)



Only the most recent of 2 results within the time period is included.



   



                 Component       Value           Ref Range       Performed At

 

   



                 CRP             21.99 (H)       0.00 - 0.50 mg/dL  [facility]











                                         Specimen

 





                                         Blood







 



                           Narrative                 Performed At

 

 



                            ID - DAWIT YOO     [facility]







   



                 Performing Organization  Address         City[facility]  Ph

one Number

 

   



                 [place] [address]

[phone]  [phone]



                                         [facility]   





* aPTT (2020  1:56 AM CDT)



Only the most recent of 2 results within the time period is included.



   



                 Component       Value           Ref Range       Performed At

 

   



                 PTT             40.7 (H)        22.5 - 36.0 seconds  [facility]











                                         Specimen

 





                                         Blood







   



                 Performing Organization  Address         City/[facility]  Ph

one Number

 

   



                 [address] [address]

[phone]  [phone]



                                         MEDICAL CENTER   





* Prothrombin time/INR (2020  1:56 AM CDT)



Only the most recent of 2 results within the time period is included.



   



                 Component       Value           Ref Range       Performed At

 

   



                 Protime         15.1 (H)        11.9 - 14.2 seconds  [facility]

 

   



                 INR             1.2             <=5.9           [facility]

[facility]











                                         Specimen

 





                                         Blood







 



                           Narrative                 Performed At

 

 



                           Effective 2019: PT Reference Range Change  [facility]



                           New: 11.9-14.2Previous: 11.7-14.7  [facility]ICAL [facility]

NTER



                                         RECOMMENDED COUMADIN/WARFARIN INR THERA

PY RANGES 



                                         STANDARD DOSE: 2.0-3.0Includes: PRO

PHYLAXIS for venous thrombosis, systemic

 



                                         embolization; TREATMENT for venous thro

mbosis and/or pulmonary embolus. 



                                         HIGH RISK: Target INR is 2.5-3.5 for pa

tients wiht mechanical heart valves. 







   



                 Performing Organization  Address         City/[facility]/Critical access hospital

one Number

 

   



                 [address]

0  [phone]



                                         MEDICAL CENTER   





* Phosphorus (2020  1:56 AM CDT)



   



                 Component       Value           Ref Range       Performed At

 

   



                 Phosphorus      3.2             2.3 - 4.7 mg/dL  [facility]











                                         Specimen

 





                                         Blood







 



                           Narrative                 Performed At

 

 



                            RHONDA YOO     [facility]







   



                 Performing Organization  Address         City/[facility]  Ph

one Number

 

   



                 [address]

[phone]  [phone]



                                         [facility]   





* Lactate dehydrogenase (LDH) (2020  1:56 AM CDT)



   



                 Component       Value           Ref Range       Performed At

 

   



                 LDH             245 (H)         125 - 220 U/L   [facility]











                                         Specimen

 





                                         Blood







 



                           Narrative                 Performed At

 

 



                            ID - NTP         [facility]







   



                 Performing Organization  Address         City/State/Zipcode  Ph

one Number

 

   



                 [facility]  [address], [place] [address]

[phone]  [phone]



                                         MEDICAL CENTER   





* Comprehensive metabolic panel (2020  1:56 AM CDT)



Only the most recent of 2 results within the time period is included.



   



                 Component       Value           Ref Range       Performed At

 

   



                 Protein, Total  8.1             6.0 - 8.3 gm/dL  [facility]

 

   



                 Albumin         3.5             3.5 - 5.0 g/dL  [facility]

 

   



                 Alkaline Phosphatase  316 (H)         40 - 150 U/L    [facility]

 

   



                 Total Bilirubin  0.4             0.2 - 1.2 mg/dL  [facility]

 

   



                 Sodium          138             136 - 145 meq/L  [facility]

 

   



                 Potassium       3.7             3.5 - 5.1 meq/L  [facility]

 

   



                 Chloride        105             98 - 107 meq/L  [facility]

 

   



                 CO2             24              22 - 29 meq/L   [facility]

 

   



                 BUN             11              7 - 21 mg/dL    [facility]

 

   



                 Creatinine      0.80            0.57 - 1.25 mg/dL  [facility]

 

   



                 Glucose         257 (H)         70 - 105 mg/dL  [facility]

 

   



                 Calcium         9.0             8.4 - 10.2 mg/dL  [facility]

 

   



                 AST             32              5 - 34 U/L      [facility]

 

   



                 ALT             27              6 - 55 U/L      [facility]

 

   



                 EGFR            108Comment: ESTIMATED GFR IS  mL/min/1.73 sq m 

 [facility]



                           NOT AS ACCURATE AS CREATININE   [facility]



                                         CLEARANCE IN PREDICTING  



                                         GLOMERULAR FILTRATION RATE.  



                                         ESTIMATED GFR IS NOT  



                                         APPLICABLE FOR DIALYSIS  



                                         PATIENTS.  











                                         Specimen

 





                                         Blood







 



                           Narrative                 Performed At

 

 



                            ID - DAWIT YOO     [facility]







   



                 Performing Organization  Address         City/[facility]/Critical access hospital

one Number

 

   



                 [facility]  [address], [place] [address]

[phone]  [phone]



                                         MEDICAL CENTER   





* XR chest 1 view portable / bedside (2020 12:25 AM CDT)



Only the most recent of 4 results within the time period is included.







                                         Specimen

 









 



                           Narrative                 Performed At

 

 



                           FINAL REPORT              [facility]



                                         PATIENT ID: [id number] 



                                         RAD, CHEST, 1 VIEW, NON DEPT 



                                         CLINICAL HISTORY: FEVER 



                                         TECHNIQUE: Single view of the chest. 



                                         COMPARISON: May 1, 2020 



                                         IMPRESSION: 



                                         Stable elevated right hemidiaphragm. Th

e right costophrenic angle 



                                         blunting suggests stable small right pl

eural effusion. There are 



                                         stable reticulations within the right l

russel likely bronchovascular 



                                         crowding, atelectasis versus asymmetric

 edema. No new lung 



                                         consolidation. No pneumothorax. Left willi

ng is well aerated. 



                                         Cardiomediastinal silhouette is stable.

 



                                         In summary, examination is not signific

ant change compared to May 1, 



                                         2020. 



                                         Signed: Flako Prather MD 



                                         Report Verified Date/Time:

0 00:37:09 



                                         Electronically signed by: LOCO PRATHER MD on 2020 12:37 



                                         AM 







                                        Procedure Note

 

                                        



Interface, External Ris In - 2020 12:39 AM CDT



FINAL REPORT

 

PATIENT ID:   [id number]

 

RAD, CHEST, 1 VIEW, NON DEPT

 

CLINICAL HISTORY: FEVER 

 

TECHNIQUE: Single view of the chest.

 

COMPARISON: May 1, 2020

 

IMPRESSION:

Stable elevated right hemidiaphragm. The right costophrenic angle 

blunting suggests stable small right pleural effusion. There are 

stable reticulations within the right lung likely bronchovascular 

crowding, atelectasis versus asymmetric edema. No new lung 

consolidation. No pneumothorax. Left lung is well aerated. 

Cardiomediastinal silhouette is stable.

 

In summary, examination is not significant change compared to May 1, 

                                        2020.

 

Signed: Flako Prather MD

Report Verified Date/Time:  2020 00:37:09

 

    Electronically signed by: FLAKO PRATHER MD on 2020 12:37 AM

 







   



                 Performing Organization  Address         City/[facility]/Critical access hospital

one Number

 

   



                                         [facility]   





* ECG/EKG Interpretation (2020 11:44 PM CDT)



 



                           Narrative                 Performed At

 

 



                                         Mundo Varela MD 8/10/2020 12

:58 AM 



                                         ECG/EKG Interpretation 



                                         Date/Time: 2020 2:42 AM 



                                         Performed by: Mundo Varela MD 



                                         Authorized by: Elda Main MD 



                                         The ECG was interpreted by ED physician

. The ECG is interpreted as sinus 



                                         rhythm. Rate is normal rate. Conduction

: conduction normal. ST segments 



                                         normal. T waves normal. Axis is normal.

 



                                         Other findings: no other findings. Righ

t sided lead use: right-sided leads 



                                         not used. 



                                         Left sided lead use: Posterior leads we

re not used. Clinical Impression: 



                                         normal ECGECG reviewed and does not glenn

t STEMI criteria. 





* Surgically obtained culture + gram stain (2020 12:42 PM CDT)



Only the most recent of 2 results within the time period is included.



   



                 Component       Value           Ref Range       Performed At

 

   



                     Result              2+ Same organism has been   [facility]



                           isolated from cultures(s) of   [facility]



                                         the same body site and  



                                         collection date. Repeat  



                                         identification and  



                                         susceptibility testing  



                                         performed only after  



                                         consultation with the clinical  



                                         microbiology laboratory. (A)  



                                         Comment:  



                                         Refer to previous culture of  



                                         Methicillin resistant  



                                         Staphylococcus aureus  

 

   



                     Result              <1+ Same organism has been   [facility]



                           isolated from cultures(s) of   [facility]



                                         the same body site and  



                                         collection date. Repeat  



                                         identification and  



                                         susceptibility testing  



                                         performed only after  



                                         consultation with the clinical  



                                         microbiology laboratory. (A)  



                                         Comment:  



                                         Refer to previous culture of  



                                         Acinetobacter baumannii  

 

   



                     Gram Stain Result   <1+ WBCs            [facility]

 

   



                     Gram Stain Result   No organisms seen   [facility]











                                         Specimen

 





                                         Wound







   



                 Performing Organization  Address         City/[facility]  Ph

one Number

 

   



                 [facility] [place] [address]

0  [phone]



                                         [facility]   





* AFB culture + smear (non-sputum) (2020 11:20 AM CDT)



   



                 Component       Value           Ref Range       Performed At

 

   



                     Result              No acid-fast bacilli isolated   [facility]



                           in 42 days                [facility]

 

   



                     AFB Smear           No acid fast bacilli seen   [facility]











                                         Specimen

 





                                         Wound







   



                 Performing Organization  Address         City/[facility]  Ph

one Number

 

   



                 [facility]  [address], [place] [address]

0  [phone]



                                         MEDICAL CENTER   





* Anaerobic culture (2020 11:20 AM CDT)



   



                 Component       Value           Ref Range       Performed At

 

   



                     Result              No anaerobes isolated   [facility]











                                         Specimen

 





                                         Wound







   



                 Performing Organization  Address         City/[facility]/Critical access hospital

one Number

 

   



                 [facility]  [address], [place] [address]

[phone]  [phone]



                                         MEDICAL CENTER   





* Fungus culture +  smear (2020 11:20 AM CDT)



   



                 Component       Value           Ref Range       Performed At

 

   



                     Result              No fungus isolated in 28 days   [facility]

 

   



                     Fungus Smear        No fungi seen       [facility]











                                         Specimen

 





                                         Wound







   



                 Performing Organization  Address         City/State/Critical access hospital

one Number

 

   



                 [address] [address]

0  [phone]



                                         MEDICAL CENTER   





* Wound culture + gram stain (2020  3:27 PM CDT)



Only the most recent of 5 results within the time period is included.



   



                 Component       Value           Ref Range       Performed At

 

   



                     Result              4+ Methicillin resistant   [facility]



                           Staphylococcus aureus (A)   [facility]

 

   



                     Gram Stain Result   1+ WBCs             [facility]

 

   



                     Gram Stain Result   1+ gram positive rods   [facility]

 

   



                     Gram Stain Result   4+ gram positive cocci in   [facility]

 

   



                     Gram Stain Result   1+ gram positive cocci in   [facility]











                                         Specimen

 





                                         Wound







                    Antibiotic          Method              Susceptibility



                                         Organism   

 

                    Clindamycin                             



0.25: Susceptible



                                         Methicillin resistant   



                                         Staphylococcus aureus   

 

                    Erythromycin                            



>=8: Resistant



                                         Methicillin resistant   



                                         Staphylococcus aureus   

 

                    Linezolid                               



2: Susceptible



                                         Methicillin resistant   



                                         Staphylococcus aureus   

 

                    Oxacillin                               



>=4: Resistant



                                         Methicillin resistant   



                                         Staphylococcus aureus   

 

                    Rifampin                                



<=0.5: Susceptible



                                         Methicillin resistant   



                                         Staphylococcus aureus   

 

                    Tetracycline                            



<=1: Susceptible



                                         Methicillin resistant   



                                         Staphylococcus aureus   

 

                    Trimethoprim + Sulfamethoxazole                     



<=10: Susceptible



                                         Methicillin resistant   



                                         Staphylococcus aureus   

 

                    Vancomycin                              



1: Susceptible



                                         Methicillin resistant   



                                         Staphylococcus aureus   







   



                 Performing Organization  Address         City/[facility]/Critical access hospital

one Number

 

   



                 [facility]  [address], [place] [address]

0  [phone]



                                         MEDICAL CENTER   





* Hemoglobin A1c (2020  4:21 AM CDT)



Only the most recent of 2 results within the time period is included.



   



                 Component       Value           Ref Range       Performed At

 

   



                 Hemoglobin A1C  8.0 (H)         4.3 - 6.1 %     [facility]











                                         Specimen

 





                                         Blood







   



                 Performing Organization  Address         City/State/Zipcode  Ph

one Number

 

   



                 [facility]  [address], [place] [address]

0  [phone]



                                         MEDICAL CENTER   





* Blood Culture Panel([employer]) (2020  3:21 PM CDT)



   



                 Component       Value           Ref Range       Performed At

 

   



                 LISTERIA MONOCYTOGENES  Not detected    Not detected    [facility]

 

   



                 STAPHYLOCOCCUS  Detected (A)    Not detected    [facility]



                           Comment:                  [facility]



                                         Coagulase negative Staph  



                                         species (CoNS)- methicillin  



                                         susceptible  



                                         First-line therapy: Cefazolin  



                                         or Oxacillin (Oxacillin  



                                         preferred if CNS involvement)  



                                         MecA NOT DETECTED  



                                         Possible contamination. The  



                                         likelihood of pathogenicity is  



                                         increased if the organism is  



                                         observed in multiple blood  



                                         cultures obtained from  



                                         separate venipunctures.  



                                         Reference Range: Not Detected  

 

   



                 STAPHYLOCOCCUS AUREUS  Not detected    Not detected    [facility]

 

   



                 Streptococcus   Not detected    Not detected    [facility]

 

   



                 STREPTOCOCCUS AGALACTIAE  Not detected    Not detected    [facility]



                           (GROUP B)                 [facility]

 

   



                 STREPTOCOCCUS PNEUMONIAE  Not detected    Not detected    [facility]

 

   



                 Streptococcus pyogenes  Not detected    Not detected    [facility]



                           (Group A)                 [facility]

 

   



                 ACINETOBACTER BAUMANNII  Not detected    Not detected    [facility]

 

   



                 HAEMOPHILUS INFLUENZAE  Not detected    Not detected    [facility]

 

   



                 NEISSERIA MENINGITIDIS  Not detected    Not detected    [facility]

 

   



                 ENTEROBACTERIACEAE  Not detected    Not detected    [facility]

 

   



                 ENTEROBACTER CLOACOE  Not detected    Not detected    [facility]

 

   



                 KLEBSIELLA OXYTOCA  Not detected    Not detected    [facility]

 

   



                 KLEBSIELLA PNEUMONIAE  Not detected    Not detected    [facility]

 

   



                 PROTEUS         Not detected    Not detected    [facility]

 

   



                 SERRATIA MARCESCENS  Not detected    Not detected    [facility]

 

   



                 CANDIDA ALBICANS  Not detected    Not detected    [facility]

 

   



                 EB GLABRATA  Not detected    Not detected    [facility]

 

   



                 EB KRUSEI  Not detected    Not detected    [facility]

 

   



                 EB PARAPSILOSIS  Not detected    Not detected    [facility]

 

   



                 CANDIDA TROPICALIS  Not detected    Not detected    [facility]

 

   



                 ESCHERICHIA COLI  Not detected    Not detected    [facility]

 

   



                 METHICILLIN-RESISTANCE  Not detected    Not detected    [facility]

 

   



                           VANCOMYCIN-RESISTANCE     [facility]

 

   



                           CARBAPENEM-RESISTANCE     [facility]

 

   



                 ENTEROCOCCUS    Not detected    Not detected    [facility]

 

   



                 PSEUDOMONAS AERUGINOSA  Not detected    Not detected    [facility]











                                         Specimen

 





                                         Blood







 



                           Narrative                 Performed At

 

 



                           Other bacteria and resistance markers not targeted by

 this PCR panel cannot be 

 [facility]



                           excluded; therefore clinical correlation and follow u

p of serology, culture  

[facility]



                                         results, and other molecular studies is

 required. The results are not intended 



                                         to be used as the sole means for clinic

al diagnosis or patient management 



                                         decisions. This sample was tested at 

e [facility]

 



                                         using the [employer]inel Blood Cultu

re ID Panel. It is FDA cleared and has 



                                         been verified and approved by the [facility]

 [facility] for 



                                         clinical use. This laboratory is CLIA-c

ertified and [employer] 



                                         Pathologists (CAP)-accredited to [facility]

m high complexity testing. 







   



                 Performing Organization  Address         City/[facility]  Ph

one Number

 

   



                 [facility]  [address], 

0  [phone]



                                         MEDICAL CENTER   





* Urine culture (2020  1:29 PM CDT)



   



                 Component       Value           Ref Range       Performed At

 

   



                     Result              >100,000 col/mL Candida   [facility]



                           glabrata (A)              [facility]

 

   



                     Result              <10,000 col/mL Staphylococcus   [facility]



                           haemolyticus (A)          [facility]

 

   



                     Result              <10,000 col/mL Staphylococcus   [facility]



                           epidermidis (A)           [facility]











                                         Specimen

 





                                         Urine - Urine, Straight



                                         Catheter







   



                 Performing Organization  Address         City/[facility]/Critical access hospital

one Number

 

   



                 [address] [address]

[phone]  [phone]



                                         MEDICAL CENTER   





* Blood gas, venous (2020  1:11 PM CDT)



   



                 Component       Value           Ref Range       Performed At

 

   



                 pH, Aime         7.41            7.32 - 7.42     [facility]

 

   



                 pCO2, Aime       48              41 - 51 mmHg    [facility]

 

   



                 pO2, Aime        50 (H)          25 - 40 mmHg    [facility]

 

   



                 O2 Sat, Aime     82.8 (H)        40.0 - 70.0 %   [facility]

 

   



                 HCO3, Aime       30 (H)          21 - 29 mmol/L  [facility]

 

   



                 Base Excess, Aime  4.8 (H)         -2.0 - 3.0 mmol/L  [facility]

 

   



                 Patient Temperature  38.3            C               [facility]

 

   



                 FIO2            21.0            %               [facility]











                                         Specimen

 





                                         Blood







   



                 Performing Organization  Address         City/[facility]/Critical access hospital

one [place] [address]

[phone]  [phone] [place] [address]   





* Ketones, blood (2020  1:10 PM CDT)



   



                 Component       Value           Ref Range       Performed At

 

   



                 Ketones, Blood  0.0             <0.4 mmol/L     [facility]











                                         Specimen

 





                                         Blood







   



                 Performing Organization  Address         City/[facility]/Critical access hospital

one Number

 

   



                 [place] [address]

[phone]  [phone]



                                         [facility]   





* Vancomycin level, trough (2020  9:26 PM CDT)



Only the most recent of 7 results within the time period is included.



   



                 Component       Value           Ref Range       Performed At

 

   



                 Vancomycin Tr   15.4            10.0 - 20.0 ug/mL  [facility]











                                         Specimen

 





                                         Blood







 



                           Narrative                 Performed At

 

 



                            ID - BS          [facility]







   



                 Performing Organization  Address         City/[facility]/Critical access hospital

one [facility]  [address], [place] [address]

0  [phone]



                                         [facility]   





* CBC (Hemogram only) (2020  2:55 PM CDT)



Only the most recent of 2 results within the time period is included.



   



                 Component       Value           Ref Range       Performed At

 

   



                 WBC             12.1 (H)        3.5 - 10.5 K/L  [facility]

 

   



                 RBC             4.04 (L)        4.63 - 6.08 M/L  [facility]

 

   



                 Hemoglobin      10.1 (L)        13.7 - 17.5 GM/DL  [facility]

 

   



                 Hematocrit      32.9 (L)        40.1 - 51.0 %   [facility]

 

   



                 MCV             81.4            79.0 - 92.2 fL  [facility]

 

   



                 MCH             25.0 (L)        25.7 - 32.2 pg  [facility]

 

   



                 MCHC            30.7 (L)        32.3 - 36.5 GM/DL  [facility]

 

   



                 RDW             19.9 (H)        11.6 - 14.4 %   [facility]

 

   



                 Platelets       576 (H)         150 - 450 K/CU MM  [facility]

 

   



                 MPV             8.2 (L)         9.4 - 12.4 fL   [facility]

 

   



                 nRBC            0               0 - 0 /100 WBC  [facility]











                                         Specimen

 





                                         Blood







   



                 Performing Organization  Address         City/State/Zipcode  Ph

one Number

 

   



                 [facility]  [address], [place] [address]

[phone]  [phone]



                                         [facility]   





* BUN and Creatinine (2020  1:22 PM CDT)



   



                 Component       Value           Ref Range       Performed At

 

   



                 BUN             21              7 - 21 mg/dL    [facility]

 

   



                 Creatinine      0.48 (L)        0.57 - 1.25 mg/dL  [facility]

 

   



                     BUN/Creatinine Ratio  43.7500Comment: For a normal   [facility]



                           individual on a normal diet,   [facility]



                                         the reference interval for the  



                                         mass ratio ranges between 12:1  



                                         and 20:1 (BUN in  



                                         mg/dL/creatinine in mg/dL)  

 

   



                 EGFR            195Comment: ESTIMATED GFR IS  mL/min/1.73 sq [facility]



                           NOT AS ACCURATE AS CREATININE   [facility]



                                         CLEARANCE IN PREDICTING  



                                         GLOMERULAR FILTRATION RATE.  



                                         ESTIMATED GFR IS NOT  



                                         APPLICABLE FOR DIALYSIS  



                                         PATIENTS.  











                                         Specimen

 





                                         Blood







 



                           Narrative                 Performed At

 

 



                            ID - MOHSEN M      [facility]







   



                 Performing Organization  Address         City/[facility]/Critical access hospital

[address] [address] [place] [address]   





* TSH/Free T4 If Indicated (2020  9:59 AM CDT)



   



                 Component       Value           Ref Range       Performed At

 

   



                 TSH             2.67            0.35 - 4.94 uIU/mL  [facility]











                                         Specimen

 





                                         Blood







 



                           Narrative                 Performed At

 

 



                            ID - [facility]







   



                 Performing Organization  Address         City/[facility]/Critical access hospital

[address] [address] [place] [address]   





* Prealbumin (2020  9:59 AM CDT)



   



                 Component       Value           Ref Range       Performed At

 

   



                 Prealbumin      7 (L)           14 - 45 mg/dL   [facility]











                                         Specimen

 





                                         Blood







 



                           Narrative                 Performed At

 

 



                            ID - [facility]







   



                 Performing Organization  Address         City/[facility]/Critical access hospital

[address] [address] [place] [address]   





* Central Line (2020  9:32 AM CDT)



 



                           Narrative                 Performed At

 

 



                                         Hesham Estrada MD 3/11/2020

 12:37 PM 



                                         Central Line 



                                         Date/Time: 3/11/2020 10:22 AM 



                                         Performed by: Hesham Estrada MD 



                                         Authorized by: Hesham Estrada MD 



                                         Consent: Verbal consent obtained. 



                                         Risks and benefits: risks, benefits and

 alternatives were discussed 



                                         Consent given by: patient 



                                         Patient identity confirmed: verbally wi

th patient 



                                         Indications: vascular access 



                                         Anesthesia: local infiltration 



                                         Preparation: skin prepped with ChloraPr

ep 



                                         Skin prep agent dried: skin prep agent 

completely dried prior to procedure 



                                         Sterile barriers: all five maximum ster

ile barriers used - cap, mask, 



                                         sterile gown, sterile gloves, and large

 sterile sheet 



                                         Hand hygiene: hand hygiene performed pr

ior to central venous catheter 



                                         insertion 



                                         Location details: right internal jugula

r 



                                         Patient position: flat 



                                         Catheter type: triple lumen 



                                         Catheter size: 7.5 Fr 



                                         Pre-procedure: landmarks identified 



                                         Ultrasound guidance: yes 



                                         Sterile ultrasound techniques: sterile 

gel and sterile probe covers were 



                                         used 



                                         Number of attempts: 1 



                                         Successful placement: yes 



                                         Post-procedure: line sutured 



                                         Immediate Post-Procedure Note 



                                         Assistants to the procedure: None 



                                         Pre-procedure diagnosis: SEPSIS 



                                         Post-procedure diagnosis: SEPSIS 



                                         Procedures Performed: Central Line 



                                         Specimens removed: None 



                                         Estimated blood loss (mL): None 



                                         Complications: None 



                                         Type of anesthesia: None 



                                         Grafts or Implants: None 





after 2019



Insurance





     



            Payer      Benefit    Subscriber ID  Type       Phone      Address



                                         Plan /    



                                         Group    

 

     



                     [place]     MEDICAID            xxxxxxxxx   



                                         [place]    

 

     



                 CDC REVIEW      CDC REVIEW      [address]        [address]



                                         [place] [address]







     



            Guarantor Name  Account    Relation to  Date of    Phone      Billin

g Address



                     Type                Patient             Birth  

 

     



            Michael Rose II  Personal/F  Self       1980  [phone] 

 [address]               (Home)              [place] [address]

[address]







Advance Directives





For more information, please contact:



[facility]



[address] [place] [address] [phone]







                          Date Inactivated          Comments



                           Code Status               Date Activated  

 

                          2020  6:40 PM          



                           Full Code                 8/3/2020 11:46 PM  







  



                           This code status was determined by:  Patient 







                                                     

 

                          4/15/2020  4:41 PM         



                           Full Code                 3/11/2020  9:32 AM  







  



                           This code status was determined by:  Patient 







                                                     

 

                          3/11/2020  9:09 AM         



                           Full Code                 2018  2:09 AM  







  



                           This code status was determined by:  Patient 







                                                     

 

                          2018 10:28 AM        



                           Full Code                 11/10/2018  4:19 AM  







  



                           This code status was determined by:  Patient

## 2020-09-01 NOTE — DIAGNOSTIC IMAGING REPORT
TIB/FIB 2VW RT - HOPD - 5 views



HISTORY:  Pain

COMPARISON: None available.

    

IMPRESSION: 

No evidence of acute displaced fracture or dislocation.

Old fracture deformities of mid to distal tibia. There is also a subtle lucent

line and mild deformity of distal fibula, probably also an old fracture

deformity. However, acute nondisplaced fibular fracture line in this area

cannot be entirely excluded.



Signed by: Dr. Umair Thomas MD on 9/1/2020 9:55 PM
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
## 2023-04-18 NOTE — NUR
Bedside report and rounds completed with oncoming nurse. Patient in bed resting, call light 
within reach. No issues or concerns noted. Is This A New Presentation, Or A Follow-Up?: Skin Lesions How Severe Is Your Skin Lesion?: mild Have Your Skin Lesions Been Treated?: not been treated

## 2025-03-04 NOTE — NUR
4 PRESSURE ULCERS,   LEFT HIP, APPROX 6 CM BY 4 CM,  6CM DEEP PURULENT DRAINAGE 
AND FOUL SMELL.  LEFT GLUTEUS POSS.     6 CM BY 4 CM,   RIGHT HIP,  6 CM BY 5 
CM.   RIGHT GLUTEUS PURULENT WITH NECROSIS  6 CM BY 4 CM,  RIGHT OUTER ANKLE 
HAS A HEALED DECB,   LEFT OUTER ANKLE IS HEALING WITH NO DRAINAGE.  PT ALSO 
SELF CATHS FOR URINE. Statement Selected